# Patient Record
Sex: MALE | Race: WHITE | NOT HISPANIC OR LATINO | URBAN - METROPOLITAN AREA
[De-identification: names, ages, dates, MRNs, and addresses within clinical notes are randomized per-mention and may not be internally consistent; named-entity substitution may affect disease eponyms.]

---

## 2013-02-19 RX ORDER — DONEPEZIL HYDROCHLORIDE 10 MG/1
1 TABLET, FILM COATED ORAL
Qty: 0 | Refills: 0 | COMMUNITY
Start: 2013-02-19 | End: 2014-01-18

## 2017-03-10 ENCOUNTER — INPATIENT (INPATIENT)
Facility: HOSPITAL | Age: 82
LOS: 5 days | Discharge: HOME | DRG: 871 | End: 2017-03-16
Attending: HOSPITALIST | Admitting: HOSPITALIST
Payer: MEDICARE

## 2017-03-10 VITALS — HEART RATE: 91 BPM | SYSTOLIC BLOOD PRESSURE: 149 MMHG | DIASTOLIC BLOOD PRESSURE: 72 MMHG | RESPIRATION RATE: 16 BRPM

## 2017-03-10 DIAGNOSIS — F03.90 UNSPECIFIED DEMENTIA, UNSPECIFIED SEVERITY, WITHOUT BEHAVIORAL DISTURBANCE, PSYCHOTIC DISTURBANCE, MOOD DISTURBANCE, AND ANXIETY: ICD-10-CM

## 2017-03-10 DIAGNOSIS — G91.2 (IDIOPATHIC) NORMAL PRESSURE HYDROCEPHALUS: Chronic | ICD-10-CM

## 2017-03-10 DIAGNOSIS — G91.2 (IDIOPATHIC) NORMAL PRESSURE HYDROCEPHALUS: ICD-10-CM

## 2017-03-10 DIAGNOSIS — A41.9 SEPSIS, UNSPECIFIED ORGANISM: ICD-10-CM

## 2017-03-10 DIAGNOSIS — E11.9 TYPE 2 DIABETES MELLITUS WITHOUT COMPLICATIONS: ICD-10-CM

## 2017-03-10 DIAGNOSIS — J18.9 PNEUMONIA, UNSPECIFIED ORGANISM: ICD-10-CM

## 2017-03-10 DIAGNOSIS — N40.0 BENIGN PROSTATIC HYPERPLASIA WITHOUT LOWER URINARY TRACT SYMPTOMS: ICD-10-CM

## 2017-03-10 DIAGNOSIS — Z29.9 ENCOUNTER FOR PROPHYLACTIC MEASURES, UNSPECIFIED: ICD-10-CM

## 2017-03-10 DIAGNOSIS — E78.5 HYPERLIPIDEMIA, UNSPECIFIED: ICD-10-CM

## 2017-03-10 DIAGNOSIS — R05 COUGH: ICD-10-CM

## 2017-03-10 DIAGNOSIS — M54.5 LOW BACK PAIN: ICD-10-CM

## 2017-03-10 LAB
ALBUMIN SERPL ELPH-MCNC: 3.9 G/DL — SIGNIFICANT CHANGE UP (ref 3.3–5)
ALP SERPL-CCNC: 74 U/L — SIGNIFICANT CHANGE UP (ref 40–120)
ALT FLD-CCNC: 19 U/L RC — SIGNIFICANT CHANGE UP (ref 10–45)
ANION GAP SERPL CALC-SCNC: 15 MMOL/L — SIGNIFICANT CHANGE UP (ref 5–17)
APPEARANCE UR: ABNORMAL
AST SERPL-CCNC: 27 U/L — SIGNIFICANT CHANGE UP (ref 10–40)
BACTERIA # UR AUTO: ABNORMAL /HPF
BASOPHILS # BLD AUTO: 0 K/UL — SIGNIFICANT CHANGE UP (ref 0–0.2)
BILIRUB SERPL-MCNC: 1.2 MG/DL — SIGNIFICANT CHANGE UP (ref 0.2–1.2)
BILIRUB UR-MCNC: NEGATIVE — SIGNIFICANT CHANGE UP
BUN SERPL-MCNC: 20 MG/DL — SIGNIFICANT CHANGE UP (ref 7–23)
CALCIUM SERPL-MCNC: 9.4 MG/DL — SIGNIFICANT CHANGE UP (ref 8.4–10.5)
CHLORIDE SERPL-SCNC: 100 MMOL/L — SIGNIFICANT CHANGE UP (ref 96–108)
CO2 SERPL-SCNC: 22 MMOL/L — SIGNIFICANT CHANGE UP (ref 22–31)
COLOR SPEC: YELLOW — SIGNIFICANT CHANGE UP
COMMENT - URINE: SIGNIFICANT CHANGE UP
CREAT SERPL-MCNC: 1.17 MG/DL — SIGNIFICANT CHANGE UP (ref 0.5–1.3)
DIFF PNL FLD: ABNORMAL
EOSINOPHIL # BLD AUTO: 0 K/UL — SIGNIFICANT CHANGE UP (ref 0–0.5)
GAS PNL BLDV: SIGNIFICANT CHANGE UP
GLUCOSE SERPL-MCNC: 210 MG/DL — HIGH (ref 70–99)
GLUCOSE UR QL: 250
HCT VFR BLD CALC: 48.8 % — SIGNIFICANT CHANGE UP (ref 39–50)
HGB BLD-MCNC: 16.2 G/DL — SIGNIFICANT CHANGE UP (ref 13–17)
KETONES UR-MCNC: NEGATIVE — SIGNIFICANT CHANGE UP
LEUKOCYTE ESTERASE UR-ACNC: ABNORMAL
LYMPHOCYTES # BLD AUTO: 0.7 K/UL — LOW (ref 1–3.3)
LYMPHOCYTES # BLD AUTO: 4 % — LOW (ref 13–44)
MCHC RBC-ENTMCNC: 27.4 PG — SIGNIFICANT CHANGE UP (ref 27–34)
MCHC RBC-ENTMCNC: 33.1 GM/DL — SIGNIFICANT CHANGE UP (ref 32–36)
MCV RBC AUTO: 82.7 FL — SIGNIFICANT CHANGE UP (ref 80–100)
MONOCYTES # BLD AUTO: 2.4 K/UL — HIGH (ref 0–0.9)
MONOCYTES NFR BLD AUTO: 10 % — SIGNIFICANT CHANGE UP (ref 2–14)
NEUTROPHILS # BLD AUTO: 22 K/UL — HIGH (ref 1.8–7.4)
NEUTROPHILS NFR BLD AUTO: 84 % — HIGH (ref 43–77)
NITRITE UR-MCNC: NEGATIVE — SIGNIFICANT CHANGE UP
PH UR: 6 — SIGNIFICANT CHANGE UP (ref 4.8–8)
PLATELET # BLD AUTO: 178 K/UL — SIGNIFICANT CHANGE UP (ref 150–400)
POTASSIUM SERPL-MCNC: 4.8 MMOL/L — SIGNIFICANT CHANGE UP (ref 3.5–5.3)
POTASSIUM SERPL-SCNC: 4.8 MMOL/L — SIGNIFICANT CHANGE UP (ref 3.5–5.3)
PROT SERPL-MCNC: 6.9 G/DL — SIGNIFICANT CHANGE UP (ref 6–8.3)
PROT UR-MCNC: 100 MG/DL
RAPID RVP RESULT: SIGNIFICANT CHANGE UP
RBC # BLD: 5.9 M/UL — HIGH (ref 4.2–5.8)
RBC # FLD: 12.6 % — SIGNIFICANT CHANGE UP (ref 10.3–14.5)
RBC CASTS # UR COMP ASSIST: >50 /HPF (ref 0–2)
SODIUM SERPL-SCNC: 137 MMOL/L — SIGNIFICANT CHANGE UP (ref 135–145)
SP GR SPEC: 1.03 — HIGH (ref 1.01–1.02)
UROBILINOGEN FLD QL: NEGATIVE — SIGNIFICANT CHANGE UP
WBC # BLD: 25.1 K/UL — HIGH (ref 3.8–10.5)
WBC # FLD AUTO: 25.1 K/UL — HIGH (ref 3.8–10.5)
WBC UR QL: >50 /HPF (ref 0–5)

## 2017-03-10 PROCEDURE — 71010: CPT | Mod: 26

## 2017-03-10 PROCEDURE — 99222 1ST HOSP IP/OBS MODERATE 55: CPT | Mod: GC

## 2017-03-10 PROCEDURE — 70450 CT HEAD/BRAIN W/O DYE: CPT | Mod: 26

## 2017-03-10 PROCEDURE — 99285 EMERGENCY DEPT VISIT HI MDM: CPT | Mod: GC

## 2017-03-10 PROCEDURE — 99223 1ST HOSP IP/OBS HIGH 75: CPT | Mod: AI,GC

## 2017-03-10 RX ORDER — ATORVASTATIN CALCIUM 80 MG/1
40 TABLET, FILM COATED ORAL AT BEDTIME
Qty: 0 | Refills: 0 | Status: DISCONTINUED | OUTPATIENT
Start: 2017-03-10 | End: 2017-03-14

## 2017-03-10 RX ORDER — TAMSULOSIN HYDROCHLORIDE 0.4 MG/1
0.4 CAPSULE ORAL AT BEDTIME
Qty: 0 | Refills: 0 | Status: DISCONTINUED | OUTPATIENT
Start: 2017-03-10 | End: 2017-03-16

## 2017-03-10 RX ORDER — AZITHROMYCIN 500 MG/1
500 TABLET, FILM COATED ORAL ONCE
Qty: 0 | Refills: 0 | Status: COMPLETED | OUTPATIENT
Start: 2017-03-10 | End: 2017-03-10

## 2017-03-10 RX ORDER — INSULIN LISPRO 100/ML
VIAL (ML) SUBCUTANEOUS AT BEDTIME
Qty: 0 | Refills: 0 | Status: DISCONTINUED | OUTPATIENT
Start: 2017-03-10 | End: 2017-03-16

## 2017-03-10 RX ORDER — IPRATROPIUM/ALBUTEROL SULFATE 18-103MCG
3 AEROSOL WITH ADAPTER (GRAM) INHALATION ONCE
Qty: 0 | Refills: 0 | Status: COMPLETED | OUTPATIENT
Start: 2017-03-10 | End: 2017-03-10

## 2017-03-10 RX ORDER — INSULIN LISPRO 100/ML
VIAL (ML) SUBCUTANEOUS
Qty: 0 | Refills: 0 | Status: DISCONTINUED | OUTPATIENT
Start: 2017-03-10 | End: 2017-03-16

## 2017-03-10 RX ORDER — SODIUM CHLORIDE 9 MG/ML
1000 INJECTION, SOLUTION INTRAVENOUS
Qty: 0 | Refills: 0 | Status: DISCONTINUED | OUTPATIENT
Start: 2017-03-10 | End: 2017-03-16

## 2017-03-10 RX ORDER — GLUCAGON INJECTION, SOLUTION 0.5 MG/.1ML
1 INJECTION, SOLUTION SUBCUTANEOUS ONCE
Qty: 0 | Refills: 0 | Status: DISCONTINUED | OUTPATIENT
Start: 2017-03-10 | End: 2017-03-16

## 2017-03-10 RX ORDER — SODIUM CHLORIDE 9 MG/ML
500 INJECTION INTRAMUSCULAR; INTRAVENOUS; SUBCUTANEOUS ONCE
Qty: 0 | Refills: 0 | Status: COMPLETED | OUTPATIENT
Start: 2017-03-10 | End: 2017-03-10

## 2017-03-10 RX ORDER — ACETAMINOPHEN 500 MG
650 TABLET ORAL EVERY 6 HOURS
Qty: 0 | Refills: 0 | Status: DISCONTINUED | OUTPATIENT
Start: 2017-03-10 | End: 2017-03-16

## 2017-03-10 RX ORDER — SODIUM CHLORIDE 9 MG/ML
1000 INJECTION INTRAMUSCULAR; INTRAVENOUS; SUBCUTANEOUS ONCE
Qty: 0 | Refills: 0 | Status: COMPLETED | OUTPATIENT
Start: 2017-03-10 | End: 2017-03-10

## 2017-03-10 RX ORDER — PIPERACILLIN AND TAZOBACTAM 4; .5 G/20ML; G/20ML
3.38 INJECTION, POWDER, LYOPHILIZED, FOR SOLUTION INTRAVENOUS EVERY 8 HOURS
Qty: 0 | Refills: 0 | Status: DISCONTINUED | OUTPATIENT
Start: 2017-03-10 | End: 2017-03-15

## 2017-03-10 RX ORDER — DEXTROSE 50 % IN WATER 50 %
25 SYRINGE (ML) INTRAVENOUS ONCE
Qty: 0 | Refills: 0 | Status: DISCONTINUED | OUTPATIENT
Start: 2017-03-10 | End: 2017-03-16

## 2017-03-10 RX ORDER — HEPARIN SODIUM 5000 [USP'U]/ML
5000 INJECTION INTRAVENOUS; SUBCUTANEOUS EVERY 8 HOURS
Qty: 0 | Refills: 0 | Status: DISCONTINUED | OUTPATIENT
Start: 2017-03-10 | End: 2017-03-16

## 2017-03-10 RX ORDER — DONEPEZIL HYDROCHLORIDE 10 MG/1
10 TABLET, FILM COATED ORAL AT BEDTIME
Qty: 0 | Refills: 0 | Status: DISCONTINUED | OUTPATIENT
Start: 2017-03-10 | End: 2017-03-16

## 2017-03-10 RX ORDER — ACETAMINOPHEN 500 MG
1000 TABLET ORAL ONCE
Qty: 0 | Refills: 0 | Status: COMPLETED | OUTPATIENT
Start: 2017-03-10 | End: 2017-03-10

## 2017-03-10 RX ORDER — DEXTROSE 50 % IN WATER 50 %
12.5 SYRINGE (ML) INTRAVENOUS ONCE
Qty: 0 | Refills: 0 | Status: DISCONTINUED | OUTPATIENT
Start: 2017-03-10 | End: 2017-03-16

## 2017-03-10 RX ORDER — CEFTRIAXONE 500 MG/1
1 INJECTION, POWDER, FOR SOLUTION INTRAMUSCULAR; INTRAVENOUS ONCE
Qty: 0 | Refills: 0 | Status: COMPLETED | OUTPATIENT
Start: 2017-03-10 | End: 2017-03-10

## 2017-03-10 RX ORDER — SODIUM CHLORIDE 9 MG/ML
1000 INJECTION INTRAMUSCULAR; INTRAVENOUS; SUBCUTANEOUS
Qty: 0 | Refills: 0 | Status: DISCONTINUED | OUTPATIENT
Start: 2017-03-10 | End: 2017-03-11

## 2017-03-10 RX ORDER — DEXTROSE 50 % IN WATER 50 %
1 SYRINGE (ML) INTRAVENOUS ONCE
Qty: 0 | Refills: 0 | Status: DISCONTINUED | OUTPATIENT
Start: 2017-03-10 | End: 2017-03-16

## 2017-03-10 RX ORDER — PIPERACILLIN AND TAZOBACTAM 4; .5 G/20ML; G/20ML
3.38 INJECTION, POWDER, LYOPHILIZED, FOR SOLUTION INTRAVENOUS ONCE
Qty: 0 | Refills: 0 | Status: COMPLETED | OUTPATIENT
Start: 2017-03-10 | End: 2017-03-10

## 2017-03-10 RX ORDER — GALANTAMINE HYDROBROMIDE 4 MG/1
1 TABLET, FILM COATED ORAL
Qty: 0 | Refills: 0 | COMMUNITY

## 2017-03-10 RX ORDER — IPRATROPIUM/ALBUTEROL SULFATE 18-103MCG
3 AEROSOL WITH ADAPTER (GRAM) INHALATION EVERY 6 HOURS
Qty: 0 | Refills: 0 | Status: DISCONTINUED | OUTPATIENT
Start: 2017-03-10 | End: 2017-03-13

## 2017-03-10 RX ORDER — LIDOCAINE 4 G/100G
1 CREAM TOPICAL DAILY
Qty: 0 | Refills: 0 | Status: DISCONTINUED | OUTPATIENT
Start: 2017-03-10 | End: 2017-03-16

## 2017-03-10 RX ADMIN — CEFTRIAXONE 100 GRAM(S): 500 INJECTION, POWDER, FOR SOLUTION INTRAMUSCULAR; INTRAVENOUS at 11:36

## 2017-03-10 RX ADMIN — Medication 3 MILLILITER(S): at 13:55

## 2017-03-10 RX ADMIN — Medication 1000 MILLIGRAM(S): at 11:11

## 2017-03-10 RX ADMIN — SODIUM CHLORIDE 70 MILLILITER(S): 9 INJECTION INTRAMUSCULAR; INTRAVENOUS; SUBCUTANEOUS at 20:09

## 2017-03-10 RX ADMIN — Medication 3 MILLILITER(S): at 21:04

## 2017-03-10 RX ADMIN — SODIUM CHLORIDE 70 MILLILITER(S): 9 INJECTION INTRAMUSCULAR; INTRAVENOUS; SUBCUTANEOUS at 16:49

## 2017-03-10 RX ADMIN — SODIUM CHLORIDE 500 MILLILITER(S): 9 INJECTION INTRAMUSCULAR; INTRAVENOUS; SUBCUTANEOUS at 10:35

## 2017-03-10 RX ADMIN — DONEPEZIL HYDROCHLORIDE 10 MILLIGRAM(S): 10 TABLET, FILM COATED ORAL at 21:04

## 2017-03-10 RX ADMIN — SODIUM CHLORIDE 1000 MILLILITER(S): 9 INJECTION INTRAMUSCULAR; INTRAVENOUS; SUBCUTANEOUS at 09:28

## 2017-03-10 RX ADMIN — ATORVASTATIN CALCIUM 40 MILLIGRAM(S): 80 TABLET, FILM COATED ORAL at 21:04

## 2017-03-10 RX ADMIN — AZITHROMYCIN 250 MILLIGRAM(S): 500 TABLET, FILM COATED ORAL at 12:38

## 2017-03-10 RX ADMIN — PIPERACILLIN AND TAZOBACTAM 200 GRAM(S): 4; .5 INJECTION, POWDER, LYOPHILIZED, FOR SOLUTION INTRAVENOUS at 16:50

## 2017-03-10 RX ADMIN — HEPARIN SODIUM 5000 UNIT(S): 5000 INJECTION INTRAVENOUS; SUBCUTANEOUS at 17:00

## 2017-03-10 RX ADMIN — Medication 400 MILLIGRAM(S): at 09:41

## 2017-03-10 RX ADMIN — Medication 3 MILLILITER(S): at 11:05

## 2017-03-10 RX ADMIN — PIPERACILLIN AND TAZOBACTAM 25 GRAM(S): 4; .5 INJECTION, POWDER, LYOPHILIZED, FOR SOLUTION INTRAVENOUS at 21:05

## 2017-03-10 RX ADMIN — Medication 3 MILLILITER(S): at 14:31

## 2017-03-10 RX ADMIN — TAMSULOSIN HYDROCHLORIDE 0.4 MILLIGRAM(S): 0.4 CAPSULE ORAL at 21:04

## 2017-03-10 RX ADMIN — SODIUM CHLORIDE 1000 MILLILITER(S): 9 INJECTION INTRAMUSCULAR; INTRAVENOUS; SUBCUTANEOUS at 13:05

## 2017-03-10 RX ADMIN — Medication 1: at 18:36

## 2017-03-10 RX ADMIN — HEPARIN SODIUM 5000 UNIT(S): 5000 INJECTION INTRAVENOUS; SUBCUTANEOUS at 21:04

## 2017-03-10 RX ADMIN — LIDOCAINE 1 PATCH: 4 CREAM TOPICAL at 16:49

## 2017-03-10 NOTE — ED PROVIDER NOTE - CARE PLAN
Principal Discharge DX:	Pneumonia due to infectious organism, unspecified laterality, unspecified part of lung

## 2017-03-10 NOTE — H&P ADULT. - FAMILY HISTORY
Father  Still living? Unknown  Family history of colon cancer, Age at diagnosis: Age Unknown  Family history of stroke, Age at diagnosis: Age Unknown     Mother  Still living? Unknown  Family history of colon cancer, Age at diagnosis: Age Unknown  Family history of stroke, Age at diagnosis: Age Unknown

## 2017-03-10 NOTE — ED PROVIDER NOTE - ATTENDING CONTRIBUTION TO CARE
Dr. Poe (Attending Physician)  Pt. with ho NPH s/p ventriculostomy, utis, pna pw fever, weakness, fatigue, cough hypoxic to low 90s but denies shortness of breath, lungs with diffuse wheezing and rhonchi worst in RUL. Denies urinary symptoms.  Abd. soft/nontender.  Will cxr, labs, RVP, lactate, ecg.  Will cover for CAP.    I performed a history and physical exam of the patient and discussed their management with the resident. I reviewed the resident's note and agree with the documented findings and plan of care. My medical decision making and observations are found above.

## 2017-03-10 NOTE — ED PROVIDER NOTE - MEDICAL DECISION MAKING DETAILS
82M hx multiple medical comorbidities 82M hx multiple medical comorbidities including NPH c/b chronic UTIs presents with AMS and weakness this AM. Chronic cough.  SpO2 91% on room air, VS otherwise WNL.  wheezes noted on exam.  URI resulted in bronchospasm +/- UTI.  Will obtain labs including vbg, rvp, bcx, ucx.  cxr, ekg, CTH.  IVF bolus and duoneb. reassess.

## 2017-03-10 NOTE — H&P ADULT. - PROBLEM SELECTOR PLAN 3
Has history of incontinence and repeat UTIs.   - Continue home vesicare, mirabegron. Hold home hiprex.

## 2017-03-10 NOTE — H&P ADULT. - PROBLEM SELECTOR PLAN 4
Hold home oral medications glipizide, acarbose, and januvia.  - SSI and FSGs  - Consistent carbohydrate diet

## 2017-03-10 NOTE — H&P ADULT. - PROBLEM SELECTOR PLAN 1
2/2 unclear source of infection, likely urinary. Has received 2.5 L NS at the ED  - Continue mIVF   - Blood cultures, urine culture and UA pending  - CXR without obvious source of infection. RVP negative. 2/2 unclear source of infection, likely urinary. Has received 2.5 L NS at the ED  - Continue mIVF   - Blood cultures, urine culture and UA pending  - CXR without obvious source of infection. RVP negative.  - Discuss home carvedilol.

## 2017-03-10 NOTE — ED ADULT NURSE NOTE - OBJECTIVE STATEMENT
82M came to ED via ambulance after wife called 911 because the patient was "unresponsive". Pts wife states she was speaking to him around 0300 this morning, and the patient wouldn't answer her. Pt a&ox2-3 with confusion as to the year. Last night around 2000, pt told his wife he had the chills and he went to bed much earlier than normal. Pt was at baseline mental status when he went to bed. At 0300 this AM pts wife noticed change in mental status. Pt has PMH hydrocephalus, DM and high cholesterol. Pt did not take any medications last night for chills. Pt denies chest pain/SOB/N/V/D/dizziness/abdominal pain/trauma/weakness/pain. Pt had history of "nephrotic syndrome" after taking Aspirin in the past, causing him to gain 50 lbs as per wife. Pts wife states that the lethargy and confusion he is experiencing is similar to when he had a UTI in the past. Pts oxygen saturation 91% on RA, so patient placed on 2L NC upon ED arrival. Pt ambulates with walker at home.

## 2017-03-10 NOTE — H&P ADULT. - ATTENDING COMMENTS
83 yo M pmhx NPH (s/p ventriculostomy in 2012), incontinence, previous UTIs, dementia, HTN, chronic low back pain s/p prior surgeries, HTN, T2DM and nephrotic syndrome who presents with fever, AMS and incontinence admitted for:  1.Sepsis likely in the setting of UTI in a pt with hx of recurrent UTI's on chronic immunosuppresive therapy as outpatient  -Start IV zosyn  -Pt has seen Dr. Santiago in the past, ID eval requested   -Follow up urine and blood cultures  -Trend wbc and temp curve   2.Acute metabolic encephalopthy superimposed on dementia likely multifactorial in the setting of sepsis and polypharmacy  -Would hold merbetriq, vesicare, diluadid for now   -PT eval, oob, fall precautions  -Continue aricept, galantamine for hx of dementia  3.Chronic low back pain 2/2 multiple sx hx   -Start tylenol and lidocaine patch for back pain   -Uptitrate as clinically indicated for optimal pain control   4.DM2  -SS for now, transition to lantus, humalog pending weight  -A1C in am  Rest per note above by Dr. Hamlin

## 2017-03-10 NOTE — H&P ADULT. - ASSESSMENT
81 yo M pmhx NPH (s/p ventriculostomy in 2012), incontinence, previous UTIs, dementia, HTN, chronic low back pain s/p prior surgeries, HTN, T2DM and nephrotic syndrome who presents with fever, AMS and incontinence 2/2 sepsis.

## 2017-03-10 NOTE — H&P ADULT. - PROBLEM SELECTOR PLAN 2
Has history of chronic cough with prior work-up in past per wife. CXR without obvious source of infection and negative RVP. Of note he had mild wheeze and hypoxia to low 90s.   - Continue supplemental O2  - Continue duonebs

## 2017-03-10 NOTE — ED ADULT NURSE NOTE - PMH
BPH (benign prostatic hyperplasia)    Chronic Back Pain  L3-L4, L4-L5 compression  Chronic lower back pain  unclear etiology per wife - no hx trauma, possibly arthritis  H/O recurrent urinary tract infection    Hypertension, Essential    Lumbar spinal stenosis    Mild dementia    Nephrotic syndrome  pt was instructed to avoid taking NSAIDS  NPH (Normal Pressure Hydrocephalus)  s/p ventriculostomy, last one in Jan 2012  OAB (overactive bladder)    Type 2 diabetes mellitus    Vitamin D deficiency

## 2017-03-10 NOTE — ED PROVIDER NOTE - PROGRESS NOTE DETAILS
Case d/w with Dr. Duque's PA Divina  who agrees with plan to admit.  Dr. Duque's group with follow while in ED.      Yisel Cramer MD

## 2017-03-10 NOTE — ED PROVIDER NOTE - OBJECTIVE STATEMENT
82M hx multiple medical comorbidities including dementia, NPH s/p remote ventriculostomy c/b incontinence and multiple UTIs presents with AMS this morning associated with generalized weakness and decreased responsiveness.  Patient ambulates with walker at baseline but could not get up from bed today. Wife reports chills last night but otherwise patient was at baseline.  Denies nausea/vomiting, dysarthria, facial droop or focal deficits, trauma.  No antipyretics on board. Received influenza vaccine. 82M hx multiple medical comorbidities including dementia, NPH s/p remote ventriculostomy c/b incontinence and multiple UTIs presents with AMS this morning associated with generalized weakness and decreased responsiveness.  Patient ambulates with walker at baseline but could not get up from bed today. Wife reports chills last night but otherwise patient was at baseline.  Denies nausea/vomiting, dysarthria, facial droop or focal deficits, trauma.  No antipyretics on board. Received influenza vaccine.    PMD - Eelazar Duque

## 2017-03-10 NOTE — H&P ADULT. - HISTORY OF PRESENT ILLNESS
83 yo   pmhx: NPH (s/p ventriculostomy in 2012)  BPH, dementia, incontinence  Chronic low back pain  HTN  Nephrotic syndrome  T2DM    While at ED VS: 149/72, HR 91, RR 16, 91% RA 81 yo M pmhx NPH (s/p ventriculostomy in 2012), incontinence, previous UTIs, dementia, HTN, chronic low back pain s/p prior surgeries, HTN, T2DM and nephrotic syndrome who presents with fever, AMS and incontinence.     Symptoms began last night around 8 pm. His wife noticed that he was cold and did not appear to warm up with multiple blankets. Around 3 am he was incontinent, which is common for him, however he was not his usual self. He was awake but was not responding appropriately to questions or commands. His wife brought him to the hospital due to concerns of repeat UTI. Patient also had suprapubic pain with questionable burning sensation (which he later denied).     Of note per wife he has had previous admissions for UTI. He has a history of incontinence with his hydrocephalus, but not usually associated with his change in mental status. He typically wakes up multiple times a night to urinate. He also has had a chronic cough for the last few years. It is non-productive.     While at ED VS: 149/72, HR 91, RR 16, 91% RA. He received a dose of ceftriaxone and was given 2.5 L NS and duoneb. RVP was negative. CXR with poor penetration, possible nodular opacities/lymph nodes, and no pleural effusions. CT Head had no acute changes but had unchanged ventriculomegaly. 81 yo M pmhx NPH (s/p ventriculostomy in 2012), incontinence, previous UTIs, dementia, HTN, chronic low back pain s/p prior surgeries, HTN, T2DM and nephrotic syndrome who presents with fever, AMS and incontinence.     Symptoms began last night around 8 pm. His wife noticed that he was cold and did not appear to warm up with multiple blankets. Around 3 am he was incontinent, which is common for him, however he was not his usual self. He was awake but was not responding appropriately to questions or commands. His wife brought him to the hospital due to concerns of repeat UTI. Patient also had suprapubic pain with questionable burning sensation (which he later denied).     Of note per wife he has had previous admissions for UTI. He has a history of incontinence with his hydrocephalus, but not usually associated with his change in mental status. He typically wakes up multiple times a night to urinate. He also has had a chronic cough for the last few years. It is non-productive.     While at ED VS: 149/72, HR 91, RR 16, 91% RA. He was febrile to 101.1. He received a dose of ceftriaxone and was given 2.5 L NS and duoneb. RVP was negative. CXR with poor penetration, possible nodular opacities/lymph nodes, and no pleural effusions. CT Head had no acute changes but had unchanged ventriculomegaly.

## 2017-03-10 NOTE — ED PROVIDER NOTE - RESPIRATORY, MLM
coarse wheezing bilaterally coarse wheezing bilaterally in bases that resolved with deep respirations.

## 2017-03-10 NOTE — H&P ADULT. - LAB RESULTS AND INTERPRETATION
Laboratories notable for elevated WBC ct of 25.1, predominantly neutrophils and monocytes, bands 2.0%. BMP with creatinine of 1.17 (b/l 1.04). LFTs within normal. VBG 7.41/42/48/26. Lactate 2.0.

## 2017-03-10 NOTE — ED ADULT NURSE REASSESSMENT NOTE - NS ED NURSE REASSESS COMMENT FT1
Pt sitting up in bed. Pt received IV tylenol and fluids.  Pt less lethargic and more alert than he was upon ED arrival. Pt to be admitted, pending bed assignment.

## 2017-03-11 LAB
ANION GAP SERPL CALC-SCNC: 13 MMOL/L — SIGNIFICANT CHANGE UP (ref 5–17)
BUN SERPL-MCNC: 19 MG/DL — SIGNIFICANT CHANGE UP (ref 7–23)
CALCIUM SERPL-MCNC: 8.3 MG/DL — LOW (ref 8.4–10.5)
CHLORIDE SERPL-SCNC: 104 MMOL/L — SIGNIFICANT CHANGE UP (ref 96–108)
CHOLEST SERPL-MCNC: 79 MG/DL — SIGNIFICANT CHANGE UP (ref 10–199)
CO2 SERPL-SCNC: 20 MMOL/L — LOW (ref 22–31)
CREAT SERPL-MCNC: 1.09 MG/DL — SIGNIFICANT CHANGE UP (ref 0.5–1.3)
CULTURE RESULTS: NO GROWTH — SIGNIFICANT CHANGE UP
GLUCOSE SERPL-MCNC: 235 MG/DL — HIGH (ref 70–99)
HBA1C BLD-MCNC: 6.2 % — HIGH (ref 4–5.6)
HCT VFR BLD CALC: 44.1 % — SIGNIFICANT CHANGE UP (ref 39–50)
HDLC SERPL-MCNC: 29 MG/DL — LOW (ref 40–125)
HGB BLD-MCNC: 14.6 G/DL — SIGNIFICANT CHANGE UP (ref 13–17)
LIPID PNL WITH DIRECT LDL SERPL: 31 MG/DL — SIGNIFICANT CHANGE UP
MAGNESIUM SERPL-MCNC: 1.8 MG/DL — SIGNIFICANT CHANGE UP (ref 1.6–2.6)
MCHC RBC-ENTMCNC: 27.4 PG — SIGNIFICANT CHANGE UP (ref 27–34)
MCHC RBC-ENTMCNC: 33 GM/DL — SIGNIFICANT CHANGE UP (ref 32–36)
MCV RBC AUTO: 82.9 FL — SIGNIFICANT CHANGE UP (ref 80–100)
PHOSPHATE SERPL-MCNC: 2 MG/DL — LOW (ref 2.5–4.5)
PLATELET # BLD AUTO: 136 K/UL — LOW (ref 150–400)
POTASSIUM SERPL-MCNC: 3.9 MMOL/L — SIGNIFICANT CHANGE UP (ref 3.5–5.3)
POTASSIUM SERPL-SCNC: 3.9 MMOL/L — SIGNIFICANT CHANGE UP (ref 3.5–5.3)
RBC # BLD: 5.33 M/UL — SIGNIFICANT CHANGE UP (ref 4.2–5.8)
RBC # FLD: 12.6 % — SIGNIFICANT CHANGE UP (ref 10.3–14.5)
SODIUM SERPL-SCNC: 137 MMOL/L — SIGNIFICANT CHANGE UP (ref 135–145)
SPECIMEN SOURCE: SIGNIFICANT CHANGE UP
TOTAL CHOLESTEROL/HDL RATIO MEASUREMENT: 2.7 RATIO — LOW (ref 3.4–9.6)
TRIGL SERPL-MCNC: 97 MG/DL — SIGNIFICANT CHANGE UP (ref 10–149)
TSH SERPL-MCNC: 1.27 UIU/ML — SIGNIFICANT CHANGE UP (ref 0.27–4.2)
WBC # BLD: 21.1 K/UL — HIGH (ref 3.8–10.5)
WBC # FLD AUTO: 21.1 K/UL — HIGH (ref 3.8–10.5)

## 2017-03-11 PROCEDURE — 99233 SBSQ HOSP IP/OBS HIGH 50: CPT | Mod: GC

## 2017-03-11 RX ORDER — GALANTAMINE HYDROBROMIDE 4 MG/1
8 TABLET, FILM COATED ORAL DAILY
Qty: 0 | Refills: 0 | Status: DISCONTINUED | OUTPATIENT
Start: 2017-03-11 | End: 2017-03-16

## 2017-03-11 RX ORDER — ACETAMINOPHEN 500 MG
650 TABLET ORAL EVERY 6 HOURS
Qty: 0 | Refills: 0 | Status: DISCONTINUED | OUTPATIENT
Start: 2017-03-11 | End: 2017-03-16

## 2017-03-11 RX ADMIN — ATORVASTATIN CALCIUM 40 MILLIGRAM(S): 80 TABLET, FILM COATED ORAL at 22:48

## 2017-03-11 RX ADMIN — PIPERACILLIN AND TAZOBACTAM 25 GRAM(S): 4; .5 INJECTION, POWDER, LYOPHILIZED, FOR SOLUTION INTRAVENOUS at 13:59

## 2017-03-11 RX ADMIN — LIDOCAINE 1 PATCH: 4 CREAM TOPICAL at 11:38

## 2017-03-11 RX ADMIN — LIDOCAINE 1 PATCH: 4 CREAM TOPICAL at 22:59

## 2017-03-11 RX ADMIN — Medication 650 MILLIGRAM(S): at 17:58

## 2017-03-11 RX ADMIN — Medication 3 MILLILITER(S): at 05:27

## 2017-03-11 RX ADMIN — PIPERACILLIN AND TAZOBACTAM 25 GRAM(S): 4; .5 INJECTION, POWDER, LYOPHILIZED, FOR SOLUTION INTRAVENOUS at 05:27

## 2017-03-11 RX ADMIN — TAMSULOSIN HYDROCHLORIDE 0.4 MILLIGRAM(S): 0.4 CAPSULE ORAL at 22:49

## 2017-03-11 RX ADMIN — Medication 1 TABLET(S): at 11:38

## 2017-03-11 RX ADMIN — LIDOCAINE 1 PATCH: 4 CREAM TOPICAL at 04:59

## 2017-03-11 RX ADMIN — HEPARIN SODIUM 5000 UNIT(S): 5000 INJECTION INTRAVENOUS; SUBCUTANEOUS at 05:27

## 2017-03-11 RX ADMIN — DONEPEZIL HYDROCHLORIDE 10 MILLIGRAM(S): 10 TABLET, FILM COATED ORAL at 22:49

## 2017-03-11 RX ADMIN — Medication 1: at 13:59

## 2017-03-11 RX ADMIN — Medication 2: at 17:22

## 2017-03-11 RX ADMIN — Medication 63.75 MILLIMOLE(S): at 17:22

## 2017-03-11 RX ADMIN — HEPARIN SODIUM 5000 UNIT(S): 5000 INJECTION INTRAVENOUS; SUBCUTANEOUS at 13:58

## 2017-03-11 RX ADMIN — SODIUM CHLORIDE 70 MILLILITER(S): 9 INJECTION INTRAMUSCULAR; INTRAVENOUS; SUBCUTANEOUS at 05:26

## 2017-03-11 RX ADMIN — PIPERACILLIN AND TAZOBACTAM 25 GRAM(S): 4; .5 INJECTION, POWDER, LYOPHILIZED, FOR SOLUTION INTRAVENOUS at 22:49

## 2017-03-11 RX ADMIN — HEPARIN SODIUM 5000 UNIT(S): 5000 INJECTION INTRAVENOUS; SUBCUTANEOUS at 22:49

## 2017-03-11 NOTE — PHYSICAL THERAPY INITIAL EVALUATION ADULT - PERTINENT HX OF CURRENT PROBLEM, REHAB EVAL
82M w/ PMH of NPH (s/p ventriculostomy in 2012), incontinence, previous UTIs on chronic abx suppression, dementia, HTN, chronic low back pain s/p prior surgeries, HTN, T2DM and nephrotic syndrome who presents with fever, AMS and incontinence likely 2/2 sepsis, Results urine shows +blood, +bacteria, CT head: Ventriculomegaly of the lateral and third ventricles is unchanged from 2014 without evidence of transependymal flow.

## 2017-03-11 NOTE — PHYSICAL THERAPY INITIAL EVALUATION ADULT - ADDITIONAL COMMENTS
As per the pt he lives in pvt home with spouse, steps inside don't know how many, +chair lift, +shower chair, and owns RW, used for ambulation, per pt he's independent in ADLs PTA

## 2017-03-12 LAB
ANION GAP SERPL CALC-SCNC: 13 MMOL/L — SIGNIFICANT CHANGE UP (ref 5–17)
BASOPHILS # BLD AUTO: 0 K/UL — SIGNIFICANT CHANGE UP (ref 0–0.2)
BASOPHILS NFR BLD AUTO: 0 % — SIGNIFICANT CHANGE UP (ref 0–2)
BUN SERPL-MCNC: 17 MG/DL — SIGNIFICANT CHANGE UP (ref 7–23)
CALCIUM SERPL-MCNC: 8.6 MG/DL — SIGNIFICANT CHANGE UP (ref 8.4–10.5)
CHLORIDE SERPL-SCNC: 103 MMOL/L — SIGNIFICANT CHANGE UP (ref 96–108)
CO2 SERPL-SCNC: 21 MMOL/L — LOW (ref 22–31)
CREAT SERPL-MCNC: 1.15 MG/DL — SIGNIFICANT CHANGE UP (ref 0.5–1.3)
EOSINOPHIL # BLD AUTO: 0.01 K/UL — SIGNIFICANT CHANGE UP (ref 0–0.5)
EOSINOPHIL NFR BLD AUTO: 0.1 % — SIGNIFICANT CHANGE UP (ref 0–6)
GLUCOSE SERPL-MCNC: 135 MG/DL — HIGH (ref 70–99)
HCT VFR BLD CALC: 42.3 % — SIGNIFICANT CHANGE UP (ref 39–50)
HGB BLD-MCNC: 14.3 G/DL — SIGNIFICANT CHANGE UP (ref 13–17)
IMM GRANULOCYTES NFR BLD AUTO: 0.3 % — SIGNIFICANT CHANGE UP (ref 0–1.5)
LYMPHOCYTES # BLD AUTO: 0.43 K/UL — LOW (ref 1–3.3)
LYMPHOCYTES # BLD AUTO: 3.4 % — LOW (ref 13–44)
MAGNESIUM SERPL-MCNC: 2 MG/DL — SIGNIFICANT CHANGE UP (ref 1.6–2.6)
MCHC RBC-ENTMCNC: 27.4 PG — SIGNIFICANT CHANGE UP (ref 27–34)
MCHC RBC-ENTMCNC: 33.8 GM/DL — SIGNIFICANT CHANGE UP (ref 32–36)
MCV RBC AUTO: 81.2 FL — SIGNIFICANT CHANGE UP (ref 80–100)
MONOCYTES # BLD AUTO: 0.85 K/UL — SIGNIFICANT CHANGE UP (ref 0–0.9)
MONOCYTES NFR BLD AUTO: 6.7 % — SIGNIFICANT CHANGE UP (ref 2–14)
NEUTROPHILS # BLD AUTO: 11.34 K/UL — HIGH (ref 1.8–7.4)
NEUTROPHILS NFR BLD AUTO: 89.5 % — HIGH (ref 43–77)
PHOSPHATE SERPL-MCNC: 1.7 MG/DL — LOW (ref 2.5–4.5)
PLATELET # BLD AUTO: 138 K/UL — LOW (ref 150–400)
POTASSIUM SERPL-MCNC: 3.7 MMOL/L — SIGNIFICANT CHANGE UP (ref 3.5–5.3)
POTASSIUM SERPL-SCNC: 3.7 MMOL/L — SIGNIFICANT CHANGE UP (ref 3.5–5.3)
RBC # BLD: 5.21 M/UL — SIGNIFICANT CHANGE UP (ref 4.2–5.8)
RBC # FLD: 14.2 % — SIGNIFICANT CHANGE UP (ref 10.3–14.5)
SODIUM SERPL-SCNC: 137 MMOL/L — SIGNIFICANT CHANGE UP (ref 135–145)
WBC # BLD: 12.67 K/UL — HIGH (ref 3.8–10.5)
WBC # FLD AUTO: 12.67 K/UL — HIGH (ref 3.8–10.5)

## 2017-03-12 PROCEDURE — 99233 SBSQ HOSP IP/OBS HIGH 50: CPT | Mod: GC

## 2017-03-12 RX ORDER — LACTOBACILLUS ACIDOPHILUS 100MM CELL
1 CAPSULE ORAL DAILY
Qty: 0 | Refills: 0 | Status: DISCONTINUED | OUTPATIENT
Start: 2017-03-12 | End: 2017-03-16

## 2017-03-12 RX ORDER — POTASSIUM PHOSPHATE, MONOBASIC POTASSIUM PHOSPHATE, DIBASIC 236; 224 MG/ML; MG/ML
15 INJECTION, SOLUTION INTRAVENOUS ONCE
Qty: 0 | Refills: 0 | Status: COMPLETED | OUTPATIENT
Start: 2017-03-12 | End: 2017-03-12

## 2017-03-12 RX ADMIN — HEPARIN SODIUM 5000 UNIT(S): 5000 INJECTION INTRAVENOUS; SUBCUTANEOUS at 22:05

## 2017-03-12 RX ADMIN — GALANTAMINE HYDROBROMIDE 8 MILLIGRAM(S): 4 TABLET, FILM COATED ORAL at 13:29

## 2017-03-12 RX ADMIN — DONEPEZIL HYDROCHLORIDE 10 MILLIGRAM(S): 10 TABLET, FILM COATED ORAL at 22:04

## 2017-03-12 RX ADMIN — LIDOCAINE 1 PATCH: 4 CREAM TOPICAL at 13:29

## 2017-03-12 RX ADMIN — PIPERACILLIN AND TAZOBACTAM 25 GRAM(S): 4; .5 INJECTION, POWDER, LYOPHILIZED, FOR SOLUTION INTRAVENOUS at 13:29

## 2017-03-12 RX ADMIN — POTASSIUM PHOSPHATE, MONOBASIC POTASSIUM PHOSPHATE, DIBASIC 62.5 MILLIMOLE(S): 236; 224 INJECTION, SOLUTION INTRAVENOUS at 13:29

## 2017-03-12 RX ADMIN — HEPARIN SODIUM 5000 UNIT(S): 5000 INJECTION INTRAVENOUS; SUBCUTANEOUS at 13:29

## 2017-03-12 RX ADMIN — Medication 1: at 17:33

## 2017-03-12 RX ADMIN — PIPERACILLIN AND TAZOBACTAM 25 GRAM(S): 4; .5 INJECTION, POWDER, LYOPHILIZED, FOR SOLUTION INTRAVENOUS at 22:05

## 2017-03-12 RX ADMIN — HEPARIN SODIUM 5000 UNIT(S): 5000 INJECTION INTRAVENOUS; SUBCUTANEOUS at 06:08

## 2017-03-12 RX ADMIN — Medication 1: at 13:29

## 2017-03-12 RX ADMIN — Medication 650 MILLIGRAM(S): at 22:04

## 2017-03-12 RX ADMIN — Medication 1 TABLET(S): at 17:34

## 2017-03-12 RX ADMIN — PIPERACILLIN AND TAZOBACTAM 25 GRAM(S): 4; .5 INJECTION, POWDER, LYOPHILIZED, FOR SOLUTION INTRAVENOUS at 06:08

## 2017-03-12 RX ADMIN — Medication 1 TABLET(S): at 13:29

## 2017-03-12 RX ADMIN — ATORVASTATIN CALCIUM 40 MILLIGRAM(S): 80 TABLET, FILM COATED ORAL at 22:04

## 2017-03-12 RX ADMIN — TAMSULOSIN HYDROCHLORIDE 0.4 MILLIGRAM(S): 0.4 CAPSULE ORAL at 22:04

## 2017-03-13 LAB
ANION GAP SERPL CALC-SCNC: 15 MMOL/L — SIGNIFICANT CHANGE UP (ref 5–17)
BASOPHILS # BLD AUTO: 0.01 K/UL — SIGNIFICANT CHANGE UP (ref 0–0.2)
BASOPHILS NFR BLD AUTO: 0.1 % — SIGNIFICANT CHANGE UP (ref 0–2)
BUN SERPL-MCNC: 13 MG/DL — SIGNIFICANT CHANGE UP (ref 7–23)
CALCIUM SERPL-MCNC: 8.9 MG/DL — SIGNIFICANT CHANGE UP (ref 8.4–10.5)
CHLORIDE SERPL-SCNC: 106 MMOL/L — SIGNIFICANT CHANGE UP (ref 96–108)
CO2 SERPL-SCNC: 20 MMOL/L — LOW (ref 22–31)
CREAT SERPL-MCNC: 1.11 MG/DL — SIGNIFICANT CHANGE UP (ref 0.5–1.3)
EOSINOPHIL # BLD AUTO: 0.02 K/UL — SIGNIFICANT CHANGE UP (ref 0–0.5)
EOSINOPHIL NFR BLD AUTO: 0.3 % — SIGNIFICANT CHANGE UP (ref 0–6)
GLUCOSE SERPL-MCNC: 209 MG/DL — HIGH (ref 70–99)
HCT VFR BLD CALC: 41.5 % — SIGNIFICANT CHANGE UP (ref 39–50)
HGB BLD-MCNC: 13.8 G/DL — SIGNIFICANT CHANGE UP (ref 13–17)
IMM GRANULOCYTES NFR BLD AUTO: 0.5 % — SIGNIFICANT CHANGE UP (ref 0–1.5)
LYMPHOCYTES # BLD AUTO: 0.57 K/UL — LOW (ref 1–3.3)
LYMPHOCYTES # BLD AUTO: 7.7 % — LOW (ref 13–44)
MAGNESIUM SERPL-MCNC: 2.1 MG/DL — SIGNIFICANT CHANGE UP (ref 1.6–2.6)
MCHC RBC-ENTMCNC: 26.1 PG — LOW (ref 27–34)
MCHC RBC-ENTMCNC: 33.3 GM/DL — SIGNIFICANT CHANGE UP (ref 32–36)
MCV RBC AUTO: 78.6 FL — LOW (ref 80–100)
MONOCYTES # BLD AUTO: 0.77 K/UL — SIGNIFICANT CHANGE UP (ref 0–0.9)
MONOCYTES NFR BLD AUTO: 10.4 % — SIGNIFICANT CHANGE UP (ref 2–14)
NEUTROPHILS # BLD AUTO: 5.96 K/UL — SIGNIFICANT CHANGE UP (ref 1.8–7.4)
NEUTROPHILS NFR BLD AUTO: 81 % — HIGH (ref 43–77)
PHOSPHATE SERPL-MCNC: 1.8 MG/DL — LOW (ref 2.5–4.5)
PLATELET # BLD AUTO: 149 K/UL — LOW (ref 150–400)
POTASSIUM SERPL-MCNC: 3.7 MMOL/L — SIGNIFICANT CHANGE UP (ref 3.5–5.3)
POTASSIUM SERPL-SCNC: 3.7 MMOL/L — SIGNIFICANT CHANGE UP (ref 3.5–5.3)
RAPID RVP RESULT: SIGNIFICANT CHANGE UP
RBC # BLD: 5.28 M/UL — SIGNIFICANT CHANGE UP (ref 4.2–5.8)
RBC # FLD: 14.1 % — SIGNIFICANT CHANGE UP (ref 10.3–14.5)
SODIUM SERPL-SCNC: 141 MMOL/L — SIGNIFICANT CHANGE UP (ref 135–145)
WBC # BLD: 7.37 K/UL — SIGNIFICANT CHANGE UP (ref 3.8–10.5)
WBC # FLD AUTO: 7.37 K/UL — SIGNIFICANT CHANGE UP (ref 3.8–10.5)

## 2017-03-13 PROCEDURE — 71250 CT THORAX DX C-: CPT | Mod: 26

## 2017-03-13 PROCEDURE — 76770 US EXAM ABDO BACK WALL COMP: CPT | Mod: 26

## 2017-03-13 PROCEDURE — 71010: CPT | Mod: 26

## 2017-03-13 PROCEDURE — 74020: CPT | Mod: 26

## 2017-03-13 PROCEDURE — 99233 SBSQ HOSP IP/OBS HIGH 50: CPT | Mod: GC

## 2017-03-13 PROCEDURE — 99232 SBSQ HOSP IP/OBS MODERATE 35: CPT

## 2017-03-13 RX ORDER — IPRATROPIUM/ALBUTEROL SULFATE 18-103MCG
3 AEROSOL WITH ADAPTER (GRAM) INHALATION EVERY 6 HOURS
Qty: 0 | Refills: 0 | Status: DISCONTINUED | OUTPATIENT
Start: 2017-03-13 | End: 2017-03-16

## 2017-03-13 RX ORDER — SODIUM CHLORIDE 9 MG/ML
1000 INJECTION INTRAMUSCULAR; INTRAVENOUS; SUBCUTANEOUS
Qty: 0 | Refills: 0 | Status: DISCONTINUED | OUTPATIENT
Start: 2017-03-13 | End: 2017-03-16

## 2017-03-13 RX ORDER — ONDANSETRON 8 MG/1
4 TABLET, FILM COATED ORAL EVERY 6 HOURS
Qty: 0 | Refills: 0 | Status: DISCONTINUED | OUTPATIENT
Start: 2017-03-13 | End: 2017-03-16

## 2017-03-13 RX ORDER — ONDANSETRON 8 MG/1
4 TABLET, FILM COATED ORAL EVERY 4 HOURS
Qty: 0 | Refills: 0 | Status: DISCONTINUED | OUTPATIENT
Start: 2017-03-13 | End: 2017-03-13

## 2017-03-13 RX ORDER — SODIUM,POTASSIUM PHOSPHATES 278-250MG
1 POWDER IN PACKET (EA) ORAL
Qty: 0 | Refills: 0 | Status: COMPLETED | OUTPATIENT
Start: 2017-03-13 | End: 2017-03-14

## 2017-03-13 RX ADMIN — Medication 2: at 12:00

## 2017-03-13 RX ADMIN — ONDANSETRON 4 MILLIGRAM(S): 8 TABLET, FILM COATED ORAL at 17:36

## 2017-03-13 RX ADMIN — Medication 1 TABLET(S): at 12:01

## 2017-03-13 RX ADMIN — LIDOCAINE 1 PATCH: 4 CREAM TOPICAL at 12:01

## 2017-03-13 RX ADMIN — PIPERACILLIN AND TAZOBACTAM 25 GRAM(S): 4; .5 INJECTION, POWDER, LYOPHILIZED, FOR SOLUTION INTRAVENOUS at 05:29

## 2017-03-13 RX ADMIN — GALANTAMINE HYDROBROMIDE 8 MILLIGRAM(S): 4 TABLET, FILM COATED ORAL at 12:01

## 2017-03-13 RX ADMIN — Medication 3 MILLILITER(S): at 23:13

## 2017-03-13 RX ADMIN — Medication 3 MILLILITER(S): at 05:35

## 2017-03-13 RX ADMIN — SODIUM CHLORIDE 70 MILLILITER(S): 9 INJECTION INTRAMUSCULAR; INTRAVENOUS; SUBCUTANEOUS at 17:30

## 2017-03-13 RX ADMIN — HEPARIN SODIUM 5000 UNIT(S): 5000 INJECTION INTRAVENOUS; SUBCUTANEOUS at 05:29

## 2017-03-13 RX ADMIN — HEPARIN SODIUM 5000 UNIT(S): 5000 INJECTION INTRAVENOUS; SUBCUTANEOUS at 13:54

## 2017-03-13 RX ADMIN — Medication 1: at 09:37

## 2017-03-13 RX ADMIN — HEPARIN SODIUM 5000 UNIT(S): 5000 INJECTION INTRAVENOUS; SUBCUTANEOUS at 23:13

## 2017-03-13 RX ADMIN — LIDOCAINE 1 PATCH: 4 CREAM TOPICAL at 01:58

## 2017-03-13 RX ADMIN — PIPERACILLIN AND TAZOBACTAM 25 GRAM(S): 4; .5 INJECTION, POWDER, LYOPHILIZED, FOR SOLUTION INTRAVENOUS at 13:54

## 2017-03-13 NOTE — PROVIDER CONTACT NOTE (OTHER) - ASSESSMENT
pt alert, pt NPO status
Pt alert and in no acute distress.
Pt A+Ox2-3. VS as documented. Pt febrile -101.2 orally. Pt denies any pain, distress, or discomfort. Last BC, UA, UC done 3/10.

## 2017-03-13 NOTE — PROVIDER CONTACT NOTE (OTHER) - ACTION/TREATMENT ORDERED:
Juan To aware that fingerstick glucose of 239. insulin sliding scale held, will continue to monitor pt for s/s of hyper/hypo glycemia
Team called and made aware.  Tylenol  ordered.  Continue to mnt pt status.
MD aware, BC x2 & UC to be collected. Tylenol to be given. Will continue to monitor pt and maintain pt safety.

## 2017-03-14 LAB
ANION GAP SERPL CALC-SCNC: 12 MMOL/L — SIGNIFICANT CHANGE UP (ref 5–17)
APPEARANCE UR: CLEAR — SIGNIFICANT CHANGE UP
BILIRUB UR-MCNC: NEGATIVE — SIGNIFICANT CHANGE UP
BUN SERPL-MCNC: 18 MG/DL — SIGNIFICANT CHANGE UP (ref 7–23)
CALCIUM SERPL-MCNC: 9 MG/DL — SIGNIFICANT CHANGE UP (ref 8.4–10.5)
CHLORIDE SERPL-SCNC: 106 MMOL/L — SIGNIFICANT CHANGE UP (ref 96–108)
CO2 SERPL-SCNC: 24 MMOL/L — SIGNIFICANT CHANGE UP (ref 22–31)
COLOR SPEC: YELLOW — SIGNIFICANT CHANGE UP
CREAT SERPL-MCNC: 1.21 MG/DL — SIGNIFICANT CHANGE UP (ref 0.5–1.3)
CULTURE RESULTS: NO GROWTH — SIGNIFICANT CHANGE UP
DIFF PNL FLD: ABNORMAL
GLUCOSE SERPL-MCNC: 192 MG/DL — HIGH (ref 70–99)
GLUCOSE UR QL: >1000
HCT VFR BLD CALC: 40.2 % — SIGNIFICANT CHANGE UP (ref 39–50)
HGB BLD-MCNC: 13.6 G/DL — SIGNIFICANT CHANGE UP (ref 13–17)
KETONES UR-MCNC: ABNORMAL
LEUKOCYTE ESTERASE UR-ACNC: ABNORMAL
MAGNESIUM SERPL-MCNC: 2.3 MG/DL — SIGNIFICANT CHANGE UP (ref 1.6–2.6)
MCHC RBC-ENTMCNC: 27.1 PG — SIGNIFICANT CHANGE UP (ref 27–34)
MCHC RBC-ENTMCNC: 33.8 GM/DL — SIGNIFICANT CHANGE UP (ref 32–36)
MCV RBC AUTO: 80.2 FL — SIGNIFICANT CHANGE UP (ref 80–100)
NITRITE UR-MCNC: NEGATIVE — SIGNIFICANT CHANGE UP
PH UR: 6 — SIGNIFICANT CHANGE UP (ref 4.8–8)
PHOSPHATE SERPL-MCNC: 2.4 MG/DL — LOW (ref 2.5–4.5)
PLATELET # BLD AUTO: 168 K/UL — SIGNIFICANT CHANGE UP (ref 150–400)
POTASSIUM SERPL-MCNC: 3.5 MMOL/L — SIGNIFICANT CHANGE UP (ref 3.5–5.3)
POTASSIUM SERPL-SCNC: 3.5 MMOL/L — SIGNIFICANT CHANGE UP (ref 3.5–5.3)
PROT UR-MCNC: 100 MG/DL
RBC # BLD: 5.01 M/UL — SIGNIFICANT CHANGE UP (ref 4.2–5.8)
RBC # FLD: 14.1 % — SIGNIFICANT CHANGE UP (ref 10.3–14.5)
SODIUM SERPL-SCNC: 142 MMOL/L — SIGNIFICANT CHANGE UP (ref 135–145)
SP GR SPEC: 1.03 — HIGH (ref 1.01–1.02)
SPECIMEN SOURCE: SIGNIFICANT CHANGE UP
UROBILINOGEN FLD QL: NEGATIVE — SIGNIFICANT CHANGE UP
WBC # BLD: 9.61 K/UL — SIGNIFICANT CHANGE UP (ref 3.8–10.5)
WBC # FLD AUTO: 9.61 K/UL — SIGNIFICANT CHANGE UP (ref 3.8–10.5)

## 2017-03-14 PROCEDURE — 99232 SBSQ HOSP IP/OBS MODERATE 35: CPT

## 2017-03-14 PROCEDURE — 99233 SBSQ HOSP IP/OBS HIGH 50: CPT | Mod: GC

## 2017-03-14 PROCEDURE — 70450 CT HEAD/BRAIN W/O DYE: CPT | Mod: 26

## 2017-03-14 PROCEDURE — 74177 CT ABD & PELVIS W/CONTRAST: CPT | Mod: 26

## 2017-03-14 PROCEDURE — 99222 1ST HOSP IP/OBS MODERATE 55: CPT | Mod: GC

## 2017-03-14 RX ORDER — SODIUM,POTASSIUM PHOSPHATES 278-250MG
1 POWDER IN PACKET (EA) ORAL
Qty: 0 | Refills: 0 | Status: COMPLETED | OUTPATIENT
Start: 2017-03-14 | End: 2017-03-14

## 2017-03-14 RX ORDER — INSULIN LISPRO 100/ML
3 VIAL (ML) SUBCUTANEOUS
Qty: 0 | Refills: 0 | Status: DISCONTINUED | OUTPATIENT
Start: 2017-03-14 | End: 2017-03-16

## 2017-03-14 RX ORDER — INSULIN GLARGINE 100 [IU]/ML
10 INJECTION, SOLUTION SUBCUTANEOUS AT BEDTIME
Qty: 0 | Refills: 0 | Status: DISCONTINUED | OUTPATIENT
Start: 2017-03-14 | End: 2017-03-16

## 2017-03-14 RX ORDER — ATORVASTATIN CALCIUM 80 MG/1
20 TABLET, FILM COATED ORAL AT BEDTIME
Qty: 0 | Refills: 0 | Status: DISCONTINUED | OUTPATIENT
Start: 2017-03-14 | End: 2017-03-16

## 2017-03-14 RX ORDER — PANTOPRAZOLE SODIUM 20 MG/1
40 TABLET, DELAYED RELEASE ORAL DAILY
Qty: 0 | Refills: 0 | Status: DISCONTINUED | OUTPATIENT
Start: 2017-03-14 | End: 2017-03-16

## 2017-03-14 RX ADMIN — GALANTAMINE HYDROBROMIDE 8 MILLIGRAM(S): 4 TABLET, FILM COATED ORAL at 14:00

## 2017-03-14 RX ADMIN — ATORVASTATIN CALCIUM 20 MILLIGRAM(S): 80 TABLET, FILM COATED ORAL at 21:59

## 2017-03-14 RX ADMIN — LIDOCAINE 1 PATCH: 4 CREAM TOPICAL at 00:10

## 2017-03-14 RX ADMIN — Medication 1 TABLET(S): at 14:11

## 2017-03-14 RX ADMIN — Medication 1: at 18:58

## 2017-03-14 RX ADMIN — HEPARIN SODIUM 5000 UNIT(S): 5000 INJECTION INTRAVENOUS; SUBCUTANEOUS at 06:22

## 2017-03-14 RX ADMIN — Medication 1 TABLET(S): at 14:01

## 2017-03-14 RX ADMIN — Medication 1 TABLET(S): at 18:52

## 2017-03-14 RX ADMIN — PIPERACILLIN AND TAZOBACTAM 25 GRAM(S): 4; .5 INJECTION, POWDER, LYOPHILIZED, FOR SOLUTION INTRAVENOUS at 00:00

## 2017-03-14 RX ADMIN — Medication 3 MILLILITER(S): at 22:00

## 2017-03-14 RX ADMIN — PIPERACILLIN AND TAZOBACTAM 25 GRAM(S): 4; .5 INJECTION, POWDER, LYOPHILIZED, FOR SOLUTION INTRAVENOUS at 21:59

## 2017-03-14 RX ADMIN — HEPARIN SODIUM 5000 UNIT(S): 5000 INJECTION INTRAVENOUS; SUBCUTANEOUS at 21:59

## 2017-03-14 RX ADMIN — TAMSULOSIN HYDROCHLORIDE 0.4 MILLIGRAM(S): 0.4 CAPSULE ORAL at 21:59

## 2017-03-14 RX ADMIN — Medication 3 UNIT(S): at 18:59

## 2017-03-14 RX ADMIN — HEPARIN SODIUM 5000 UNIT(S): 5000 INJECTION INTRAVENOUS; SUBCUTANEOUS at 14:02

## 2017-03-14 RX ADMIN — INSULIN GLARGINE 10 UNIT(S): 100 INJECTION, SOLUTION SUBCUTANEOUS at 21:58

## 2017-03-14 RX ADMIN — Medication 3 MILLILITER(S): at 06:21

## 2017-03-14 RX ADMIN — Medication 3 MILLILITER(S): at 13:59

## 2017-03-14 RX ADMIN — Medication 1 TABLET(S): at 14:02

## 2017-03-14 RX ADMIN — PANTOPRAZOLE SODIUM 40 MILLIGRAM(S): 20 TABLET, DELAYED RELEASE ORAL at 14:10

## 2017-03-14 RX ADMIN — LIDOCAINE 1 PATCH: 4 CREAM TOPICAL at 14:01

## 2017-03-14 RX ADMIN — PIPERACILLIN AND TAZOBACTAM 25 GRAM(S): 4; .5 INJECTION, POWDER, LYOPHILIZED, FOR SOLUTION INTRAVENOUS at 13:59

## 2017-03-14 RX ADMIN — Medication 1 TABLET(S): at 21:59

## 2017-03-14 RX ADMIN — DONEPEZIL HYDROCHLORIDE 10 MILLIGRAM(S): 10 TABLET, FILM COATED ORAL at 21:59

## 2017-03-15 LAB
AMYLASE P1 CFR SERPL: 50 U/L — SIGNIFICANT CHANGE UP (ref 25–125)
ANION GAP SERPL CALC-SCNC: 19 MMOL/L — HIGH (ref 5–17)
BUN SERPL-MCNC: 14 MG/DL — SIGNIFICANT CHANGE UP (ref 7–23)
CALCIUM SERPL-MCNC: 8.9 MG/DL — SIGNIFICANT CHANGE UP (ref 8.4–10.5)
CHLORIDE SERPL-SCNC: 100 MMOL/L — SIGNIFICANT CHANGE UP (ref 96–108)
CO2 SERPL-SCNC: 15 MMOL/L — LOW (ref 22–31)
CREAT SERPL-MCNC: 1.08 MG/DL — SIGNIFICANT CHANGE UP (ref 0.5–1.3)
CULTURE RESULTS: SIGNIFICANT CHANGE UP
CULTURE RESULTS: SIGNIFICANT CHANGE UP
GLUCOSE SERPL-MCNC: 131 MG/DL — HIGH (ref 70–99)
HCT VFR BLD CALC: 38.8 % — LOW (ref 39–50)
HGB BLD-MCNC: 13.1 G/DL — SIGNIFICANT CHANGE UP (ref 13–17)
LIDOCAIN IGE QN: 78 U/L — HIGH (ref 7–60)
MAGNESIUM SERPL-MCNC: 2.1 MG/DL — SIGNIFICANT CHANGE UP (ref 1.6–2.6)
MCHC RBC-ENTMCNC: 26.6 PG — LOW (ref 27–34)
MCHC RBC-ENTMCNC: 33.8 GM/DL — SIGNIFICANT CHANGE UP (ref 32–36)
MCV RBC AUTO: 78.9 FL — LOW (ref 80–100)
PHOSPHATE SERPL-MCNC: 2.8 MG/DL — SIGNIFICANT CHANGE UP (ref 2.5–4.5)
PLATELET # BLD AUTO: 175 K/UL — SIGNIFICANT CHANGE UP (ref 150–400)
POTASSIUM SERPL-MCNC: 3.7 MMOL/L — SIGNIFICANT CHANGE UP (ref 3.5–5.3)
POTASSIUM SERPL-SCNC: 3.7 MMOL/L — SIGNIFICANT CHANGE UP (ref 3.5–5.3)
RBC # BLD: 4.92 M/UL — SIGNIFICANT CHANGE UP (ref 4.2–5.8)
RBC # FLD: 14.1 % — SIGNIFICANT CHANGE UP (ref 10.3–14.5)
SODIUM SERPL-SCNC: 134 MMOL/L — LOW (ref 135–145)
SPECIMEN SOURCE: SIGNIFICANT CHANGE UP
SPECIMEN SOURCE: SIGNIFICANT CHANGE UP
WBC # BLD: 9.04 K/UL — SIGNIFICANT CHANGE UP (ref 3.8–10.5)
WBC # FLD AUTO: 9.04 K/UL — SIGNIFICANT CHANGE UP (ref 3.8–10.5)

## 2017-03-15 PROCEDURE — 99233 SBSQ HOSP IP/OBS HIGH 50: CPT | Mod: GC

## 2017-03-15 PROCEDURE — 99232 SBSQ HOSP IP/OBS MODERATE 35: CPT

## 2017-03-15 PROCEDURE — 99232 SBSQ HOSP IP/OBS MODERATE 35: CPT | Mod: GC

## 2017-03-15 RX ADMIN — Medication 650 MILLIGRAM(S): at 01:42

## 2017-03-15 RX ADMIN — Medication 3 MILLILITER(S): at 05:00

## 2017-03-15 RX ADMIN — Medication 650 MILLIGRAM(S): at 00:42

## 2017-03-15 RX ADMIN — Medication 1: at 14:11

## 2017-03-15 RX ADMIN — Medication 3 MILLILITER(S): at 14:12

## 2017-03-15 RX ADMIN — INSULIN GLARGINE 10 UNIT(S): 100 INJECTION, SOLUTION SUBCUTANEOUS at 22:19

## 2017-03-15 RX ADMIN — DONEPEZIL HYDROCHLORIDE 10 MILLIGRAM(S): 10 TABLET, FILM COATED ORAL at 21:47

## 2017-03-15 RX ADMIN — Medication 3 UNIT(S): at 14:12

## 2017-03-15 RX ADMIN — HEPARIN SODIUM 5000 UNIT(S): 5000 INJECTION INTRAVENOUS; SUBCUTANEOUS at 21:47

## 2017-03-15 RX ADMIN — Medication 1: at 18:05

## 2017-03-15 RX ADMIN — SODIUM CHLORIDE 70 MILLILITER(S): 9 INJECTION INTRAMUSCULAR; INTRAVENOUS; SUBCUTANEOUS at 21:48

## 2017-03-15 RX ADMIN — Medication 3 UNIT(S): at 08:40

## 2017-03-15 RX ADMIN — PIPERACILLIN AND TAZOBACTAM 25 GRAM(S): 4; .5 INJECTION, POWDER, LYOPHILIZED, FOR SOLUTION INTRAVENOUS at 05:52

## 2017-03-15 RX ADMIN — Medication 650 MILLIGRAM(S): at 06:58

## 2017-03-15 RX ADMIN — HEPARIN SODIUM 5000 UNIT(S): 5000 INJECTION INTRAVENOUS; SUBCUTANEOUS at 14:12

## 2017-03-15 RX ADMIN — LIDOCAINE 1 PATCH: 4 CREAM TOPICAL at 23:56

## 2017-03-15 RX ADMIN — LIDOCAINE 1 PATCH: 4 CREAM TOPICAL at 02:31

## 2017-03-15 RX ADMIN — ATORVASTATIN CALCIUM 20 MILLIGRAM(S): 80 TABLET, FILM COATED ORAL at 21:47

## 2017-03-15 RX ADMIN — Medication 3 UNIT(S): at 18:06

## 2017-03-15 RX ADMIN — PIPERACILLIN AND TAZOBACTAM 25 GRAM(S): 4; .5 INJECTION, POWDER, LYOPHILIZED, FOR SOLUTION INTRAVENOUS at 14:12

## 2017-03-15 RX ADMIN — PANTOPRAZOLE SODIUM 40 MILLIGRAM(S): 20 TABLET, DELAYED RELEASE ORAL at 11:10

## 2017-03-15 RX ADMIN — Medication 650 MILLIGRAM(S): at 07:30

## 2017-03-15 RX ADMIN — HEPARIN SODIUM 5000 UNIT(S): 5000 INJECTION INTRAVENOUS; SUBCUTANEOUS at 05:52

## 2017-03-15 RX ADMIN — GALANTAMINE HYDROBROMIDE 8 MILLIGRAM(S): 4 TABLET, FILM COATED ORAL at 11:05

## 2017-03-15 RX ADMIN — Medication 3 MILLILITER(S): at 08:41

## 2017-03-15 RX ADMIN — TAMSULOSIN HYDROCHLORIDE 0.4 MILLIGRAM(S): 0.4 CAPSULE ORAL at 21:47

## 2017-03-15 RX ADMIN — LIDOCAINE 1 PATCH: 4 CREAM TOPICAL at 11:05

## 2017-03-15 RX ADMIN — Medication 1 TABLET(S): at 11:05

## 2017-03-15 RX ADMIN — SODIUM CHLORIDE 70 MILLILITER(S): 9 INJECTION INTRAMUSCULAR; INTRAVENOUS; SUBCUTANEOUS at 05:52

## 2017-03-15 RX ADMIN — Medication 3 MILLILITER(S): at 21:47

## 2017-03-16 ENCOUNTER — TRANSCRIPTION ENCOUNTER (OUTPATIENT)
Age: 82
End: 2017-03-16

## 2017-03-16 VITALS
HEART RATE: 70 BPM | OXYGEN SATURATION: 94 % | RESPIRATION RATE: 18 BRPM | SYSTOLIC BLOOD PRESSURE: 156 MMHG | DIASTOLIC BLOOD PRESSURE: 92 MMHG | TEMPERATURE: 97 F

## 2017-03-16 LAB
ANION GAP SERPL CALC-SCNC: 13 MMOL/L — SIGNIFICANT CHANGE UP (ref 5–17)
BUN SERPL-MCNC: 8 MG/DL — SIGNIFICANT CHANGE UP (ref 7–23)
CALCIUM SERPL-MCNC: 8.8 MG/DL — SIGNIFICANT CHANGE UP (ref 8.4–10.5)
CHLORIDE SERPL-SCNC: 107 MMOL/L — SIGNIFICANT CHANGE UP (ref 96–108)
CO2 SERPL-SCNC: 24 MMOL/L — SIGNIFICANT CHANGE UP (ref 22–31)
CREAT SERPL-MCNC: 1.02 MG/DL — SIGNIFICANT CHANGE UP (ref 0.5–1.3)
GLUCOSE SERPL-MCNC: 156 MG/DL — HIGH (ref 70–99)
HCT VFR BLD CALC: 41.3 % — SIGNIFICANT CHANGE UP (ref 39–50)
HGB BLD-MCNC: 14.1 G/DL — SIGNIFICANT CHANGE UP (ref 13–17)
MAGNESIUM SERPL-MCNC: 2.2 MG/DL — SIGNIFICANT CHANGE UP (ref 1.6–2.6)
MCHC RBC-ENTMCNC: 27.1 PG — SIGNIFICANT CHANGE UP (ref 27–34)
MCHC RBC-ENTMCNC: 34.1 GM/DL — SIGNIFICANT CHANGE UP (ref 32–36)
MCV RBC AUTO: 79.3 FL — LOW (ref 80–100)
PHOSPHATE SERPL-MCNC: 2.8 MG/DL — SIGNIFICANT CHANGE UP (ref 2.5–4.5)
PLATELET # BLD AUTO: 206 K/UL — SIGNIFICANT CHANGE UP (ref 150–400)
POTASSIUM SERPL-MCNC: 3.6 MMOL/L — SIGNIFICANT CHANGE UP (ref 3.5–5.3)
POTASSIUM SERPL-SCNC: 3.6 MMOL/L — SIGNIFICANT CHANGE UP (ref 3.5–5.3)
RBC # BLD: 5.21 M/UL — SIGNIFICANT CHANGE UP (ref 4.2–5.8)
RBC # FLD: 14.1 % — SIGNIFICANT CHANGE UP (ref 10.3–14.5)
SODIUM SERPL-SCNC: 144 MMOL/L — SIGNIFICANT CHANGE UP (ref 135–145)
WBC # BLD: 10.47 K/UL — SIGNIFICANT CHANGE UP (ref 3.8–10.5)
WBC # FLD AUTO: 10.47 K/UL — SIGNIFICANT CHANGE UP (ref 3.8–10.5)

## 2017-03-16 PROCEDURE — 82803 BLOOD GASES ANY COMBINATION: CPT

## 2017-03-16 PROCEDURE — 99285 EMERGENCY DEPT VISIT HI MDM: CPT | Mod: 25

## 2017-03-16 PROCEDURE — 83036 HEMOGLOBIN GLYCOSYLATED A1C: CPT

## 2017-03-16 PROCEDURE — 87798 DETECT AGENT NOS DNA AMP: CPT

## 2017-03-16 PROCEDURE — 87633 RESP VIRUS 12-25 TARGETS: CPT

## 2017-03-16 PROCEDURE — 74020: CPT

## 2017-03-16 PROCEDURE — 97162 PT EVAL MOD COMPLEX 30 MIN: CPT

## 2017-03-16 PROCEDURE — 80048 BASIC METABOLIC PNL TOTAL CA: CPT

## 2017-03-16 PROCEDURE — 97112 NEUROMUSCULAR REEDUCATION: CPT

## 2017-03-16 PROCEDURE — 80061 LIPID PANEL: CPT

## 2017-03-16 PROCEDURE — 71250 CT THORAX DX C-: CPT

## 2017-03-16 PROCEDURE — 85014 HEMATOCRIT: CPT

## 2017-03-16 PROCEDURE — 94640 AIRWAY INHALATION TREATMENT: CPT

## 2017-03-16 PROCEDURE — 81001 URINALYSIS AUTO W/SCOPE: CPT

## 2017-03-16 PROCEDURE — 80053 COMPREHEN METABOLIC PANEL: CPT

## 2017-03-16 PROCEDURE — 82435 ASSAY OF BLOOD CHLORIDE: CPT

## 2017-03-16 PROCEDURE — 85027 COMPLETE CBC AUTOMATED: CPT

## 2017-03-16 PROCEDURE — 82150 ASSAY OF AMYLASE: CPT

## 2017-03-16 PROCEDURE — 87040 BLOOD CULTURE FOR BACTERIA: CPT

## 2017-03-16 PROCEDURE — 87086 URINE CULTURE/COLONY COUNT: CPT

## 2017-03-16 PROCEDURE — 87486 CHLMYD PNEUM DNA AMP PROBE: CPT

## 2017-03-16 PROCEDURE — 84100 ASSAY OF PHOSPHORUS: CPT

## 2017-03-16 PROCEDURE — 96374 THER/PROPH/DIAG INJ IV PUSH: CPT

## 2017-03-16 PROCEDURE — 76770 US EXAM ABDO BACK WALL COMP: CPT

## 2017-03-16 PROCEDURE — 82330 ASSAY OF CALCIUM: CPT

## 2017-03-16 PROCEDURE — 74177 CT ABD & PELVIS W/CONTRAST: CPT

## 2017-03-16 PROCEDURE — 71045 X-RAY EXAM CHEST 1 VIEW: CPT

## 2017-03-16 PROCEDURE — 99232 SBSQ HOSP IP/OBS MODERATE 35: CPT

## 2017-03-16 PROCEDURE — 97116 GAIT TRAINING THERAPY: CPT

## 2017-03-16 PROCEDURE — 84295 ASSAY OF SERUM SODIUM: CPT

## 2017-03-16 PROCEDURE — 82947 ASSAY GLUCOSE BLOOD QUANT: CPT

## 2017-03-16 PROCEDURE — 84443 ASSAY THYROID STIM HORMONE: CPT

## 2017-03-16 PROCEDURE — 83690 ASSAY OF LIPASE: CPT

## 2017-03-16 PROCEDURE — 70450 CT HEAD/BRAIN W/O DYE: CPT

## 2017-03-16 PROCEDURE — 84132 ASSAY OF SERUM POTASSIUM: CPT

## 2017-03-16 PROCEDURE — 96375 TX/PRO/DX INJ NEW DRUG ADDON: CPT

## 2017-03-16 PROCEDURE — 83605 ASSAY OF LACTIC ACID: CPT

## 2017-03-16 PROCEDURE — 87581 M.PNEUMON DNA AMP PROBE: CPT

## 2017-03-16 PROCEDURE — 83735 ASSAY OF MAGNESIUM: CPT

## 2017-03-16 PROCEDURE — 99239 HOSP IP/OBS DSCHRG MGMT >30: CPT

## 2017-03-16 RX ORDER — ACETAMINOPHEN 500 MG
2 TABLET ORAL
Qty: 0 | Refills: 0 | COMMUNITY
Start: 2017-03-16

## 2017-03-16 RX ORDER — LACTOBACILLUS ACIDOPHILUS 100MM CELL
1 CAPSULE ORAL
Qty: 0 | Refills: 0 | COMMUNITY
Start: 2017-03-16

## 2017-03-16 RX ORDER — IPRATROPIUM/ALBUTEROL SULFATE 18-103MCG
3 AEROSOL WITH ADAPTER (GRAM) INHALATION
Qty: 0 | Refills: 0 | COMMUNITY
Start: 2017-03-16

## 2017-03-16 RX ORDER — PANTOPRAZOLE SODIUM 20 MG/1
1 TABLET, DELAYED RELEASE ORAL
Qty: 0 | Refills: 0 | COMMUNITY
Start: 2017-03-16

## 2017-03-16 RX ORDER — METHENAMINE MANDELATE 1 G
1 TABLET ORAL
Qty: 0 | Refills: 0 | COMMUNITY

## 2017-03-16 RX ORDER — CARVEDILOL PHOSPHATE 80 MG/1
6.25 CAPSULE, EXTENDED RELEASE ORAL EVERY 12 HOURS
Qty: 0 | Refills: 0 | Status: DISCONTINUED | OUTPATIENT
Start: 2017-03-16 | End: 2017-03-16

## 2017-03-16 RX ORDER — LIDOCAINE 4 G/100G
1 CREAM TOPICAL
Qty: 0 | Refills: 0 | COMMUNITY
Start: 2017-03-16

## 2017-03-16 RX ORDER — ACARBOSE 25 MG/1
1 TABLET ORAL
Qty: 0 | Refills: 0 | COMMUNITY

## 2017-03-16 RX ORDER — HYDROMORPHONE HYDROCHLORIDE 2 MG/ML
1 INJECTION INTRAMUSCULAR; INTRAVENOUS; SUBCUTANEOUS
Qty: 0 | Refills: 0 | COMMUNITY

## 2017-03-16 RX ADMIN — LIDOCAINE 1 PATCH: 4 CREAM TOPICAL at 12:43

## 2017-03-16 RX ADMIN — GALANTAMINE HYDROBROMIDE 8 MILLIGRAM(S): 4 TABLET, FILM COATED ORAL at 12:46

## 2017-03-16 RX ADMIN — Medication 1: at 09:29

## 2017-03-16 RX ADMIN — PANTOPRAZOLE SODIUM 40 MILLIGRAM(S): 20 TABLET, DELAYED RELEASE ORAL at 12:37

## 2017-03-16 RX ADMIN — Medication 3 MILLILITER(S): at 09:28

## 2017-03-16 RX ADMIN — Medication 3 UNIT(S): at 12:34

## 2017-03-16 RX ADMIN — CARVEDILOL PHOSPHATE 6.25 MILLIGRAM(S): 80 CAPSULE, EXTENDED RELEASE ORAL at 12:35

## 2017-03-16 RX ADMIN — Medication 3 UNIT(S): at 09:29

## 2017-03-16 RX ADMIN — Medication 1 TABLET(S): at 12:36

## 2017-03-16 RX ADMIN — HEPARIN SODIUM 5000 UNIT(S): 5000 INJECTION INTRAVENOUS; SUBCUTANEOUS at 06:29

## 2017-03-16 RX ADMIN — Medication 1 TABLET(S): at 12:41

## 2017-03-16 RX ADMIN — Medication 1: at 12:33

## 2017-03-16 NOTE — DISCHARGE NOTE ADULT - CARE PLAN
Principal Discharge DX:	Sepsis, due to unspecified organism  Goal:	Resolution Principal Discharge DX:	Sepsis, due to unspecified organism  Goal:	Resolution  Instructions for follow-up, activity and diet:	You were diagnosed with an urinary tract infection.  Secondary Diagnosis:	Vomiting  Secondary Diagnosis:	Altered mental status Principal Discharge DX:	Sepsis, due to unspecified organism  Goal:	Resolution  Instructions for follow-up, activity and diet:	You were diagnosed with an urinary tract infection. You completed your course of antibiotics while at the hospital. Please follow-up with Dr. Alejandra after your stay at rehab. Please stop taking your vesicare and mirabegron. Please make an appointment to see your urologist as an outpatient after rehab. If you have any fevers, chills, or confusion, please seek medical attention.  Secondary Diagnosis:	Vomiting  Goal:	Resolved  Instructions for follow-up, activity and diet:	You had an episode of vomiting while at the hospital. Repeat head scan was negative. Abdominal films and CT abdomen did not show an obstruction to your bowels causing your symptoms. You did have a dilated esophagus on your CT chest. Please follow-up with your gastroenterologist after your discharge.  Secondary Diagnosis:	Altered mental status  Goal:	Resolved/Improved  Instructions for follow-up, activity and diet:	You came in with confusion which had improved during your hospitalization. Please stop taking dilaudid for your pain control, vesicare and mirabegron as these can worsen your confusion. Please continue to take lidocaine patches and Tylenol for your pain. If you become confused in the future please seek medical attention. Please continue your memantine and donepezil.

## 2017-03-16 NOTE — DISCHARGE NOTE ADULT - ADDITIONAL INSTRUCTIONS
Please call and make an appointment with Dr. Duque within the next two weeks. Please make an appointment with your urologist and infectious disease doctor on discharge from the hospital or after rehab.

## 2017-03-16 NOTE — DISCHARGE NOTE ADULT - HOSPITAL COURSE
History of Present Illness		  83 yo M pmhx NPH (s/p ventriculostomy in 2012), incontinence, previous UTIs, dementia, HTN, chronic low back pain s/p prior surgeries, HTN, T2DM and nephrotic syndrome who presents with fever, AMS and incontinence.     Symptoms began last night around 8 pm. His wife noticed that he was cold and did not appear to warm up with multiple blankets. Around 3 am he was incontinent, which is common for him, however he was not his usual self. He was awake but was not responding appropriately to questions or commands. His wife brought him to the hospital due to concerns of repeat UTI. Patient also had suprapubic pain with questionable burning sensation (which he later denied).     Of note per wife he has had previous admissions for UTI. He has a history of incontinence with his hydrocephalus, but not usually associated with his change in mental status. He typically wakes up multiple times a night to urinate. He also has had a chronic cough for the last few years. It is non-productive.     While at ED VS: 149/72, HR 91, RR 16, 91% RA. He was febrile to 101.1. He received a dose of ceftriaxone and was given 2.5 L NS and duoneb. RVP was negative. CXR with poor penetration, possible nodular opacities/lymph nodes, and no pleural effusions. CT Head had no acute changes but had unchanged ventriculomegaly.      Hospital Course:  Patient was admitted for urosepsis c/b chronic antibiotic suppressive therapy and prior UTIs. He was initially given ceftriaxone for concerns of community acquired pneumonia, however his CXR did not show any focal consolidations. His urinalysis was grossly positive. He was subsequently changed to zosyn. Infectious diseases was consulted given his prior history of UTIs. He was continued on zosyn throughout the course of his hospitalization. Urine cultures were negative, however due to difficulty collecting urine, this was collected post start of abx.     His hospital course was complicated by one afternoon with an episode of projectile vomiting and increased distension on exam. His outpatient gastroenterologist and house gastroenterology were consulted. Abdominal x-rays did not show an obstructive gas pattern and CT abdomen did not show small bowel obstruction and showed resolution of gastric distension. He will follow-up with his outpatient gastroenterologist after discharge. Of note he had fever the evening prior to this episode. He had a repeat CT head performed, as he had prior history of ventriculostomy. Repeat CT head had stable changes since repeat exam.     Prior to discharge patient remained afebrile and hemodynamically stable. He was restarted on his home coreg. History of Present Illness		  81 yo M pmhx NPH (s/p ventriculostomy in 2012), incontinence, previous UTIs, dementia, HTN, chronic low back pain s/p prior surgeries, HTN, T2DM and nephrotic syndrome who presents with fever, AMS and incontinence.     Symptoms began last night around 8 pm. His wife noticed that he was cold and did not appear to warm up with multiple blankets. Around 3 am he was incontinent, which is common for him, however he was not his usual self. He was awake but was not responding appropriately to questions or commands. His wife brought him to the hospital due to concerns of repeat UTI. Patient also had suprapubic pain with questionable burning sensation (which he later denied).     Of note per wife he has had previous admissions for UTI. He has a history of incontinence with his hydrocephalus, but not usually associated with his change in mental status. He typically wakes up multiple times a night to urinate. He also has had a chronic cough for the last few years. It is non-productive.     While at ED VS: 149/72, HR 91, RR 16, 91% RA. He was febrile to 101.1. He received a dose of ceftriaxone and was given 2.5 L NS and duoneb. RVP was negative. CXR with poor penetration, possible nodular opacities/lymph nodes, and no pleural effusions. CT Head had no acute changes but had unchanged ventriculomegaly.      Hospital Course:  Patient was admitted for urosepsis c/b chronic antibiotic suppressive therapy and prior UTIs. He was initially given ceftriaxone for concerns of community acquired pneumonia, however his CXR did not show any focal consolidations. His urinalysis was grossly positive. He was subsequently changed to zosyn. Infectious diseases was consulted given his prior history of UTIs. He was continued on zosyn throughout the course of his hospitalization. Urine cultures were negative, however due to difficulty collecting urine, this was collected post start of abx.     His hospital course was complicated by one afternoon with an episode of projectile vomiting and increased distension on exam. His outpatient gastroenterologist and house gastroenterology were consulted. Abdominal x-rays did not show an obstructive gas pattern and CT abdomen did not show small bowel obstruction and showed resolution of gastric distension. He will follow-up with his outpatient gastroenterologist after discharge. Of note he had fever the evening prior to this episode. He had a repeat CT head performed, as he had prior history of ventriculostomy. Repeat CT head had stable changes since repeat exam.     Prior to discharge patient remained afebrile and hemodynamically stable. He was restarted on his home coreg. His vesicare, dilaudid and mirabegron were stopped.

## 2017-03-16 NOTE — DISCHARGE NOTE ADULT - MEDICATION SUMMARY - MEDICATIONS TO TAKE
I will START or STAY ON the medications listed below when I get home from the hospital:    Tylenol Regular Strength 325 mg oral tablet  -- 2 tab(s) by mouth every 6 hours, As needed, mild and moderate pain  -- Indication: For Pain control    tamsulosin 0.4 mg oral capsule  -- 1 cap(s) by mouth once a day (at bedtime)  -- verified with duane reade  -- Indication: For BPH (benign prostatic hyperplasia)    glipiZIDE 10 mg oral tablet  -- 1 tab(s) by mouth 2 times a day  -- verified with duane reade  -- Indication: For Diabetes    Januvia 50 mg oral tablet  -- 1 tab(s) by mouth once a day  -- Indication: For Diabetes    Crestor 10 mg oral tablet  -- 1 tab(s) by mouth once a day (at bedtime)  -- verified with duane reade  -- Indication: For Hyperlipidemia    carvedilol 6.25 mg oral tablet  -- 1 tab(s) by mouth 2 times a day  -- verified with duane reade  -- Indication: For Hypertension    albuterol-ipratropium 2.5 mg-0.5 mg/3 mL inhalation solution  -- 3 milliliter(s) inhaled every 6 hours  -- Indication: For Wheezing    galantamine 8 mg oral capsule, extended release  -- 1 cap(s) by mouth once a day (in the morning)  -- verified with duane reade  -- Indication: For Dementia    donepezil 10 mg oral tablet  -- 1 tab(s) by mouth once a day  -- verified with duane reade  -- Indication: For Dementia    lidocaine 5% topical film  -- Apply on skin to affected area every 12 hours, As Needed  -- Please keep on for 12 hours and remove for 12 hours  -- Indication: For Pain control    lactobacillus acidophilus oral capsule  -- 1 tab(s) by mouth 3 times a day  -- Indication: For Digestion    pantoprazole 40 mg oral granule, enteric coated  -- 1 tab(s) by mouth once a day in the morning 30 minutes before eating  -- Indication: For GERD    methenamine hippurate 1 g oral tablet  -- 1 tab(s) by mouth once a day  -- Indication: For UTIs

## 2017-03-16 NOTE — DISCHARGE NOTE ADULT - OTHER SIGNIFICANT FINDINGS
3/14/17: Ct ab/pelvis IMPRESSION: No small bowel obstruction.  Resolution of previously noted gastric distension. Wall thickening versus underdistention of the gastric   antrum. Enlarged heterogeneous prostate gland. 3/13:12: CT chest: 1.  Dilated esophagus and gastric distention. 2.  No pneumonia.  3/14/17: Ct ab/pelvis IMPRESSION: No small bowel obstruction.  Resolution of previously noted gastric distension. Wall thickening versus underdistention of the gastric   antrum. Enlarged heterogeneous prostate gland.

## 2017-03-16 NOTE — DISCHARGE NOTE ADULT - MEDICATION SUMMARY - MEDICATIONS TO STOP TAKING
I will STOP taking the medications listed below when I get home from the hospital:    VESIcare 10 mg oral tablet  -- 1 tab(s) by mouth once a day  -- verified with duane reade    mirabegron 25 mg oral tablet, extended release  -- 1 tab(s) by mouth once a day  -- verified with duane reade    acarbose 100 mg oral tablet  -- 1 tab(s) by mouth 2 times a day  -- verified with duane reade    Dilaudid 2 mg oral tablet  -- 1 tab(s) by mouth every 8 hours, As Needed for chronic back pain

## 2017-03-16 NOTE — DISCHARGE NOTE ADULT - CARE PROVIDER_API CALL
Eleazar Duque), Gastroenterology; Internal Medicine  [address] [facility] [address]  [place] [address]  Phone: [phone]  Fax: [phone]    Nohelia Alejandra), Infectious Disease; Internal Medicine  [address] [place] [id number]  Phone: [phone]  Fax: [phone]

## 2017-03-16 NOTE — DISCHARGE NOTE ADULT - PATIENT PORTAL LINK FT
“You can access the [facility] Patient Portal, offered by [facility], by registering with the following website: [email or web address]”

## 2017-03-16 NOTE — DISCHARGE NOTE ADULT - PLAN OF CARE
Resolution You were diagnosed with an urinary tract infection. Resolved You had an episode of vomiting while at the hospital. Repeat head scan was negative. Abdominal films and CT abdomen did not show an obstruction to your bowels causing your symptoms. You did have a dilated esophagus on your CT chest. Please follow-up with your gastroenterologist after your discharge. Resolved/Improved You came in with confusion which had improved during your hospitalization. Please stop taking dilaudid for your pain control, vesicare and mirabegron as these can worsen your confusion. Please continue to take lidocaine patches and Tylenol for your pain. If you become confused in the future please seek medical attention. Please continue your memantine and donepezil. You were diagnosed with an urinary tract infection. You completed your course of antibiotics while at the hospital. Please follow-up with Dr. Alejandra after your stay at rehab. Please stop taking your vesicare and mirabegron. Please make an appointment to see your urologist as an outpatient after rehab. If you have any fevers, chills, or confusion, please seek medical attention.

## 2017-03-18 LAB
CULTURE RESULTS: SIGNIFICANT CHANGE UP
CULTURE RESULTS: SIGNIFICANT CHANGE UP
SPECIMEN SOURCE: SIGNIFICANT CHANGE UP
SPECIMEN SOURCE: SIGNIFICANT CHANGE UP

## 2017-05-23 NOTE — ED PROVIDER NOTE - NEUROLOGICAL, MLM
HOME INSTRUCTION SHEET  Procedure/Condition: Eye Lid - right upper lid skin graft harvesting, left eyelid sharing flap-reconstruction of Mohs defect  Discharge Medications:   1. Apply the Erythromycin ointment two times a day to surgical area, (morning & bedtime), by placing a thin strip over the right upper lid sutures and between the edges of the left eyelids.  If your eye(s) feel dry or scratchy, you may apply erythromycin ointment or a lubricant (Optive or Genteal Gel) in the eye as needed during the day for comfort - may do more often if eyes feel scratchy. The ointment will blur your vision but is safe to use in the eye.  2. Use Tylenol (reg. Or extra strength) for discomfort as directed on the bottle.  3. Vicodin for more severe pain as directed.  4. DO NOT take any Aspirin or Arthritic products until 3rd day. Saturday May 5/27  5. Cephalexin antibiotic as directed.  6. May restart anticoagulants as directed by your primary physician.  If your physician has not instructed you, restart Coumadin the day after surgery, Xarelto 48 hours after surgery and aspirin 3 days after surgery.  Activity:   1.     Rest today.   2.     No bending, lifting or straining for 72 hours.  Increase activity slowly over the next 6 days.   3.     Do not work or be in a bobby or dirty place for ten days.   4.     No swimming for three weeks.    5.     Do not operate machinery or appliances, make any important decisions, or           drink alcohol for the next 24 hours.     6.     Do not drive a motor vehicle for 7 days, or longer if you don’t feel safe or comfortable.    7.     If there are any activities that you are unsure about, check with your doctor.   8.     A responsible adult needs to oversee your care today.  Diet:  You may eat your usual diet.   Dressing/Incision:  1. Keep the incision clean and dry.  Occasionally, there may be some minor bleeding.  If this happens, apply direct pressure on the involved area for 5-10  minutes.     2.   Clean the area with a warm moist cloth --- DO NOT RUB THE AREA, only gently blot.  3. Eyelid swelling and bruising is normal.  Notify Dr. Sanders IMMEDIATELY if the swelling is severe and feels hard like a baseball.  It may take one month for the eyelids to regain their strength and completely close after surgery.   4.   GOOD HANDWASHING PRIOR TO USING EYE OINTMENT.  Special Instructions:  1. Ice packs are very important for 72 hours after surgery on the right upper lid.  Apply the ice one time per hour for 15 minutes at a time.   Use the ice only while you are awake. Do not put cool compresses or ice on the left eyelids  2. Today and tomorrow keep your head elevated about 30 degrees by using a recliner chair and/or  2 - 3 pillows under your head.  Wear eye shield over the left eye at bedtime/sleeping/snapping   3.  Shower neck down, wash hair backwards, and keep water out of eyes for the first week.   4.  Please do not wear contacts or eye make-up for 14 days after surgery.   Call Your Doctor if You Have Any of The Followin.     Excessive pain not controlled by pain medication.   2.     Increased bleeding, heat or drainage, significant swelling or continued dry eyes.   3.     Temperature above 101 degrees.      You will next see your physician at their office.  Call for an appointment if needed at the number listed below.  The [facility] staff will call you tomorrow to see how you are doing.  If you need to speak to your Doctor, call him/her at his/her office number as listed below.  You can also feel free to contact the surgery center between the hours of 6:00 AM and 4:30 PM Monday - Thursday at [phone] with your questions or concerns regarding your instructions.      Physician:  Caleb Sanders M.D.  Office Address:  [address] [place] [address]    Office Phone: [phone]    I have read and understand these instructions and received a copy.  I acknowledge that I have received all of my  belongings brought into the [facility] from home.        Spouse_____________________________________________    Signature of R.N.____________________________________________________________________  [email or web address]         Alert and oriented, no focal deficits, no motor or sensory deficits.

## 2018-02-06 ENCOUNTER — INPATIENT (INPATIENT)
Facility: HOSPITAL | Age: 83
LOS: 5 days | Discharge: NURSING, REHAB OR LONG-TERM CARE | DRG: 871 | End: 2018-02-12
Attending: INTERNAL MEDICINE | Admitting: INTERNAL MEDICINE
Payer: MEDICARE

## 2018-02-06 VITALS
DIASTOLIC BLOOD PRESSURE: 87 MMHG | HEART RATE: 102 BPM | SYSTOLIC BLOOD PRESSURE: 136 MMHG | OXYGEN SATURATION: 100 % | RESPIRATION RATE: 16 BRPM | TEMPERATURE: 101 F

## 2018-02-06 DIAGNOSIS — R50.9 FEVER, UNSPECIFIED: ICD-10-CM

## 2018-02-06 DIAGNOSIS — G91.2 (IDIOPATHIC) NORMAL PRESSURE HYDROCEPHALUS: Chronic | ICD-10-CM

## 2018-02-06 LAB
ALBUMIN SERPL ELPH-MCNC: 4.2 G/DL — SIGNIFICANT CHANGE UP (ref 3.3–5)
ALP SERPL-CCNC: 87 U/L — SIGNIFICANT CHANGE UP (ref 40–120)
ALT FLD-CCNC: 15 U/L RC — SIGNIFICANT CHANGE UP (ref 10–45)
ANION GAP SERPL CALC-SCNC: 12 MMOL/L — SIGNIFICANT CHANGE UP (ref 5–17)
APPEARANCE UR: CLEAR — SIGNIFICANT CHANGE UP
AST SERPL-CCNC: 11 U/L — SIGNIFICANT CHANGE UP (ref 10–40)
BASOPHILS # BLD AUTO: 0 K/UL — SIGNIFICANT CHANGE UP (ref 0–0.2)
BASOPHILS NFR BLD AUTO: 0.2 % — SIGNIFICANT CHANGE UP (ref 0–2)
BILIRUB SERPL-MCNC: 1.3 MG/DL — HIGH (ref 0.2–1.2)
BILIRUB UR-MCNC: NEGATIVE — SIGNIFICANT CHANGE UP
BUN SERPL-MCNC: 13 MG/DL — SIGNIFICANT CHANGE UP (ref 7–23)
CALCIUM SERPL-MCNC: 9.7 MG/DL — SIGNIFICANT CHANGE UP (ref 8.4–10.5)
CHLORIDE SERPL-SCNC: 99 MMOL/L — SIGNIFICANT CHANGE UP (ref 96–108)
CK MB CFR SERPL CALC: 2 NG/ML — SIGNIFICANT CHANGE UP (ref 0–6.7)
CO2 SERPL-SCNC: 27 MMOL/L — SIGNIFICANT CHANGE UP (ref 22–31)
COLOR SPEC: YELLOW — SIGNIFICANT CHANGE UP
CREAT SERPL-MCNC: 1.14 MG/DL — SIGNIFICANT CHANGE UP (ref 0.5–1.3)
DIFF PNL FLD: ABNORMAL
EOSINOPHIL # BLD AUTO: 0 K/UL — SIGNIFICANT CHANGE UP (ref 0–0.5)
EOSINOPHIL NFR BLD AUTO: 0.2 % — SIGNIFICANT CHANGE UP (ref 0–6)
EPI CELLS # UR: SIGNIFICANT CHANGE UP /HPF
GAS PNL BLDV: SIGNIFICANT CHANGE UP
GLUCOSE SERPL-MCNC: 195 MG/DL — HIGH (ref 70–99)
GLUCOSE UR QL: 250 MG/DL
HCT VFR BLD CALC: 51 % — HIGH (ref 39–50)
HGB BLD-MCNC: 17.4 G/DL — HIGH (ref 13–17)
KETONES UR-MCNC: ABNORMAL
LEUKOCYTE ESTERASE UR-ACNC: NEGATIVE — SIGNIFICANT CHANGE UP
LYMPHOCYTES # BLD AUTO: 0.6 K/UL — LOW (ref 1–3.3)
LYMPHOCYTES # BLD AUTO: 3.7 % — LOW (ref 13–44)
MCHC RBC-ENTMCNC: 28.5 PG — SIGNIFICANT CHANGE UP (ref 27–34)
MCHC RBC-ENTMCNC: 34 GM/DL — SIGNIFICANT CHANGE UP (ref 32–36)
MCV RBC AUTO: 83.7 FL — SIGNIFICANT CHANGE UP (ref 80–100)
MONOCYTES # BLD AUTO: 1.8 K/UL — HIGH (ref 0–0.9)
MONOCYTES NFR BLD AUTO: 11.6 % — SIGNIFICANT CHANGE UP (ref 2–14)
NEUTROPHILS # BLD AUTO: 13.4 K/UL — HIGH (ref 1.8–7.4)
NEUTROPHILS NFR BLD AUTO: 84.4 % — HIGH (ref 43–77)
NITRITE UR-MCNC: NEGATIVE — SIGNIFICANT CHANGE UP
NT-PROBNP SERPL-SCNC: 910 PG/ML — HIGH (ref 0–300)
PH UR: 6 — SIGNIFICANT CHANGE UP (ref 5–8)
PLATELET # BLD AUTO: 190 K/UL — SIGNIFICANT CHANGE UP (ref 150–400)
POTASSIUM SERPL-MCNC: 4.3 MMOL/L — SIGNIFICANT CHANGE UP (ref 3.5–5.3)
POTASSIUM SERPL-SCNC: 4.3 MMOL/L — SIGNIFICANT CHANGE UP (ref 3.5–5.3)
PROT SERPL-MCNC: 7.7 G/DL — SIGNIFICANT CHANGE UP (ref 6–8.3)
PROT UR-MCNC: 100 MG/DL
RAPID RVP RESULT: SIGNIFICANT CHANGE UP
RBC # BLD: 6.09 M/UL — HIGH (ref 4.2–5.8)
RBC # FLD: 12.3 % — SIGNIFICANT CHANGE UP (ref 10.3–14.5)
RBC CASTS # UR COMP ASSIST: SIGNIFICANT CHANGE UP /HPF (ref 0–2)
SODIUM SERPL-SCNC: 138 MMOL/L — SIGNIFICANT CHANGE UP (ref 135–145)
SP GR SPEC: 1.02 — SIGNIFICANT CHANGE UP (ref 1.01–1.02)
TROPONIN T SERPL-MCNC: <0.01 NG/ML — SIGNIFICANT CHANGE UP (ref 0–0.06)
UROBILINOGEN FLD QL: NEGATIVE — SIGNIFICANT CHANGE UP
WBC # BLD: 15.9 K/UL — HIGH (ref 3.8–10.5)
WBC # FLD AUTO: 15.9 K/UL — HIGH (ref 3.8–10.5)
WBC UR QL: SIGNIFICANT CHANGE UP /HPF (ref 0–5)

## 2018-02-06 PROCEDURE — 71045 X-RAY EXAM CHEST 1 VIEW: CPT | Mod: 26

## 2018-02-06 PROCEDURE — 99223 1ST HOSP IP/OBS HIGH 75: CPT | Mod: AI

## 2018-02-06 PROCEDURE — 99284 EMERGENCY DEPT VISIT MOD MDM: CPT | Mod: GC

## 2018-02-06 RX ORDER — IPRATROPIUM/ALBUTEROL SULFATE 18-103MCG
3 AEROSOL WITH ADAPTER (GRAM) INHALATION ONCE
Qty: 0 | Refills: 0 | Status: COMPLETED | OUTPATIENT
Start: 2018-02-06 | End: 2018-02-06

## 2018-02-06 RX ORDER — AZITHROMYCIN 500 MG/1
500 TABLET, FILM COATED ORAL ONCE
Qty: 0 | Refills: 0 | Status: COMPLETED | OUTPATIENT
Start: 2018-02-06 | End: 2018-02-06

## 2018-02-06 RX ORDER — SODIUM CHLORIDE 9 MG/ML
1000 INJECTION, SOLUTION INTRAVENOUS ONCE
Qty: 0 | Refills: 0 | Status: COMPLETED | OUTPATIENT
Start: 2018-02-06 | End: 2018-02-06

## 2018-02-06 RX ORDER — ACETAMINOPHEN 500 MG
1000 TABLET ORAL ONCE
Qty: 0 | Refills: 0 | Status: COMPLETED | OUTPATIENT
Start: 2018-02-06 | End: 2018-02-06

## 2018-02-06 RX ORDER — CEFTRIAXONE 500 MG/1
1 INJECTION, POWDER, FOR SOLUTION INTRAMUSCULAR; INTRAVENOUS ONCE
Qty: 0 | Refills: 0 | Status: COMPLETED | OUTPATIENT
Start: 2018-02-06 | End: 2018-02-06

## 2018-02-06 RX ADMIN — CEFTRIAXONE 100 GRAM(S): 500 INJECTION, POWDER, FOR SOLUTION INTRAMUSCULAR; INTRAVENOUS at 21:22

## 2018-02-06 RX ADMIN — Medication 3 MILLILITER(S): at 19:45

## 2018-02-06 RX ADMIN — Medication 400 MILLIGRAM(S): at 22:23

## 2018-02-06 RX ADMIN — Medication 3 MILLILITER(S): at 20:06

## 2018-02-06 RX ADMIN — SODIUM CHLORIDE 1000 MILLILITER(S): 9 INJECTION, SOLUTION INTRAVENOUS at 19:21

## 2018-02-06 RX ADMIN — AZITHROMYCIN 250 MILLIGRAM(S): 500 TABLET, FILM COATED ORAL at 19:21

## 2018-02-06 NOTE — ED PROVIDER NOTE - OBJECTIVE STATEMENT
82 yo male with PMH of chronic back pain, BPH, DM, recurrent UTI's, HTN, dementia, nephrotic syndrome, NPO s/p ventriculostomy, spinal stenosis presents to the ED brought in by his wife and his brother-in-law who is a physician, for AMS and fever x2 days. Family states this is how his typical UTI's present. Patient appeared confused yesterday and told his wife he was going to "a shiva call" when in fact nobody had . Patient A&O x3 in the ED but hesitates when answering questions. Has cough, states it is c/w his chronic cough of unknown etiology, states he has back pain c/w his chronic back pain. Endorsed chest pain earlier today to wife, denying now. Seen at urgent care yesterday, diagnosed with flu clinically w/o RVP, given tamiflu. Took one dose yesterday and two today.

## 2018-02-06 NOTE — ED PROVIDER NOTE - MEDICAL DECISION MAKING DETAILS
AMS in setting of fever. Will perform sepsis w/u including EKG, CXR blood and urine cultures, UA, labs including VBG with lactate, cardiac enzymes, pro-bnp; initiating IV abx in ED, IV fluid, RVP

## 2018-02-06 NOTE — ED ADULT NURSE REASSESSMENT NOTE - NS ED NURSE REASSESS COMMENT FT1
As per conversation with ED MD Attending Sofie, MD has tried to get in touch with surgery, and is waiting to get a name to admit patient to.  Safety and comfort maintaned.

## 2018-02-06 NOTE — ED PROVIDER NOTE - ATTENDING CONTRIBUTION TO CARE
Private Physician Dwaine Duque PCP, [facility], [facility], [facility], Nadeem neruo  83y male PMH DMT2, Hydrocephalus sp ventriculostomy, HTN,HLD, UTI, nph, bph, PT comes to ed complains of 1d hx of anorexia. "he was talking like he as a uti, Seen at urgent care and dx as flu clinically treated with tamiflu. PE WDWN Male awake alert looking stated age normocephalic atraumatic chest clear anterior & posterior abd soft +bs neuro moves all extr  Timoteo Braden MD, Facep

## 2018-02-06 NOTE — ED ADULT NURSE REASSESSMENT NOTE - NS ED NURSE REASSESS COMMENT FT1
Report received from Shimon PARHAM.  Pt is resting comfortably.  Pt family members asking about catheterization of the patient.  MD made aware.   Medications given, VSS, will continue to monitor.

## 2018-02-06 NOTE — ED ADULT NURSE NOTE - OBJECTIVE STATEMENT
84 y/o M, A&Ox3, hard of hearing, enters ED by EMS w/ c/o fever and urinary symptoms. As per wife, pt. was slouched over and c/o chest pain when she arrived at home. Upon arrival, pt. has no c/o chest pain. Pt. presents febrile. tachy to 102 and hypoxic to 91-92% on RA. Pt. placed on 3L NC and O2 improved to 96%. Wheezes heard bilaterally in the upper fields and diminished in the lower fields. Hx. of UTIs. As per wife, pt. has been acting confused which is the typical presentation of how pt. behaves when he has UTIs. Pt. poor historian and unable to determine if he has dysuria/hematuria bc as per pt., "I don't remember if I went." No abdominal pain. Abdomen soft, distended, round, nontender. Pt placed on CM - sinus tach. Pt. straight cathed as per MD's orders using sterile technique - 2 RNs present. Successful after 1 attempt. Pt. tolerated procedure well. No skin breakdown. Pt.'s lower extremities cool to touch. Skin otherwise warm, dry and intact. As per family, pt. also has chronic back pain. Family at bedside. Safety and comfort provided.

## 2018-02-07 DIAGNOSIS — Z75.8 OTHER PROBLEMS RELATED TO MEDICAL FACILITIES AND OTHER HEALTH CARE: ICD-10-CM

## 2018-02-07 DIAGNOSIS — M54.5 LOW BACK PAIN: ICD-10-CM

## 2018-02-07 DIAGNOSIS — F03.90 UNSPECIFIED DEMENTIA WITHOUT BEHAVIORAL DISTURBANCE: ICD-10-CM

## 2018-02-07 DIAGNOSIS — G91.2 (IDIOPATHIC) NORMAL PRESSURE HYDROCEPHALUS: ICD-10-CM

## 2018-02-07 DIAGNOSIS — R50.9 FEVER, UNSPECIFIED: ICD-10-CM

## 2018-02-07 DIAGNOSIS — I10 ESSENTIAL (PRIMARY) HYPERTENSION: ICD-10-CM

## 2018-02-07 DIAGNOSIS — E11.9 TYPE 2 DIABETES MELLITUS WITHOUT COMPLICATIONS: ICD-10-CM

## 2018-02-07 DIAGNOSIS — Z79.899 OTHER LONG TERM (CURRENT) DRUG THERAPY: ICD-10-CM

## 2018-02-07 LAB
CULTURE RESULTS: NO GROWTH — SIGNIFICANT CHANGE UP
GAS PNL BLDV: SIGNIFICANT CHANGE UP
GLUCOSE BLDC GLUCOMTR-MCNC: 165 MG/DL — HIGH (ref 70–99)
GLUCOSE BLDC GLUCOMTR-MCNC: 188 MG/DL — HIGH (ref 70–99)
GLUCOSE BLDC GLUCOMTR-MCNC: 198 MG/DL — HIGH (ref 70–99)
GLUCOSE BLDC GLUCOMTR-MCNC: 251 MG/DL — HIGH (ref 70–99)
LACTATE BLDV-MCNC: 1.5 MMOL/L — SIGNIFICANT CHANGE UP (ref 0.7–2)
LEGIONELLA AG UR QL: NEGATIVE — SIGNIFICANT CHANGE UP
PROCALCITONIN SERPL-MCNC: 0.14 NG/ML — HIGH (ref 0–0.04)
SPECIMEN SOURCE: SIGNIFICANT CHANGE UP

## 2018-02-07 PROCEDURE — 71250 CT THORAX DX C-: CPT | Mod: 26

## 2018-02-07 RX ORDER — DEXTROSE 50 % IN WATER 50 %
25 SYRINGE (ML) INTRAVENOUS ONCE
Qty: 0 | Refills: 0 | Status: DISCONTINUED | OUTPATIENT
Start: 2018-02-07 | End: 2018-02-12

## 2018-02-07 RX ORDER — DEXTROSE 50 % IN WATER 50 %
1 SYRINGE (ML) INTRAVENOUS ONCE
Qty: 0 | Refills: 0 | Status: DISCONTINUED | OUTPATIENT
Start: 2018-02-07 | End: 2018-02-12

## 2018-02-07 RX ORDER — TAMSULOSIN HYDROCHLORIDE 0.4 MG/1
0.4 CAPSULE ORAL AT BEDTIME
Qty: 0 | Refills: 0 | Status: DISCONTINUED | OUTPATIENT
Start: 2018-02-07 | End: 2018-02-12

## 2018-02-07 RX ORDER — LIDOCAINE 4 G/100G
1 CREAM TOPICAL EVERY 24 HOURS
Qty: 0 | Refills: 0 | Status: DISCONTINUED | OUTPATIENT
Start: 2018-02-07 | End: 2018-02-12

## 2018-02-07 RX ORDER — CEFTRIAXONE 500 MG/1
1 INJECTION, POWDER, FOR SOLUTION INTRAMUSCULAR; INTRAVENOUS EVERY 24 HOURS
Qty: 0 | Refills: 0 | Status: DISCONTINUED | OUTPATIENT
Start: 2018-02-07 | End: 2018-02-09

## 2018-02-07 RX ORDER — SODIUM CHLORIDE 9 MG/ML
1000 INJECTION, SOLUTION INTRAVENOUS
Qty: 0 | Refills: 0 | Status: DISCONTINUED | OUTPATIENT
Start: 2018-02-07 | End: 2018-02-12

## 2018-02-07 RX ORDER — INSULIN LISPRO 100/ML
VIAL (ML) SUBCUTANEOUS
Qty: 0 | Refills: 0 | Status: DISCONTINUED | OUTPATIENT
Start: 2018-02-07 | End: 2018-02-12

## 2018-02-07 RX ORDER — ATORVASTATIN CALCIUM 80 MG/1
40 TABLET, FILM COATED ORAL AT BEDTIME
Qty: 0 | Refills: 0 | Status: DISCONTINUED | OUTPATIENT
Start: 2018-02-07 | End: 2018-02-08

## 2018-02-07 RX ORDER — FUROSEMIDE 40 MG
20 TABLET ORAL DAILY
Qty: 0 | Refills: 0 | Status: DISCONTINUED | OUTPATIENT
Start: 2018-02-07 | End: 2018-02-08

## 2018-02-07 RX ORDER — AZITHROMYCIN 500 MG/1
250 TABLET, FILM COATED ORAL DAILY
Qty: 0 | Refills: 0 | Status: DISCONTINUED | OUTPATIENT
Start: 2018-02-07 | End: 2018-02-12

## 2018-02-07 RX ORDER — DEXTROSE 50 % IN WATER 50 %
12.5 SYRINGE (ML) INTRAVENOUS ONCE
Qty: 0 | Refills: 0 | Status: DISCONTINUED | OUTPATIENT
Start: 2018-02-07 | End: 2018-02-12

## 2018-02-07 RX ORDER — PANTOPRAZOLE SODIUM 20 MG/1
40 TABLET, DELAYED RELEASE ORAL
Qty: 0 | Refills: 0 | Status: DISCONTINUED | OUTPATIENT
Start: 2018-02-07 | End: 2018-02-12

## 2018-02-07 RX ORDER — INSULIN LISPRO 100/ML
VIAL (ML) SUBCUTANEOUS AT BEDTIME
Qty: 0 | Refills: 0 | Status: DISCONTINUED | OUTPATIENT
Start: 2018-02-07 | End: 2018-02-12

## 2018-02-07 RX ORDER — GLUCAGON INJECTION, SOLUTION 0.5 MG/.1ML
1 INJECTION, SOLUTION SUBCUTANEOUS ONCE
Qty: 0 | Refills: 0 | Status: DISCONTINUED | OUTPATIENT
Start: 2018-02-07 | End: 2018-02-12

## 2018-02-07 RX ORDER — ACETAMINOPHEN 500 MG
650 TABLET ORAL EVERY 6 HOURS
Qty: 0 | Refills: 0 | Status: DISCONTINUED | OUTPATIENT
Start: 2018-02-07 | End: 2018-02-12

## 2018-02-07 RX ORDER — CARVEDILOL PHOSPHATE 80 MG/1
6.25 CAPSULE, EXTENDED RELEASE ORAL EVERY 12 HOURS
Qty: 0 | Refills: 0 | Status: DISCONTINUED | OUTPATIENT
Start: 2018-02-07 | End: 2018-02-12

## 2018-02-07 RX ORDER — HEPARIN SODIUM 5000 [USP'U]/ML
5000 INJECTION INTRAVENOUS; SUBCUTANEOUS EVERY 8 HOURS
Qty: 0 | Refills: 0 | Status: DISCONTINUED | OUTPATIENT
Start: 2018-02-07 | End: 2018-02-12

## 2018-02-07 RX ORDER — DONEPEZIL HYDROCHLORIDE 10 MG/1
10 TABLET, FILM COATED ORAL AT BEDTIME
Qty: 0 | Refills: 0 | Status: DISCONTINUED | OUTPATIENT
Start: 2018-02-07 | End: 2018-02-12

## 2018-02-07 RX ORDER — SODIUM CHLORIDE 9 MG/ML
1000 INJECTION INTRAMUSCULAR; INTRAVENOUS; SUBCUTANEOUS
Qty: 0 | Refills: 0 | Status: DISCONTINUED | OUTPATIENT
Start: 2018-02-07 | End: 2018-02-08

## 2018-02-07 RX ADMIN — Medication 1: at 17:40

## 2018-02-07 RX ADMIN — Medication 3: at 13:35

## 2018-02-07 RX ADMIN — TAMSULOSIN HYDROCHLORIDE 0.4 MILLIGRAM(S): 0.4 CAPSULE ORAL at 22:55

## 2018-02-07 RX ADMIN — ATORVASTATIN CALCIUM 40 MILLIGRAM(S): 80 TABLET, FILM COATED ORAL at 22:55

## 2018-02-07 RX ADMIN — SODIUM CHLORIDE 75 MILLILITER(S): 9 INJECTION INTRAMUSCULAR; INTRAVENOUS; SUBCUTANEOUS at 11:09

## 2018-02-07 RX ADMIN — CARVEDILOL PHOSPHATE 6.25 MILLIGRAM(S): 80 CAPSULE, EXTENDED RELEASE ORAL at 06:35

## 2018-02-07 RX ADMIN — PANTOPRAZOLE SODIUM 40 MILLIGRAM(S): 20 TABLET, DELAYED RELEASE ORAL at 06:35

## 2018-02-07 RX ADMIN — Medication 650 MILLIGRAM(S): at 17:40

## 2018-02-07 RX ADMIN — CEFTRIAXONE 100 GRAM(S): 500 INJECTION, POWDER, FOR SOLUTION INTRAMUSCULAR; INTRAVENOUS at 22:56

## 2018-02-07 RX ADMIN — CARVEDILOL PHOSPHATE 6.25 MILLIGRAM(S): 80 CAPSULE, EXTENDED RELEASE ORAL at 17:40

## 2018-02-07 RX ADMIN — AZITHROMYCIN 250 MILLIGRAM(S): 500 TABLET, FILM COATED ORAL at 11:22

## 2018-02-07 RX ADMIN — HEPARIN SODIUM 5000 UNIT(S): 5000 INJECTION INTRAVENOUS; SUBCUTANEOUS at 06:34

## 2018-02-07 RX ADMIN — HEPARIN SODIUM 5000 UNIT(S): 5000 INJECTION INTRAVENOUS; SUBCUTANEOUS at 22:57

## 2018-02-07 RX ADMIN — HEPARIN SODIUM 5000 UNIT(S): 5000 INJECTION INTRAVENOUS; SUBCUTANEOUS at 13:35

## 2018-02-07 RX ADMIN — SODIUM CHLORIDE 150 MILLILITER(S): 9 INJECTION INTRAMUSCULAR; INTRAVENOUS; SUBCUTANEOUS at 06:35

## 2018-02-07 NOTE — H&P ADULT - HISTORY OF PRESENT ILLNESS
Farooq Parmar is an 83 year old male with a history of chronic back pain, BPH, DM, recurrent UTI's, HTN, dementia, nephrotic syndrome, NPH s/p ventriculostomy, spinal stenosis presents for evaluation of fever and altered mental status.    Patient is unable to provide an accurate history secondary to dementia. As per discussion with family and chart review, patient    In the ED, Tmax 102.1, HR 90-100s, /96, O2 92% on RA. Labs showed leukocytosis to 15.9, hemoconcentration with Hgb 17.4, and platelets 190. Metabolic panel unremarkable with Cr 1.14. LFTs normal. Troponin negative, . VBG with mildly elevated lactate of 3.4. UA non-infectious. RVP negative. CXR with possible RLL PNA.    Patient was given 1L LR and Ceftriaxone/Azithromycin for possible PNA and admitted. Farooq Parmar is an 83 year old male with a history of chronic back pain, BPH, DM, recurrent UTI's, HTN, dementia, nephrotic syndrome, NPH s/p ventriculostomy, spinal stenosis presents for evaluation of fever and altered mental status.    Patient is unable to provide an accurate history secondary to dementia. As per discussion with patient and chart review, patient was noted to have fever and altered mental status similar to prior UTIs. Patient was also noted to be complaining of some chest discomfort and have a cough. Went to  where RVP was negative, but clinically diagnosed with influenza and sent home. Symptoms persisted prompting visit to the ED. On my exam with the patient, he reports feeling well at this time without any pain or shortness of breath. Requesting to be discharged home this AM. Does have a cough that is productive of a small amount of sputum.     In the ED, Tmax 102.1, HR 90-100s, /96, O2 92% on RA. Labs showed leukocytosis to 15.9, hemoconcentration with Hgb 17.4, and platelets 190. Metabolic panel unremarkable with Cr 1.14. LFTs normal. Troponin negative, . VBG with mildly elevated lactate of 3.4. UA non-infectious. RVP negative. CXR with possible RLL PNA.    Patient was given 1L LR and Ceftriaxone/Azithromycin for possible PNA and admitted.

## 2018-02-07 NOTE — H&P ADULT - NSHPPHYSICALEXAM_GEN_ALL_CORE
PHYSICAL EXAM:  GENERAL: NAD, well-groomed, well-developed  HEAD:  Atraumatic, Normocephalic  EYES: EOMI, PERRLA, conjunctiva and sclera clear  ENMT: No tonsillar erythema, exudates, or enlargement; Moist mucous membranes,   NECK: Supple, No JVD, Normal thyroid  HEART: Regular rate and rhythm; No murmurs, rubs, or gallops  RESPIRATORY: CTA B/L, No W/R/R  ABDOMEN: Soft, Nontender, Nondistended; Bowel sounds present  NEUROLOGY: A&Ox3, nonfocal, CN II-XII grossly intact, sensation intact, no gait abnormalities bilaterally;  EXTREMITIES:  2+ Peripheral Pulses, No clubbing, cyanosis, or edema  SKIN: warm, dry, normal color, no rash or abnormal lesions  MSK: No joint effusion  PSYCH: Flat affect PHYSICAL EXAM:  GENERAL: NAD, well-groomed, well-developed  HEAD:  Atraumatic, Normocephalic  EYES: EOMI, PERRLA, conjunctiva and sclera clear  ENMT: No tonsillar erythema, exudates, or enlargement; dry mucous membranes,   NECK: Supple, No JVD, Normal thyroid  HEART: Regular rate and rhythm; No murmurs, rubs, or gallops  RESPIRATORY: rales at the right lower base, no wheezes or rhonchi   ABDOMEN: Soft, Nontender, Nondistended; Bowel sounds present  NEUROLOGY: A&Ox3, nonfocal, CN II-XII grossly intact, sensation intact  EXTREMITIES:  2+ Peripheral Pulses, No clubbing, cyanosis, or edema  SKIN: warm, dry, normal color, no rash or abnormal lesions  MSK: No joint effusion  PSYCH: Flat affect

## 2018-02-07 NOTE — H&P ADULT - PROBLEM SELECTOR PLAN 4
Patient is s/p  shunt for NPH. Consider possible source of infection though without any headache or evidence of meningismus on exam.

## 2018-02-07 NOTE — H&P ADULT - ASSESSMENT
Farooq Parmar is an 83 year old male with a history of chronic back pain, BPH, DM, recurrent UTI's, HTN, dementia, nephrotic syndrome, NPH s/p ventriculostomy, spinal stenosis presents for evaluation of fever and altered mental status found to have likely pneumonia.

## 2018-02-07 NOTE — H&P ADULT - PROBLEM SELECTOR PLAN 7
- Patient is unsure about medications and family no longer at bedside. Please confirm medication with PMD in AM

## 2018-02-07 NOTE — CHART NOTE - NSCHARTNOTEFT_GEN_A_CORE
VSS. Seen in ER. CXR clear. CT chest ordered to r/o pneumonia. Continue IVF and present medical regimen.

## 2018-02-07 NOTE — H&P ADULT - NSHPLABSRESULTS_GEN_ALL_CORE
17.4   15.9  )-----------( 190      ( 2018 19:21 )             51.0       02-    138  |  99  |  13  ----------------------------<  195<H>  4.3   |  27  |  1.14    Ca    9.7      2018 19:21    TPro  7.7  /  Alb  4.2  /  TBili  1.3<H>  /  DBili  x   /  AST  11  /  ALT  15  /  AlkPhos  87  02-        Urinalysis Basic - ( 2018 19:21 )    Color: Yellow / Appearance: Clear / S.018 / pH: x  Gluc: x / Ketone: Trace  / Bili: Negative / Urobili: Negative   Blood: x / Protein: 100 mg/dL / Nitrite: Negative   Leuk Esterase: Negative / RBC: 3-5 /HPF / WBC 0-2 /HPF   Sq Epi: x / Non Sq Epi: OCC /HPF / Bacteria: x    CARDIAC MARKERS ( 2018 19:21 )  x     / <0.01 ng/mL / x     / x     / 2.0 ng/mL    I personally interpreted this patient's labs. They were notable for leukocytosis to 15.9, hemoconcentration with Hgb 17.4, and platelets 190. Metabolic panel unremarkable with Cr 1.14. LFTs normal. Troponin negative, . VBG with mildly elevated lactate of 3.4. UA non-infectious. RVP negative.  I personally interpreted this patient's imaging. CXR was notable for possible RLL infiltrate though poor inspiration.  I personally interpreted this patient's ECG. It showed 17.4   15.9  )-----------( 190      ( 2018 19:21 )             51.0       02-    138  |  99  |  13  ----------------------------<  195<H>  4.3   |  27  |  1.14    Ca    9.7      2018 19:21    TPro  7.7  /  Alb  4.2  /  TBili  1.3<H>  /  DBili  x   /  AST  11  /  ALT  15  /  AlkPhos  87  02-        Urinalysis Basic - ( 2018 19:21 )    Color: Yellow / Appearance: Clear / S.018 / pH: x  Gluc: x / Ketone: Trace  / Bili: Negative / Urobili: Negative   Blood: x / Protein: 100 mg/dL / Nitrite: Negative   Leuk Esterase: Negative / RBC: 3-5 /HPF / WBC 0-2 /HPF   Sq Epi: x / Non Sq Epi: OCC /HPF / Bacteria: x    CARDIAC MARKERS ( 2018 19:21 )  x     / <0.01 ng/mL / x     / x     / 2.0 ng/mL    I personally interpreted this patient's labs. They were notable for leukocytosis to 15.9, hemoconcentration with Hgb 17.4, and platelets 190. Metabolic panel unremarkable with Cr 1.14. LFTs normal. Troponin negative, . VBG with mildly elevated lactate of 3.4. UA non-infectious. RVP negative.  I personally interpreted this patient's imaging. CXR was notable for possible RLL infiltrate though poor inspiration.  I personally interpreted this patient's ECG. It showed sinus rhythm with frequent PVCs, no acute ischemic changes

## 2018-02-07 NOTE — H&P ADULT - PROBLEM SELECTOR PLAN 1
Patient presents for evaluation of fever and found to have cough and significant wheezing on arrival. Labs with leukocytosis though hemoconcentrated. UA and RVP negative. CXR overall poor quality with shallow inspiration, though evidence of possible RLL opacity consistent with pneumonia. Given fever and symptoms, likely source of infection.    - Follow up final read of CXR  - Continue Ceftriaxone, Azithromycin for CAP  - Follow up BCx  - Urine legionella, procalctonin  - Consider CT chest for further evaluation if CXR indeterminate Patient presents for evaluation of fever and found to have cough and significant wheezing on arrival. Labs with leukocytosis though hemoconcentrated. UA and RVP negative. CXR overall poor quality with shallow inspiration, though evidence of possible RLL opacity consistent with pneumonia. Given fever and symptoms, likely source of infection.    - Follow up final read of CXR  - Continue Ceftriaxone, Azithromycin for CAP  - Follow up BCx  - Urine legionella, procalctonin  - Consider CT chest for further evaluation if CXR indeterminate  - Follow up repeat lactate

## 2018-02-07 NOTE — CONSULT NOTE ADULT - ASSESSMENT
pt with sig risk factor for cad with sob and possible pneumoniae/pleural effusion  possible chf/diastolic vs systolic  echo  add lasix  asa daily  continue coreg  will add ace if renal function remain normal  lipid  tsh

## 2018-02-08 LAB
CHOLEST SERPL-MCNC: 102 MG/DL — SIGNIFICANT CHANGE UP (ref 10–199)
GLUCOSE BLDC GLUCOMTR-MCNC: 205 MG/DL — HIGH (ref 70–99)
GLUCOSE BLDC GLUCOMTR-MCNC: 228 MG/DL — HIGH (ref 70–99)
GLUCOSE BLDC GLUCOMTR-MCNC: 324 MG/DL — HIGH (ref 70–99)
HBA1C BLD-MCNC: 7.6 % — HIGH (ref 4–5.6)
HDLC SERPL-MCNC: 39 MG/DL — LOW (ref 40–125)
LIPID PNL WITH DIRECT LDL SERPL: 41 MG/DL — SIGNIFICANT CHANGE UP
PROCALCITONIN SERPL-MCNC: 0.16 NG/ML — HIGH (ref 0–0.04)
TOTAL CHOLESTEROL/HDL RATIO MEASUREMENT: 2.6 RATIO — LOW (ref 3.4–9.6)
TRIGL SERPL-MCNC: 110 MG/DL — SIGNIFICANT CHANGE UP (ref 10–149)
TSH SERPL-MCNC: 1.16 UIU/ML — SIGNIFICANT CHANGE UP (ref 0.27–4.2)

## 2018-02-08 PROCEDURE — 93306 TTE W/DOPPLER COMPLETE: CPT | Mod: 26

## 2018-02-08 RX ORDER — ATORVASTATIN CALCIUM 80 MG/1
10 TABLET, FILM COATED ORAL AT BEDTIME
Qty: 0 | Refills: 0 | Status: DISCONTINUED | OUTPATIENT
Start: 2018-02-08 | End: 2018-02-12

## 2018-02-08 RX ADMIN — CEFTRIAXONE 100 GRAM(S): 500 INJECTION, POWDER, FOR SOLUTION INTRAMUSCULAR; INTRAVENOUS at 22:13

## 2018-02-08 RX ADMIN — PANTOPRAZOLE SODIUM 40 MILLIGRAM(S): 20 TABLET, DELAYED RELEASE ORAL at 05:41

## 2018-02-08 RX ADMIN — TAMSULOSIN HYDROCHLORIDE 0.4 MILLIGRAM(S): 0.4 CAPSULE ORAL at 22:14

## 2018-02-08 RX ADMIN — Medication 2: at 08:41

## 2018-02-08 RX ADMIN — CARVEDILOL PHOSPHATE 6.25 MILLIGRAM(S): 80 CAPSULE, EXTENDED RELEASE ORAL at 18:04

## 2018-02-08 RX ADMIN — CARVEDILOL PHOSPHATE 6.25 MILLIGRAM(S): 80 CAPSULE, EXTENDED RELEASE ORAL at 05:42

## 2018-02-08 RX ADMIN — DONEPEZIL HYDROCHLORIDE 10 MILLIGRAM(S): 10 TABLET, FILM COATED ORAL at 01:00

## 2018-02-08 RX ADMIN — DONEPEZIL HYDROCHLORIDE 10 MILLIGRAM(S): 10 TABLET, FILM COATED ORAL at 22:14

## 2018-02-08 RX ADMIN — ATORVASTATIN CALCIUM 10 MILLIGRAM(S): 80 TABLET, FILM COATED ORAL at 22:13

## 2018-02-08 RX ADMIN — Medication 2: at 18:03

## 2018-02-08 RX ADMIN — AZITHROMYCIN 250 MILLIGRAM(S): 500 TABLET, FILM COATED ORAL at 14:27

## 2018-02-08 RX ADMIN — Medication 4: at 12:27

## 2018-02-08 RX ADMIN — HEPARIN SODIUM 5000 UNIT(S): 5000 INJECTION INTRAVENOUS; SUBCUTANEOUS at 14:28

## 2018-02-08 RX ADMIN — Medication 20 MILLIGRAM(S): at 05:41

## 2018-02-08 NOTE — CONSULT NOTE ADULT - ASSESSMENT
83y male with hx DM, spinal stenosis, NPH, ventriculostomy, BPH who presented with fever, leukocytosis, episode of confusion.   He was seen 2 days ago at urgent care center and empiric tamiflu was started for possible influenza, but RVP was negative and now d/cd.   fever to 102, started on CTX and Zithromax for possible pneumonia.   Pt now afebrile, he denies any ha, neck pain, +dry cough, no dysuria.   CT chest with atelectasis, pleural effusion, possible superimposed infiltrate.    PLAN:  cont CTX and Zithromax for CAP  f/u final cultures  monitor WBC trend and check procalcitonin in am  if cont to improve may switch to oral abx regimen soon: PO ceftin and zithromax 5 days total  D/w Dr Beckwith and medicine NP  reviewed with pt's spouse at bedside 83y male with hx DM, spinal stenosis, NPH, ventriculostomy, BPH who presented with fever, leukocytosis, episode of confusion.   He was seen 2 days ago at urgent care center and empiric tamiflu was started for possible influenza, but RVP was negative and now d/cd.   fever to 102, started on CTX and Zithromax for possible pneumonia.   Pt now afebrile, he denies any ha, neck pain, +dry cough, no dysuria.   CT chest with atelectasis, pleural effusion, possible superimposed infiltrate.    PLAN:  cont CTX and Zithromax for CAP  f/u final cultures  monitor WBC trend and check procalcitonin in am  if cont to improve may switch to oral abx regimen soon: PO ceftin and zithromax 5 days total  D/w Dr Beckwith and medicine NP  reviewed with pt's spouse at bedside   etiology of pericardial effusion w/u as per cardiology, echo  No HA or confusion at present, if symptoms develop would consider CT head, neuro eval

## 2018-02-08 NOTE — PROGRESS NOTE ADULT - SUBJECTIVE AND OBJECTIVE BOX
HPI:  84 yo male HTN, DM2, NPH s/p ventriculostomy, Nephrotic syndrome, admitted with fever.  CT chest bilateral effusions, can't R/O pna and interval increase in circumferential small pericardial effusion from 3/17.  PAST MEDICAL & SURGICAL HISTORY:  Lumbar spinal stenosis  Vitamin D deficiency  Mild dementia  OAB (overactive bladder)  Type 2 diabetes mellitus  BPH (benign prostatic hyperplasia)  Nephrotic syndrome: pt was instructed to avoid taking NSAIDS  Chronic lower back pain: unclear etiology per wife - no hx trauma, possibly arthritis  H/O recurrent urinary tract infection  Chronic Back Pain: L3-L4, L4-L5 compression  NPH (Normal Pressure Hydrocephalus): s/p ventriculostomy, last one in 2012  Hypertension, Essential  Normal pressure hydrocephalus: s/p ventriculostomy of the third ventricle       Allergies    No Known Allergies    Intolerances          MEDICATIONS  (STANDING):  atorvastatin 40 milliGRAM(s) Oral at bedtime  azithromycin   Tablet 250 milliGRAM(s) Oral daily  carvedilol 6.25 milliGRAM(s) Oral every 12 hours  cefTRIAXone   IVPB 1 Gram(s) IV Intermittent every 24 hours  dextrose 5%. 1000 milliLiter(s) (50 mL/Hr) IV Continuous <Continuous>  dextrose 50% Injectable 12.5 Gram(s) IV Push once  dextrose 50% Injectable 25 Gram(s) IV Push once  dextrose 50% Injectable 25 Gram(s) IV Push once  donepezil 10 milliGRAM(s) Oral at bedtime  furosemide   Injectable 20 milliGRAM(s) IV Push daily  heparin  Injectable 5000 Unit(s) SubCutaneous every 8 hours  insulin lispro (HumaLOG) corrective regimen sliding scale   SubCutaneous three times a day before meals  insulin lispro (HumaLOG) corrective regimen sliding scale   SubCutaneous at bedtime  lidocaine   Patch 1 Patch Transdermal every 24 hours  pantoprazole    Tablet 40 milliGRAM(s) Oral before breakfast  sodium chloride 0.9%. 1000 milliLiter(s) (75 mL/Hr) IV Continuous <Continuous>  tamsulosin 0.4 milliGRAM(s) Oral at bedtime    MEDICATIONS  (PRN):  acetaminophen   Tablet 650 milliGRAM(s) Oral every 6 hours PRN For Temp greater than 38 C (100.4 F)  dextrose Gel 1 Dose(s) Oral once PRN Blood Glucose LESS THAN 70 milliGRAM(s)/deciliter  glucagon  Injectable 1 milliGRAM(s) IntraMuscular once PRN Glucose LESS THAN 70 milligrams/deciliter            Vital Signs Last 24 Hrs  T(C): 37.5 (2018 07:56), Max: 37.8 (2018 17:35)  T(F): 99.5 (2018 07:56), Max: 100.1 (2018 17:35)  HR: 94 (2018 07:56) (74 - 94)  BP: 146/84 (2018 07:56) (123/82 - 168/69)  BP(mean): --  RR: 18 (2018 07:56) (18 - 18)  SpO2: 94% (2018 07:56) (93% - 97%)  Daily Height in cm: 175.26 (2018 21:51)    Daily   I&O's Detail      Physical Exam  Neck without JVD  Lungs dec BS bases  cor s1s2 2/6 kamar rusb  Abd soft  Ext without edema          LABS    Urinalysis Basic - ( 2018 19:21 )    Color: Yellow / Appearance: Clear / S.018 / pH: x  Gluc: x / Ketone: Trace  / Bili: Negative / Urobili: Negative   Blood: x / Protein: 100 mg/dL / Nitrite: Negative   Leuk Esterase: Negative / RBC: 3-5 /HPF / WBC 0-2 /HPF   Sq Epi: x / Non Sq Epi: OCC /HPF / Bacteria: x      CARDIAC MARKERS ( 2018 19:21 )  x     / <0.01 ng/mL / x     / x     / 2.0 ng/mL      CBC Full  -  ( 2018 19:21 )  WBC Count : 15.9 K/uL  Hemoglobin : 17.4 g/dL  Hematocrit : 51.0 %  Platelet Count - Automated : 190 K/uL  Mean Cell Volume : 83.7 fl  Mean Cell Hemoglobin : 28.5 pg  Mean Cell Hemoglobin Concentration : 34.0 gm/dL  Auto Neutrophil # : 13.4 K/uL  Auto Lymphocyte # : 0.6 K/uL  Auto Monocyte # : 1.8 K/uL  Auto Eosinophil # : 0.0 K/uL  Auto Basophil # : 0.0 K/uL  Auto Neutrophil % : 84.4 %  Auto Lymphocyte % : 3.7 %  Auto Monocyte % : 11.6 %  Auto Eosinophil % : 0.2 %  Auto Basophil % : 0.2 %    -06    138  |  99  |  13  ----------------------------<  195<H>  4.3   |  27  |  1.14    Ca    9.7      2018 19:21    TPro  7.7  /  Alb  4.2  /  TBili  1.3<H>  /  DBili  x   /  AST  11  /  ALT  15  /  AlkPhos  87  -        EKG:  < from: 12 Lead ECG (18 @ 18:14) >  Ventricular Rate 98 BPM    Atrial Rate 98 BPM    P-R Interval 264 ms    QRS Duration 94 ms     ms    QTc 423 ms    P Axis 13 degrees    R Axis -34 degrees    T Axis 70 degrees    Diagnosis Line SINUS RHYTHM WITH 1ST DEGREE A-V BLOCK WITH FREQUENT PREMATURE VENTRICULAR COMPLEXES  POSSIBLE LEFT ATRIAL ENLARGEMENT  LEFT AXIS DEVIATION  LEFT VENTRICULAR HYPERTROPHY WITH REPOLARIZATION ABNORMALITY  CANNOT RULE OUT SEPTAL INFARCT , AGE UNDETERMINED  ABNORMAL ECG    CT chest  < from: CT Chest No Cont (18 @ 11:52) >  EXAM:  CT CHEST                            PROCEDURE DATE:  2018            INTERPRETATION:  CLINICAL INFORMATION: Fever. Evaluate for pneumonia.    COMPARISON: CT chest dated 3/13/2017.    PROCEDURE:   CT of the Chest was performed without intravenous contrast.  Sagittal and coronal reformats were performed.      FINDINGS:    CHEST:     LUNGS AND LARGE AIRWAYS: Secretions within the lower trachea. Bibasilar   lower lobe dependent or subsegmental atelectasis. Scattered 2 mm   granulomas. Tracheal bronchus is incidentally noted.  PLEURA: Trace bilateral pleural effusions with associated atelectasis.  VESSELS: Atherosclerosis of the aorta and coronary arteries.  HEART: Cardiomegaly. Small circumferential and likely loculated   pericardial effusion with mild interval increase in size.  MEDIASTINUM AND PETER: No lymphadenopathy.  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  VISUALIZED UPPER ABDOMEN: Tiny hiatal hernia.Right upper pole renal   cysts.   BONES: Mild degenerative changes.    IMPRESSION:     Small bilateral pleural effusions with bibasilar atelectasis.   Superimposed pneumonia cannot be excluded.    Small circumferential and likely loculated pericardial effusion with   interval increase in size since 2017. Correlation with   echocardiography is recommended.              Assessment and Plan:  84 yo male HTN, DM2, NPH s/p ventriculostomy, Nephrotic syndrome, admitted with fever.  CT chest bilateral effusions, can't R/O pna and interval increase in circumferential small pericardial effusion from 3/17.  Pt receiving IV NS and IV lasix  Recommend: DC lasix  Start lisinopril 10mg daily  TTE to evaluate pericardial effusion  ID consult etiology of fever unclear in this patient with shunt

## 2018-02-08 NOTE — PROGRESS NOTE ADULT - ASSESSMENT
Farooq Parmar is an 83 year old male with a history of chronic back pain, BPH, DM, recurrent UTI's, HTN, dementia, nephrotic syndrome, NPH s/p ventriculostomy, spinal stenosis presents for evaluation of fever and altered mental status.    Patient is unable to provide an accurate history secondary to dementia. As per discussion with patient and chart review, patient was noted to have fever and altered mental status similar to prior UTIs. Patient was also noted to be complaining of some chest discomfort and have a cough. Went to  where RVP was negative, but clinically diagnosed with influenza and sent home. Symptoms persisted prompting visit to the ED. On my exam with the patient, he reports feeling well at this time without any pain or shortness of breath. Requesting to be discharged home this AM. Does have a cough that is productive of a small amount of sputum.     In the ED, Tmax 102.1, HR 90-100s, /96, O2 92% on RA. Labs showed leukocytosis to 15.9, hemoconcentration with Hgb 17.4, and platelets 190. Metabolic panel unremarkable with Cr 1.14. LFTs normal. Troponin negative, . VBG with mildly elevated lactate of 3.4. UA non-infectious. RVP negative. CXR with possible RLL PNA. Patient was given 1L LR and Ceftriaxone/Azithromycin for possible PNA and admitted.     ID: Continue Rocephin and Zithromas. CT chest notes mild pericardial effusion and lower lobe infiltrates. ID consult called. TTE ordered.     CV: Continue Lipitor 20 mg/day and Coreg 6.25 mg bid, Vasotec 10 mg/day.  Stop IVF. Likely resume Lasix in AM if BP allows. Discussed case with cardiology.      Neuro: Continue Aricept 10 mg/day.

## 2018-02-08 NOTE — PROGRESS NOTE ADULT - SUBJECTIVE AND OBJECTIVE BOX
INTERVAL HPI/OVERNIGHT EVENTS:  Pt seen and examined at bedside.     Allergies/Intolerance: No Known Allergies      MEDICATIONS  (STANDING):  atorvastatin 10 milliGRAM(s) Oral at bedtime  azithromycin   Tablet 250 milliGRAM(s) Oral daily  carvedilol 6.25 milliGRAM(s) Oral every 12 hours  cefTRIAXone   IVPB 1 Gram(s) IV Intermittent every 24 hours  dextrose 5%. 1000 milliLiter(s) (50 mL/Hr) IV Continuous <Continuous>  dextrose 50% Injectable 12.5 Gram(s) IV Push once  dextrose 50% Injectable 25 Gram(s) IV Push once  dextrose 50% Injectable 25 Gram(s) IV Push once  donepezil 10 milliGRAM(s) Oral at bedtime  enalapril 10 milliGRAM(s) Oral daily  heparin  Injectable 5000 Unit(s) SubCutaneous every 8 hours  insulin lispro (HumaLOG) corrective regimen sliding scale   SubCutaneous three times a day before meals  insulin lispro (HumaLOG) corrective regimen sliding scale   SubCutaneous at bedtime  lidocaine   Patch 1 Patch Transdermal every 24 hours  pantoprazole    Tablet 40 milliGRAM(s) Oral before breakfast  tamsulosin 0.4 milliGRAM(s) Oral at bedtime    MEDICATIONS  (PRN):  acetaminophen   Tablet 650 milliGRAM(s) Oral every 6 hours PRN For Temp greater than 38 C (100.4 F)  dextrose Gel 1 Dose(s) Oral once PRN Blood Glucose LESS THAN 70 milliGRAM(s)/deciliter  glucagon  Injectable 1 milliGRAM(s) IntraMuscular once PRN Glucose LESS THAN 70 milligrams/deciliter        ROS: all systems reviewed and wnl      PHYSICAL EXAMINATION:  Vital Signs Last 24 Hrs  T(C): 37.5 (2018 07:56), Max: 37.8 (2018 17:35)  T(F): 99.5 (2018 07:56), Max: 100.1 (2018 17:35)  HR: 94 (2018 07:56) (74 - 94)  BP: 146/84 (2018 07:56) (123/82 - 168/69)  BP(mean): --  RR: 18 (2018 07:56) (18 - 18)  SpO2: 94% (2018 07:56) (93% - 97%)  CAPILLARY BLOOD GLUCOSE      POCT Blood Glucose.: 205 mg/dL (2018 08:34)  POCT Blood Glucose.: 198 mg/dL (2018 22:29)  POCT Blood Glucose.: 188 mg/dL (2018 17:28)  POCT Blood Glucose.: 251 mg/dL (2018 13:20)        GENERAL:   NECK: supple, No JVD  CHEST/LUNG: clear to auscultation bilaterally; no rales, rhonchi, or wheezing b/l  HEART: normal S1, S2  ABDOMEN: BS+, soft, ND, NT   EXTREMITIES:  pulses palpable; no clubbing, cyanosis, or edema b/l LEs  SKIN: no rashes or lesions      LABS:                        17.4   15.9  )-----------( 190      ( 2018 19:21 )             51.0     02-06    138  |  99  |  13  ----------------------------<  195<H>  4.3   |  27  |  1.14    Ca    9.7      2018 19:21    TPro  7.7  /  Alb  4.2  /  TBili  1.3<H>  /  DBili  x   /  AST  11  /  ALT  15  /  AlkPhos  87  02-06      Urinalysis Basic - ( 2018 19:21 )    Color: Yellow / Appearance: Clear / S.018 / pH: x  Gluc: x / Ketone: Trace  / Bili: Negative / Urobili: Negative   Blood: x / Protein: 100 mg/dL / Nitrite: Negative   Leuk Esterase: Negative / RBC: 3-5 /HPF / WBC 0-2 /HPF   Sq Epi: x / Non Sq Epi: OCC /HPF / Bacteria: x INTERVAL HPI/OVERNIGHT EVENTS:  Pt seen and examined at bedside.     Allergies/Intolerance: No Known Allergies      MEDICATIONS  (STANDING):  atorvastatin 10 milliGRAM(s) Oral at bedtime  azithromycin   Tablet 250 milliGRAM(s) Oral daily  carvedilol 6.25 milliGRAM(s) Oral every 12 hours  cefTRIAXone   IVPB 1 Gram(s) IV Intermittent every 24 hours  dextrose 5%. 1000 milliLiter(s) (50 mL/Hr) IV Continuous <Continuous>  dextrose 50% Injectable 12.5 Gram(s) IV Push once  dextrose 50% Injectable 25 Gram(s) IV Push once  dextrose 50% Injectable 25 Gram(s) IV Push once  donepezil 10 milliGRAM(s) Oral at bedtime  enalapril 10 milliGRAM(s) Oral daily  heparin  Injectable 5000 Unit(s) SubCutaneous every 8 hours  insulin lispro (HumaLOG) corrective regimen sliding scale   SubCutaneous three times a day before meals  insulin lispro (HumaLOG) corrective regimen sliding scale   SubCutaneous at bedtime  lidocaine   Patch 1 Patch Transdermal every 24 hours  pantoprazole    Tablet 40 milliGRAM(s) Oral before breakfast  tamsulosin 0.4 milliGRAM(s) Oral at bedtime    MEDICATIONS  (PRN):  acetaminophen   Tablet 650 milliGRAM(s) Oral every 6 hours PRN For Temp greater than 38 C (100.4 F)  dextrose Gel 1 Dose(s) Oral once PRN Blood Glucose LESS THAN 70 milliGRAM(s)/deciliter  glucagon  Injectable 1 milliGRAM(s) IntraMuscular once PRN Glucose LESS THAN 70 milligrams/deciliter        ROS: all systems reviewed and wnl      PHYSICAL EXAMINATION:  Vital Signs Last 24 Hrs  T(C): 37.5 (2018 07:56), Max: 37.8 (2018 17:35)  T(F): 99.5 (2018 07:56), Max: 100.1 (2018 17:35)  HR: 94 (2018 07:56) (74 - 94)  BP: 146/84 (2018 07:56) (123/82 - 168/69)  BP(mean): --  RR: 18 (2018 07:56) (18 - 18)  SpO2: 94% (2018 07:56) (93% - 97%)  CAPILLARY BLOOD GLUCOSE      POCT Blood Glucose.: 205 mg/dL (2018 08:34)  POCT Blood Glucose.: 198 mg/dL (2018 22:29)  POCT Blood Glucose.: 188 mg/dL (2018 17:28)  POCT Blood Glucose.: 251 mg/dL (2018 13:20)        GENERAL: stable, NAD, no sob, fevers or CP  NECK: supple, No JVD  CHEST/LUNG: decreased at bases.  HEART: normal S1, S2  ABDOMEN: BS+, soft, ND, NT   EXTREMITIES:  pulses palpable; no clubbing, cyanosis, or edema b/l LEs  SKIN: no rashes or lesions      LABS:                        17.4   15.9  )-----------( 190      ( 2018 19:21 )             51.0     02-06    138  |  99  |  13  ----------------------------<  195<H>  4.3   |  27  |  1.14    Ca    9.7      2018 19:21    TPro  7.7  /  Alb  4.2  /  TBili  1.3<H>  /  DBili  x   /  AST  11  /  ALT  15  /  AlkPhos  87  02-06      Urinalysis Basic - ( 2018 19:21 )    Color: Yellow / Appearance: Clear / S.018 / pH: x  Gluc: x / Ketone: Trace  / Bili: Negative / Urobili: Negative   Blood: x / Protein: 100 mg/dL / Nitrite: Negative   Leuk Esterase: Negative / RBC: 3-5 /HPF / WBC 0-2 /HPF   Sq Epi: x / Non Sq Epi: OCC /HPF / Bacteria: x

## 2018-02-09 ENCOUNTER — TRANSCRIPTION ENCOUNTER (OUTPATIENT)
Age: 83
End: 2018-02-09

## 2018-02-09 LAB
ANION GAP SERPL CALC-SCNC: 13 MMOL/L — SIGNIFICANT CHANGE UP (ref 5–17)
BUN SERPL-MCNC: 19 MG/DL — SIGNIFICANT CHANGE UP (ref 7–23)
CALCIUM SERPL-MCNC: 10.1 MG/DL — SIGNIFICANT CHANGE UP (ref 8.4–10.5)
CHLORIDE SERPL-SCNC: 102 MMOL/L — SIGNIFICANT CHANGE UP (ref 96–108)
CO2 SERPL-SCNC: 23 MMOL/L — SIGNIFICANT CHANGE UP (ref 22–31)
CREAT SERPL-MCNC: 1.11 MG/DL — SIGNIFICANT CHANGE UP (ref 0.5–1.3)
GLUCOSE BLDC GLUCOMTR-MCNC: 225 MG/DL — HIGH (ref 70–99)
GLUCOSE BLDC GLUCOMTR-MCNC: 235 MG/DL — HIGH (ref 70–99)
GLUCOSE BLDC GLUCOMTR-MCNC: 241 MG/DL — HIGH (ref 70–99)
GLUCOSE BLDC GLUCOMTR-MCNC: 258 MG/DL — HIGH (ref 70–99)
GLUCOSE BLDC GLUCOMTR-MCNC: 263 MG/DL — HIGH (ref 70–99)
GLUCOSE SERPL-MCNC: 238 MG/DL — HIGH (ref 70–99)
HCT VFR BLD CALC: 43.9 % — SIGNIFICANT CHANGE UP (ref 39–50)
HGB BLD-MCNC: 14.6 G/DL — SIGNIFICANT CHANGE UP (ref 13–17)
MCHC RBC-ENTMCNC: 27 PG — SIGNIFICANT CHANGE UP (ref 27–34)
MCHC RBC-ENTMCNC: 33.3 GM/DL — SIGNIFICANT CHANGE UP (ref 32–36)
MCV RBC AUTO: 81.3 FL — SIGNIFICANT CHANGE UP (ref 80–100)
PLATELET # BLD AUTO: 232 K/UL — SIGNIFICANT CHANGE UP (ref 150–400)
POTASSIUM SERPL-MCNC: 4 MMOL/L — SIGNIFICANT CHANGE UP (ref 3.5–5.3)
POTASSIUM SERPL-SCNC: 4 MMOL/L — SIGNIFICANT CHANGE UP (ref 3.5–5.3)
RBC # BLD: 5.4 M/UL — SIGNIFICANT CHANGE UP (ref 4.2–5.8)
RBC # FLD: 13.8 % — SIGNIFICANT CHANGE UP (ref 10.3–14.5)
SODIUM SERPL-SCNC: 138 MMOL/L — SIGNIFICANT CHANGE UP (ref 135–145)
WBC # BLD: 11.87 K/UL — HIGH (ref 3.8–10.5)
WBC # FLD AUTO: 11.87 K/UL — HIGH (ref 3.8–10.5)

## 2018-02-09 PROCEDURE — 70450 CT HEAD/BRAIN W/O DYE: CPT | Mod: 26

## 2018-02-09 RX ORDER — CEFUROXIME AXETIL 250 MG
500 TABLET ORAL EVERY 12 HOURS
Qty: 0 | Refills: 0 | Status: DISCONTINUED | OUTPATIENT
Start: 2018-02-09 | End: 2018-02-12

## 2018-02-09 RX ORDER — ASPIRIN/CALCIUM CARB/MAGNESIUM 324 MG
81 TABLET ORAL DAILY
Qty: 0 | Refills: 0 | Status: DISCONTINUED | OUTPATIENT
Start: 2018-02-09 | End: 2018-02-12

## 2018-02-09 RX ORDER — INSULIN GLARGINE 100 [IU]/ML
10 INJECTION, SOLUTION SUBCUTANEOUS AT BEDTIME
Qty: 0 | Refills: 0 | Status: DISCONTINUED | OUTPATIENT
Start: 2018-02-09 | End: 2018-02-10

## 2018-02-09 RX ADMIN — Medication 3: at 13:03

## 2018-02-09 RX ADMIN — PANTOPRAZOLE SODIUM 40 MILLIGRAM(S): 20 TABLET, DELAYED RELEASE ORAL at 05:58

## 2018-02-09 RX ADMIN — TAMSULOSIN HYDROCHLORIDE 0.4 MILLIGRAM(S): 0.4 CAPSULE ORAL at 21:35

## 2018-02-09 RX ADMIN — LIDOCAINE 1 PATCH: 4 CREAM TOPICAL at 13:04

## 2018-02-09 RX ADMIN — CARVEDILOL PHOSPHATE 6.25 MILLIGRAM(S): 80 CAPSULE, EXTENDED RELEASE ORAL at 05:59

## 2018-02-09 RX ADMIN — Medication 81 MILLIGRAM(S): at 13:07

## 2018-02-09 RX ADMIN — Medication 2: at 08:55

## 2018-02-09 RX ADMIN — CARVEDILOL PHOSPHATE 6.25 MILLIGRAM(S): 80 CAPSULE, EXTENDED RELEASE ORAL at 18:04

## 2018-02-09 RX ADMIN — ATORVASTATIN CALCIUM 10 MILLIGRAM(S): 80 TABLET, FILM COATED ORAL at 21:35

## 2018-02-09 RX ADMIN — HEPARIN SODIUM 5000 UNIT(S): 5000 INJECTION INTRAVENOUS; SUBCUTANEOUS at 21:35

## 2018-02-09 RX ADMIN — Medication 2: at 18:04

## 2018-02-09 RX ADMIN — INSULIN GLARGINE 10 UNIT(S): 100 INJECTION, SOLUTION SUBCUTANEOUS at 21:53

## 2018-02-09 RX ADMIN — AZITHROMYCIN 250 MILLIGRAM(S): 500 TABLET, FILM COATED ORAL at 13:04

## 2018-02-09 RX ADMIN — Medication 10 MILLIGRAM(S): at 05:58

## 2018-02-09 RX ADMIN — Medication 500 MILLIGRAM(S): at 18:04

## 2018-02-09 RX ADMIN — HEPARIN SODIUM 5000 UNIT(S): 5000 INJECTION INTRAVENOUS; SUBCUTANEOUS at 13:07

## 2018-02-09 RX ADMIN — DONEPEZIL HYDROCHLORIDE 10 MILLIGRAM(S): 10 TABLET, FILM COATED ORAL at 21:35

## 2018-02-09 NOTE — PROGRESS NOTE ADULT - SUBJECTIVE AND OBJECTIVE BOX
CC: f/u for pneumonia, fever, leukocytosis    Patient reports no new c/o     REVIEW OF SYSTEMS: no fevers, no chills, no nausea, no vomiting, no abd pain, no resp symptoms  All other review of systems negative (Comprehensive ROS)    Antimicrobials Day #    azithromycin   Tablet 250 milliGRAM(s) Oral daily  cefTRIAXone   IVPB 1 Gram(s) IV Intermittent every 24 hours    Other Medications Reviewed    T(F): 98.2 (02-09-18 @ 08:05), Max: 98.8 (02-08-18 @ 22:04)  HR: 80 (02-09-18 @ 08:05)  BP: 149/81 (02-09-18 @ 08:05)  RR: 18 (02-09-18 @ 08:05)  SpO2: 97% (02-09-18 @ 08:05)    PHYSICAL EXAM:  General: alert, no acute distress  Eyes:  anicteric, no conjunctival injection, no discharge  Oropharynx: no lesions or injection 	  Neck: supple, without adenopathy  Lungs: clear to auscultation  Heart: regular rate and rhythm; no murmur, rubs or gallops  Abdomen: soft, nondistended, nontender, without mass or organomegaly  Skin: no lesions  Extremities: no clubbing, cyanosis, or edema  Neurologic: alert, oriented, moves all extremities    LAB RESULTS:                MICROBIOLOGY REVIEWED:    RADIOLOGY REVIEWED: CC: f/u for pneumonia, fever, leukocytosis    Patient reports no new c/o     REVIEW OF SYSTEMS: no fevers, no chills, no nausea, no vomiting, no abd pain, no resp symptoms  All other review of systems negative (Comprehensive ROS)    Antimicrobials Day #    azithromycin   Tablet 250 milliGRAM(s) Oral daily  cefuroxime   Tablet 500 milliGRAM(s) Oral every 12 hours      Other Medications Reviewed        T(F): 98.2 (02-09-18 @ 08:05), Max: 98.8 (02-08-18 @ 22:04)  HR: 80 (02-09-18 @ 08:05)  BP: 149/81 (02-09-18 @ 08:05)  RR: 18 (02-09-18 @ 08:05)  SpO2: 97% (02-09-18 @ 08:05)    PHYSICAL EXAM:  General: alert, no acute distress  Eyes:  anicteric, no conjunctival injection, no discharge  Oropharynx: no lesions or injection 	  Neck: supple, without adenopathy  Lungs: clear to auscultation  Heart: regular rate and rhythm; no murmur, rubs or gallops  Abdomen: soft, nondistended, nontender, without mass or organomegaly  Skin: no lesions  Extremities: no clubbing, cyanosis, or edema  Neurologic: alert, oriented, moves all extremities    LAB RESULTS:                            14.6   11.87 )-----------( 232      ( 09 Feb 2018 09:07 )             43.9         Procalcitonin, Serum: 0.16  PCT < 0.5: Systemic infection (sepsis) is not likely and risk for  progression to severe systemic infection is low.     MICROBIOLOGY REVIEWED:    RADIOLOGY REVIEWED:

## 2018-02-09 NOTE — PROGRESS NOTE ADULT - SUBJECTIVE AND OBJECTIVE BOX
HPI:  84 yo male HTN, DM2, NPH s/p ventriculostomy, Nephrotic syndrome, admitted with fever.  CT chest bilateral effusions, can't R/O pna and interval increase in circumferential small pericardial effusion from 3/17.  PAST MEDICAL & SURGICAL HISTORY:  Lumbar spinal stenosis  Vitamin D deficiency  Mild dementia  OAB (overactive bladder)  Type 2 diabetes mellitus  BPH (benign prostatic hyperplasia)  Nephrotic syndrome: pt was instructed to avoid taking NSAIDS  Chronic lower back pain: unclear etiology per wife - no hx trauma, possibly arthritis  H/O recurrent urinary tract infection  Chronic Back Pain: L3-L4, L4-L5 compression  NPH (Normal Pressure Hydrocephalus): s/p ventriculostomy, last one in Jan 2012  Hypertension, Essential  Normal pressure hydrocephalus: s/p ventriculostomy of the third ventricle 2012      Allergies    No Known Allergies    Intolerances          MEDICATIONS  (STANDING):  atorvastatin 10 milliGRAM(s) Oral at bedtime  azithromycin   Tablet 250 milliGRAM(s) Oral daily  carvedilol 6.25 milliGRAM(s) Oral every 12 hours  cefTRIAXone   IVPB 1 Gram(s) IV Intermittent every 24 hours  dextrose 5%. 1000 milliLiter(s) (50 mL/Hr) IV Continuous <Continuous>  dextrose 50% Injectable 12.5 Gram(s) IV Push once  dextrose 50% Injectable 25 Gram(s) IV Push once  dextrose 50% Injectable 25 Gram(s) IV Push once  donepezil 10 milliGRAM(s) Oral at bedtime  enalapril 10 milliGRAM(s) Oral daily  heparin  Injectable 5000 Unit(s) SubCutaneous every 8 hours  insulin lispro (HumaLOG) corrective regimen sliding scale   SubCutaneous three times a day before meals  insulin lispro (HumaLOG) corrective regimen sliding scale   SubCutaneous at bedtime  lidocaine   Patch 1 Patch Transdermal every 24 hours  pantoprazole    Tablet 40 milliGRAM(s) Oral before breakfast  tamsulosin 0.4 milliGRAM(s) Oral at bedtime    MEDICATIONS  (PRN):  acetaminophen   Tablet 650 milliGRAM(s) Oral every 6 hours PRN For Temp greater than 38 C (100.4 F)  dextrose Gel 1 Dose(s) Oral once PRN Blood Glucose LESS THAN 70 milliGRAM(s)/deciliter  glucagon  Injectable 1 milliGRAM(s) IntraMuscular once PRN Glucose LESS THAN 70 milligrams/deciliter            Vital Signs Last 24 Hrs  T(C): 36.8 (09 Feb 2018 08:05), Max: 37.1 (08 Feb 2018 16:13)  T(F): 98.2 (09 Feb 2018 08:05), Max: 98.8 (08 Feb 2018 22:04)  HR: 80 (09 Feb 2018 08:05) (75 - 86)  BP: 149/81 (09 Feb 2018 08:05) (121/59 - 149/81)  BP(mean): --  RR: 18 (09 Feb 2018 08:05) (18 - 18)  SpO2: 97% (09 Feb 2018 08:05) (93% - 97%)  Daily     Daily   I&O's Detail    08 Feb 2018 07:01  -  09 Feb 2018 07:00  --------------------------------------------------------  IN:    Oral Fluid: 600 mL  Total IN: 600 mL    OUT:    Voided: 400 mL  Total OUT: 400 mL    Total NET: 200 mL      09 Feb 2018 07:01  -  09 Feb 2018 08:15  --------------------------------------------------------  IN:  Total IN: 0 mL    OUT:    Voided: 500 mL  Total OUT: 500 mL    Total NET: -500 mL      Physical Exam  Neck without JVD  Lungs dec BS bases  cor s1s2 2/6 kamar rusb  Abd soft  Ext without edema          LABS      Lipid [id number] @ 09:21  102  41  39  --  110    TSH:Thyroid Stimulating Hormone, Serum: 1.16 uIU/mL (02-08 @ 09:22)    Echo  < from: Transthoracic Echocardiogram (02.08.18 @ 12:24) >  PROCEDURE: Transthoracic echocardiogram with 2-D, M-Mode  and complete spectral and color flow Doppler.  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 Minimal mitral  regurgitation.  Aortic Valve/Aorta: Calcified trileaflet aortic valve with  normal opening. No aortic valve regurgitation seen.  Aortic root not well visualized.  Left Atrium: Left atrium not well visualized.  Left Ventricle: Endocardium not well visualized; grossly  normal left ventricular systolic function. Concentric left  ventricular hypertrophy.  Right Heart: Right atrium not well visualized. The right  ventricle is not well visualized; grossly normal right  ventricular systolic function. TAPSE > 2.0 cm (normal).  Tricuspid valve not well visualized. Pulmonic valve not  well visualized.  Pericardium/Pleura: Small to moderate pericardial effusion  (up to 1.4 cm) seen postero-lateral to the left ventricle.  Hemodynamic: Estimated right atrial pressure is 8 mm Hg.  ------------------------------------------------------------------------  Conclusions:  1. Endocardium not well visualized; grossly normal left  ventricular systolic function.  2. The right ventricle is not well visualized; grossly  normal right ventricular systolic function. TAPSE > 2.0 cm  (normal).  3. Small to moderate pericardial effusion (up to 1.4 cm)  seen postero-lateral tothe left ventricle.  *** Compared with echocardiogram of 8/29/2016, small  pericardial effusion is new, otherwise findings are  similar.  --          Assessment and Plan:  84 yo male HTN, DM2, NPH s/p ventriculostomy, Nephrotic syndrome, admitted with fever.  CT chest bilateral effusions, can't R/O pna and interval increase in circumferential small pericardial effusion from 3/17.  Agree with current rx  Repeat echocardiogram as outpatient

## 2018-02-09 NOTE — PROGRESS NOTE ADULT - ASSESSMENT
Farooq Parmar is an 83 year old male with a history of chronic back pain, BPH, DM, recurrent UTI's, HTN, dementia, nephrotic syndrome, NPH s/p ventriculostomy, spinal stenosis presents for evaluation of fever and altered mental status.    Patient is unable to provide an accurate history secondary to dementia. As per discussion with patient and chart review, patient was noted to have fever and altered mental status similar to prior UTIs. Patient was also noted to be complaining of some chest discomfort and have a cough. Went to  where RVP was negative, but clinically diagnosed with influenza and sent home. Symptoms persisted prompting visit to the ED. On my exam with the patient, he reports feeling well at this time without any pain or shortness of breath. Requesting to be discharged home this AM. Does have a cough that is productive of a small amount of sputum.     In the ED, Tmax 102.1, HR 90-100s, /96, O2 92% on RA. Labs showed leukocytosis to 15.9, hemoconcentration with Hgb 17.4, and platelets 190. Metabolic panel unremarkable with Cr 1.14. LFTs normal. Troponin negative, . VBG with mildly elevated lactate of 3.4. UA non-infectious. RVP negative. CXR with possible RLL PNA. Patient was given 1L LR and Ceftriaxone/Azithromycin for possible PNA and admitted.     ID: Continue Rocephin and Zithromas. CT chest notes mild pericardial effusion and lower lobe infiltrates. ID consult reviewed. Likely CAP. TTE   shows normal LV, RV function with EF 65 %, small pericardial effusion.       CV: Continue Lipitor 20 mg/day and Coreg 6.25 mg bid, Vasotec 10 mg/day.  Stop IVF.       Neuro: Continue Aricept 10 mg/day. PT ordered. Discharge to rehab Monday.

## 2018-02-09 NOTE — PHYSICAL THERAPY INITIAL EVALUATION ADULT - PRECAUTIONS/LIMITATIONS, REHAB EVAL
fall precautions/CT Chest: Small B/L pleural effusions with bibasilar atelectasis. +loculated pericardial effusion with small increase from March 2017. In ED pt found with fever 102, given 1L LR and Ceftriaxone/Azithromycin for PNA

## 2018-02-09 NOTE — DISCHARGE NOTE ADULT - SECONDARY DIAGNOSIS.
Pneumonia due to infectious organism, unspecified laterality, unspecified part of lung Type 2 diabetes mellitus without complication, without long-term current use of insulin Hypertension, Essential NPH (Normal Pressure Hydrocephalus)

## 2018-02-09 NOTE — CONSULT NOTE ADULT - ASSESSMENT
82yo M with ams in setting of fever, pneumonia, likley reflective of a toxic metabolic encephalopathy, suspect this is superimposed on a baseline dementia. Other considerations for ams include a space occupying lesion,small evolving SDH and seizure  1. CT head  2. may consider EEG if mental status does not continue to improve  3. ID on case, will defer infectious work up to them.

## 2018-02-09 NOTE — PROGRESS NOTE ADULT - ASSESSMENT
83y m hx DM, spinal stenosis, NPH, ventriculostomy, BPH who presented with fever, leukocytosis, episode of confusion.   He was seen 2 days ago at urgent care center and empiric tamiflu was started for possible influenza, but RVP was negative and now d/cd.   fever to 102, started on CTX and Zithromax for possible pneumonia.   Pt now afebrile, he denies any ha, neck pain, +dry cough, no dysuria.   CT chest with atelectasis, pleural effusion, possible superimposed infiltrate, also pericardial effusion  PLAN:  cont CTX and Zithromax for CAP  f/u final cultures  monitor WBC trend and check procalcitonin in am  if cont to improve may switch to oral abx regimen soon: PO ceftin and zithromax 5 days total  reviewed with pt's spouse at bedside   etiology of pericardial effusion w/u as per cardiology, echo  No HA or confusion at present, if symptoms develop would consider CT head, neuro eval 83y m hx DM, spinal stenosis, NPH, ventriculostomy, BPH who presented with fever, leukocytosis, episode of confusion.   He was seen 2 days ago at urgent care center and empiric tamiflu was started for possible influenza, but RVP was negative and now d/cd.   fever to 102, started on CTX and Zithromax for possible pneumonia.   Pt now afebrile, he denies any ha, neck pain, +dry cough, no dysuria.   CT chest with atelectasis, pleural effusion, possible superimposed infiltrate, also pericardial effusion  procalcitonin is low    PLAN:    f/u final cultures  monitor WBC trend and check procalcitonin in am  switch to oral abx regimen soon: PO ceftin and zithromax 5 days total  reviewed with pt's spouse at bedside   etiology of pericardial effusion w/u as per cardiology, echo to be followed as outpatient as per cardiology  d/c planning

## 2018-02-09 NOTE — DISCHARGE NOTE ADULT - MEDICATION SUMMARY - MEDICATIONS TO STOP TAKING
I will STOP taking the medications listed below when I get home from the hospital:    lactobacillus acidophilus oral capsule  -- 1 tab(s) by mouth 3 times a day    Dilaudid 2 mg oral tablet  -- 1 tab(s) by mouth 3 times a day, As Needed    heparin

## 2018-02-09 NOTE — DISCHARGE NOTE ADULT - CARE PROVIDER_API CALL
Cb Ellis (MD), Cardiovascular Disease; Internal Medicine  [address] [facility]  [address]  [place] [address]  Phone: [phone]  Fax: [phone]    Vasu Macario (DO), Neurology  [address] [place] [address]  Phone: [phone]  Fax: [phone]

## 2018-02-09 NOTE — PROGRESS NOTE ADULT - SUBJECTIVE AND OBJECTIVE BOX
INTERVAL HPI/OVERNIGHT EVENTS:  Pt seen and examined at bedside.     Allergies/Intolerance: No Known Allergies      MEDICATIONS  (STANDING):  aspirin enteric coated 81 milliGRAM(s) Oral daily  atorvastatin 10 milliGRAM(s) Oral at bedtime  azithromycin   Tablet 250 milliGRAM(s) Oral daily  carvedilol 6.25 milliGRAM(s) Oral every 12 hours  cefuroxime   Tablet 500 milliGRAM(s) Oral every 12 hours  dextrose 5%. 1000 milliLiter(s) (50 mL/Hr) IV Continuous <Continuous>  dextrose 50% Injectable 12.5 Gram(s) IV Push once  dextrose 50% Injectable 25 Gram(s) IV Push once  dextrose 50% Injectable 25 Gram(s) IV Push once  donepezil 10 milliGRAM(s) Oral at bedtime  enalapril 10 milliGRAM(s) Oral daily  heparin  Injectable 5000 Unit(s) SubCutaneous every 8 hours  insulin lispro (HumaLOG) corrective regimen sliding scale   SubCutaneous three times a day before meals  insulin lispro (HumaLOG) corrective regimen sliding scale   SubCutaneous at bedtime  lidocaine   Patch 1 Patch Transdermal every 24 hours  pantoprazole    Tablet 40 milliGRAM(s) Oral before breakfast  tamsulosin 0.4 milliGRAM(s) Oral at bedtime    MEDICATIONS  (PRN):  acetaminophen   Tablet 650 milliGRAM(s) Oral every 6 hours PRN For Temp greater than 38 C (100.4 F)  dextrose Gel 1 Dose(s) Oral once PRN Blood Glucose LESS THAN 70 milliGRAM(s)/deciliter  glucagon  Injectable 1 milliGRAM(s) IntraMuscular once PRN Glucose LESS THAN 70 milligrams/deciliter        ROS: all systems reviewed and wnl      PHYSICAL EXAMINATION:  Vital Signs Last 24 Hrs  T(C): 36.8 (09 Feb 2018 08:05), Max: 37.1 (08 Feb 2018 16:13)  T(F): 98.2 (09 Feb 2018 08:05), Max: 98.8 (08 Feb 2018 22:04)  HR: 80 (09 Feb 2018 08:05) (75 - 86)  BP: 149/81 (09 Feb 2018 08:05) (121/59 - 149/81)  BP(mean): --  RR: 18 (09 Feb 2018 08:05) (18 - 18)  SpO2: 97% (09 Feb 2018 08:05) (93% - 97%)  CAPILLARY BLOOD GLUCOSE      POCT Blood Glucose.: 225 mg/dL (09 Feb 2018 08:28)  POCT Blood Glucose.: 228 mg/dL (08 Feb 2018 17:11)  POCT Blood Glucose.: 324 mg/dL (08 Feb 2018 12:09)      02-08 @ 07:01  -  02-09 @ 07:00  --------------------------------------------------------  IN: 600 mL / OUT: 400 mL / NET: 200 mL    02-09 @ 07:01  -  02-09 @ 09:42  --------------------------------------------------------  IN: 0 mL / OUT: 500 mL / NET: -500 mL        GENERAL:   NECK: supple, No JVD  CHEST/LUNG: clear to auscultation bilaterally; no rales, rhonchi, or wheezing b/l  HEART: normal S1, S2  ABDOMEN: BS+, soft, ND, NT   EXTREMITIES:  pulses palpable; no clubbing, cyanosis, or edema b/l LEs  SKIN: no rashes or lesions      LABS: INTERVAL HPI/OVERNIGHT EVENTS:  Pt seen and examined at bedside.     Allergies/Intolerance: No Known Allergies      MEDICATIONS  (STANDING):  aspirin enteric coated 81 milliGRAM(s) Oral daily  atorvastatin 10 milliGRAM(s) Oral at bedtime  azithromycin   Tablet 250 milliGRAM(s) Oral daily  carvedilol 6.25 milliGRAM(s) Oral every 12 hours  cefuroxime   Tablet 500 milliGRAM(s) Oral every 12 hours  dextrose 5%. 1000 milliLiter(s) (50 mL/Hr) IV Continuous <Continuous>  dextrose 50% Injectable 12.5 Gram(s) IV Push once  dextrose 50% Injectable 25 Gram(s) IV Push once  dextrose 50% Injectable 25 Gram(s) IV Push once  donepezil 10 milliGRAM(s) Oral at bedtime  enalapril 10 milliGRAM(s) Oral daily  heparin  Injectable 5000 Unit(s) SubCutaneous every 8 hours  insulin lispro (HumaLOG) corrective regimen sliding scale   SubCutaneous three times a day before meals  insulin lispro (HumaLOG) corrective regimen sliding scale   SubCutaneous at bedtime  lidocaine   Patch 1 Patch Transdermal every 24 hours  pantoprazole    Tablet 40 milliGRAM(s) Oral before breakfast  tamsulosin 0.4 milliGRAM(s) Oral at bedtime    MEDICATIONS  (PRN):  acetaminophen   Tablet 650 milliGRAM(s) Oral every 6 hours PRN For Temp greater than 38 C (100.4 F)  dextrose Gel 1 Dose(s) Oral once PRN Blood Glucose LESS THAN 70 milliGRAM(s)/deciliter  glucagon  Injectable 1 milliGRAM(s) IntraMuscular once PRN Glucose LESS THAN 70 milligrams/deciliter        ROS: all systems reviewed and wnl      PHYSICAL EXAMINATION:  Vital Signs Last 24 Hrs  T(C): 36.8 (09 Feb 2018 08:05), Max: 37.1 (08 Feb 2018 16:13)  T(F): 98.2 (09 Feb 2018 08:05), Max: 98.8 (08 Feb 2018 22:04)  HR: 80 (09 Feb 2018 08:05) (75 - 86)  BP: 149/81 (09 Feb 2018 08:05) (121/59 - 149/81)  BP(mean): --  RR: 18 (09 Feb 2018 08:05) (18 - 18)  SpO2: 97% (09 Feb 2018 08:05) (93% - 97%)  CAPILLARY BLOOD GLUCOSE      POCT Blood Glucose.: 225 mg/dL (09 Feb 2018 08:28)  POCT Blood Glucose.: 228 mg/dL (08 Feb 2018 17:11)  POCT Blood Glucose.: 324 mg/dL (08 Feb 2018 12:09)      02-08 @ 07:01  -  02-09 @ 07:00  --------------------------------------------------------  IN: 600 mL / OUT: 400 mL / NET: 200 mL    02-09 @ 07:01  -  02-09 @ 09:42  --------------------------------------------------------  IN: 0 mL / OUT: 500 mL / NET: -500 mL        GENERAL: stable, mild cough, no fevers or CP  NECK: supple, No JVD  CHEST/LUNG: clear to auscultation bilaterally; no rales, rhonchi, or wheezing b/l  HEART: normal S1, S2  ABDOMEN: BS+, soft, ND, NT   EXTREMITIES:  pulses palpable; no clubbing, cyanosis, or edema b/l LEs  SKIN: no rashes or lesions      LABS:

## 2018-02-09 NOTE — DISCHARGE NOTE ADULT - CARE PLAN
Principal Discharge DX:	Fever, unspecified fever cause  Secondary Diagnosis:	Pneumonia due to infectious organism, unspecified laterality, unspecified part of lung  Secondary Diagnosis:	Type 2 diabetes mellitus without complication, without long-term current use of insulin  Secondary Diagnosis:	Hypertension, Essential  Secondary Diagnosis:	NPH (Normal Pressure Hydrocephalus) Principal Discharge DX:	Fever, unspecified fever cause  Goal:	stable  Assessment and plan of treatment:	now resolved, likely related  to possible pneumonia.   follow up with your PMD  Secondary Diagnosis:	Pneumonia due to infectious organism, unspecified laterality, unspecified part of lung  Assessment and plan of treatment:	Pneumonia is a lung infection that can cause a fever, cough, and trouble breathing.  Continue all antibiotics as ordered until complete.  Nutrition is important, eat small frequent meals.  Get lots of rest and drink fluids.  Call your health care provider upon arrival home from hospital and make a follow up appointment for one week.  If your cough worsens, you develop fever greater than 101', you have shaking chills, a fast heartbeat, trouble breathing and/or feel your are breathing much faster than usual, call your healthcare provider.  Make sure you wash your hands frequently.  Secondary Diagnosis:	Type 2 diabetes mellitus without complication, without long-term current use of insulin  Assessment and plan of treatment:	HgA1C this admission.  Make sure you get your HgA1c checked every three months.  If you take oral diabetes medications, check your blood glucose two times a day.  If you take insulin, check your blood glucose before meals and at bedtime.  It's important not to skip any meals.  Keep a log of your blood glucose results and always take it with you to your doctor appointments.  Keep a list of your current medications including injectables and over the counter medications and bring this medication list with you to all your doctor appointments.  If you have not seen your ophthalmologist this year call for appointment.  Check your feet daily for redness, sores, or openings. Do not self treat. If no improvement in two days call your primary care physician for an appointment.  Low blood sugar (hypoglycemia) is a blood sugar below 70mg/dl. Check your blood sugar if you feel signs/symptoms of hypoglycemia. If your blood sugar is below 70 take 15 grams of carbohydrates (ex 4 oz of apple juice, 3-4 glucose tablets, or 4-6 oz of regular soda) wait 15 minutes and repeat blood sugar to make sure it comes up above 70.  If your blood sugar is above 70 and you are due for a meal, have a meal.  If you are not due for a meal have a snack.  This snack helps keeps your blood sugar at a safe range.  Secondary Diagnosis:	Hypertension, Essential  Assessment and plan of treatment:	Low salt diet  Activity as tolerated.  Take all medication as prescribed.  Follow up with your medical doctor for routine blood pressure monitoring at your next visit.  Notify your doctor if you have any of the following symptoms:   Dizziness, Lightheadedness, Blurry vision, Headache, Chest pain, Shortness of breath  Secondary Diagnosis:	NPH (Normal Pressure Hydrocephalus)  Assessment and plan of treatment:	follow up with your Neurologist

## 2018-02-09 NOTE — DISCHARGE NOTE ADULT - PLAN OF CARE
stable now resolved, likely related  to possible pneumonia.   follow up with your PMD Pneumonia is a lung infection that can cause a fever, cough, and trouble breathing.  Continue all antibiotics as ordered until complete.  Nutrition is important, eat small frequent meals.  Get lots of rest and drink fluids.  Call your health care provider upon arrival home from hospital and make a follow up appointment for one week.  If your cough worsens, you develop fever greater than 101', you have shaking chills, a fast heartbeat, trouble breathing and/or feel your are breathing much faster than usual, call your healthcare provider.  Make sure you wash your hands frequently. HgA1C this admission.  Make sure you get your HgA1c checked every three months.  If you take oral diabetes medications, check your blood glucose two times a day.  If you take insulin, check your blood glucose before meals and at bedtime.  It's important not to skip any meals.  Keep a log of your blood glucose results and always take it with you to your doctor appointments.  Keep a list of your current medications including injectables and over the counter medications and bring this medication list with you to all your doctor appointments.  If you have not seen your ophthalmologist this year call for appointment.  Check your feet daily for redness, sores, or openings. Do not self treat. If no improvement in two days call your primary care physician for an appointment.  Low blood sugar (hypoglycemia) is a blood sugar below 70mg/dl. Check your blood sugar if you feel signs/symptoms of hypoglycemia. If your blood sugar is below 70 take 15 grams of carbohydrates (ex 4 oz of apple juice, 3-4 glucose tablets, or 4-6 oz of regular soda) wait 15 minutes and repeat blood sugar to make sure it comes up above 70.  If your blood sugar is above 70 and you are due for a meal, have a meal.  If you are not due for a meal have a snack.  This snack helps keeps your blood sugar at a safe range. Low salt diet  Activity as tolerated.  Take all medication as prescribed.  Follow up with your medical doctor for routine blood pressure monitoring at your next visit.  Notify your doctor if you have any of the following symptoms:   Dizziness, Lightheadedness, Blurry vision, Headache, Chest pain, Shortness of breath follow up with your Neurologist

## 2018-02-09 NOTE — DISCHARGE NOTE ADULT - PATIENT PORTAL LINK FT
You can access the [facility] Patient Portal, offered by [facility], by registering with the following website: [email or web address]

## 2018-02-09 NOTE — DISCHARGE NOTE ADULT - HOSPITAL COURSE
83 year old male with a history of chronic back pain, BPH, DM, recurrent UTI's, HTN, dementia, nephrotic syndrome, NPH s/p ventriculostomy, spinal stenosis presents for evaluation of fever and altered mental status.    Patient is unable to provide an accurate history secondary to dementia. As per discussion with patient and chart review, patient was noted to have fever and altered mental status similar to prior UTIs. Patient was also noted to be complaining of some chest discomfort and have a cough. Went to  where RVP was negative, but clinically diagnosed with influenza and sent home. Symptoms persisted prompting visit to the ED. On my exam with the patient, he reports feeling well at this time without any pain or shortness of breath. Requesting to be discharged home this AM. Does have a cough that is productive of a small amount of sputum.     In the ED, Tmax 102.1, HR 90-100s, /96, O2 92% on RA. Labs showed leukocytosis to 15.9, hemoconcentration with Hgb 17.4, and platelets 190. Metabolic panel unremarkable with Cr 1.14. LFTs normal. Troponin negative, . VBG with mildly elevated lactate of 3.4. UA non-infectious. RVP negative. CXR with possible RLL PNA. Patient was given 1L LR and Ceftriaxone/Azithromycin for possible PNA and admitted.     ID: Rocephin and Zithromax changed to oral Ceftin and Zithromax, to finish on Monday. Likely gram negative pneumonia. CT chest notes mild pericardial effusion and lower lobe infiltrates. ID consult reviewed. TTE shows normal LV, RV function with EF 65 %, small pericardial effusion.       CV: Continue Lipitor 20 mg/day and Coreg 6.25 mg bid, Vasotec increased to 20 mg/day.  Stop IVF.       Neuro: Continue Aricept 10 mg/day. PT ordered. Discharge to rehab Monday. 83 year old male with a history of chronic back pain, BPH, DM, recurrent UTI's, HTN, dementia, nephrotic syndrome, NPH s/p ventriculostomy, spinal stenosis presents for evaluation of fever and altered mental status.    Patient is unable to provide an accurate history secondary to dementia. As per discussion with patient and chart review, patient was noted to have fever and altered mental status similar to prior UTIs. Patient was also noted to be complaining of some chest discomfort and have a cough. Went to  where RVP was negative, but clinically diagnosed with influenza and sent home. Symptoms persisted prompting visit to the ED. On my exam with the patient, he reports feeling well at this time without any pain or shortness of breath. Requesting to be discharged home this AM. Does have a cough that is productive of a small amount of sputum.     In the ED, Tmax 102.1, HR 90-100s, /96, O2 92% on RA. Labs showed leukocytosis to 15.9, hemoconcentration with Hgb 17.4, and platelets 190. Metabolic panel unremarkable with Cr 1.14. LFTs normal. Troponin negative, . VBG with mildly elevated lactate of 3.4. UA non-infectious. RVP negative. CXR with possible RLL PNA. Patient was given 1L LR and Ceftriaxone/Azithromycin for possible PNA and admitted.     ID: Rocephin and Zithromax changed to oral Ceftin and Zithromax, to finish on Monday. Likely aspiration given dementia. CT chest notes mild pericardial effusion and lower lobe infiltrates. ID consult reviewed. TTE shows normal LV, RV function with EF 65 %, small pericardial effusion.       CV: Continue Lipitor 20 mg/day and Coreg 6.25 mg bid, Vasotec increased to 20 mg/day.  Stop IVF.       Neuro: Continue Aricept 10 mg/day. PT ordered. Discharge to rehab Monday. 83 year old male with a history of chronic back pain, BPH, DM, recurrent UTI's, HTN, dementia, nephrotic syndrome, NPH s/p ventriculostomy, spinal stenosis presents for evaluation of fever and altered mental status.    Patient is unable to provide an accurate history secondary to dementia. As per discussion with patient and chart review, patient was noted to have fever and altered mental status similar to prior UTIs. Patient was also noted to be complaining of some chest discomfort and have a cough. Went to  where RVP was negative, but clinically diagnosed with influenza and sent home. Symptoms persisted prompting visit to the ED. On my exam with the patient, he reports feeling well at this time without any pain or shortness of breath. Requesting to be discharged home this AM. Does have a cough that is productive of a small amount of sputum.     In the ED, Tmax 102.1, HR 90-100s, /96, O2 92% on RA. Labs showed leukocytosis to 15.9, hemoconcentration with Hgb 17.4, and platelets 190. Metabolic panel unremarkable with Cr 1.14. LFTs normal. Troponin negative, . VBG with mildly elevated lactate of 3.4. UA non-infectious. RVP negative. CXR with possible RLL PNA. Patient was given 1L LR and Ceftriaxone/Azithromycin for possible PNA and admitted.     ID: Rocephin and Zithromax changed to oral Ceftin and Zithromax, to finish on Monday. Likely aspiration given dementia. CT chest notes mild pericardial effusion and lower lobe infiltrates. ID consult reviewed. TTE shows normal LV, RV function with EF 65 %, small pericardial effusion.       CV: Continue Lipitor 20 mg/day and Coreg 6.25 mg bid, Vasotec increased to 20 mg/day.  Stop IVF.       Neuro: Continue Aricept 10 mg/day. PT ordered. Discharge to rehab Monday.     Addednum: Patient had altered mental status upon arrival from sepsis and aspiration pneumonia.

## 2018-02-09 NOTE — DISCHARGE NOTE ADULT - MEDICATION SUMMARY - MEDICATIONS TO TAKE
I will START or STAY ON the medications listed below when I get home from the hospital:    Aspirin Enteric Coated 81 mg oral delayed release tablet  -- 1 tab(s) by mouth once a day  -- Indication: For Ppx    acetaminophen 325 mg oral tablet  -- 2 tab(s) by mouth every 6 hours, As needed, For Temp greater than 38 C (100.4 F)  -- Indication: For Fever    enalapril 20 mg oral tablet  -- 1 tab(s) by mouth once a day  -- Indication: For Hypertension, Essential    tamsulosin 0.4 mg oral capsule  -- 1 cap(s) by mouth once a day (at bedtime)  -- verified with duane reade  -- Indication: For bph    Lyrica 50 mg oral capsule  -- 1 cap(s) by mouth 2 times a day  -- Indication: For Chronic low back pain, unspecified back pain laterality, with sciatica presence unspecified    acarbose 50 mg oral tablet  -- 1 tab(s) by mouth 2 times a day  -- Indication: For Type 2 diabetes mellitus without complication, without long-term current use of insulin    glipiZIDE 10 mg oral tablet  -- 1 tab(s) by mouth 2 times a day  -- verified with duane reade  -- Indication: For Type 2 diabetes mellitus without complication, without long-term current use of insulin    Januvia 50 mg oral tablet  -- 1 tab(s) by mouth once a day  -- Indication: For Type 2 diabetes mellitus without complication, without long-term current use of insulin    Crestor 10 mg oral tablet  -- 1 tab(s) by mouth once a day (at bedtime)  -- verified with duane reade  -- Indication: For Hld    carvedilol 6.25 mg oral tablet  -- 1 tab(s) by mouth 2 times a day  -- verified with duane reade  -- Indication: For Hypertension, Essential    galantamine 8 mg oral capsule, extended release  -- 1 cap(s) by mouth once a day (in the morning)  -- verified with duane reade  -- Indication: For NPH (Normal Pressure Hydrocephalus)    donepezil 10 mg oral tablet  -- 1 tab(s) by mouth once a day  -- verified with duane reade  -- Indication: For Cns    lidocaine 5% topical film  -- Apply on skin to affected area once a day for back pain   -- Indication: For back pain     omeprazole 20 mg oral delayed release capsule  -- 1 cap(s) by mouth once a day  -- Indication: For reflux    methenamine hippurate 1 g oral tablet  -- 1 tab(s) by mouth once a day  -- Indication: For Ppx-uti    Myrbetriq 25 mg oral tablet, extended release  -- 1 tab(s) by mouth once a day  -- Indication: For     VESIcare 10 mg oral tablet  -- 1 tab(s) by mouth once a day  -- Indication: For

## 2018-02-09 NOTE — PHYSICAL THERAPY INITIAL EVALUATION ADULT - ADDITIONAL COMMENTS
Pt lives in a  with his spouse +2 steps to enter with HR, 1 flight of stairs to bedroom/bathroom with chair lift. Pt reports he was ambulating independently with use of R/W and was independent with ADL's prior

## 2018-02-09 NOTE — PHYSICAL THERAPY INITIAL EVALUATION ADULT - PERTINENT HX OF CURRENT PROBLEM, REHAB EVAL
Pt is an 84 yo M PMH of chronic back pain, recurrent UTI's, HTN, dementia pw fever and AMS. Pt unable to provide accurate hx 2/2 dementia. Pt also with c/o chest discomfort and cough. Went to  where RVP was negative, but clinically diagnosed with influenza given tamiflu and sent home. Symptoms persisted prompting visit to the ED.

## 2018-02-09 NOTE — PHYSICAL THERAPY INITIAL EVALUATION ADULT - GENERAL OBSERVATIONS, REHAB EVAL
Pt rec'd sitting in bedside chair in NAD, VSS +O2 via NC, agreeable to PT Pt rec'd sitting in bedside chair in NAD, VSS +O2 via NC, +A&Ox3, agreeable to PT

## 2018-02-10 LAB
GLUCOSE BLDC GLUCOMTR-MCNC: 229 MG/DL — HIGH (ref 70–99)
GLUCOSE BLDC GLUCOMTR-MCNC: 270 MG/DL — HIGH (ref 70–99)
GLUCOSE BLDC GLUCOMTR-MCNC: 282 MG/DL — HIGH (ref 70–99)
GLUCOSE BLDC GLUCOMTR-MCNC: 322 MG/DL — HIGH (ref 70–99)

## 2018-02-10 RX ORDER — INSULIN GLARGINE 100 [IU]/ML
16 INJECTION, SOLUTION SUBCUTANEOUS AT BEDTIME
Qty: 0 | Refills: 0 | Status: DISCONTINUED | OUTPATIENT
Start: 2018-02-10 | End: 2018-02-11

## 2018-02-10 RX ADMIN — Medication 2: at 21:59

## 2018-02-10 RX ADMIN — TAMSULOSIN HYDROCHLORIDE 0.4 MILLIGRAM(S): 0.4 CAPSULE ORAL at 21:52

## 2018-02-10 RX ADMIN — LIDOCAINE 1 PATCH: 4 CREAM TOPICAL at 12:57

## 2018-02-10 RX ADMIN — DONEPEZIL HYDROCHLORIDE 10 MILLIGRAM(S): 10 TABLET, FILM COATED ORAL at 21:52

## 2018-02-10 RX ADMIN — Medication 500 MILLIGRAM(S): at 05:41

## 2018-02-10 RX ADMIN — CARVEDILOL PHOSPHATE 6.25 MILLIGRAM(S): 80 CAPSULE, EXTENDED RELEASE ORAL at 05:40

## 2018-02-10 RX ADMIN — Medication 3: at 12:55

## 2018-02-10 RX ADMIN — Medication 81 MILLIGRAM(S): at 12:56

## 2018-02-10 RX ADMIN — CARVEDILOL PHOSPHATE 6.25 MILLIGRAM(S): 80 CAPSULE, EXTENDED RELEASE ORAL at 17:40

## 2018-02-10 RX ADMIN — ATORVASTATIN CALCIUM 10 MILLIGRAM(S): 80 TABLET, FILM COATED ORAL at 21:52

## 2018-02-10 RX ADMIN — Medication 10 MILLIGRAM(S): at 05:41

## 2018-02-10 RX ADMIN — HEPARIN SODIUM 5000 UNIT(S): 5000 INJECTION INTRAVENOUS; SUBCUTANEOUS at 21:52

## 2018-02-10 RX ADMIN — LIDOCAINE 1 PATCH: 4 CREAM TOPICAL at 01:30

## 2018-02-10 RX ADMIN — Medication 2: at 09:03

## 2018-02-10 RX ADMIN — PANTOPRAZOLE SODIUM 40 MILLIGRAM(S): 20 TABLET, DELAYED RELEASE ORAL at 05:41

## 2018-02-10 RX ADMIN — Medication 500 MILLIGRAM(S): at 17:41

## 2018-02-10 RX ADMIN — INSULIN GLARGINE 16 UNIT(S): 100 INJECTION, SOLUTION SUBCUTANEOUS at 21:53

## 2018-02-10 RX ADMIN — HEPARIN SODIUM 5000 UNIT(S): 5000 INJECTION INTRAVENOUS; SUBCUTANEOUS at 13:00

## 2018-02-10 RX ADMIN — AZITHROMYCIN 250 MILLIGRAM(S): 500 TABLET, FILM COATED ORAL at 12:57

## 2018-02-10 RX ADMIN — HEPARIN SODIUM 5000 UNIT(S): 5000 INJECTION INTRAVENOUS; SUBCUTANEOUS at 05:42

## 2018-02-10 RX ADMIN — Medication 4: at 17:39

## 2018-02-10 NOTE — PROGRESS NOTE ADULT - ASSESSMENT
Farooq Parmar is an 83 year old male with a history of chronic back pain, BPH, DM, recurrent UTI's, HTN, dementia, nephrotic syndrome, NPH s/p ventriculostomy, spinal stenosis presents for evaluation of fever and altered mental status.    Patient is unable to provide an accurate history secondary to dementia. As per discussion with patient and chart review, patient was noted to have fever and altered mental status similar to prior UTIs. Patient was also noted to be complaining of some chest discomfort and have a cough. Went to  where RVP was negative, but clinically diagnosed with influenza and sent home. Symptoms persisted prompting visit to the ED. On my exam with the patient, he reports feeling well at this time without any pain or shortness of breath. Requesting to be discharged home this AM. Does have a cough that is productive of a small amount of sputum.     In the ED, Tmax 102.1, HR 90-100s, /96, O2 92% on RA. Labs showed leukocytosis to 15.9, hemoconcentration with Hgb 17.4, and platelets 190. Metabolic panel unremarkable with Cr 1.14. LFTs normal. Troponin negative, . VBG with mildly elevated lactate of 3.4. UA non-infectious. RVP negative. CXR with possible RLL PNA. Patient was given 1L LR and Ceftriaxone/Azithromycin for possible PNA and admitted.     ID: Rocephin and Zithromax changed to oral Ceftin and Zithromax, to finish on Monday. Likely gram negative pneumonia. CT chest notes mild pericardial effusion and lower lobe infiltrates. ID consult reviewed. TTE shows normal LV, RV function with EF 65 %, small pericardial effusion.       CV: Continue Lipitor 20 mg/day and Coreg 6.25 mg bid, Vasotec increased to 20 mg/day.  Stop IVF.       Neuro: Continue Aricept 10 mg/day. PT ordered. Discharge to rehab Monday.     DM: Increase Lantus to 16 units at night.

## 2018-02-10 NOTE — PROGRESS NOTE ADULT - SUBJECTIVE AND OBJECTIVE BOX
Patient is a 83y old  Male who presents with a chief complaint of altered mental status, fevers (09 Feb 2018 15:15)    HPI:  pt seen, no events noted overnight, no ha, no weakness, no agitation.    PAST MEDICAL & SURGICAL HISTORY:  Lumbar spinal stenosis  Vitamin D deficiency  Mild dementia  OAB (overactive bladder)  Type 2 diabetes mellitus  BPH (benign prostatic hyperplasia)  Nephrotic syndrome: pt was instructed to avoid taking NSAIDS  Chronic lower back pain: unclear etiology per wife - no hx trauma, possibly arthritis  H/O recurrent urinary tract infection  Chronic Back Pain: L3-L4, L4-L5 compression  NPH (Normal Pressure Hydrocephalus): s/p ventriculostomy, last one in Jan 2012  Hypertension, Essential  Normal pressure hydrocephalus: s/p ventriculostomy of the third ventricle 2012    FAMILY HISTORY:  No pertinent family history in first degree relatives    Social Hx:  Nonsmoker, no drug or alcohol use  MEDICATIONS  (STANDING):  aspirin enteric coated 81 milliGRAM(s) Oral daily  atorvastatin 10 milliGRAM(s) Oral at bedtime  azithromycin   Tablet 250 milliGRAM(s) Oral daily  carvedilol 6.25 milliGRAM(s) Oral every 12 hours  cefuroxime   Tablet 500 milliGRAM(s) Oral every 12 hours  dextrose 5%. 1000 milliLiter(s) (50 mL/Hr) IV Continuous <Continuous>  dextrose 50% Injectable 12.5 Gram(s) IV Push once  dextrose 50% Injectable 25 Gram(s) IV Push once  dextrose 50% Injectable 25 Gram(s) IV Push once  donepezil 10 milliGRAM(s) Oral at bedtime  enalapril 10 milliGRAM(s) Oral daily  heparin  Injectable 5000 Unit(s) SubCutaneous every 8 hours  insulin glargine Injectable (LANTUS) 10 Unit(s) SubCutaneous at bedtime  insulin lispro (HumaLOG) corrective regimen sliding scale   SubCutaneous three times a day before meals  insulin lispro (HumaLOG) corrective regimen sliding scale   SubCutaneous at bedtime  lidocaine   Patch 1 Patch Transdermal every 24 hours  pantoprazole    Tablet 40 milliGRAM(s) Oral before breakfast  tamsulosin 0.4 milliGRAM(s) Oral at bedtime    MEDICATIONS  (PRN):  acetaminophen   Tablet 650 milliGRAM(s) Oral every 6 hours PRN For Temp greater than 38 C (100.4 F)  dextrose Gel 1 Dose(s) Oral once PRN Blood Glucose LESS THAN 70 milliGRAM(s)/deciliter  glucagon  Injectable 1 milliGRAM(s) IntraMuscular once PRN Glucose LESS THAN 70 milligrams/deciliter    Allergies    No Known Allergies    Intolerances      ROS: Pertinent positives in HPI, all other ROS were reviewed and are negative.    Vital Signs Last 24 Hrs  T(C): 36.6 (10 Feb 2018 08:41), Max: 37.2 (09 Feb 2018 21:53)  T(F): 97.9 (10 Feb 2018 08:41), Max: 99 (09 Feb 2018 21:53)  HR: 78 (10 Feb 2018 08:41) (76 - 80)  BP: 156/44 (10 Feb 2018 08:41) (127/74 - 163/80)  BP(mean): --  RR: 18 (10 Feb 2018 08:41) (18 - 18)  SpO2: 92% (10 Feb 2018 08:41) (92% - 95%)  GENERAL EXAM:  Constitutional: awake and alert. NAD  HEENT: PERRLA, EOMI  Neck: Supple  Respiratory: Breath sounds are clear bilaterally  Cardiovascular: S1 and S2, regular / irregular rhythm  Gastrointestinal: soft, nontender  Extremities: no edema, no cyanosis  Vascular: no carotid bruits  Musculoskeletal: no joint swelling/tenderness, no abnormal movements  Skin: no rashes  NEUROLOGICAL EXAM:  MS: AAOX2, speech fluent, intact concentration, memory  CN: VFF, EOMI, PERRL, no MARIPOSA, no APD,  V1-3 intact, no facial asymmetry, t/p midline, SCM/trap intact.  Motor: Strength: 5/5 4x. Tone: normal. Bulk: normal. DTR 1+ symm.  Plantar flex b/l. Sensation: intact to LT/PP/  Coordination: intact 4x.       Labs:                         14.6   11.87 )-----------( 232      ( 09 Feb 2018 09:07 )             43.9     02-09    138  |  102  |  19  ----------------------------<  238<H>  4.0   |  23  |  1.11    Ca    10.1      09 Feb 2018 09:00        02-08 Chol 102 LDL 41 HDL 39<L> Trig 110  02-08 [id number] 7.6        CAPILLARY BLOOD GLUCOSE      POCT Blood Glucose.: 229 mg/dL (10 Feb 2018 08:35)        Radiology:  -CT Head: I personally reviewed films, no change from prior study 3/17  -MRI brain  -MRA brain/Carotids  -EEG

## 2018-02-10 NOTE — PROGRESS NOTE ADULT - ASSESSMENT
83y m hx DM, spinal stenosis, NPH, ventriculostomy, BPH who presented with fever, leukocytosis, episode of confusion.   He was seen 2 days ago at urgent care center and empiric tamiflu was started for possible influenza, but RVP was negative and now d/cd.   fever to 102, started on CTX and Zithromax for possible pneumonia.   Pt now afebrile, he denies any ha, neck pain, +dry cough, no dysuria.   CT chest with atelectasis, pleural effusion, possible superimposed infiltrate, also pericardial effusion  procalcitonin is low    PLAN:    f/u final cultures  monitor WBC trend and check procalcitonin in am  cont PO ceftin and zithromax 5 days total  repeat echo to be followed as outpatient as per cardiology  d/c planning

## 2018-02-10 NOTE — PROGRESS NOTE ADULT - SUBJECTIVE AND OBJECTIVE BOX
INTERVAL HPI/OVERNIGHT EVENTS:  Pt seen and examined at bedside.     Allergies/Intolerance: No Known Allergies      MEDICATIONS  (STANDING):  aspirin enteric coated 81 milliGRAM(s) Oral daily  atorvastatin 10 milliGRAM(s) Oral at bedtime  azithromycin   Tablet 250 milliGRAM(s) Oral daily  carvedilol 6.25 milliGRAM(s) Oral every 12 hours  cefuroxime   Tablet 500 milliGRAM(s) Oral every 12 hours  dextrose 5%. 1000 milliLiter(s) (50 mL/Hr) IV Continuous <Continuous>  dextrose 50% Injectable 12.5 Gram(s) IV Push once  dextrose 50% Injectable 25 Gram(s) IV Push once  dextrose 50% Injectable 25 Gram(s) IV Push once  donepezil 10 milliGRAM(s) Oral at bedtime  enalapril 20 milliGRAM(s) Oral daily  heparin  Injectable 5000 Unit(s) SubCutaneous every 8 hours  insulin glargine Injectable (LANTUS) 16 Unit(s) SubCutaneous at bedtime  insulin lispro (HumaLOG) corrective regimen sliding scale   SubCutaneous three times a day before meals  insulin lispro (HumaLOG) corrective regimen sliding scale   SubCutaneous at bedtime  lidocaine   Patch 1 Patch Transdermal every 24 hours  pantoprazole    Tablet 40 milliGRAM(s) Oral before breakfast  tamsulosin 0.4 milliGRAM(s) Oral at bedtime    MEDICATIONS  (PRN):  acetaminophen   Tablet 650 milliGRAM(s) Oral every 6 hours PRN For Temp greater than 38 C (100.4 F)  dextrose Gel 1 Dose(s) Oral once PRN Blood Glucose LESS THAN 70 milliGRAM(s)/deciliter  glucagon  Injectable 1 milliGRAM(s) IntraMuscular once PRN Glucose LESS THAN 70 milligrams/deciliter        ROS: all systems reviewed and wnl      PHYSICAL EXAMINATION:  Vital Signs Last 24 Hrs  T(C): 36.6 (10 Feb 2018 08:41), Max: 37.2 (09 Feb 2018 21:53)  T(F): 97.9 (10 Feb 2018 08:41), Max: 99 (09 Feb 2018 21:53)  HR: 78 (10 Feb 2018 08:41) (76 - 80)  BP: 156/44 (10 Feb 2018 08:41) (127/74 - 163/80)  BP(mean): --  RR: 18 (10 Feb 2018 08:41) (18 - 18)  SpO2: 92% (10 Feb 2018 08:41) (92% - 95%)  CAPILLARY BLOOD GLUCOSE      POCT Blood Glucose.: 229 mg/dL (10 Feb 2018 08:35)  POCT Blood Glucose.: 235 mg/dL (09 Feb 2018 21:50)  POCT Blood Glucose.: 241 mg/dL (09 Feb 2018 17:39)  POCT Blood Glucose.: 258 mg/dL (09 Feb 2018 17:22)  POCT Blood Glucose.: 263 mg/dL (09 Feb 2018 12:41)      02-09 @ 07:01  -  02-10 @ 07:00  --------------------------------------------------------  IN: 940 mL / OUT: 900 mL / NET: 40 mL        GENERAL:   NECK: supple, No JVD  CHEST/LUNG: clear to auscultation bilaterally; no rales, rhonchi, or wheezing b/l  HEART: normal S1, S2  ABDOMEN: BS+, soft, ND, NT   EXTREMITIES:  pulses palpable; no clubbing, cyanosis, or edema b/l LEs  SKIN: no rashes or lesions      LABS:                        14.6   11.87 )-----------( 232      ( 09 Feb 2018 09:07 )             43.9     02-09    138  |  102  |  19  ----------------------------<  238<H>  4.0   |  23  |  1.11    Ca    10.1      09 Feb 2018 09:00 INTERVAL HPI/OVERNIGHT EVENTS:  Pt seen and examined at bedside.     Allergies/Intolerance: No Known Allergies      MEDICATIONS  (STANDING):  aspirin enteric coated 81 milliGRAM(s) Oral daily  atorvastatin 10 milliGRAM(s) Oral at bedtime  azithromycin   Tablet 250 milliGRAM(s) Oral daily  carvedilol 6.25 milliGRAM(s) Oral every 12 hours  cefuroxime   Tablet 500 milliGRAM(s) Oral every 12 hours  dextrose 5%. 1000 milliLiter(s) (50 mL/Hr) IV Continuous <Continuous>  dextrose 50% Injectable 12.5 Gram(s) IV Push once  dextrose 50% Injectable 25 Gram(s) IV Push once  dextrose 50% Injectable 25 Gram(s) IV Push once  donepezil 10 milliGRAM(s) Oral at bedtime  enalapril 20 milliGRAM(s) Oral daily  heparin  Injectable 5000 Unit(s) SubCutaneous every 8 hours  insulin glargine Injectable (LANTUS) 16 Unit(s) SubCutaneous at bedtime  insulin lispro (HumaLOG) corrective regimen sliding scale   SubCutaneous three times a day before meals  insulin lispro (HumaLOG) corrective regimen sliding scale   SubCutaneous at bedtime  lidocaine   Patch 1 Patch Transdermal every 24 hours  pantoprazole    Tablet 40 milliGRAM(s) Oral before breakfast  tamsulosin 0.4 milliGRAM(s) Oral at bedtime    MEDICATIONS  (PRN):  acetaminophen   Tablet 650 milliGRAM(s) Oral every 6 hours PRN For Temp greater than 38 C (100.4 F)  dextrose Gel 1 Dose(s) Oral once PRN Blood Glucose LESS THAN 70 milliGRAM(s)/deciliter  glucagon  Injectable 1 milliGRAM(s) IntraMuscular once PRN Glucose LESS THAN 70 milligrams/deciliter        ROS: all systems reviewed and wnl      PHYSICAL EXAMINATION:  Vital Signs Last 24 Hrs  T(C): 36.6 (10 Feb 2018 08:41), Max: 37.2 (09 Feb 2018 21:53)  T(F): 97.9 (10 Feb 2018 08:41), Max: 99 (09 Feb 2018 21:53)  HR: 78 (10 Feb 2018 08:41) (76 - 80)  BP: 156/44 (10 Feb 2018 08:41) (127/74 - 163/80)  BP(mean): --  RR: 18 (10 Feb 2018 08:41) (18 - 18)  SpO2: 92% (10 Feb 2018 08:41) (92% - 95%)  CAPILLARY BLOOD GLUCOSE      POCT Blood Glucose.: 229 mg/dL (10 Feb 2018 08:35)  POCT Blood Glucose.: 235 mg/dL (09 Feb 2018 21:50)  POCT Blood Glucose.: 241 mg/dL (09 Feb 2018 17:39)  POCT Blood Glucose.: 258 mg/dL (09 Feb 2018 17:22)  POCT Blood Glucose.: 263 mg/dL (09 Feb 2018 12:41)      02-09 @ 07:01  -  02-10 @ 07:00  --------------------------------------------------------  IN: 940 mL / OUT: 900 mL / NET: 40 mL        GENERAL: stable, alert, in kourteny, no fevers, CP or SOB   NECK: supple, No JVD  CHEST/LUNG: decreased BS both bases bilaterally, no wheezing b/l  HEART: normal S1, S2  ABDOMEN: BS+, soft, ND, NT   EXTREMITIES:  pulses palpable; no clubbing, cyanosis, or edema b/l LEs  SKIN: no rashes or lesions      LABS:                        14.6   11.87 )-----------( 232      ( 09 Feb 2018 09:07 )             43.9     02-09    138  |  102  |  19  ----------------------------<  238<H>  4.0   |  23  |  1.11    Ca    10.1      09 Feb 2018 09:00

## 2018-02-10 NOTE — PROGRESS NOTE ADULT - ASSESSMENT
84yo M with ams, likely toxic/metabolic encephalopathy with improved symptoms  1. No further inpt neuro work up planned. If change in condition please recall.

## 2018-02-10 NOTE — PROGRESS NOTE ADULT - SUBJECTIVE AND OBJECTIVE BOX
CC: f/u for pneumonia, fever, leukocytosis    Patient reports no new c/o     REVIEW OF SYSTEMS: no fevers, no chills, no nausea, no vomiting, no abd pain, no resp symptoms  All other review of systems negative (Comprehensive ROS)    Antimicrobials Day #    azithromycin   Tablet 250 milliGRAM(s) Oral daily  cefuroxime   Tablet 500 milliGRAM(s) Oral every 12 hours        Other Medications Reviewed        Vital Signs Last 24 Hrs  T(C): 37.2 (09 Feb 2018 21:53), Max: 37.2 (09 Feb 2018 21:53)  T(F): 99 (09 Feb 2018 21:53), Max: 99 (09 Feb 2018 21:53)  HR: 80 (10 Feb 2018 05:43) (76 - 80)  BP: 163/80 (10 Feb 2018 05:43) (127/74 - 163/80)  BP(mean): --  RR: 18 (09 Feb 2018 21:53) (18 - 18)  SpO2: 93% (09 Feb 2018 21:53) (93% - 95%)    PHYSICAL EXAM:  General: alert, no acute distress  Eyes:  anicteric, no conjunctival injection, no discharge  Oropharynx: no lesions or injection 	  Neck: supple, without adenopathy  Lungs: clear to auscultation  Heart: regular rate and rhythm; no murmur, rubs or gallops  Abdomen: soft, nondistended, nontender, without mass or organomegaly  Skin: no lesions  Extremities: no clubbing, cyanosis, or edema  Neurologic: alert, oriented, moves all extremities    LAB RESULTS:                          14.6   11.87 )-----------( 232      ( 09 Feb 2018 09:07 )             43.9   02-09    138  |  102  |  19  ----------------------------<  238<H>  4.0   |  23  |  1.11    Ca    10.1      09 Feb 2018 09:00        Procalcitonin, Serum: 0.16  PCT < 0.5: Systemic infection (sepsis) is not likely and risk for  progression to severe systemic infection is low.     MICROBIOLOGY REVIEWED:    RADIOLOGY REVIEWED:

## 2018-02-10 NOTE — CONSULT NOTE ADULT - ASSESSMENT
suggest stool check for c. diff  serial cbc and cmp  abx as per med/id  consider trial of probiotics

## 2018-02-10 NOTE — CONSULT NOTE ADULT - GASTROINTESTINAL DETAILS
no guarding/soft/no masses palpable/bowel sounds normal/prominent/nontender/no rigidity/no rebound tenderness/no organomegaly

## 2018-02-10 NOTE — CONSULT NOTE ADULT - SUBJECTIVE AND OBJECTIVE BOX
CHIEF COMPLAINT:Patient is a 83y old  Male who presents with a chief complaint of altered mental status, fevers (07 Feb 2018 03:05)      HPI:  Faoroq Parmar is an 83 year old male with a history of chronic back pain, BPH, DM, recurrent UTI's, HTN, dementia, nephrotic syndrome, NPH s/p ventriculostomy, spinal stenosis presents for evaluation of fever and altered mental status.    Patient is unable to provide an accurate history secondary to dementia. As per discussion with patient and chart review, patient was noted to have fever and altered mental status similar to prior UTIs. Patient was also noted to be complaining of some chest discomfort and have a cough. Went to  where RVP was negative, but clinically diagnosed with influenza and sent home. Symptoms persisted prompting visit to the ED. On my exam with the patient, he reports feeling well at this time without any pain or shortness of breath. Requesting to be discharged home this AM. Does have a cough that is productive of a small amount of sputum.     In the ED, Tmax 102.1, HR 90-100s, /96, O2 92% on RA. Labs showed leukocytosis to 15.9, hemoconcentration with Hgb 17.4, and platelets 190. Metabolic panel unremarkable with Cr 1.14. LFTs normal. Troponin negative, . VBG with mildly elevated lactate of 3.4. UA non-infectious. RVP negative. CXR with possible RLL PNA.    Patient was given 1L LR and Ceftriaxone/Azithromycin for possible PNA and admitted. (07 Feb 2018 03:05)  pt alsoo c/o sob and eli ?cardiac hx.      PAST MEDICAL & SURGICAL HISTORY:  Lumbar spinal stenosis  Vitamin D deficiency  Mild dementia  OAB (overactive bladder)  Type 2 diabetes mellitus  BPH (benign prostatic hyperplasia)  Nephrotic syndrome: pt was instructed to avoid taking NSAIDS  Chronic lower back pain: unclear etiology per wife - no hx trauma, possibly arthritis  H/O recurrent urinary tract infection  Chronic Back Pain: L3-L4, L4-L5 compression  NPH (Normal Pressure Hydrocephalus): s/p ventriculostomy, last one in Jan 2012  Hypertension, Essential  Normal pressure hydrocephalus: s/p ventriculostomy of the third ventricle 2012      MEDICATIONS  (STANDING):  atorvastatin 40 milliGRAM(s) Oral at bedtime  azithromycin   Tablet 250 milliGRAM(s) Oral daily  carvedilol 6.25 milliGRAM(s) Oral every 12 hours  cefTRIAXone   IVPB 1 Gram(s) IV Intermittent every 24 hours  dextrose 5%. 1000 milliLiter(s) (50 mL/Hr) IV Continuous <Continuous>  dextrose 50% Injectable 12.5 Gram(s) IV Push once  dextrose 50% Injectable 25 Gram(s) IV Push once  dextrose 50% Injectable 25 Gram(s) IV Push once  donepezil 10 milliGRAM(s) Oral at bedtime  heparin  Injectable 5000 Unit(s) SubCutaneous every 8 hours  insulin lispro (HumaLOG) corrective regimen sliding scale   SubCutaneous three times a day before meals  insulin lispro (HumaLOG) corrective regimen sliding scale   SubCutaneous at bedtime  lidocaine   Patch 1 Patch Transdermal every 24 hours  pantoprazole    Tablet 40 milliGRAM(s) Oral before breakfast  sodium chloride 0.9%. 1000 milliLiter(s) (75 mL/Hr) IV Continuous <Continuous>  tamsulosin 0.4 milliGRAM(s) Oral at bedtime    MEDICATIONS  (PRN):  acetaminophen   Tablet 650 milliGRAM(s) Oral every 6 hours PRN For Temp greater than 38 C (100.4 F)  dextrose Gel 1 Dose(s) Oral once PRN Blood Glucose LESS THAN 70 milliGRAM(s)/deciliter  glucagon  Injectable 1 milliGRAM(s) IntraMuscular once PRN Glucose LESS THAN 70 milligrams/deciliter      FAMILY HISTORY:  No pertinent family history in first degree relatives      SOCIAL HISTORY:    [ ] Non-smoker  [ ] Smoker  [ ] Alcohol    Allergies    No Known Allergies    Intolerances    	    REVIEW OF SYSTEMS:  CONSTITUTIONAL: + fever, weight loss, or fatigue  EYES: No eye pain, visual disturbances, or discharge  ENT:  No difficulty hearing, tinnitus, vertigo; No sinus or throat pain  NECK: No pain or stiffness  RESPIRATORY: No cough, wheezing, chills or hemoptysis; + Shortness of Breath  CARDIOVASCULAR: No chest pain, palpitations, passing out, dizziness, or leg swelling  GASTROINTESTINAL: No abdominal or epigastric pain. No nausea, vomiting, or hematemesis; No diarrhea or constipation. No melena or hematochezia.  GENITOURINARY: No dysuria, frequency, hematuria, or incontinence  NEUROLOGICAL: No headaches, memory loss, loss of strength, numbness, or tremors  SKIN: No itching, burning, rashes, or lesions   LYMPH Nodes: No enlarged glands  ENDOCRINE: No heat or cold intolerance; No hair loss  MUSCULOSKELETAL: No joint pain or swelling; No muscle, back, or extremity pain  PSYCHIATRIC: No depression, anxiety, mood swings, or difficulty sleeping  HEME/LYMPH: No easy bruising, or bleeding gums  ALLERGY AND IMMUNOLOGIC: No hives or eczema	    [ ] All others negative	  [ ] Unable to obtain    PHYSICAL EXAM:  T(C): 37.8 (02-07-18 @ 19:07), Max: 37.8 (02-07-18 @ 17:35)  HR: 86 (02-07-18 @ 19:07) (67 - 92)  BP: 152/63 (02-07-18 @ 19:07) (132/71 - 168/69)  RR: 18 (02-07-18 @ 19:07) (18 - 23)  SpO2: 97% (02-07-18 @ 19:07) (93% - 97%)  Wt(kg): --  I&O's Summary      Appearance: Normal	  HEENT:   Normal oral mucosa, PERRL, EOMI	  Lymphatic: No lymphadenopathy  Cardiovascular: Normal S1 S2, No JVD, + murmurs, No edema  Respiratory: decrease bs	  Psychiatry: A & O x 3, Mood & affect appropriate  Gastrointestinal:  Soft, Non-tender, + BS	  Skin: No rashes, No ecchymoses, No cyanosis	  Neurologic: Non-focal  Extremities: Normal range of motion, No clubbing, cyanosis or edema  Vascular: Peripheral pulses palpable 2+ bilaterally    TELEMETRY: 	    ECG:  	  RADIOLOGY:  OTHER: 	  	  LABS:	 	    CARDIAC MARKERS:  CARDIAC MARKERS ( 06 Feb 2018 19:21 )  x     / <0.01 ng/mL / x     / x     / 2.0 ng/mL                              17.4   15.9  )-----------( 190      ( 06 Feb 2018 19:21 )             51.0     02-06    138  |  99  |  13  ----------------------------<  195<H>  4.3   |  27  |  1.14    Ca    9.7      06 Feb 2018 19:21    TPro  7.7  /  Alb  4.2  /  TBili  1.3<H>  /  DBili  x   /  AST  11  /  ALT  15  /  AlkPhos  87  02-06    proBNP: Serum Pro-Brain Natriuretic Peptide: 910 pg/mL (02-06 @ 19:21)    Lipid Profile:   HgA1c:   TSH:       PREVIOUS DIAGNOSTIC TESTING:    < from: CT Chest No Cont (02.07.18 @ 11:52) >  Small bilateral pleural effusions with bibasilar atelectasis.   Superimposed pneumonia cannot be excluded.    Small circumferential and likely loculated pericardial effusion with   interval increase in size since March 13, 2017. Correlation with   echocardiography is recommended.    < from: 12 Lead ECG (02.06.18 @ 18:14) >  Line SINUS RHYTHM WITH 1ST DEGREE A-V BLOCK WITH FREQUENT PREMATURE VENTRICULAR COMPLEXES  POSSIBLE LEFT ATRIAL ENLARGEMENT  LEFT AXIS DEVIATION  LEFT VENTRICULAR HYPERTROPHY WITH REPOLARIZATION ABNORMALITY  CANNOT RULE OUT SEPTAL INFARCT , AGE UNDETERMINED  ABNORMAL ECG  < from: Transthoracic Echocardiogram (08.29.16 @ 14:13) >  1. Mitral annular calcification, otherwise normal mitral  valve. Mild mitral regurgitation.  2. Normal left ventricular internal dimensions and wall  thicknesses.  3. Normal left ventricular systolic function. No segmental  wall motion abnormalities.    < end of copied text >
HPI:   Patient is a 83y male with hx DM, spinal stenosis, NPH, ventriculostomy, BPH who presented with fever, leukocytosis, episode of confusion. He was seen 2 days ago at urgent care center and empiric tamiflu was started for possible influenza, but was negative and now d/cd. Pt came to ED, fever to 102, started on CTX and Zithromax for possible pneumonia. ID eval requested for further abx management. Pt now afebrile, he denies any ha, neck pain, +dry cough, no dysuria. CT chest with atelectasis, pleural effusion, possible superimposed infiltrate.      REVIEW OF SYSTEMS:  All other review of systems negative (Comprehensive ROS)    PAST MEDICAL & SURGICAL HISTORY:  Lumbar spinal stenosis  Vitamin D deficiency  Mild dementia  OAB (overactive bladder)  Type 2 diabetes mellitus  BPH (benign prostatic hyperplasia)  Nephrotic syndrome: pt was instructed to avoid taking NSAIDS  Chronic lower back pain: unclear etiology per wife - no hx trauma, possibly arthritis  H/O recurrent urinary tract infection  Chronic Back Pain: L3-L4, L4-L5 compression  NPH (Normal Pressure Hydrocephalus): s/p ventriculostomy, last one in 2012  Hypertension, Essential  Normal pressure hydrocephalus: s/p ventriculostomy of the third ventricle       Allergies    No Known Allergies    Intolerances        Antimicrobials Day #    azithromycin   Tablet 250 milliGRAM(s) Oral daily  cefTRIAXone   IVPB 1 Gram(s) IV Intermittent every 24 hours    Other Medications:  acetaminophen   Tablet 650 milliGRAM(s) Oral every 6 hours PRN  atorvastatin 10 milliGRAM(s) Oral at bedtime  carvedilol 6.25 milliGRAM(s) Oral every 12 hours  dextrose 5%. 1000 milliLiter(s) IV Continuous <Continuous>  dextrose 50% Injectable 12.5 Gram(s) IV Push once  dextrose 50% Injectable 25 Gram(s) IV Push once  dextrose 50% Injectable 25 Gram(s) IV Push once  dextrose Gel 1 Dose(s) Oral once PRN  donepezil 10 milliGRAM(s) Oral at bedtime  enalapril 10 milliGRAM(s) Oral daily  glucagon  Injectable 1 milliGRAM(s) IntraMuscular once PRN  heparin  Injectable 5000 Unit(s) SubCutaneous every 8 hours  insulin lispro (HumaLOG) corrective regimen sliding scale   SubCutaneous three times a day before meals  insulin lispro (HumaLOG) corrective regimen sliding scale   SubCutaneous at bedtime  lidocaine   Patch 1 Patch Transdermal every 24 hours  pantoprazole    Tablet 40 milliGRAM(s) Oral before breakfast  tamsulosin 0.4 milliGRAM(s) Oral at bedtime      FAMILY HISTORY:  No pertinent family history in first degree relatives      SOCIAL HISTORY:  Smoking:     ETOH:     Drug Use:     Single     T(F): 99.5 (18 @ 07:56), Max: 100.1 (18 @ 17:35)  HR: 94 (18 @ 07:56)  BP: 146/84 (18 @ 07:56)  RR: 18 (18 @ 07:56)  SpO2: 94% (18 @ 07:56)  Wt(kg): --    PHYSICAL EXAM:  General: alert, no acute distress  Eyes:  anicteric, no conjunctival injection, no discharge  Oropharynx: no lesions or injection 	  Neck: supple, without adenopathy  Lungs: clear to auscultation  Heart: regular rate and rhythm; no murmur, rubs or gallops  Abdomen: soft, nondistended, nontender, without mass or organomegaly  Skin: no lesions  Extremities: no clubbing, cyanosis, or edema  Neurologic: alert, oriented, moves all extremities    LAB RESULTS:                        17.4   15.9  )-----------( 190      ( 2018 19:21 )             51.0     02-    138  |  99  |  13  ----------------------------<  195<H>  4.3   |  27  |  1.14    Ca    9.7      2018 19:21    TPro  7.7  /  Alb  4.2  /  TBili  1.3<H>  /  DBili  x   /  AST  11  /  ALT  15  /  AlkPhos  87  02-06    LIVER FUNCTIONS - ( 2018 19:21 )  Alb: 4.2 g/dL / Pro: 7.7 g/dL / ALK PHOS: 87 U/L / ALT: 15 U/L RC / AST: 11 U/L / GGT: x           Urinalysis Basic - ( 2018 19:21 )    Color: Yellow / Appearance: Clear / S.018 / pH: x  Gluc: x / Ketone: Trace  / Bili: Negative / Urobili: Negative   Blood: x / Protein: 100 mg/dL / Nitrite: Negative   Leuk Esterase: Negative / RBC: 3-5 /HPF / WBC 0-2 /HPF   Sq Epi: x / Non Sq Epi: OCC /HPF / Bacteria: x        MICROBIOLOGY REVIEWED:    RADIOLOGY REVIEWED:
Patient is a 83y old  Male who presents with a chief complaint of altered mental status, fevers (07 Feb 2018 03:05)    HPI:  82 yo male admitted with fever and ams, he recalls being 'off' for the past several days, though this has improved. Patient is a difficult historian. No hallucination nor agitation, no documented convulsion, tongue biting nor urinary incontinence. He initialy had been treated for flu, now currenlty treated for pneumonia. He denies any ha, neck pain.      PAST MEDICAL & SURGICAL HISTORY:  Lumbar spinal stenosis  Vitamin D deficiency  Mild dementia  OAB (overactive bladder)  Type 2 diabetes mellitus  BPH (benign prostatic hyperplasia)  Nephrotic syndrome: pt was instructed to avoid taking NSAIDS  Chronic lower back pain: unclear etiology per wife - no hx trauma, possibly arthritis  H/O recurrent urinary tract infection  Chronic Back Pain: L3-L4, L4-L5 compression  NPH (Normal Pressure Hydrocephalus): s/p ventriculostomy, last one in Jan 2012  Hypertension, Essential  Normal pressure hydrocephalus: s/p ventriculostomy of the third ventricle 2012    FAMILY HISTORY:  No pertinent family history in first degree relatives    Social Hx:  Nonsmoker, no drug or alcohol use  MEDICATIONS  (STANDING):  aspirin enteric coated 81 milliGRAM(s) Oral daily  atorvastatin 10 milliGRAM(s) Oral at bedtime  azithromycin   Tablet 250 milliGRAM(s) Oral daily  carvedilol 6.25 milliGRAM(s) Oral every 12 hours  cefuroxime   Tablet 500 milliGRAM(s) Oral every 12 hours  dextrose 5%. 1000 milliLiter(s) (50 mL/Hr) IV Continuous <Continuous>  dextrose 50% Injectable 12.5 Gram(s) IV Push once  dextrose 50% Injectable 25 Gram(s) IV Push once  dextrose 50% Injectable 25 Gram(s) IV Push once  donepezil 10 milliGRAM(s) Oral at bedtime  enalapril 10 milliGRAM(s) Oral daily  heparin  Injectable 5000 Unit(s) SubCutaneous every 8 hours  insulin glargine Injectable (LANTUS) 10 Unit(s) SubCutaneous at bedtime  insulin lispro (HumaLOG) corrective regimen sliding scale   SubCutaneous three times a day before meals  insulin lispro (HumaLOG) corrective regimen sliding scale   SubCutaneous at bedtime  lidocaine   Patch 1 Patch Transdermal every 24 hours  pantoprazole    Tablet 40 milliGRAM(s) Oral before breakfast  tamsulosin 0.4 milliGRAM(s) Oral at bedtime    MEDICATIONS  (PRN):  acetaminophen   Tablet 650 milliGRAM(s) Oral every 6 hours PRN For Temp greater than 38 C (100.4 F)  dextrose Gel 1 Dose(s) Oral once PRN Blood Glucose LESS THAN 70 milliGRAM(s)/deciliter  glucagon  Injectable 1 milliGRAM(s) IntraMuscular once PRN Glucose LESS THAN 70 milligrams/deciliter    Allergies    No Known Allergies    Intolerances      ROS: Positive fever, chills, all other ROS were reviewed and are negative.    Vital Signs Last 24 Hrs  T(C): 36.8 (09 Feb 2018 08:05), Max: 37.1 (08 Feb 2018 16:13)  T(F): 98.2 (09 Feb 2018 08:05), Max: 98.8 (08 Feb 2018 22:04)  HR: 80 (09 Feb 2018 08:05) (75 - 86)  BP: 149/81 (09 Feb 2018 08:05) (121/59 - 149/81)  BP(mean): --  RR: 18 (09 Feb 2018 08:05) (18 - 18)  SpO2: 97% (09 Feb 2018 08:05) (93% - 97%)  GENERAL EXAM:  Constitutional: awake and alert. NAD  HEENT: NCAT  Neck: Supple  Respiratory: Breath sounds are clear bilaterally  Cardiovascular: S1 and S2, regular  rhythm  Gastrointestinal: soft, nontender  Extremities: no edema, no cyanosis  Vascular: no carotid bruits  Musculoskeletal: no joint swelling/tenderness, no abnormal movements  Skin: no rashes  NEUROLOGICAL EXAM:  MS: AAOX2, not date, fluent, attends b/l; impaired recent remote memory; slightly impaired attention, language and fund of knowledge.   CN: VFF, EOMI, PERRL, no MARIPOSA, no APD,  V1-3 intact, no facial asymmetry, t/p midline, SCM/trap intact.  Fundi: no papilledema.  Motor: Strength: 5/5 4x. Tone: normal. Bulk: normal. DTR 1+ symm.  Plantar flex b/l. Sensation: intact to LT/PP/Vibration/Position/Temperature 4x.   Coordination: intact 4x.   Gait:  able to stand briefly unassisted.    Labs:                         14.6   11.87 )-----------( 232      ( 09 Feb 2018 09:07 )             43.9             02-08 Chol 102 LDL 41 HDL 39<L> Trig 110  02-08 [id number] 7.6        CAPILLARY BLOOD GLUCOSE      POCT Blood Glucose.: 225 mg/dL (09 Feb 2018 08:28)        Radiology:  -CT Head:  -MRI brain  -MRA brain/Carotids  -EEG
Patient is a 83y old  Male who presents with a chief complaint of altered mental status, fevers (2018 03:05)      HPI:  Farooq Marshall is an 83 year old male with a history of chronic back pain, BPH, DM, recurrent UTI's, HTN, dementia, nephrotic syndrome, NPH s/p ventriculostomy, spinal stenosis presents for evaluation of fever and altered mental status.    Patient is unable to provide an accurate history secondary to dementia. As per discussion with patient and chart review, patient was noted to have fever and altered mental status similar to prior UTIs. Patient was also noted to be complaining of some chest discomfort and have a cough. Went to  where RVP was negative, but clinically diagnosed with influenza and sent home. Symptoms persisted prompting visit to the ED. On my exam with the patient, he reports feeling well at this time without any pain or shortness of breath. Requesting to be discharged home this AM. Does have a cough that is productive of a small amount of sputum.     In the ED, Tmax 102.1, HR 90-100s, /96, O2 92% on RA. Labs showed leukocytosis to 15.9, hemoconcentration with Hgb 17.4, and platelets 190. Metabolic panel unremarkable with Cr 1.14. LFTs normal. Troponin negative, . VBG with mildly elevated lactate of 3.4. UA non-infectious. RVP negative. CXR with possible RLL PNA.    Patient was given 1L LR and Ceftriaxone/Azithromycin for possible PNA and admitted. (2018 03:05)    Pt offers no new complaints to me today.  He is eager to go home  Complaining of his chronic low back pain      PAST MEDICAL & SURGICAL HISTORY:  Lumbar spinal stenosis  Vitamin D deficiency  Mild dementia  OAB (overactive bladder)  Type 2 diabetes mellitus  BPH (benign prostatic hyperplasia)  Nephrotic syndrome: pt was instructed to avoid taking NSAIDS  Chronic lower back pain: unclear etiology per wife - no hx trauma, possibly arthritis  H/O recurrent urinary tract infection  Chronic Back Pain: L3-L4, L4-L5 compression  NPH (Normal Pressure Hydrocephalus): s/p ventriculostomy, last one in 2012  Hypertension, Essential  Normal pressure hydrocephalus: s/p ventriculostomy of the third ventricle       ALLERGIES:    No Known Allergies       MEDICATIONS  (STANDING):  atorvastatin 40 milliGRAM(s) Oral at bedtime  azithromycin   Tablet 250 milliGRAM(s) Oral daily  carvedilol 6.25 milliGRAM(s) Oral every 12 hours  cefTRIAXone   IVPB 1 Gram(s) IV Intermittent every 24 hours  dextrose 5%. 1000 milliLiter(s) (50 mL/Hr) IV Continuous <Continuous>  dextrose 50% Injectable 12.5 Gram(s) IV Push once  dextrose 50% Injectable 25 Gram(s) IV Push once  dextrose 50% Injectable 25 Gram(s) IV Push once  donepezil 10 milliGRAM(s) Oral at bedtime  heparin  Injectable 5000 Unit(s) SubCutaneous every 8 hours  insulin lispro (HumaLOG) corrective regimen sliding scale   SubCutaneous three times a day before meals  insulin lispro (HumaLOG) corrective regimen sliding scale   SubCutaneous at bedtime  lidocaine   Patch 1 Patch Transdermal every 24 hours  pantoprazole    Tablet 40 milliGRAM(s) Oral before breakfast  sodium chloride 0.9%. 1000 milliLiter(s) (75 mL/Hr) IV Continuous <Continuous>  tamsulosin 0.4 milliGRAM(s) Oral at bedtime    MEDICATIONS  (PRN):  acetaminophen   Tablet 650 milliGRAM(s) Oral every 6 hours PRN For Temp greater than 38 C (100.4 F)  dextrose Gel 1 Dose(s) Oral once PRN Blood Glucose LESS THAN 70 milliGRAM(s)/deciliter  glucagon  Injectable 1 milliGRAM(s) IntraMuscular once PRN Glucose LESS THAN 70 milligrams/deciliter      FAMILY HISTORY:  No pertinent family history in first degree relatives      SOCIAL HISTORY:      ROS:     A comprehensive review of systems was performed and pertinent items are noted in the history above. A detailed ROS is as follows:    non contributory    Vital Signs Last 24 Hrs  T(C): 37.8 (2018 17:35), Max: 38.9 (2018 18:39)  T(F): 100.1 (2018 17:35), Max: 102.1 (2018 18:39)  HR: 83 (2018 17:35) (67 - 102)  BP: 156/72 (2018 17:35) (132/71 - 168/69)  BP(mean): --  RR: 18 (2018 17:35) (16 - 23)  SpO2: 93% (2018 17:35) (92% - 100%)    I&O's Summary      PHYSICAL EXAM:  Daily     Daily   Drug Dosing Weight  Height (cm): 177.8 (25 Aug 2016 21:42)  Weight (kg): 105.1 (10 Mar 2017 20:49)  BMI (kg/m2): 33.2 (10 Mar 2017 20:49)  BSA (m2): 2.22 (10 Mar 2017 20:49)  General Appearance:    Comfortable, AAO X 2, in no distress.   HEENT:                       Atraumatic, PERRLA, EOMI, conjunctiva clear.   Neck:                          Supple, no adenopathy, no thyromegaly, no JVD, no carotid bruit  Back:                          Symmetric, no CV angle fullness or tenderness, no soft tissue tenderness.  Chest:                         Clear to auscultation, no wheezes, rales or rhonchi, symmetric air entry, no tachypnea, retractions or cyanosis  Heart:                          S1, S2 normal, no gallop, no murmur.  Abdomen:                    Soft, non-tender, bowel sounds active. No palpable masses.   Extremities:                 no cyanosis, no edema, no joint swelling. Peripheral pulses normal  Skin:                           Skin color, texture normal. No rashes   Neurologic:                  Alert and oriented X2, cranial nerves intact, sensory and motor normal.      TELEMETRY:    ECG:  Ventricular Rate 98 BPM    Atrial Rate 98 BPM    P-R Interval 264 ms    QRS Duration 94 ms     ms    QTc 423 ms    P Axis 13 degrees    R Axis -34 degrees    T Axis 70 degrees    Diagnosis Line SINUS RHYTHM WITH 1ST DEGREE A-V BLOCK WITH FREQUENT PREMATURE VENTRICULAR COMPLEXES  POSSIBLE LEFT ATRIAL ENLARGEMENT  LEFT AXIS DEVIATION  LEFT VENTRICULAR HYPERTROPHY WITH REPOLARIZATION ABNORMALITY  CANNOT RULE OUT SEPTAL INFARCT , AGE UNDETERMINED  ABNORMAL ECG      Patient name: FAROOQ MARSHALL  YOB: 1935   Age: 81 (M)   MR#: [id number]  Study Date: 2016  Location: [address] [place], [place] [id number]: [facility]  Study quality: Technically good  Referring Physician: Ismael Garduno MD  Blood Pressure: 155/82 mmHg  Height: 175 cm  Weight: 95 kg  BSA: 2.1 m2  ------------------------------------------------------------------------  PROCEDURE: Transthoracic echocardiogram with 2-D, M-Mode  and complete spectral and color flow Doppler.  INDICATION:Endocarditis, valve unspecified (I38)  ------------------------------------------------------------------------  Dimensions:    Normal Values:  LA:     5.0    2.0 - 4.0 cm  Ao:     3.3    2.0 - 3.8 cm  SEPTUM: 1.1    0.6 - 1.2 cm  PWT:    1.2    0.6 - 1.1 cm  LVIDd:  4.0    3.0 - 5.6 cm  LVIDs:  2.6    1.8 - 4.0 cm  Derived variables:  LVMI: 74 g/m2  RWT: 0.60  Fractional short: 35 %  EF (Pachecoicholtz): 65 %  ------------------------------------------------------------------------  Observations:  Mitral Valve: Mitral annular calcification, otherwise  normal mitral valve. Mild mitral regurgitation.  Aortic Valve/Aorta: Calcified trileaflet aortic valve with  normal opening.  Aortic Root: 3.3 cm.  Left Atrium: Normal left atrium.  LA volume index = 26  cc/m2.  Left Ventricle: Normal left ventricular systolic function.  No segmental wall motion abnormalities. Normal left  ventricular internal dimensions and wall thicknesses.  Right Heart: Normal right atrium. Normal right ventricular  size and function. Normal tricuspid valve. Normal pulmonic  valve.  Pericardium/Pleura: Normal pericardium with no pericardial  effusion.  Hemodynamic: Estimated right atrial pressure is 8 mm Hg.  ------------------------------------------------------------------------  Conclusions:  1. Mitral annular calcification, otherwise normal mitral  valve. Mild mitral regurgitation.  2. Normal left ventricular internal dimensions and wall  thicknesses.  3. Normal left ventricular systolic function. No segmental  wall motion abnormalities.  ------------------------------------------------------------------------  Confirmed on  2016 - 17:53:17 by Polina Mitchell M.D.  ------------------------------------------------------------------------      LABS:  CARDIAC MARKERS ( 2018 19:21 )  x     / <0.01 ng/mL / x     / x     / 2.0 ng/mL                            17.4   15.9  )-----------( 190      ( 2018 19:21 )             51.0     02-06    138  |  99  |  13  ----------------------------<  195<H>  4.3   |  27  |  1.14    Ca    9.7      2018 19:21    TPro  7.7  /  Alb  4.2  /  TBili  1.3<H>  /  DBili  x   /  AST  11  /  ALT  15  /  AlkPhos  87  -        Pro BNP  910  @ 19:21  D Dimer  --  @ 19:21      Urinalysis Basic - ( 2018 19:21 )    Color: Yellow / Appearance: Clear / S.018 / pH: x  Gluc: x / Ketone: Trace  / Bili: Negative / Urobili: Negative   Blood: x / Protein: 100 mg/dL / Nitrite: Negative   Leuk Esterase: Negative / RBC: 3-5 /HPF / WBC 0-2 /HPF   Sq Epi: x / Non Sq Epi: OCC /HPF / Bacteria: x            RADIOLOGY & ADDITIONAL STUDIES:    HEALTH ISSUES - PROBLEM Dx:  Medication management: Medication management  Other problems related to medical facilities and other health care: Other problems related to medical facilities and other health care  Chronic low back pain, unspecified back pain laterality, with sciatica presence unspecified: Chronic low back pain, unspecified back pain laterality, with sciatica presence unspecified  Mild dementia: Mild dementia  NPH (Normal Pressure Hydrocephalus): NPH (Normal Pressure Hydrocephalus)  Hypertension, Essential: Hypertension, Essential  Type 2 diabetes mellitus without complication, without long-term current use of insulin: Type 2 diabetes mellitus without complication, without long-term current use of insulin  Fever, unspecified fever cause: Fever, unspecified fever cause  Possible PNA  Mild MR
Patient is a 83y old  Male who presents to [facility] with a chief complaint of altered mental status, fevers (09 Feb 2018 15:15)      HPI:  Farooq Parmar is an 83 year old male with a history of chronic back pain, BPH, DM, recurrent UTI's, HTN, dementia, nephrotic syndrome, NPH s/p ventriculostomy, spinal stenosis presents for evaluation of fever and altered mental status.    Patient is a poor historian. patient was noted to have fever and altered mental status similar to prior UTIs. Patient was also noted to be complaining of some chest discomfort and have a cough. Went to  where RVP was negative, but clinically diagnosed with influenza and sent home. Symptoms persisted prompting visit to the ED. .     In the ED, Tmax 102.1, HR 90-100s, /96, O2 92% on RA. Labs showed leukocytosis to 15.9, hemoconcentration with Hgb 17.4, and platelets 190. Metabolic panel unremarkable with Cr 1.14. LFTs normal. Troponin negative, . VBG with mildly elevated lactate of 3.4. UA non-infectious. RVP negative. CXR with possible RLL PNA.    Patient was given 1L LR and Ceftriaxone/Azithromycin for possible PNA and admitted.  hospital course was placed on iv rocephin and zithromax and ct chest obtained with small bilateral pleural effusions and  bibasilar atelectasis. pneumonia could not be excluded. abx changed to po cefuroxime and azithromycin.  now with about two day hx of nonbloody diarrhea without nausea, vomiting or abdominal pain.      PAST MEDICAL  HISTORY:  Lumbar spinal stenosis  ashd  Mild dementia  OAB (overactive bladder)  Type 2 diabetes mellitus  BPH (benign prostatic hyperplasia)  Nephrotic syndrome: pt was instructed to avoid taking NSAIDS  floyd  H/O recurrent urinary tract infection  htn    PAST SURGICAL HISTORY:  NPH (Normal Pressure Hydrocephalus): s/p ventriculostomy, last one in Jan 2012  s/p brain bx  s/p spine repair x 2      Allergies  No Known Allergies      MEDICATIONS  (STANDING):  aspirin enteric coated 81 milliGRAM(s) Oral daily  atorvastatin 10 milliGRAM(s) Oral at bedtime  azithromycin   Tablet 250 milliGRAM(s) Oral daily  carvedilol 6.25 milliGRAM(s) Oral every 12 hours  cefuroxime   Tablet 500 milliGRAM(s) Oral every 12 hours  dextrose 5%. 1000 milliLiter(s) (50 mL/Hr) IV Continuous <Continuous>  dextrose 50% Injectable 12.5 Gram(s) IV Push once  dextrose 50% Injectable 25 Gram(s) IV Push once  dextrose 50% Injectable 25 Gram(s) IV Push once  donepezil 10 milliGRAM(s) Oral at bedtime  enalapril 10 milliGRAM(s) Oral daily  heparin  Injectable 5000 Unit(s) SubCutaneous every 8 hours  insulin glargine Injectable (LANTUS) 10 Unit(s) SubCutaneous at bedtime  insulin lispro (HumaLOG) corrective regimen sliding scale   SubCutaneous three times a day before meals  insulin lispro (HumaLOG) corrective regimen sliding scale   SubCutaneous at bedtime  lidocaine   Patch 1 Patch Transdermal every 24 hours  pantoprazole    Tablet 40 milliGRAM(s) Oral before breakfast  tamsulosin 0.4 milliGRAM(s) Oral at bedtime    MEDICATIONS  (PRN):  acetaminophen   Tablet 650 milliGRAM(s) Oral every 6 hours PRN For Temp greater than 38 C (100.4 F)  dextrose Gel 1 Dose(s) Oral once PRN Blood Glucose LESS THAN 70 milliGRAM(s)/deciliter  glucagon  Injectable 1 milliGRAM(s) IntraMuscular once PRN Glucose LESS THAN 70 milligrams/deciliter      social history  distant smoking hx  alcohol without    FAMILY HISTORY:  No pertinent family history in first degree relatives              Vital Signs Last 24 Hrs  T(C): 36.6 (10 Feb 2018 08:41), Max: 37.2 (09 Feb 2018 21:53)  T(F): 97.9 (10 Feb 2018 08:41), Max: 99 (09 Feb 2018 21:53)  HR: 78 (10 Feb 2018 08:41) (76 - 80)  BP: 156/44 (10 Feb 2018 08:41) (127/74 - 163/80)  BP(mean): --  RR: 18 (10 Feb 2018 08:41) (18 - 18)  SpO2: 92% (10 Feb 2018 08:41) (92% - 95%)        LABS:                        14.6   11.87 )-----------( 232      ( 09 Feb 2018 09:07 )             43.9     02-09    138  |  102  |  19  ----------------------------<  238<H>  4.0   |  23  |  1.11    Ca    10.1      09 Feb 2018 09:00          I&O's Summary    09 Feb 2018 07:01  -  10 Feb 2018 07:00  --------------------------------------------------------  IN: 940 mL / OUT: 900 mL / NET: 40 mL      RADIOLOGY & ADDITIONAL STUDIES:        marshall

## 2018-02-11 LAB
CULTURE RESULTS: SIGNIFICANT CHANGE UP
CULTURE RESULTS: SIGNIFICANT CHANGE UP
GLUCOSE BLDC GLUCOMTR-MCNC: 222 MG/DL — HIGH (ref 70–99)
GLUCOSE BLDC GLUCOMTR-MCNC: 284 MG/DL — HIGH (ref 70–99)
GLUCOSE BLDC GLUCOMTR-MCNC: 317 MG/DL — HIGH (ref 70–99)
GLUCOSE BLDC GLUCOMTR-MCNC: 402 MG/DL — HIGH (ref 70–99)
GLUCOSE BLDC GLUCOMTR-MCNC: 429 MG/DL — HIGH (ref 70–99)
SPECIMEN SOURCE: SIGNIFICANT CHANGE UP
SPECIMEN SOURCE: SIGNIFICANT CHANGE UP

## 2018-02-11 RX ORDER — INSULIN GLARGINE 100 [IU]/ML
18 INJECTION, SOLUTION SUBCUTANEOUS AT BEDTIME
Qty: 0 | Refills: 0 | Status: DISCONTINUED | OUTPATIENT
Start: 2018-02-11 | End: 2018-02-12

## 2018-02-11 RX ADMIN — HEPARIN SODIUM 5000 UNIT(S): 5000 INJECTION INTRAVENOUS; SUBCUTANEOUS at 05:53

## 2018-02-11 RX ADMIN — HEPARIN SODIUM 5000 UNIT(S): 5000 INJECTION INTRAVENOUS; SUBCUTANEOUS at 12:56

## 2018-02-11 RX ADMIN — Medication 500 MILLIGRAM(S): at 05:53

## 2018-02-11 RX ADMIN — LIDOCAINE 1 PATCH: 4 CREAM TOPICAL at 00:05

## 2018-02-11 RX ADMIN — INSULIN GLARGINE 18 UNIT(S): 100 INJECTION, SOLUTION SUBCUTANEOUS at 21:57

## 2018-02-11 RX ADMIN — Medication 81 MILLIGRAM(S): at 12:57

## 2018-02-11 RX ADMIN — TAMSULOSIN HYDROCHLORIDE 0.4 MILLIGRAM(S): 0.4 CAPSULE ORAL at 21:57

## 2018-02-11 RX ADMIN — CARVEDILOL PHOSPHATE 6.25 MILLIGRAM(S): 80 CAPSULE, EXTENDED RELEASE ORAL at 05:53

## 2018-02-11 RX ADMIN — Medication 20 MILLIGRAM(S): at 05:59

## 2018-02-11 RX ADMIN — Medication 2: at 21:58

## 2018-02-11 RX ADMIN — PANTOPRAZOLE SODIUM 40 MILLIGRAM(S): 20 TABLET, DELAYED RELEASE ORAL at 05:54

## 2018-02-11 RX ADMIN — Medication 6: at 17:45

## 2018-02-11 RX ADMIN — ATORVASTATIN CALCIUM 10 MILLIGRAM(S): 80 TABLET, FILM COATED ORAL at 21:57

## 2018-02-11 RX ADMIN — Medication 2: at 08:46

## 2018-02-11 RX ADMIN — LIDOCAINE 1 PATCH: 4 CREAM TOPICAL at 12:58

## 2018-02-11 RX ADMIN — HEPARIN SODIUM 5000 UNIT(S): 5000 INJECTION INTRAVENOUS; SUBCUTANEOUS at 21:57

## 2018-02-11 RX ADMIN — Medication 500 MILLIGRAM(S): at 17:46

## 2018-02-11 RX ADMIN — CARVEDILOL PHOSPHATE 6.25 MILLIGRAM(S): 80 CAPSULE, EXTENDED RELEASE ORAL at 17:46

## 2018-02-11 RX ADMIN — AZITHROMYCIN 250 MILLIGRAM(S): 500 TABLET, FILM COATED ORAL at 12:57

## 2018-02-11 RX ADMIN — Medication 4: at 12:57

## 2018-02-11 NOTE — PROGRESS NOTE ADULT - ASSESSMENT
monitor bowel pattern for any recurrence of diarrhea   abx as per med/id  gi prophylaxis  for subacute rehab

## 2018-02-11 NOTE — PROGRESS NOTE ADULT - GASTROINTESTINAL DETAILS
no rigidity/no guarding/bowel sounds normal/no masses palpable/nontender/no rebound tenderness/prominent but soft/no organomegaly

## 2018-02-11 NOTE — PROGRESS NOTE ADULT - SUBJECTIVE AND OBJECTIVE BOX
resting, no complaints    MEDICATIONS  (STANDING):  aspirin enteric coated 81 milliGRAM(s) Oral daily  atorvastatin 10 milliGRAM(s) Oral at bedtime  azithromycin   Tablet 250 milliGRAM(s) Oral daily  carvedilol 6.25 milliGRAM(s) Oral every 12 hours  cefuroxime   Tablet 500 milliGRAM(s) Oral every 12 hours  dextrose 5%. 1000 milliLiter(s) (50 mL/Hr) IV Continuous <Continuous>  dextrose 50% Injectable 12.5 Gram(s) IV Push once  dextrose 50% Injectable 25 Gram(s) IV Push once  dextrose 50% Injectable 25 Gram(s) IV Push once  donepezil 10 milliGRAM(s) Oral at bedtime  enalapril 20 milliGRAM(s) Oral daily  heparin  Injectable 5000 Unit(s) SubCutaneous every 8 hours  insulin glargine Injectable (LANTUS) 16 Unit(s) SubCutaneous at bedtime  insulin lispro (HumaLOG) corrective regimen sliding scale   SubCutaneous three times a day before meals  insulin lispro (HumaLOG) corrective regimen sliding scale   SubCutaneous at bedtime  lidocaine   Patch 1 Patch Transdermal every 24 hours  pantoprazole    Tablet 40 milliGRAM(s) Oral before breakfast  tamsulosin 0.4 milliGRAM(s) Oral at bedtime    MEDICATIONS  (PRN):  acetaminophen   Tablet 650 milliGRAM(s) Oral every 6 hours PRN For Temp greater than 38 C (100.4 F)  dextrose Gel 1 Dose(s) Oral once PRN Blood Glucose LESS THAN 70 milliGRAM(s)/deciliter  glucagon  Injectable 1 milliGRAM(s) IntraMuscular once PRN Glucose LESS THAN 70 milligrams/deciliter      Vital Signs Last 24 Hrs  T(C): 36.7 (11 Feb 2018 08:30), Max: 36.8 (10 Feb 2018 22:00)  T(F): 98 (11 Feb 2018 08:30), Max: 98.3 (10 Feb 2018 22:00)  HR: 69 (11 Feb 2018 08:30) (69 - 78)  BP: 124/69 (11 Feb 2018 08:30) (124/69 - 150/74)  BP(mean): --  RR: 18 (11 Feb 2018 08:30) (18 - 18)  SpO2: 93% (11 Feb 2018 08:30) (93% - 95%)  CAPILLARY BLOOD GLUCOSE      POCT Blood Glucose.: 222 mg/dL (11 Feb 2018 08:29)  POCT Blood Glucose.: 282 mg/dL (10 Feb 2018 21:58)  POCT Blood Glucose.: 322 mg/dL (10 Feb 2018 17:05)  POCT Blood Glucose.: 270 mg/dL (10 Feb 2018 12:34)    I&O's Summary    10 Feb 2018 07:01  -  11 Feb 2018 07:00  --------------------------------------------------------  IN: 1140 mL / OUT: 300 mL / NET: 840 mL        PHYSICAL EXAM:  HEAD:  Atraumatic, Normocephalic  NECK: Supple, No JVD  CHEST/LUNG:   no     rales,     no,    rhonchi  HEART: Regular rate and rhythm;         murmur  ABDOMEN: Soft, Nontender, ;   EXTREMITIES:       no      edema  NEUROLOGY:  alert    LABS:                        14.6   11.87 )-----------( 232      ( 09 Feb 2018 09:07 )             43.9     02-09    138  |  102  |  19  ----------------------------<  238<H>  4.0   |  23  |  1.11    Ca    10.1      09 Feb 2018 09:00                    Hemoglobin A1C, Whole Blood: 7.6 % (02-08 @ 07:34)    Thyroid Stimulating Hormone, Serum: 1.16 uIU/mL (02-08 @ 09:22)          Consultant(s) Notes Reviewed:      Care Discussed with Consultants/Other Providers:

## 2018-02-11 NOTE — PROGRESS NOTE ADULT - SUBJECTIVE AND OBJECTIVE BOX
INTERVAL HPI/OVERNIGHT EVENTS:  one formed bowel movement noted  c.diff testing not accomplished secondary to above  no nausea, vomiting or abdominal pain      Vital Signs Last 24 Hrs  T(C): 36.8 (10 Feb 2018 22:00), Max: 36.8 (10 Feb 2018 22:00)  T(F): 98.3 (10 Feb 2018 22:00), Max: 98.3 (10 Feb 2018 22:00)  HR: 78 (10 Feb 2018 22:00) (74 - 78)  BP: 150/74 (10 Feb 2018 22:00) (140/75 - 156/44)  BP(mean): --  RR: 18 (10 Feb 2018 22:00) (18 - 18)  SpO2: 95% (10 Feb 2018 22:00) (92% - 95%)        LABS:                        14.6   11.87 )-----------( 232      ( 09 Feb 2018 09:07 )             43.9     02-09    138  |  102  |  19  ----------------------------<  238<H>  4.0   |  23  |  1.11    Ca    10.1      09 Feb 2018 09:00          I&O's Summary    10 Feb 2018 07:01  -  11 Feb 2018 07:00  --------------------------------------------------------  IN: 1140 mL / OUT: 300 mL / NET: 840 mL        MEDICATIONS  (STANDING):  aspirin enteric coated 81 milliGRAM(s) Oral daily  atorvastatin 10 milliGRAM(s) Oral at bedtime  azithromycin   Tablet 250 milliGRAM(s) Oral daily  carvedilol 6.25 milliGRAM(s) Oral every 12 hours  cefuroxime   Tablet 500 milliGRAM(s) Oral every 12 hours  dextrose 5%. 1000 milliLiter(s) (50 mL/Hr) IV Continuous <Continuous>  dextrose 50% Injectable 12.5 Gram(s) IV Push once  dextrose 50% Injectable 25 Gram(s) IV Push once  dextrose 50% Injectable 25 Gram(s) IV Push once  donepezil 10 milliGRAM(s) Oral at bedtime  enalapril 20 milliGRAM(s) Oral daily  heparin  Injectable 5000 Unit(s) SubCutaneous every 8 hours  insulin glargine Injectable (LANTUS) 16 Unit(s) SubCutaneous at bedtime  insulin lispro (HumaLOG) corrective regimen sliding scale   SubCutaneous three times a day before meals  insulin lispro (HumaLOG) corrective regimen sliding scale   SubCutaneous at bedtime  lidocaine   Patch 1 Patch Transdermal every 24 hours  pantoprazole    Tablet 40 milliGRAM(s) Oral before breakfast  tamsulosin 0.4 milliGRAM(s) Oral at bedtime    MEDICATIONS  (PRN):  acetaminophen   Tablet 650 milliGRAM(s) Oral every 6 hours PRN For Temp greater than 38 C (100.4 F)  dextrose Gel 1 Dose(s) Oral once PRN Blood Glucose LESS THAN 70 milliGRAM(s)/deciliter  glucagon  Injectable 1 milliGRAM(s) IntraMuscular once PRN Glucose LESS THAN 70 milligrams/deciliter        RADIOLOGY & ADDITIONAL TESTS:                marshall

## 2018-02-12 VITALS
DIASTOLIC BLOOD PRESSURE: 78 MMHG | HEART RATE: 76 BPM | RESPIRATION RATE: 18 BRPM | OXYGEN SATURATION: 94 % | SYSTOLIC BLOOD PRESSURE: 133 MMHG | TEMPERATURE: 98 F

## 2018-02-12 LAB
GLUCOSE BLDC GLUCOMTR-MCNC: 233 MG/DL — HIGH (ref 70–99)
GLUCOSE BLDC GLUCOMTR-MCNC: 301 MG/DL — HIGH (ref 70–99)

## 2018-02-12 PROCEDURE — 71045 X-RAY EXAM CHEST 1 VIEW: CPT

## 2018-02-12 PROCEDURE — 85014 HEMATOCRIT: CPT

## 2018-02-12 PROCEDURE — 84443 ASSAY THYROID STIM HORMONE: CPT

## 2018-02-12 PROCEDURE — 93306 TTE W/DOPPLER COMPLETE: CPT

## 2018-02-12 PROCEDURE — 85027 COMPLETE CBC AUTOMATED: CPT

## 2018-02-12 PROCEDURE — 82962 GLUCOSE BLOOD TEST: CPT

## 2018-02-12 PROCEDURE — 70450 CT HEAD/BRAIN W/O DYE: CPT

## 2018-02-12 PROCEDURE — 71250 CT THORAX DX C-: CPT

## 2018-02-12 PROCEDURE — 82803 BLOOD GASES ANY COMBINATION: CPT

## 2018-02-12 PROCEDURE — 84132 ASSAY OF SERUM POTASSIUM: CPT

## 2018-02-12 PROCEDURE — 82553 CREATINE MB FRACTION: CPT

## 2018-02-12 PROCEDURE — 80053 COMPREHEN METABOLIC PANEL: CPT

## 2018-02-12 PROCEDURE — 87798 DETECT AGENT NOS DNA AMP: CPT

## 2018-02-12 PROCEDURE — 81001 URINALYSIS AUTO W/SCOPE: CPT

## 2018-02-12 PROCEDURE — 82947 ASSAY GLUCOSE BLOOD QUANT: CPT

## 2018-02-12 PROCEDURE — 82435 ASSAY OF BLOOD CHLORIDE: CPT

## 2018-02-12 PROCEDURE — 84295 ASSAY OF SERUM SODIUM: CPT

## 2018-02-12 PROCEDURE — 97116 GAIT TRAINING THERAPY: CPT

## 2018-02-12 PROCEDURE — 87633 RESP VIRUS 12-25 TARGETS: CPT

## 2018-02-12 PROCEDURE — 87040 BLOOD CULTURE FOR BACTERIA: CPT

## 2018-02-12 PROCEDURE — 96374 THER/PROPH/DIAG INJ IV PUSH: CPT

## 2018-02-12 PROCEDURE — 80048 BASIC METABOLIC PNL TOTAL CA: CPT

## 2018-02-12 PROCEDURE — 96375 TX/PRO/DX INJ NEW DRUG ADDON: CPT

## 2018-02-12 PROCEDURE — 80061 LIPID PANEL: CPT

## 2018-02-12 PROCEDURE — 84484 ASSAY OF TROPONIN QUANT: CPT

## 2018-02-12 PROCEDURE — 97162 PT EVAL MOD COMPLEX 30 MIN: CPT

## 2018-02-12 PROCEDURE — 82330 ASSAY OF CALCIUM: CPT

## 2018-02-12 PROCEDURE — 83880 ASSAY OF NATRIURETIC PEPTIDE: CPT

## 2018-02-12 PROCEDURE — 94640 AIRWAY INHALATION TREATMENT: CPT

## 2018-02-12 PROCEDURE — 97110 THERAPEUTIC EXERCISES: CPT

## 2018-02-12 PROCEDURE — 87581 M.PNEUMON DNA AMP PROBE: CPT

## 2018-02-12 PROCEDURE — 87486 CHLMYD PNEUM DNA AMP PROBE: CPT

## 2018-02-12 PROCEDURE — 99285 EMERGENCY DEPT VISIT HI MDM: CPT | Mod: 25

## 2018-02-12 PROCEDURE — 84145 PROCALCITONIN (PCT): CPT

## 2018-02-12 PROCEDURE — 83036 HEMOGLOBIN GLYCOSYLATED A1C: CPT

## 2018-02-12 PROCEDURE — 87449 NOS EACH ORGANISM AG IA: CPT

## 2018-02-12 PROCEDURE — 87086 URINE CULTURE/COLONY COUNT: CPT

## 2018-02-12 PROCEDURE — 83605 ASSAY OF LACTIC ACID: CPT

## 2018-02-12 RX ORDER — LIDOCAINE 4 G/100G
0 CREAM TOPICAL
Qty: 0 | Refills: 0 | COMMUNITY
Start: 2018-02-12

## 2018-02-12 RX ORDER — ACETAMINOPHEN 500 MG
2 TABLET ORAL
Qty: 0 | Refills: 0 | COMMUNITY
Start: 2018-02-12

## 2018-02-12 RX ORDER — HYDROMORPHONE HYDROCHLORIDE 2 MG/ML
1 INJECTION INTRAMUSCULAR; INTRAVENOUS; SUBCUTANEOUS
Qty: 0 | Refills: 0 | COMMUNITY

## 2018-02-12 RX ORDER — INSULIN LISPRO 100/ML
4 VIAL (ML) SUBCUTANEOUS
Qty: 0 | Refills: 0 | Status: DISCONTINUED | OUTPATIENT
Start: 2018-02-12 | End: 2018-02-12

## 2018-02-12 RX ORDER — INSULIN GLARGINE 100 [IU]/ML
25 INJECTION, SOLUTION SUBCUTANEOUS AT BEDTIME
Qty: 0 | Refills: 0 | Status: DISCONTINUED | OUTPATIENT
Start: 2018-02-12 | End: 2018-02-12

## 2018-02-12 RX ORDER — HEPARIN SODIUM 5000 [USP'U]/ML
0 INJECTION INTRAVENOUS; SUBCUTANEOUS
Qty: 0 | Refills: 0 | COMMUNITY
Start: 2018-02-12

## 2018-02-12 RX ADMIN — PANTOPRAZOLE SODIUM 40 MILLIGRAM(S): 20 TABLET, DELAYED RELEASE ORAL at 05:00

## 2018-02-12 RX ADMIN — Medication 500 MILLIGRAM(S): at 05:00

## 2018-02-12 RX ADMIN — CARVEDILOL PHOSPHATE 6.25 MILLIGRAM(S): 80 CAPSULE, EXTENDED RELEASE ORAL at 05:00

## 2018-02-12 RX ADMIN — Medication 4: at 13:30

## 2018-02-12 RX ADMIN — HEPARIN SODIUM 5000 UNIT(S): 5000 INJECTION INTRAVENOUS; SUBCUTANEOUS at 05:00

## 2018-02-12 RX ADMIN — Medication 81 MILLIGRAM(S): at 12:48

## 2018-02-12 RX ADMIN — LIDOCAINE 1 PATCH: 4 CREAM TOPICAL at 12:48

## 2018-02-12 RX ADMIN — Medication 4 UNIT(S): at 13:29

## 2018-02-12 RX ADMIN — LIDOCAINE 1 PATCH: 4 CREAM TOPICAL at 00:05

## 2018-02-12 RX ADMIN — HEPARIN SODIUM 5000 UNIT(S): 5000 INJECTION INTRAVENOUS; SUBCUTANEOUS at 13:31

## 2018-02-12 RX ADMIN — Medication 2: at 09:40

## 2018-02-12 RX ADMIN — Medication 20 MILLIGRAM(S): at 05:00

## 2018-02-12 NOTE — PROGRESS NOTE ADULT - SUBJECTIVE AND OBJECTIVE BOX
CC: f/u for fever and respiratory illness    Patient reports: weakness, no acute complaints, no chills, dry cough.    REVIEW OF SYSTEMS:  All other review of systems negative (Comprehensive ROS)    Antimicrobials Day # s/p 5 days of ceftin and azithromycin    Other Medications Reviewed    T(F): 98.7 (02-12-18 @ 08:50), Max: 98.8 (02-11-18 @ 21:45)  HR: 73 (02-12-18 @ 08:50)  BP: 149/80 (02-12-18 @ 08:50)  RR: 18 (02-12-18 @ 08:50)  SpO2: 95% (02-12-18 @ 08:50)  Wt(kg): --    PHYSICAL EXAM:  General: alert, no acute distress  Eyes:  anicteric, no conjunctival injection, no discharge  Oropharynx: no lesions or injection 	  Neck: supple, without adenopathy  Lungs: crackles at bases  Heart: regular rate and rhythm; no murmur, rubs or gallops  Abdomen: soft, nondistended, nontender, without mass or organomegaly  Skin: no lesions  Extremities: no clubbing, cyanosis, or edema  Neurologic: alert, oriented, moves all extremities    LAB RESULTS:              MICROBIOLOGY:  RECENT CULTURES:  2/6 blood,urine,cultures negative  RVP negative    RADIOLOGY REVIEWED:  < from: CT Chest No Cont (02.07.18 @ 11:52) >  IMPRESSION:     Small bilateral pleural effusions with bibasilar atelectasis.   Superimposed pneumonia cannot be excluded.    Small circumferential and likely loculated pericardial effusion with   interval increase in size since March 13, 2017. Correlation with   echocardiography is recommended.    < end of copied text >

## 2018-02-12 NOTE — PROGRESS NOTE ADULT - ASSESSMENT
83y m hx DM, spinal stenosis, NPH, ventriculostomy, BPH who presented with fever, leukocytosis, episode of confusion.   He was seen 2 days ago at urgent care center and empiric tamiflu was started for possible influenza, but RVP was negative and now d/cd.   fever to 102, started on CTX and Zithromax for possible pneumonia.   Pt now afebrile, he denies any ha, neck pain, +dry cough, no dysuria.   CT chest with atelectasis, pleural effusion, possible superimposed infiltrate, also pericardial effusion  procalcitonin is low  No signs of ongoing infection,his antibiotic course has been completed    PLAN:  1. Monitor off antibiotics  2.No additional ID w/u planned at this time  3.No ID objection to d/c, we mar stop actively following,please call if ID issues arise.

## 2018-03-24 ENCOUNTER — INPATIENT (INPATIENT)
Facility: HOSPITAL | Age: 83
LOS: 5 days | Discharge: HOME | DRG: 274 | End: 2018-03-30
Attending: INTERNAL MEDICINE | Admitting: INTERNAL MEDICINE
Payer: MEDICARE

## 2018-03-24 VITALS
HEART RATE: 112 BPM | RESPIRATION RATE: 20 BRPM | DIASTOLIC BLOOD PRESSURE: 96 MMHG | SYSTOLIC BLOOD PRESSURE: 154 MMHG | OXYGEN SATURATION: 99 %

## 2018-03-24 DIAGNOSIS — G91.2 (IDIOPATHIC) NORMAL PRESSURE HYDROCEPHALUS: Chronic | ICD-10-CM

## 2018-03-24 LAB
ALBUMIN SERPL ELPH-MCNC: 3 G/DL — LOW (ref 3.3–5)
ALP SERPL-CCNC: 76 U/L — SIGNIFICANT CHANGE UP (ref 40–120)
ALT FLD-CCNC: 16 U/L RC — SIGNIFICANT CHANGE UP (ref 10–45)
ANION GAP SERPL CALC-SCNC: 15 MMOL/L — SIGNIFICANT CHANGE UP (ref 5–17)
APTT BLD: 40.7 SEC — HIGH (ref 27.5–37.4)
AST SERPL-CCNC: 48 U/L — HIGH (ref 10–40)
BASE EXCESS BLDV CALC-SCNC: 3.7 MMOL/L — HIGH (ref -2–2)
BASOPHILS # BLD AUTO: 0 K/UL — SIGNIFICANT CHANGE UP (ref 0–0.2)
BASOPHILS NFR BLD AUTO: 0.3 % — SIGNIFICANT CHANGE UP (ref 0–2)
BILIRUB SERPL-MCNC: 0.7 MG/DL — SIGNIFICANT CHANGE UP (ref 0.2–1.2)
BUN SERPL-MCNC: 20 MG/DL — SIGNIFICANT CHANGE UP (ref 7–23)
CA-I SERPL-SCNC: 1.12 MMOL/L — SIGNIFICANT CHANGE UP (ref 1.12–1.3)
CALCIUM SERPL-MCNC: 9.1 MG/DL — SIGNIFICANT CHANGE UP (ref 8.4–10.5)
CHLORIDE BLDV-SCNC: 99 MMOL/L — SIGNIFICANT CHANGE UP (ref 96–108)
CHLORIDE SERPL-SCNC: 95 MMOL/L — LOW (ref 96–108)
CK SERPL-CCNC: 85 U/L — SIGNIFICANT CHANGE UP (ref 30–200)
CO2 BLDV-SCNC: 30 MMOL/L — SIGNIFICANT CHANGE UP (ref 22–30)
CO2 SERPL-SCNC: 22 MMOL/L — SIGNIFICANT CHANGE UP (ref 22–31)
CREAT SERPL-MCNC: 1.21 MG/DL — SIGNIFICANT CHANGE UP (ref 0.5–1.3)
EOSINOPHIL # BLD AUTO: 0.1 K/UL — SIGNIFICANT CHANGE UP (ref 0–0.5)
EOSINOPHIL NFR BLD AUTO: 0.4 % — SIGNIFICANT CHANGE UP (ref 0–6)
GAS PNL BLDV: 132 MMOL/L — LOW (ref 136–145)
GAS PNL BLDV: SIGNIFICANT CHANGE UP
GAS PNL BLDV: SIGNIFICANT CHANGE UP
GLUCOSE BLDV-MCNC: 293 MG/DL — HIGH (ref 70–99)
GLUCOSE SERPL-MCNC: 297 MG/DL — HIGH (ref 70–99)
HCO3 BLDV-SCNC: 29 MMOL/L — SIGNIFICANT CHANGE UP (ref 21–29)
HCT VFR BLD CALC: 40.4 % — SIGNIFICANT CHANGE UP (ref 39–50)
HCT VFR BLDA CALC: 42 % — SIGNIFICANT CHANGE UP (ref 39–50)
HGB BLD CALC-MCNC: 13.5 G/DL — SIGNIFICANT CHANGE UP (ref 13–17)
HGB BLD-MCNC: 14.4 G/DL — SIGNIFICANT CHANGE UP (ref 13–17)
INR BLD: 1.63 RATIO — HIGH (ref 0.88–1.16)
LACTATE BLDV-MCNC: 2.5 MMOL/L — HIGH (ref 0.7–2)
LYMPHOCYTES # BLD AUTO: 0.8 K/UL — LOW (ref 1–3.3)
LYMPHOCYTES # BLD AUTO: 4.1 % — LOW (ref 13–44)
MCHC RBC-ENTMCNC: 28.9 PG — SIGNIFICANT CHANGE UP (ref 27–34)
MCHC RBC-ENTMCNC: 35.5 GM/DL — SIGNIFICANT CHANGE UP (ref 32–36)
MCV RBC AUTO: 81.4 FL — SIGNIFICANT CHANGE UP (ref 80–100)
MONOCYTES # BLD AUTO: 1.5 K/UL — HIGH (ref 0–0.9)
MONOCYTES NFR BLD AUTO: 7.9 % — SIGNIFICANT CHANGE UP (ref 2–14)
NEUTROPHILS # BLD AUTO: 16.1 K/UL — HIGH (ref 1.8–7.4)
NEUTROPHILS NFR BLD AUTO: 87.3 % — HIGH (ref 43–77)
NT-PROBNP SERPL-SCNC: 2268 PG/ML — HIGH (ref 0–300)
PCO2 BLDV: 48 MMHG — SIGNIFICANT CHANGE UP (ref 35–50)
PH BLDV: 7.4 — SIGNIFICANT CHANGE UP (ref 7.35–7.45)
PLATELET # BLD AUTO: 276 K/UL — SIGNIFICANT CHANGE UP (ref 150–400)
PO2 BLDV: 49 MMHG — HIGH (ref 25–45)
POTASSIUM BLDV-SCNC: 4.5 MMOL/L — SIGNIFICANT CHANGE UP (ref 3.5–5)
POTASSIUM SERPL-MCNC: 5.1 MMOL/L — SIGNIFICANT CHANGE UP (ref 3.5–5.3)
POTASSIUM SERPL-SCNC: 5.1 MMOL/L — SIGNIFICANT CHANGE UP (ref 3.5–5.3)
PROT SERPL-MCNC: 7.4 G/DL — SIGNIFICANT CHANGE UP (ref 6–8.3)
PROTHROM AB SERPL-ACNC: 17.9 SEC — HIGH (ref 9.8–12.7)
RBC # BLD: 4.97 M/UL — SIGNIFICANT CHANGE UP (ref 4.2–5.8)
RBC # FLD: 13.2 % — SIGNIFICANT CHANGE UP (ref 10.3–14.5)
SAO2 % BLDV: 83 % — SIGNIFICANT CHANGE UP (ref 67–88)
SODIUM SERPL-SCNC: 132 MMOL/L — LOW (ref 135–145)
TROPONIN T SERPL-MCNC: 0.04 NG/ML — SIGNIFICANT CHANGE UP (ref 0–0.06)
WBC # BLD: 18.5 K/UL — HIGH (ref 3.8–10.5)
WBC # FLD AUTO: 18.5 K/UL — HIGH (ref 3.8–10.5)

## 2018-03-24 PROCEDURE — 99285 EMERGENCY DEPT VISIT HI MDM: CPT | Mod: GC

## 2018-03-24 PROCEDURE — 71045 X-RAY EXAM CHEST 1 VIEW: CPT | Mod: 26

## 2018-03-24 RX ORDER — IPRATROPIUM/ALBUTEROL SULFATE 18-103MCG
3 AEROSOL WITH ADAPTER (GRAM) INHALATION ONCE
Qty: 0 | Refills: 0 | Status: COMPLETED | OUTPATIENT
Start: 2018-03-24 | End: 2018-03-24

## 2018-03-24 RX ORDER — FUROSEMIDE 40 MG
20 TABLET ORAL ONCE
Qty: 0 | Refills: 0 | Status: COMPLETED | OUTPATIENT
Start: 2018-03-24 | End: 2018-03-24

## 2018-03-24 RX ORDER — SODIUM CHLORIDE 9 MG/ML
3 INJECTION INTRAMUSCULAR; INTRAVENOUS; SUBCUTANEOUS ONCE
Qty: 0 | Refills: 0 | Status: COMPLETED | OUTPATIENT
Start: 2018-03-24 | End: 2018-03-24

## 2018-03-24 RX ADMIN — Medication 3 MILLILITER(S): at 22:46

## 2018-03-24 RX ADMIN — SODIUM CHLORIDE 3 MILLILITER(S): 9 INJECTION INTRAMUSCULAR; INTRAVENOUS; SUBCUTANEOUS at 22:07

## 2018-03-24 RX ADMIN — Medication 125 MILLIGRAM(S): at 22:46

## 2018-03-24 NOTE — ED PROVIDER NOTE - PHYSICAL EXAMINATION
Braxton: A & O x 3, NAD, HEENT WNL and no facial asymmetry; lungs with wheezing and shallow inspiration, heart with irreg rhythm; abdomen soft NTND; extremities with bilateral edema; skin with no rashes, neuro exam non focal with no motor or sensory deficits

## 2018-03-24 NOTE — ED PROCEDURE NOTE - PROCEDURE ADDITIONAL DETAILS
Emergency Department Focused Ultrasound performed at patient's bedside for educational purposes. The study will have a follow up study performed or was performed in the direct supervision of an ultrasound trained attending.     Lungs: no B lines, left sided pleural effusion

## 2018-03-24 NOTE — ED PROVIDER NOTE - OBJECTIVE STATEMENT
83 year old, history of ventriulostomy, presenting after EMS took patient from restaurant after patient fell to ground and was confused. Found to be hypoxic in the 88. patient in ER was hypoxic to 84 on RA. Placed on nasal cannula. History of CHF and is on a "water pill" that is not lasix as this didn't work for him in the past. No worsening of chronic leg swelling over the past few days. No syncope, he remember the fall.   denies fevers for the past few days. no changes in medications.     on pradaxa  PMD: eboni webster  Cards: sandy campbell

## 2018-03-24 NOTE — ED PROVIDER NOTE - PROGRESS NOTE DETAILS
Patient evaluation and care begun prior to documentation. Patient was evaluated by me, found in no acute distress, calm, speaking in complete sentences, but with low O2 sat on room air requiring O2 supplementation with nasal cannula. Patient with O2 sat at 94% with nasal cannula at this time. Physical exam revealed mild expiratory wheezing, with bedside lung ultrasound revealing no B lines, but was remarkable for left sided pleural effusion. Pleural effusion also appreciated on X-ray. Family describe pleural effusion was diagnosed on previous admission, but are unsure of the side that was affected. Will start treatment with antibiotics and lasix at this time. Will likely admit to hospital. Will f/u on results and patient's clinical progression to determine final disposition. I agree with resident assessment and plan. -Dr. Darrel Green

## 2018-03-24 NOTE — ED PROVIDER NOTE - MEDICAL DECISION MAKING DETAILS
Dr. Green: Male patient with past hx of HTN, DM, NPH, BPH, brought to ED after sustaining episode of general weakness while at restaurant earlier today, causing patient to require assistance to avoid falling to floor. Patient found in no acute distress, calm, speaking in complete sentences, but with low O2 sat on room air requiring O2 supplementation with nasal cannula. Patient with O2 sat at 94% with nasal cannula at this time. Physical exam revealed mild expiratory wheezing, with bedside lung ultrasound revealing no B lines, but was remarkable for left sided pleural effusion. Pleural effusion also appreciated on X-ray. Family describe pleural effusion was diagnosed on previous admission, but are unsure of the side that was affected. Will start treatment with antibiotics and lasix at this time. Will likely admit to hospital. Will f/u on results and patient's clinical progression to determine final disposition.

## 2018-03-25 DIAGNOSIS — R09.02 HYPOXEMIA: ICD-10-CM

## 2018-03-25 LAB
ANION GAP SERPL CALC-SCNC: 16 MMOL/L — SIGNIFICANT CHANGE UP (ref 5–17)
APPEARANCE UR: CLEAR — SIGNIFICANT CHANGE UP
BILIRUB UR-MCNC: NEGATIVE — SIGNIFICANT CHANGE UP
BUN SERPL-MCNC: 21 MG/DL — SIGNIFICANT CHANGE UP (ref 7–23)
CALCIUM SERPL-MCNC: 9.3 MG/DL — SIGNIFICANT CHANGE UP (ref 8.4–10.5)
CHLORIDE SERPL-SCNC: 96 MMOL/L — SIGNIFICANT CHANGE UP (ref 96–108)
CK MB CFR SERPL CALC: 2.2 NG/ML — SIGNIFICANT CHANGE UP (ref 0–6.7)
CK SERPL-CCNC: 58 U/L — SIGNIFICANT CHANGE UP (ref 30–200)
CO2 SERPL-SCNC: 23 MMOL/L — SIGNIFICANT CHANGE UP (ref 22–31)
COLOR SPEC: YELLOW — SIGNIFICANT CHANGE UP
CREAT SERPL-MCNC: 1.25 MG/DL — SIGNIFICANT CHANGE UP (ref 0.5–1.3)
DIFF PNL FLD: NEGATIVE — SIGNIFICANT CHANGE UP
EPI CELLS # UR: SIGNIFICANT CHANGE UP /HPF
GLUCOSE BLDC GLUCOMTR-MCNC: 311 MG/DL — HIGH (ref 70–99)
GLUCOSE BLDC GLUCOMTR-MCNC: 318 MG/DL — HIGH (ref 70–99)
GLUCOSE BLDC GLUCOMTR-MCNC: 328 MG/DL — HIGH (ref 70–99)
GLUCOSE BLDC GLUCOMTR-MCNC: 372 MG/DL — HIGH (ref 70–99)
GLUCOSE BLDC GLUCOMTR-MCNC: 379 MG/DL — HIGH (ref 70–99)
GLUCOSE SERPL-MCNC: 371 MG/DL — HIGH (ref 70–99)
GLUCOSE UR QL: >1000 MG/DL
HCT VFR BLD CALC: 42.7 % — SIGNIFICANT CHANGE UP (ref 39–50)
HGB BLD-MCNC: 14.3 G/DL — SIGNIFICANT CHANGE UP (ref 13–17)
HYALINE CASTS # UR AUTO: ABNORMAL
KETONES UR-MCNC: ABNORMAL
LEUKOCYTE ESTERASE UR-ACNC: NEGATIVE — SIGNIFICANT CHANGE UP
MCHC RBC-ENTMCNC: 26.1 PG — LOW (ref 27–34)
MCHC RBC-ENTMCNC: 33.5 GM/DL — SIGNIFICANT CHANGE UP (ref 32–36)
MCV RBC AUTO: 77.9 FL — LOW (ref 80–100)
NITRITE UR-MCNC: NEGATIVE — SIGNIFICANT CHANGE UP
NRBC # BLD: 0 /100 WBCS — SIGNIFICANT CHANGE UP (ref 0–0)
PH UR: 6 — SIGNIFICANT CHANGE UP (ref 5–8)
PLATELET # BLD AUTO: 316 K/UL — SIGNIFICANT CHANGE UP (ref 150–400)
POTASSIUM SERPL-MCNC: 4.4 MMOL/L — SIGNIFICANT CHANGE UP (ref 3.5–5.3)
POTASSIUM SERPL-SCNC: 4.4 MMOL/L — SIGNIFICANT CHANGE UP (ref 3.5–5.3)
PROT UR-MCNC: SIGNIFICANT CHANGE UP
RAPID RVP RESULT: SIGNIFICANT CHANGE UP
RBC # BLD: 5.48 M/UL — SIGNIFICANT CHANGE UP (ref 4.2–5.8)
RBC # FLD: 14.4 % — SIGNIFICANT CHANGE UP (ref 10.3–14.5)
SODIUM SERPL-SCNC: 135 MMOL/L — SIGNIFICANT CHANGE UP (ref 135–145)
SP GR SPEC: 1.02 — SIGNIFICANT CHANGE UP (ref 1.01–1.02)
TROPONIN T SERPL-MCNC: 0.03 NG/ML — SIGNIFICANT CHANGE UP (ref 0–0.06)
UROBILINOGEN FLD QL: NEGATIVE — SIGNIFICANT CHANGE UP
WBC # BLD: 12.99 K/UL — HIGH (ref 3.8–10.5)
WBC # FLD AUTO: 12.99 K/UL — HIGH (ref 3.8–10.5)
WBC UR QL: SIGNIFICANT CHANGE UP /HPF (ref 0–5)

## 2018-03-25 PROCEDURE — 71250 CT THORAX DX C-: CPT | Mod: 26

## 2018-03-25 PROCEDURE — 70450 CT HEAD/BRAIN W/O DYE: CPT | Mod: 26

## 2018-03-25 RX ORDER — ATORVASTATIN CALCIUM 80 MG/1
40 TABLET, FILM COATED ORAL AT BEDTIME
Qty: 0 | Refills: 0 | Status: DISCONTINUED | OUTPATIENT
Start: 2018-03-25 | End: 2018-03-30

## 2018-03-25 RX ORDER — GLUCAGON INJECTION, SOLUTION 0.5 MG/.1ML
1 INJECTION, SOLUTION SUBCUTANEOUS ONCE
Qty: 0 | Refills: 0 | Status: DISCONTINUED | OUTPATIENT
Start: 2018-03-25 | End: 2018-03-30

## 2018-03-25 RX ORDER — DEXTROSE 50 % IN WATER 50 %
1 SYRINGE (ML) INTRAVENOUS ONCE
Qty: 0 | Refills: 0 | Status: DISCONTINUED | OUTPATIENT
Start: 2018-03-25 | End: 2018-03-30

## 2018-03-25 RX ORDER — KETOTIFEN FUMARATE 0.34 MG/ML
1 SOLUTION OPHTHALMIC
Qty: 0 | Refills: 0 | Status: DISCONTINUED | OUTPATIENT
Start: 2018-03-25 | End: 2018-03-30

## 2018-03-25 RX ORDER — SODIUM CHLORIDE 9 MG/ML
1000 INJECTION, SOLUTION INTRAVENOUS
Qty: 0 | Refills: 0 | Status: DISCONTINUED | OUTPATIENT
Start: 2018-03-25 | End: 2018-03-30

## 2018-03-25 RX ORDER — ASCORBIC ACID 60 MG
500 TABLET,CHEWABLE ORAL DAILY
Qty: 0 | Refills: 0 | Status: DISCONTINUED | OUTPATIENT
Start: 2018-03-25 | End: 2018-03-30

## 2018-03-25 RX ORDER — CARVEDILOL PHOSPHATE 80 MG/1
3.12 CAPSULE, EXTENDED RELEASE ORAL EVERY 12 HOURS
Qty: 0 | Refills: 0 | Status: DISCONTINUED | OUTPATIENT
Start: 2018-03-25 | End: 2018-03-25

## 2018-03-25 RX ORDER — TAMSULOSIN HYDROCHLORIDE 0.4 MG/1
0.4 CAPSULE ORAL AT BEDTIME
Qty: 0 | Refills: 0 | Status: DISCONTINUED | OUTPATIENT
Start: 2018-03-25 | End: 2018-03-30

## 2018-03-25 RX ORDER — ACETAMINOPHEN 500 MG
650 TABLET ORAL EVERY 6 HOURS
Qty: 0 | Refills: 0 | Status: DISCONTINUED | OUTPATIENT
Start: 2018-03-25 | End: 2018-03-30

## 2018-03-25 RX ORDER — INSULIN LISPRO 100/ML
VIAL (ML) SUBCUTANEOUS AT BEDTIME
Qty: 0 | Refills: 0 | Status: DISCONTINUED | OUTPATIENT
Start: 2018-03-25 | End: 2018-03-30

## 2018-03-25 RX ORDER — INSULIN GLARGINE 100 [IU]/ML
20 INJECTION, SOLUTION SUBCUTANEOUS AT BEDTIME
Qty: 0 | Refills: 0 | Status: DISCONTINUED | OUTPATIENT
Start: 2018-03-25 | End: 2018-03-28

## 2018-03-25 RX ORDER — DONEPEZIL HYDROCHLORIDE 10 MG/1
10 TABLET, FILM COATED ORAL AT BEDTIME
Qty: 0 | Refills: 0 | Status: DISCONTINUED | OUTPATIENT
Start: 2018-03-25 | End: 2018-03-30

## 2018-03-25 RX ORDER — DEXTROSE 50 % IN WATER 50 %
25 SYRINGE (ML) INTRAVENOUS ONCE
Qty: 0 | Refills: 0 | Status: DISCONTINUED | OUTPATIENT
Start: 2018-03-25 | End: 2018-03-30

## 2018-03-25 RX ORDER — ASPIRIN/CALCIUM CARB/MAGNESIUM 324 MG
81 TABLET ORAL DAILY
Qty: 0 | Refills: 0 | Status: DISCONTINUED | OUTPATIENT
Start: 2018-03-25 | End: 2018-03-27

## 2018-03-25 RX ORDER — LIDOCAINE 4 G/100G
1 CREAM TOPICAL DAILY
Qty: 0 | Refills: 0 | Status: DISCONTINUED | OUTPATIENT
Start: 2018-03-25 | End: 2018-03-30

## 2018-03-25 RX ORDER — INSULIN LISPRO 100/ML
VIAL (ML) SUBCUTANEOUS
Qty: 0 | Refills: 0 | Status: DISCONTINUED | OUTPATIENT
Start: 2018-03-25 | End: 2018-03-30

## 2018-03-25 RX ORDER — DEXTROSE 50 % IN WATER 50 %
12.5 SYRINGE (ML) INTRAVENOUS ONCE
Qty: 0 | Refills: 0 | Status: DISCONTINUED | OUTPATIENT
Start: 2018-03-25 | End: 2018-03-30

## 2018-03-25 RX ORDER — PANTOPRAZOLE SODIUM 20 MG/1
40 TABLET, DELAYED RELEASE ORAL
Qty: 0 | Refills: 0 | Status: DISCONTINUED | OUTPATIENT
Start: 2018-03-25 | End: 2018-03-30

## 2018-03-25 RX ORDER — FLUTICASONE PROPIONATE 50 MCG
1 SPRAY, SUSPENSION NASAL
Qty: 0 | Refills: 0 | Status: DISCONTINUED | OUTPATIENT
Start: 2018-03-25 | End: 2018-03-30

## 2018-03-25 RX ORDER — LACTOBACILLUS ACIDOPHILUS 100MM CELL
1 CAPSULE ORAL
Qty: 0 | Refills: 0 | Status: DISCONTINUED | OUTPATIENT
Start: 2018-03-25 | End: 2018-03-30

## 2018-03-25 RX ORDER — IPRATROPIUM/ALBUTEROL SULFATE 18-103MCG
3 AEROSOL WITH ADAPTER (GRAM) INHALATION EVERY 6 HOURS
Qty: 0 | Refills: 0 | Status: DISCONTINUED | OUTPATIENT
Start: 2018-03-25 | End: 2018-03-26

## 2018-03-25 RX ORDER — ASCORBIC ACID 60 MG
1000 TABLET,CHEWABLE ORAL DAILY
Qty: 0 | Refills: 0 | Status: DISCONTINUED | OUTPATIENT
Start: 2018-03-25 | End: 2018-03-25

## 2018-03-25 RX ORDER — CARVEDILOL PHOSPHATE 80 MG/1
6.25 CAPSULE, EXTENDED RELEASE ORAL EVERY 12 HOURS
Qty: 0 | Refills: 0 | Status: DISCONTINUED | OUTPATIENT
Start: 2018-03-25 | End: 2018-03-29

## 2018-03-25 RX ADMIN — DONEPEZIL HYDROCHLORIDE 10 MILLIGRAM(S): 10 TABLET, FILM COATED ORAL at 21:47

## 2018-03-25 RX ADMIN — Medication 1 TABLET(S): at 17:25

## 2018-03-25 RX ADMIN — ATORVASTATIN CALCIUM 40 MILLIGRAM(S): 80 TABLET, FILM COATED ORAL at 21:46

## 2018-03-25 RX ADMIN — KETOTIFEN FUMARATE 1 DROP(S): 0.34 SOLUTION OPHTHALMIC at 17:26

## 2018-03-25 RX ADMIN — Medication 81 MILLIGRAM(S): at 11:31

## 2018-03-25 RX ADMIN — Medication 20 MILLIGRAM(S): at 11:33

## 2018-03-25 RX ADMIN — CARVEDILOL PHOSPHATE 6.25 MILLIGRAM(S): 80 CAPSULE, EXTENDED RELEASE ORAL at 17:30

## 2018-03-25 RX ADMIN — Medication 500 MILLIGRAM(S): at 21:47

## 2018-03-25 RX ADMIN — Medication 50 MILLIGRAM(S): at 17:29

## 2018-03-25 RX ADMIN — Medication 20 MILLIGRAM(S): at 00:20

## 2018-03-25 RX ADMIN — Medication 5: at 11:30

## 2018-03-25 RX ADMIN — INSULIN GLARGINE 20 UNIT(S): 100 INJECTION, SOLUTION SUBCUTANEOUS at 21:47

## 2018-03-25 RX ADMIN — TAMSULOSIN HYDROCHLORIDE 0.4 MILLIGRAM(S): 0.4 CAPSULE ORAL at 21:46

## 2018-03-25 RX ADMIN — Medication 2: at 21:47

## 2018-03-25 RX ADMIN — Medication 4: at 07:56

## 2018-03-25 RX ADMIN — Medication 1 SPRAY(S): at 17:26

## 2018-03-25 RX ADMIN — PANTOPRAZOLE SODIUM 40 MILLIGRAM(S): 20 TABLET, DELAYED RELEASE ORAL at 11:31

## 2018-03-25 RX ADMIN — Medication 5: at 17:24

## 2018-03-25 NOTE — ED ADULT NURSE NOTE - OBJECTIVE STATEMENT
82 yo M pmh of BPH, HTN, diabetes type 2 came to ED via ambulance c/o hypoxia, weakness, pt had fallen in restaurant and was confused. Pt was hypoxic at 88 on RA.  Pt had difficulty recollecting the events leading up to his arrival to ED but had some memory of the fall.  Denies chest pain, back pain, SOB, fevers/chills, n/v/d, lightheadedness, dizziness, changes in urinary or bowel habits.  A&Ox4, pt appears slightly confused and sleepy, able to answer questions after being prompted several times, placed on O2, neurologically intact, with slight weakness noted on left leg.  Pt has visual disturbances in left peripherals.  PERRLA.  Lungs wheezes bilaterally, symmetrical lung movement.  Skin w/d/i, pt skin pink upon first assessment after receiving 4L nasal cannula.  Family present at bedside.  Safety and comfort maintained.  Will continue to monitor.

## 2018-03-25 NOTE — H&P ADULT - ASSESSMENT
83 year old  male PMH of BPH, HTN, lumbar spinal stenosis, dementia and NPH, history of ventriulostomy, presenting after EMS took patient from restaurant after patient fell to ground and was confused. Found to be hypoxic in the 88. Patient in ER was hypoxic to 84 on RA. Placed on nasal cannula. History of CHF and is on a "water pill" that is not lasix as this didn't work for him in the past. No worsening of chronic leg swelling over the past few days. No syncope, he remember the fall. Denies fevers for the past few days. No new meds.    CV: 83 year old  male PMH of BPH, HTN, lumbar spinal stenosis, dementia and NPH, history of ventriulostomy, presenting after EMS took patient from restaurant after patient fell to ground and was confused. Found to be hypoxic in the 88. Patient in ER was hypoxic to 84 on RA. Placed on nasal cannula. History of CHF and is on a "water pill" that is not lasix as this didn't work for him in the past. No worsening of chronic leg swelling over the past few days. No syncope, he remember the fall. Denies fevers for the past few days. No new meds.    CV: Had TTE 2/08/18. EF normal 65 % with normal LV, RV function.     Pulmonary: CXR notes small left effusion, cannot r/o infection. CT chest non-contrast ordered. 83 year old  male PMH of BPH, HTN, lumbar spinal stenosis, dementia and NPH, history of ventriulostomy, presenting after EMS took patient from restaurant after patient fell to ground and was confused. Found to be hypoxic in the 88. Patient in ER was hypoxic to 84 on RA. Placed on nasal cannula. History of CHF and is on a "water pill" that is not lasix as this didn't work for him in the past. No worsening of chronic leg swelling over the past few days. No syncope, he remember the fall. Denies fevers for the past few days. No new meds.    CV: Had TTE 2/08/18. EF normal 65 % with normal LV, RV function. BP stable 131/66. Continue Coreg 6.25 mg bid and Vasotec 20 mg/day, ASA 81 mg/day and Crestor 10 mg/day. Continue telemetry.     Pulmonary: CXR notes small left effusion, cannot r/o infection. CT chest non-contrast ordered to r/o possible LLL pneumonia as cause of   hypoxemia.     Urology: Continue Flomax .4 mg/day for BPH.     Neuro: Continue Aricept 10 mg/day. PT eval requested.  CT head ordered by NP.     Endo: hold all oral meds. Add Lantus 20 units at night and SSC.

## 2018-03-25 NOTE — CONSULT NOTE ADULT - ASSESSMENT
83y male with DM, spinal stenosis, NPH, ventriculostomy, BPH, here last month for fever and possible pneumonia, who returns as of late last night after severe weakness, fall.  Difficult to conclude if new process such as infection, is present.  Afebrile, WBC minimally elevated, no other sepsis criteria.  No pyuria.  No infiltrate on CT.  Pleural and pericardial effusion, along with leg edema noted, but this less likely to be linked to infection.  Pericardial effusion noted on TTE from last month as well.      Plan:  With no support for infection, no indication for empiric abx  ?progression of NPH- ?neuro f/u  Afib, pericardial effusion as per Cardiology- input appreciated  D/w wife at bedside- lack of support for infex reviewed

## 2018-03-25 NOTE — H&P ADULT - NSHPPHYSICALEXAM_GEN_ALL_CORE
PHYSICAL EXAMINATION:  Vital Signs Last 24 Hrs  T(C): 36.7 (25 Mar 2018 05:08), Max: 36.7 (25 Mar 2018 02:43)  T(F): 98 (25 Mar 2018 05:08), Max: 98 (25 Mar 2018 02:43)  HR: 63 (25 Mar 2018 05:08) (63 - 112)  BP: 131/66 (25 Mar 2018 05:08) (120/57 - 154/96)  BP(mean): --  RR: 20 (25 Mar 2018 05:08) (20 - 20)  SpO2: 96% (25 Mar 2018 05:08) (95% - 99%)  CAPILLARY BLOOD GLUCOSE      POCT Blood Glucose.: 328 mg/dL (25 Mar 2018 07:40)  POCT Blood Glucose.: 318 mg/dL (25 Mar 2018 02:50)      GENERAL: NAD, well-groomed, well-developed  HEAD:  atraumatic, normocephalic  EYES: sclera anicteric  ENMT: mucous membranes moist  NECK: supple, No JVD  CHEST/LUNG: clear to auscultation bilaterally; no rales, rhonchi, or wheezing b/l  HEART: normal S1, S2  ABDOMEN: BS+, soft, ND, NT   EXTREMITIES:  pulses palpable; no clubbing, cyanosis, or edema b/l LEs  NEURO: awake, alert, interactive; moves all extremities  SKIN: no rashes or lesions

## 2018-03-25 NOTE — CONSULT NOTE ADULT - ASSESSMENT
pty with hx of chf now with fall ?syncope in a.fib and hx of pericardial effusion  awaiting chest ct result  repeat echo  hold on ac due to pericardial effusion and risk of fall  tele  tsh  will adjust meds

## 2018-03-25 NOTE — H&P ADULT - HISTORY OF PRESENT ILLNESS
83 year old  male PMH of BPH, HTN, lumbar spinal stenosis, dementia and NPH, history of ventriulostomy, presenting after EMS took patient from restaurant after patient fell to ground and was confused. Found to be hypoxic in the 88. Patient in ER was hypoxic to 84 on RA. Placed on nasal cannula. History of CHF and is on a "water pill" that is not lasix as this didn't work for him in the past. No worsening of chronic leg swelling over the past few days. No syncope, he remember the fall. Denies fevers for the past few days. No new meds.

## 2018-03-25 NOTE — CONSULT NOTE ADULT - SUBJECTIVE AND OBJECTIVE BOX
CHIEF COMPLAINT:Patient is a 83y old  Male who presents with a chief complaint of syncope    HPI:  83 year old  male PMH of BPH, HTN, lumbar spinal stenosis, dementia and NPH, history of ventriulostomy, presenting after EMS took patient from restaurant after patient fell to ground and was confused. Found to be hypoxic in the 88. Patient in ER was hypoxic to 84 on RA. Placed on nasal cannula. History of CHF and is on a "water pill" that is not lasix as this didn't work for him in the past. No worsening of chronic leg swelling over the past few days. No syncope, he remember the fall. Denies fevers for the past few days. No new meds. (25 Mar 2018 07:46)  pt in a.fib in er.    PAST MEDICAL & SURGICAL HISTORY:  Lumbar spinal stenosis  Vitamin D deficiency  Mild dementia  OAB (overactive bladder)  Type 2 diabetes mellitus  BPH (benign prostatic hyperplasia)  Nephrotic syndrome: pt was instructed to avoid taking NSAIDS  Chronic lower back pain: unclear etiology per wife - no hx trauma, possibly arthritis  H/O recurrent urinary tract infection  Chronic Back Pain: L3-L4, L4-L5 compression  NPH (Normal Pressure Hydrocephalus): s/p ventriculostomy, last one in Jan 2012  Hypertension, Essential  Normal pressure hydrocephalus: s/p ventriculostomy of the third ventricle 2012      MEDICATIONS  (STANDING):  aspirin enteric coated 81 milliGRAM(s) Oral daily  atorvastatin 40 milliGRAM(s) Oral at bedtime  carvedilol 6.25 milliGRAM(s) Oral every 12 hours  dextrose 5%. 1000 milliLiter(s) (50 mL/Hr) IV Continuous <Continuous>  dextrose 50% Injectable 12.5 Gram(s) IV Push once  dextrose 50% Injectable 25 Gram(s) IV Push once  dextrose 50% Injectable 25 Gram(s) IV Push once  donepezil 10 milliGRAM(s) Oral at bedtime  enalapril 20 milliGRAM(s) Oral daily  insulin glargine Injectable (LANTUS) 20 Unit(s) SubCutaneous at bedtime  insulin lispro (HumaLOG) corrective regimen sliding scale   SubCutaneous three times a day before meals  insulin lispro (HumaLOG) corrective regimen sliding scale   SubCutaneous at bedtime  lactobacillus acidophilus 1 Tablet(s) Oral two times a day with meals  lidocaine   Patch 1 Patch Transdermal daily  pantoprazole    Tablet 40 milliGRAM(s) Oral before breakfast  pregabalin 50 milliGRAM(s) Oral two times a day  tamsulosin 0.4 milliGRAM(s) Oral at bedtime    MEDICATIONS  (PRN):  ALBUTerol/ipratropium for Nebulization 3 milliLiter(s) Nebulizer every 6 hours PRN Shortness of Breath and/or Wheezing  dextrose Gel 1 Dose(s) Oral once PRN Blood Glucose LESS THAN 70 milliGRAM(s)/deciliter  glucagon  Injectable 1 milliGRAM(s) IntraMuscular once PRN Glucose LESS THAN 70 milligrams/deciliter      FAMILY HISTORY:  No pertinent family history in first degree relatives      SOCIAL HISTORY:    [ ] Non-smoker  [ ] Smoker  [ ] Alcohol    Allergies    No Known Allergies    Intolerances    	    REVIEW OF SYSTEMS:can not get a good hx from the pt.  CONSTITUTIONAL: No fever, weight loss, or fatigue  EYES: No eye pain, visual disturbances, or discharge  ENT:  No difficulty hearing, tinnitus, vertigo; No sinus or throat pain  NECK: No pain or stiffness  RESPIRATORY: No cough, wheezing, chills or hemoptysis; + Shortness of Breath  CARDIOVASCULAR: No chest pain, palpitations, passing out, dizziness, or leg swelling  GASTROINTESTINAL: No abdominal or epigastric pain. No nausea, vomiting, or hematemesis; No diarrhea or constipation. No melena or hematochezia.  GENITOURINARY: No dysuria, frequency, hematuria, or incontinence  NEUROLOGICAL: No headaches, memory loss, loss of strength, numbness, or tremors  SKIN: No itching, burning, rashes, or lesions   LYMPH Nodes: No enlarged glands  ENDOCRINE: No heat or cold intolerance; No hair loss  MUSCULOSKELETAL: No joint pain or swelling; No muscle, back, or extremity pain  PSYCHIATRIC: No depression, anxiety, mood swings, or difficulty sleeping  HEME/LYMPH: No easy bruising, or bleeding gums  ALLERGY AND IMMUNOLOGIC: No hives or eczema	    [ ] All others negative	  [ ] Unable to obtain    PHYSICAL EXAM:  T(C): 36.7 (03-25-18 @ 05:08), Max: 36.7 (03-25-18 @ 02:43)  HR: 63 (03-25-18 @ 05:08) (63 - 112)  BP: 131/66 (03-25-18 @ 05:08) (120/57 - 154/96)  RR: 20 (03-25-18 @ 05:08) (20 - 20)  SpO2: 96% (03-25-18 @ 05:08) (95% - 99%)  Wt(kg): --  I&O's Summary      Appearance: Normal	  HEENT:   Normal oral mucosa, PERRL, EOMI	  Lymphatic: No lymphadenopathy  Cardiovascular: Normal S1 S2, + JVD, + murmurs, No edema  Respiratory: Lungs clear to auscultation	  Psychiatry: A & O x 3, Mood & affect appropriate  Gastrointestinal:  Soft, Non-tender, + BS	  Skin: No rashes, No ecchymoses, No cyanosis	  Neurologic: Non-focal  Extremities: Normal range of motion, No clubbing, cyanosis or edema  Vascular: Peripheral pulses palpable 2+ bilaterally    TELEMETRY: 	    ECG:  	  RADIOLOGY:  OTHER: 	  	  LABS:	 	    CARDIAC MARKERS:  CARDIAC MARKERS ( 25 Mar 2018 03:41 )  x     / 0.03 ng/mL / 58 U/L / x     / 2.2 ng/mL  CARDIAC MARKERS ( 24 Mar 2018 22:29 )  x     / 0.04 ng/mL / 85 U/L / x     / x                                  14.3   12.99 )-----------( 316      ( 25 Mar 2018 07:15 )             42.7     03-25    135  |  96  |  21  ----------------------------<  371<H>  4.4   |  23  |  1.25    Ca    9.3      25 Mar 2018 06:12    TPro  7.4  /  Alb  3.0<L>  /  TBili  0.7  /  DBili  x   /  AST  48<H>  /  ALT  16  /  AlkPhos  76  03-24    proBNP: Serum Pro-Brain Natriuretic Peptide: 2268 pg/mL (03-24 @ 22:29)    Lipid Profile:   HgA1c:   TSH:   PT/INR - ( 24 Mar 2018 22:29 )   PT: 17.9 sec;   INR: 1.63 ratio         PTT - ( 24 Mar 2018 22:29 )  PTT:40.7 sec    PREVIOUS DIAGNOSTIC TESTING:      < from: Transthoracic Echocardiogram (02.08.18 @ 12:24) >  1. Endocardium not well visualized; grossly normal left  ventricular systolic function.  2. The right ventricle is not well visualized; grossly  normal right ventricular systolic function. TAPSE > 2.0 cm  (normal).  3. Small to moderate pericardial effusion (up to 1.4 cm)  seen postero-lateral tothe left ventricle.    < from: 12 Lead ECG (02.06.18 @ 18:14) >  INUS RHYTHM WITH 1ST DEGREE A-V BLOCK WITH FREQUENT PREMATURE VENTRICULAR COMPLEXES  POSSIBLE LEFT ATRIAL ENLARGEMENT  LEFT AXIS DEVIATION  LEFT VENTRICULAR HYPERTROPHY WITH REPOLARIZATION ABNORMALITY  CANNOT RULE OUT SEPTAL INFARCT , AGE UNDETERMINED  ABNORMAL ECG    < from: CT Head No Cont (02.09.18 @ 14:09) >   Large ventricles out of proportion to the degree of sulcal   prominence without change from 3/14/2017.    < from: Xray Chest 1 View AP/PA (03.24.18 @ 22:27) >  Small left pleural effusion with adjacent atelectasis and/or infection.    < end of copied text >

## 2018-03-25 NOTE — H&P ADULT - NSHPLABSRESULTS_GEN_ALL_CORE
LABS:                        14.3   12.99 )-----------( 316      ( 25 Mar 2018 07:15 )             42.7     03-25    135  |  96  |  21  ----------------------------<  371<H>  4.4   |  23  |  1.25    Ca    9.3      25 Mar 2018 06:12    TPro  7.4  /  Alb  3.0<L>  /  TBili  0.7  /  DBili  x   /  AST  48<H>  /  ALT  16  /  AlkPhos  76  03-24    PT/INR - ( 24 Mar 2018 22:29 )   PT: 17.9 sec;   INR: 1.63 ratio         PTT - ( 24 Mar 2018 22:29 )  PTT:40.7 sec        RADIOLOGY & ADDITIONAL TESTS:

## 2018-03-25 NOTE — CONSULT NOTE ADULT - SUBJECTIVE AND OBJECTIVE BOX
HPI:   Patient is a 83y male with DM, spinal stenosis, NPH, ventriculostomy, BPH, here last month for fever and possible pneumonia, who returns as of late last night after severe weakness, fall at restaurant.  Wife describes pt's legs weak, could not support himself, and fell to ground.  EMS brought pt to ED and hypoxia noted.  No recent fever or chills.  Wife reports cough x at least past yr, no change of late.  Pt is incontinent at baseline, requires assistant with most activities, and noted to be more confused of late.  Gait disturbance, unsteady on feet., "not the same since he returned from hosp last month."    REVIEW OF SYSTEMS:  All other review of systems negative (Comprehensive ROS)    PAST MEDICAL & SURGICAL HISTORY:  Lumbar spinal stenosis  Vitamin D deficiency  Mild dementia  OAB (overactive bladder)  Type 2 diabetes mellitus  BPH (benign prostatic hyperplasia)  Nephrotic syndrome: pt was instructed to avoid taking NSAIDS  Chronic lower back pain: unclear etiology per wife - no hx trauma, possibly arthritis  H/O recurrent urinary tract infection  Chronic Back Pain: L3-L4, L4-L5 compression  NPH (Normal Pressure Hydrocephalus): s/p ventriculostomy, last one in 2012  Hypertension, Essential  Normal pressure hydrocephalus: s/p ventriculostomy of the third ventricle       Allergies  No Known Allergies    Antimicrobials: none    Other Medications:  acetaminophen   Tablet. 650 milliGRAM(s) Oral every 6 hours PRN  ALBUTerol/ipratropium for Nebulization 3 milliLiter(s) Nebulizer every 6 hours PRN  ascorbic acid 500 milliGRAM(s) Oral daily  aspirin enteric coated 81 milliGRAM(s) Oral daily  atorvastatin 40 milliGRAM(s) Oral at bedtime  carvedilol 6.25 milliGRAM(s) Oral every 12 hours  dextrose 5%. 1000 milliLiter(s) IV Continuous <Continuous>  dextrose 50% Injectable 12.5 Gram(s) IV Push once  dextrose 50% Injectable 25 Gram(s) IV Push once  dextrose 50% Injectable 25 Gram(s) IV Push once  dextrose Gel 1 Dose(s) Oral once PRN  donepezil 10 milliGRAM(s) Oral at bedtime  enalapril 20 milliGRAM(s) Oral daily  fluticasone propionate 50 MICROgram(s)/spray Nasal Spray 1 Spray(s) Both Nostrils two times a day  glucagon  Injectable 1 milliGRAM(s) IntraMuscular once PRN  insulin glargine Injectable (LANTUS) 20 Unit(s) SubCutaneous at bedtime  insulin lispro (HumaLOG) corrective regimen sliding scale   SubCutaneous three times a day before meals  insulin lispro (HumaLOG) corrective regimen sliding scale   SubCutaneous at bedtime  ketotifen 0.025% Ophthalmic Solution 1 Drop(s) Both EYES two times a day  lactobacillus acidophilus 1 Tablet(s) Oral two times a day with meals  lidocaine   Patch 1 Patch Transdermal daily  pantoprazole    Tablet 40 milliGRAM(s) Oral before breakfast  pregabalin 50 milliGRAM(s) Oral two times a day  tamsulosin 0.4 milliGRAM(s) Oral at bedtime      FAMILY HISTORY:  No pertinent family history in first degree relatives      SOCIAL HISTORY:  Smoking: no  ETOH: no           T(F): 98 (18 @ 05:08), Max: 98 (18 @ 02:43)  HR: 61 (18 @ 11:47)  BP: 144/72 (18 @ 11:47)  RR: 20 (18 @ 05:08)  SpO2: 96% (18 @ 05:08)  Wt(kg): --    PHYSICAL EXAM:  General: alert, no acute distress  Eyes:  anicteric, no conjunctival injection, no discharge  Oropharynx: no lesions or injection 	  Neck: supple, without adenopathy  Lungs: clear to auscultation  Heart: irregular rhythm; no murmur, rubs or gallops  Abdomen: soft, nondistended, nontender, without mass or organomegaly  Skin: no lesions  Extremities: symmetrical pitting leg edema  Neurologic: alert, moves all extremities    LAB RESULTS:                        14.3   12.99 )-----------( 316      ( 25 Mar 2018 07:15 )             42.7         135  |  96  |  21  ----------------------------<  371<H>  4.4   |  23  |  1.25    Ca    9.3      25 Mar 2018 06:12    TPro  7.4  /  Alb  3.0<L>  /  TBili  0.7  /  DBili  x   /  AST  48<H>  /  ALT  16  /  AlkPhos  76      LIVER FUNCTIONS - ( 24 Mar 2018 22:29 )  Alb: 3.0 g/dL / Pro: 7.4 g/dL / ALK PHOS: 76 U/L / ALT: 16 U/L RC / AST: 48 U/L / GGT: x           Urinalysis Basic - ( 25 Mar 2018 12:37 )    Color: Yellow / Appearance: Clear / S.021 / pH: x  Gluc: x / Ketone: Small  / Bili: Negative / Urobili: Negative   Blood: x / Protein: Trace / Nitrite: Negative   Leuk Esterase: Negative / RBC: x / WBC 0-2 /HPF   Sq Epi: x / Non Sq Epi: OCC /HPF / Bacteria: x    MICROBIOLOGY:  RECENT CULTURES:  Rapid Respiratory Viral Panel (18 @ 22:44)    Rapid RVP Result: [facility]    RADIOLOGY REVIEWED:  CT Chest No Cont (18 @ 09:37) >  1.  Small left pleural effusion, increased since 2018, and partial   left lower lobe compressive atelectasis.  2.  Pericardial effusion of greater than fluid density with pericardial   thickening can occur in the clinical setting of constrictive   pericarditis. This could either be evaluated by echocardiography.    CT Head No Cont (18 @ 09:28) >  No CT evidence for acute infarct or acute hemorrhage.    Unchanged enlarged lateral ventricles out of proportion to sulcal   prominence, raising possibility of normal pressure hydrocephalus versus   central greater than cortical atrophy.

## 2018-03-26 LAB
ANION GAP SERPL CALC-SCNC: 12 MMOL/L — SIGNIFICANT CHANGE UP (ref 5–17)
BUN SERPL-MCNC: 27 MG/DL — HIGH (ref 7–23)
CALCIUM SERPL-MCNC: 9.2 MG/DL — SIGNIFICANT CHANGE UP (ref 8.4–10.5)
CHLORIDE SERPL-SCNC: 97 MMOL/L — SIGNIFICANT CHANGE UP (ref 96–108)
CK MB CFR SERPL CALC: 2.8 NG/ML — SIGNIFICANT CHANGE UP (ref 0–6.7)
CK SERPL-CCNC: 52 U/L — SIGNIFICANT CHANGE UP (ref 30–200)
CO2 SERPL-SCNC: 27 MMOL/L — SIGNIFICANT CHANGE UP (ref 22–31)
CREAT SERPL-MCNC: 1.22 MG/DL — SIGNIFICANT CHANGE UP (ref 0.5–1.3)
GLUCOSE BLDC GLUCOMTR-MCNC: 155 MG/DL — HIGH (ref 70–99)
GLUCOSE BLDC GLUCOMTR-MCNC: 171 MG/DL — HIGH (ref 70–99)
GLUCOSE BLDC GLUCOMTR-MCNC: 256 MG/DL — HIGH (ref 70–99)
GLUCOSE BLDC GLUCOMTR-MCNC: 278 MG/DL — HIGH (ref 70–99)
GLUCOSE SERPL-MCNC: 175 MG/DL — HIGH (ref 70–99)
HCT VFR BLD CALC: 39 % — SIGNIFICANT CHANGE UP (ref 39–50)
HGB BLD-MCNC: 12.8 G/DL — LOW (ref 13–17)
MCHC RBC-ENTMCNC: 25.4 PG — LOW (ref 27–34)
MCHC RBC-ENTMCNC: 32.8 GM/DL — SIGNIFICANT CHANGE UP (ref 32–36)
MCV RBC AUTO: 77.5 FL — LOW (ref 80–100)
NRBC # BLD: 0 /100 WBCS — SIGNIFICANT CHANGE UP (ref 0–0)
PLATELET # BLD AUTO: 295 K/UL — SIGNIFICANT CHANGE UP (ref 150–400)
POTASSIUM SERPL-MCNC: 4.1 MMOL/L — SIGNIFICANT CHANGE UP (ref 3.5–5.3)
POTASSIUM SERPL-SCNC: 4.1 MMOL/L — SIGNIFICANT CHANGE UP (ref 3.5–5.3)
RBC # BLD: 5.03 M/UL — SIGNIFICANT CHANGE UP (ref 4.2–5.8)
RBC # FLD: 14.2 % — SIGNIFICANT CHANGE UP (ref 10.3–14.5)
SODIUM SERPL-SCNC: 136 MMOL/L — SIGNIFICANT CHANGE UP (ref 135–145)
TROPONIN T SERPL-MCNC: 0.02 NG/ML — SIGNIFICANT CHANGE UP (ref 0–0.06)
WBC # BLD: 15.6 K/UL — HIGH (ref 3.8–10.5)
WBC # FLD AUTO: 15.6 K/UL — HIGH (ref 3.8–10.5)

## 2018-03-26 PROCEDURE — 73560 X-RAY EXAM OF KNEE 1 OR 2: CPT | Mod: 26,LT

## 2018-03-26 PROCEDURE — 73590 X-RAY EXAM OF LOWER LEG: CPT | Mod: 26,RT

## 2018-03-26 PROCEDURE — 93010 ELECTROCARDIOGRAM REPORT: CPT

## 2018-03-26 RX ORDER — CARVEDILOL PHOSPHATE 80 MG/1
1 CAPSULE, EXTENDED RELEASE ORAL
Qty: 0 | Refills: 0 | COMMUNITY

## 2018-03-26 RX ORDER — METHENAMINE MANDELATE 1 G
1 TABLET ORAL
Qty: 0 | Refills: 0 | COMMUNITY

## 2018-03-26 RX ORDER — ACARBOSE 25 MG/1
1 TABLET ORAL
Qty: 0 | Refills: 0 | COMMUNITY

## 2018-03-26 RX ORDER — GALANTAMINE HYDROBROMIDE 4 MG/1
8 TABLET, FILM COATED ORAL
Qty: 0 | Refills: 0 | Status: DISCONTINUED | OUTPATIENT
Start: 2018-03-26 | End: 2018-03-30

## 2018-03-26 RX ADMIN — Medication 20 MILLIGRAM(S): at 05:49

## 2018-03-26 RX ADMIN — Medication 50 MILLIGRAM(S): at 05:49

## 2018-03-26 RX ADMIN — INSULIN GLARGINE 20 UNIT(S): 100 INJECTION, SOLUTION SUBCUTANEOUS at 22:01

## 2018-03-26 RX ADMIN — PANTOPRAZOLE SODIUM 40 MILLIGRAM(S): 20 TABLET, DELAYED RELEASE ORAL at 05:49

## 2018-03-26 RX ADMIN — Medication 1: at 07:33

## 2018-03-26 RX ADMIN — Medication 3: at 17:33

## 2018-03-26 RX ADMIN — CARVEDILOL PHOSPHATE 6.25 MILLIGRAM(S): 80 CAPSULE, EXTENDED RELEASE ORAL at 17:24

## 2018-03-26 RX ADMIN — Medication 20 MILLIGRAM(S): at 12:00

## 2018-03-26 RX ADMIN — GALANTAMINE HYDROBROMIDE 8 MILLIGRAM(S): 4 TABLET, FILM COATED ORAL at 12:00

## 2018-03-26 RX ADMIN — Medication 1 SPRAY(S): at 17:24

## 2018-03-26 RX ADMIN — KETOTIFEN FUMARATE 1 DROP(S): 0.34 SOLUTION OPHTHALMIC at 17:24

## 2018-03-26 RX ADMIN — TAMSULOSIN HYDROCHLORIDE 0.4 MILLIGRAM(S): 0.4 CAPSULE ORAL at 22:01

## 2018-03-26 RX ADMIN — Medication 81 MILLIGRAM(S): at 12:00

## 2018-03-26 RX ADMIN — Medication 1 TABLET(S): at 17:24

## 2018-03-26 RX ADMIN — Medication 650 MILLIGRAM(S): at 06:26

## 2018-03-26 RX ADMIN — ATORVASTATIN CALCIUM 40 MILLIGRAM(S): 80 TABLET, FILM COATED ORAL at 22:01

## 2018-03-26 RX ADMIN — Medication 650 MILLIGRAM(S): at 05:49

## 2018-03-26 RX ADMIN — Medication 1 TABLET(S): at 07:33

## 2018-03-26 RX ADMIN — Medication 50 MILLIGRAM(S): at 17:24

## 2018-03-26 RX ADMIN — DONEPEZIL HYDROCHLORIDE 10 MILLIGRAM(S): 10 TABLET, FILM COATED ORAL at 22:01

## 2018-03-26 RX ADMIN — Medication 500 MILLIGRAM(S): at 12:00

## 2018-03-26 RX ADMIN — Medication 1 SPRAY(S): at 05:50

## 2018-03-26 RX ADMIN — Medication 1: at 22:02

## 2018-03-26 RX ADMIN — CARVEDILOL PHOSPHATE 6.25 MILLIGRAM(S): 80 CAPSULE, EXTENDED RELEASE ORAL at 05:49

## 2018-03-26 RX ADMIN — Medication 1: at 11:59

## 2018-03-26 NOTE — PHYSICAL THERAPY INITIAL EVALUATION ADULT - ADDITIONAL COMMENTS
Pt lives in a house with wife, 2 JORDAN with left sided hand rail and 12 steps inside with left sided hand rail and +chair lift. PTA Pt reports being independent and occasionally used the walker or cane. HHA to start 6 hours, 5 days a week. Spouse assists on off time. Pt owns rolling walker, cane, shower chair, and grab bars.

## 2018-03-26 NOTE — PHYSICAL THERAPY INITIAL EVALUATION ADULT - ACTIVE RANGE OF MOTION EXAMINATION, REHAB EVAL
bilateral lower extremity Active ROM was WNL (within normal limits)/guerda. upper extremity Active ROM was WNL (within normal limits)

## 2018-03-26 NOTE — PROGRESS NOTE ADULT - ASSESSMENT
83y male with DM, spinal stenosis, NPH, ventriculostomy, BPH, here last month for fever and possible pneumonia, who returns after severe weakness, fall.    Afebrile, WBC minimally elevated, no other sepsis criteria.   No pyuria.  No infiltrate on CT.  Bld Cxs negative  Pleural and pericardial effusion subacute  Alert, cooperative  No support for infection at present      Plan:  No indication for empiric abx  Afib, pericardial effusion as per Cardiology  Follow WBC trend

## 2018-03-26 NOTE — CONSULT NOTE ADULT - SUBJECTIVE AND OBJECTIVE BOX
HPI:  83 year old  male PMH of BPH, HTN, lumbar spinal stenosis, dementia and NPH, history of ventriulostomy, presenting after EMS took patient from restaurant after patient fell to ground and was confused. Found to be hypoxic in the 88. Patient in ER was hypoxic to 84 on RA. Placed on nasal cannula. History of CHF and is on a "water pill" that is not lasix as this didn't work for him in the past. No worsening of chronic leg swelling over the past few days. No syncope, he remember the fall. Denies fevers for the past few days. No new meds. (25 Mar 2018 07:46)    Seen at bedside with wife.  Patient felt his legs going weak and family was able to support him.  Pt did not fall.  No LOC.      Review of Systems:  Denies SOB, CP, or LOC 	    PAST MEDICAL & SURGICAL HISTORY:  Lumbar spinal stenosis  Vitamin D deficiency  Mild dementia  OAB (overactive bladder)  Type 2 diabetes mellitus  BPH (benign prostatic hyperplasia)  Nephrotic syndrome: pt was instructed to avoid taking NSAIDS  Chronic lower back pain: unclear etiology per wife - no hx trauma, possibly arthritis  H/O recurrent urinary tract infection  Chronic Back Pain: L3-L4, L4-L5 compression  NPH (Normal Pressure Hydrocephalus): s/p ventriculostomy, last one in 2012  Hypertension, Essential  Normal pressure hydrocephalus: s/p ventriculostomy of the third ventricle     MEDICATIONS  (STANDING):  ascorbic acid 500 milliGRAM(s) Oral daily  aspirin enteric coated 81 milliGRAM(s) Oral daily  atorvastatin 40 milliGRAM(s) Oral at bedtime  carvedilol 6.25 milliGRAM(s) Oral every 12 hours  dextrose 5%. 1000 milliLiter(s) (50 mL/Hr) IV Continuous <Continuous>  dextrose 50% Injectable 12.5 Gram(s) IV Push once  dextrose 50% Injectable 25 Gram(s) IV Push once  dextrose 50% Injectable 25 Gram(s) IV Push once  donepezil 10 milliGRAM(s) Oral at bedtime  enalapril 20 milliGRAM(s) Oral daily  fluticasone propionate 50 MICROgram(s)/spray Nasal Spray 1 Spray(s) Both Nostrils two times a day  galantamine ER 8 milliGRAM(s) Oral with breakfast  insulin glargine Injectable (LANTUS) 20 Unit(s) SubCutaneous at bedtime  insulin lispro (HumaLOG) corrective regimen sliding scale   SubCutaneous three times a day before meals  insulin lispro (HumaLOG) corrective regimen sliding scale   SubCutaneous at bedtime  ketotifen 0.025% Ophthalmic Solution 1 Drop(s) Both EYES two times a day  lactobacillus acidophilus 1 Tablet(s) Oral two times a day with meals  lidocaine   Patch 1 Patch Transdermal daily  pantoprazole    Tablet 40 milliGRAM(s) Oral before breakfast  pregabalin 50 milliGRAM(s) Oral two times a day  tamsulosin 0.4 milliGRAM(s) Oral at bedtime  torsemide 20 milliGRAM(s) Oral daily    MEDICATIONS  (PRN):  acetaminophen   Tablet. 650 milliGRAM(s) Oral every 6 hours PRN Mild Pain (1 - 3)  dextrose Gel 1 Dose(s) Oral once PRN Blood Glucose LESS THAN 70 milliGRAM(s)/deciliter  glucagon  Injectable 1 milliGRAM(s) IntraMuscular once PRN Glucose LESS THAN 70 milligrams/deciliter    Allergies    No Known Allergies    Intolerances          FAMILY HISTORY:  No pertinent neurological family history in first degree relatives      Social History  Lives at home with wife     Vital Signs Last 24 Hrs  T(C): 36.8 (26 Mar 2018 12:20), Max: 37 (25 Mar 2018 20:08)  T(F): 98.2 (26 Mar 2018 12:20), Max: 98.6 (25 Mar 2018 20:08)  HR: 67 (26 Mar 2018 17:16) (53 - 72)  BP: 113/55 (26 Mar 2018 17:16) (113/55 - 139/68)  BP(mean): 79 (26 Mar 2018 15:30) (79 - 79)  RR: 16 (26 Mar 2018 17:16) (16 - 20)  SpO2: 92% (26 Mar 2018 17:16) (92% - 97%)  Daily     Daily Weight in k.6 (26 Mar 2018 04:31)      GENERAL PHYSICAL EXAM  All physical exam findings normal, except for those marked:  General:	well nourished, not acutely or chronically ill-appearing  HEENT:	normocephalic, atraumatic, clear conjunctiva   Neck:          supple, full range of motion, no nuchal rigidity  Cardiovascular:	regular rate and variability   Abdominal	:                    soft, ND, NT   Extremities:	no edema b/l LE    NEUROLOGIC EXAM  Mental Status:     Oriented to time/place/person; Good eye contact ; follow simple commands ;  Age appropriate language  and fund of  knowledge.  Cranial Nerves:   PERRL, EOMI, no facial asymmetry , V1-V3 intact , symmetric palate, tongue midline.   Eyes:			Normal: optic discs   Visual Fields:		Full visual field  Muscle Strength:	 Full strength 5/5, proximal and distal,  upper and lower extremities  Muscle Tone:	Normal tone  Deep Tendon Reflexes:         1+/4  : Biceps, Brachioradialis, Triceps Bilateral;  1+/4 : Pattelar, Ankle bilateral. No clonus.  Plantar Response:	Plantar reflexes flexion bilaterally  Sensation:		Intact to pain, light touch, temperature    Coordination/	No dysmetria in finger to nose test bilaterally  Cerebellum	  Tandem Gait/Romberg	Ambulates with a rolling walker                             12.8   15.60 )-----------( 295      ( 26 Mar 2018 07:28 )             39.0         136  |  97  |  27<H>  ----------------------------<  175<H>  4.1   |  27  |  1.22    Ca    9.2      26 Mar 2018 07:02    TPro  7.4  /  Alb  3.0<L>  /  TBili  0.7  /  DBili  x   /  AST  48<H>  /  ALT  16  /  AlkPhos  76  03-24    LIVER FUNCTIONS - ( 24 Mar 2018 22:29 )  Alb: 3.0 g/dL / Pro: 7.4 g/dL / ALK PHOS: 76 U/L / ALT: 16 U/L RC / AST: 48 U/L / GGT: x           PT/INR - ( 24 Mar 2018 22:29 )   PT: 17.9 sec;   INR: 1.63 ratio         PTT - ( 24 Mar 2018 22:29 )  PTT:40.7 sec    Imaging Studies:  < from: CT Head No Cont (18 @ 09:28) >  IMPRESSION:    No CT evidence for acute infarct or acute hemorrhage.    Unchanged enlarged lateral ventricles out of proportion to sulcal   prominence, raising possibility of normal pressure hydrocephalus versus   central greater than cortical atrophy.    < end of copied text >

## 2018-03-26 NOTE — PHYSICAL THERAPY INITIAL EVALUATION ADULT - DISCHARGE DISPOSITION, PT EVAL
rehabilitation facility [facility], spoke to wife Maria Eugenia on phone and she's in agreement with DC plan/rehabilitation facility

## 2018-03-26 NOTE — CHART NOTE - NSCHARTNOTEFT_GEN_A_CORE
Called by RN for complaint of headache and chest pain. Patient seen and evaluated in bedside, in NAD, mildly anxious. Patient states he woke up with frontal headache and substernal chest pain, both 6/10, pt unable to describe pain, stating "you're the doctor". Patient also c/o pain to left knee and right shin since fall. +palpitations, + dizziness. Denies N/V, dyspnea, SOB.   - VSS. Continue to monitor.  - Stat EKG shows AFlutter, no acute changes from previous EKG.  - CE ordered  - XR L knee and R tibia  - Tylenol prn for pain  Will endorse to AM team, attending to follow.    Sherly Loyd PA-C  #[id number]

## 2018-03-26 NOTE — CONSULT NOTE ADULT - SUBJECTIVE AND OBJECTIVE BOX
Patient is a 83y old  Male who presents with a chief complaint of weakness and ? syncope    HPI:  83 year old  male PMH of BPH, HTN, lumbar spinal stenosis, dementia and NPH, history of ventriculostomy, presenting after [place] patient from restaurant after patient fell to ground and was confused. Found to be hypoxic in the ER to an O2 satn of 88%. Patient in ER was hypoxic to 84% on RA. Placed on nasal cannula. History of CHF and is on Torsemide since Lasix didn't work for him in the past. No worsening of chronic leg swelling over the past few days. No syncope, he remembers the fall. Denies fevers for the past few days. No new meds. denies CP or SOB.      PAST MEDICAL & SURGICAL HISTORY:  Lumbar spinal stenosis  Vitamin D deficiency  Mild dementia  OAB (overactive bladder)  Type 2 diabetes mellitus  BPH (benign prostatic hyperplasia)  Nephrotic syndrome: pt was instructed to avoid taking NSAIDS  Chronic lower back pain: unclear etiology per wife - no hx trauma, possibly arthritis  H/O recurrent urinary tract infection  Chronic Back Pain: L3-L4, L4-L5 compression  NPH (Normal Pressure Hydrocephalus): s/p ventriculostomy, last one in 2012  Hypertension, Essential  Normal pressure hydrocephalus: s/p ventriculostomy of the third ventricle   HFpEF    MEDICATIONS  (STANDING):  ascorbic acid 500 milliGRAM(s) Oral daily  aspirin enteric coated 81 milliGRAM(s) Oral daily  atorvastatin 40 milliGRAM(s) Oral at bedtime  carvedilol 6.25 milliGRAM(s) Oral every 12 hours  dextrose 5%. 1000 milliLiter(s) (50 mL/Hr) IV Continuous <Continuous>  dextrose 50% Injectable 12.5 Gram(s) IV Push once  dextrose 50% Injectable 25 Gram(s) IV Push once  dextrose 50% Injectable 25 Gram(s) IV Push once  donepezil 10 milliGRAM(s) Oral at bedtime  enalapril 20 milliGRAM(s) Oral daily  fluticasone propionate 50 MICROgram(s)/spray Nasal Spray 1 Spray(s) Both Nostrils two times a day  insulin glargine Injectable (LANTUS) 20 Unit(s) SubCutaneous at bedtime  insulin lispro (HumaLOG) corrective regimen sliding scale   SubCutaneous three times a day before meals  insulin lispro (HumaLOG) corrective regimen sliding scale   SubCutaneous at bedtime  ketotifen 0.025% Ophthalmic Solution 1 Drop(s) Both EYES two times a day  lactobacillus acidophilus 1 Tablet(s) Oral two times a day with meals  lidocaine   Patch 1 Patch Transdermal daily  pantoprazole    Tablet 40 milliGRAM(s) Oral before breakfast  pregabalin 50 milliGRAM(s) Oral two times a day  tamsulosin 0.4 milliGRAM(s) Oral at bedtime    MEDICATIONS  (PRN):  acetaminophen   Tablet. 650 milliGRAM(s) Oral every 6 hours PRN Mild Pain (1 - 3)  ALBUTerol/ipratropium for Nebulization 3 milliLiter(s) Nebulizer every 6 hours PRN Shortness of Breath and/or Wheezing  dextrose Gel 1 Dose(s) Oral once PRN Blood Glucose LESS THAN 70 milliGRAM(s)/deciliter  glucagon  Injectable 1 milliGRAM(s) IntraMuscular once PRN Glucose LESS THAN 70 milligrams/deciliter      FAMILY HISTORY:  No pertinent family history in first degree relatives      SOCIAL HISTORY: ,retired    CIGARETTES: former smoker    REVIEW OF SYSTEMS  General: Weakness  Respiratory and Thorax: See HPI	  Cardiovascular:	See HPI  Gastrointestinal:	neg  Neurological: see HPI  	  Vital Signs Last 24 Hrs  T(C): 36.7 (26 Mar 2018 04:31), Max: 37 (25 Mar 2018 20:08)  T(F): 98 (26 Mar 2018 04:31), Max: 98.6 (25 Mar 2018 20:08)  HR: 60 (26 Mar 2018 04:31) (60 - 80)  BP: 115/63 (26 Mar 2018 04:31) (115/63 - 165/81)  BP(mean): --  RR: 20 (26 Mar 2018 04:31) (18 - 20)  SpO2: 94% (26 Mar 2018 04:31) (94% - 96%)    PHYSICAL EXAM:    Constitutional: WD, WN  Neck: no JVD  Respiratory: decreased BS at L base  Cardiovascular: IRR, 1/6 JONY  Gastrointestinal: BS present, no masses. NT  Extremities: guerda 1+ edema of LE  Neurological: grossly nonfocal    TELEMETRY: atrial flutter with rates down to 40 BPM    ECG:< from: 12 Lead ECG (18 @ 21:32) >  Ventricular Rate 84 BPM    Atrial Rate 252 BPM    QRS Duration 108 ms     ms    QTc 472 ms    P Axis 49 degrees    R Axis -19 degrees    T Axis 47 degrees    Diagnosis Line ATRIAL FLUTTER WITH VARIABLE A-V BLOCK  INCOMPLETE LEFT BUNDLE BRANCHBLOCK  MODERATE VOLTAGE CRITERIA FOR LVH, MAY BE NORMAL VARIANT  NONSPECIFIC ST AND T WAVE ABNORMALITY  PROLONGED QT  ABNORMAL ECG    < end of copied text >        LABS:                        12.8   15.60 )-----------( 295      ( 26 Mar 2018 07:28 )             39.0         136  |  97  |  27<H>  ----------------------------<  175<H>  4.1   |  27  |  1.22    Ca    9.2      26 Mar 2018 07:02    TPro  7.4  /  Alb  3.0<L>  /  TBili  0.7  /  DBili  x   /  AST  48<H>  /  ALT  16  /  AlkPhos  76  03-24    CARDIAC MARKERS ( 26 Mar 2018 07:02 )  x     / 0.02 ng/mL / 52 U/L / x     / 2.8 ng/mL  CARDIAC MARKERS ( 25 Mar 2018 03:41 )  x     / 0.03 ng/mL / 58 U/L / x     / 2.2 ng/mL  CARDIAC MARKERS ( 24 Mar 2018 22:29 )  x     / 0.04 ng/mL / 85 U/L / x     / x          PT/INR - ( 24 Mar 2018 22:29 )   PT: 17.9 sec;   INR: 1.63 ratio         PTT - ( 24 Mar 2018 22:29 )  PTT:40.7 sec  Urinalysis Basic - ( 25 Mar 2018 12:37 )    Color: Yellow / Appearance: Clear / S.021 / pH: x  Gluc: x / Ketone: Small  / Bili: Negative / Urobili: Negative   Blood: x / Protein: Trace / Nitrite: Negative   Leuk Esterase: Negative / RBC: x / WBC 0-2 /HPF   Sq Epi: x / Non Sq Epi: OCC /HPF / Bacteria: x      I&O's Summary    25 Mar 2018 07:01  -  26 Mar 2018 07:00  --------------------------------------------------------  IN: 1600 mL / OUT: 1000 mL / NET: 600 mL      BNP  RADIOLOGY & ADDITIONAL STUDIES:  < from: CT Chest No Cont (18 @ 09:37) >  EXAM:  CT CHEST                            PROCEDURE DATE:  2018            INTERPRETATION:  CLINICAL INFORMATION: Hypoxia. Follow up admission chest   radiograph. Evaluate left lower lobe effusion or infection.    COMPARISON: CT chest 2018.    PROCEDURE:   CT of the Chest was performed without intravenous contrast.  Sagittal and coronal reformats were performed.    FINDINGS:    LUNGS, PLEURA AND LARGE AIRWAYS: Patent central airways. The left pleural   effusion occupies [id number]% of the left hemithorax has increased   in size since 2018, and subsegmental left lower lobe compressive   atelectasis. Few mucoid impacted bronchi in the right lower lobe and   associated subsegmental atelectasis.     VESSELS: The main pulmonary artery measures 3.2 cm. Normal caliber aorta.    HEART: Heart size is mildly enlarged. The pericardial effusion (small) is   unchanged in size, however, the effusion is of greater than simple fluid   density. It addition, the pericardium is thickened measuring greater than   4 mm anterior to the free wall of the right ventricle. In addition, there   are subtle striation of the pericardial fat adjacent to the lateral wall   of the left ventricle. Coronary artery calcifications involving the left   main, left anterior descending and left circumflex arteries.    MEDIASTINUM AND PETER: No lymphadenopathy.    CHEST WALL AND LOWER NECK: The thyroid gland is heterogeneous.    VISUALIZED UPPER ABDOMEN: Partially imaged right upper pole hypodense   lesions, likely cysts.    BONES: Within normal limits.    IMPRESSION:     1.  Small left pleural effusion, increased since 2018, and partial   left lower lobe compressive atelectasis.  2.  Pericardial effusion of greater than fluid density with pericardial   thickening can occur in the clinical setting of constrictive   pericarditis. This could either be evaluated by echocardiography.                    RADHA SANCHEZ M.D., RADIOLOGY RESIDENT  This document has been electronically signed.  JONNY KIM M.D., ATTENDING RADIOLOGIST  This document has been electronically signed. Mar 25 2018 12:51PM              < end of copied text >  < from: Xray Chest 1 View AP/PA (18 @ 22:27) >  EXAM:  XR CHEST AP OR PA 1V                            PROCEDURE DATE:  2018            INTERPRETATION:  EXAMINATION: XR CHEST AP OR PA 1V    CLINICAL INDICATION:  Chest Pain.     TECHNIQUE: Frontal radiograph of the chest was obtained on 2018     COMPARISON: Chest radiograph 2018.    FINDINGS:     The cardiomediastinal silhouette is enlarged in size. Small left pleural   effusion with adjacent atelectasis and/or infection. There is no   pneumothorax. No focal consolidation identified. Unremarkable osseous   structures.    IMPRESSION:   Small left pleural effusion with adjacent atelectasis and/or infection.                ANIA FLOWER M.D., RADIOLOGY RESIDENT  This document has been electronically signed.  TRUE MABRY M.D., ATTENDING RADIOLOGIST  This document has been electronically signed. Mar 25 2018  7:58AM        < end of copied text >    IMPRESSION:  Weakness with ? syncope or fall from weakness  Atrial flutter- persistent  HFpEF  DM  HTN  Possible LLL PNA/effusion    RECOMMENDATIONS:  Continue current meds  Pt. off NOAC due to fall risk- ? candidate for Watchman- would elect for MARYJANE with DCCV first  f/u CBC and BMP  Repeat CXR  Restart Torsemide at 20mg daily  Telemetry to assess for bradyarrhythmias

## 2018-03-26 NOTE — CONSULT NOTE ADULT - ASSESSMENT
suggest trial of miralax prn for constipation  sono abdomen  gi prophylaxis  consider neuro f/up for gait impairment

## 2018-03-26 NOTE — PROGRESS NOTE ADULT - SUBJECTIVE AND OBJECTIVE BOX
- Patient seen and examined.  - In summary, patient is a 83 year old man who presented with syncope.  - Today, patient is without complaints.         *****MEDICATIONS:    MEDICATIONS  (STANDING):  ascorbic acid 500 milliGRAM(s) Oral daily  aspirin enteric coated 81 milliGRAM(s) Oral daily  atorvastatin 40 milliGRAM(s) Oral at bedtime  carvedilol 6.25 milliGRAM(s) Oral every 12 hours  dextrose 5%. 1000 milliLiter(s) (50 mL/Hr) IV Continuous <Continuous>  dextrose 50% Injectable 12.5 Gram(s) IV Push once  dextrose 50% Injectable 25 Gram(s) IV Push once  dextrose 50% Injectable 25 Gram(s) IV Push once  donepezil 10 milliGRAM(s) Oral at bedtime  enalapril 20 milliGRAM(s) Oral daily  fluticasone propionate 50 MICROgram(s)/spray Nasal Spray 1 Spray(s) Both Nostrils two times a day  insulin glargine Injectable (LANTUS) 20 Unit(s) SubCutaneous at bedtime  insulin lispro (HumaLOG) corrective regimen sliding scale   SubCutaneous three times a day before meals  insulin lispro (HumaLOG) corrective regimen sliding scale   SubCutaneous at bedtime  ketotifen 0.025% Ophthalmic Solution 1 Drop(s) Both EYES two times a day  lactobacillus acidophilus 1 Tablet(s) Oral two times a day with meals  lidocaine   Patch 1 Patch Transdermal daily  pantoprazole    Tablet 40 milliGRAM(s) Oral before breakfast  pregabalin 50 milliGRAM(s) Oral two times a day  tamsulosin 0.4 milliGRAM(s) Oral at bedtime    MEDICATIONS  (PRN):  acetaminophen   Tablet. 650 milliGRAM(s) Oral every 6 hours PRN Mild Pain (1 - 3)  ALBUTerol/ipratropium for Nebulization 3 milliLiter(s) Nebulizer every 6 hours PRN Shortness of Breath and/or Wheezing  dextrose Gel 1 Dose(s) Oral once PRN Blood Glucose LESS THAN 70 milliGRAM(s)/deciliter  glucagon  Injectable 1 milliGRAM(s) IntraMuscular once PRN Glucose LESS THAN 70 milligrams/deciliter           ***** REVIEW OF SYSTEM:  GEN: no fever, no chills, no pain  RESP: no SOB, no cough, no sputum  CVS: no chest pain, no palpitations, no edema  GI: no abdominal pain, no nausea, no vomiting, no constipation, no diarrhea  : no dysuria, no frequency  NEURO: no headache, no dizziness  PSYCH: no depression, not anxious  Derm : no itching, no rash         ***** VITAL SIGNS:  T(F): 98 (18 @ 04:31), Max: 98.6 (18 @ 20:08)  HR: 60 (18 @ 04:31) (60 - 80)  BP: 115/63 (18 @ 04:31) (115/63 - 165/81)  RR: 20 (18 @ 04:31) (18 - 20)  SpO2: 94% (18 @ 04:31) (94% - 96%)  Wt(kg): --  ,   I&O's Summary    25 Mar 2018 07:01  -  26 Mar 2018 07:00  --------------------------------------------------------  IN: 1600 mL / OUT: 1000 mL / NET: 600 mL             *****PHYSICAL EXAM:  GEN: A&O X 3 , NAD , comfortable  HEENT: NCAT, EOMI, MMM, no icterus  NECK: Supple, No JVD  CVS: S1S2 , regular , No M/R/G appreciated  PULM: CTA B/L,  no W/R/R appreciated  ABD.: soft. non tender, non distended,  bowel sounds present  Extrem: intact pulses , no edema noted  Derm: No rash or ecchymosis noted  PSYCH: normal mood, no depression, not anxious         *****LAB AND IMAGIN.8   15.60 )-----------( 295      ( 26 Mar 2018 07:28 )             39.0                   136  |  97  |  27<H>  ----------------------------<  175<H>  4.1   |  27  |  1.22    Ca    9.2      26 Mar 2018 07:02    TPro  7.4  /  Alb  3.0<L>  /  TBili  0.7  /  DBili  x   /  AST  48<H>  /  ALT  16  /  AlkPhos  76  03-24    PT/INR - ( 24 Mar 2018 22:29 )   PT: 17.9 sec;   INR: 1.63 ratio         PTT - ( 24 Mar 2018 22:29 )  PTT:40.7 sec       CARDIAC MARKERS ( 26 Mar 2018 07:02 )  x     / 0.02 ng/mL / 52 U/L / x     / 2.8 ng/mL  CARDIAC MARKERS ( 25 Mar 2018 03:41 )  x     / 0.03 ng/mL / 58 U/L / x     / 2.2 ng/mL  CARDIAC MARKERS ( 24 Mar 2018 22:29 )  x     / 0.04 ng/mL / 85 U/L / x     / x                  Urinalysis Basic - ( 25 Mar 2018 12:37 )    Color: Yellow / Appearance: Clear / S.021 / pH: x  Gluc: x / Ketone: Small  / Bili: Negative / Urobili: Negative   Blood: x / Protein: Trace / Nitrite: Negative   Leuk Esterase: Negative / RBC: x / WBC 0-2 /HPF   Sq Epi: x / Non Sq Epi: OCC /HPF / Bacteria: x      [All pertinent recent Imaging/Reports reviewed]         *****A S S E S S M E N T   A N D   P L A N :  83M with chf adm s/p fall, ?syncope, a.fib and hx of pericardial effusion  chest ct result noted  await echo  hold ac due to pericardial effusion and risk of fall  cont tele  will adjust meds as needed  ?neuro    __________________________  RONNIE Quiroz D.O.

## 2018-03-26 NOTE — CONSULT NOTE ADULT - SUBJECTIVE AND OBJECTIVE BOX
Patient is a 83y old  Male who presented to [facility] with a chief complaint of inability to walk     HPI:  83 year old  male, well known to our practice, with a  PMH of BPH, HTN, lumbar spinal stenosis, dementia and NPH, colon polyps, gerd, nafld, presenting after [place] patient from restaurant after patient noted inability to walk and was confused. Found to be hypoxic in the er with a reading of 88.  hospital course with  mild leukocytosis, ct chest with pleural and pericardial effusion and ct head without change from prior.notes mild abdominal distention and no bowel movement x ? several days. no nausea, vomiting ,gerd  or abdominal pain.      PAST MEDICAL HISTORY:  Lumbar spinal stenosis  ASHD  Mild dementia  OAB (overactive bladder)  Type 2 diabetes mellitus  BPH (benign prostatic hyperplasia)  Nephrotic syndrome  NICOLAS  H/O recurrent urinary tract infection  hyperlipidemia  NPH (Normal Pressure Hydrocephalus): s/p ventriculostomy, last one in 2012  Hypertension, Essential  NAFLD    PAST SURGICAL HX:  s/p ventriculostomy  s/p l-s stenosis repair x 2      Allergies  No Known Allergies      MEDICATIONS  (STANDING):  ascorbic acid 500 milliGRAM(s) Oral daily  aspirin enteric coated 81 milliGRAM(s) Oral daily  atorvastatin 40 milliGRAM(s) Oral at bedtime  carvedilol 6.25 milliGRAM(s) Oral every 12 hours  dextrose 5%. 1000 milliLiter(s) (50 mL/Hr) IV Continuous <Continuous>  dextrose 50% Injectable 12.5 Gram(s) IV Push once  dextrose 50% Injectable 25 Gram(s) IV Push once  dextrose 50% Injectable 25 Gram(s) IV Push once  donepezil 10 milliGRAM(s) Oral at bedtime  enalapril 20 milliGRAM(s) Oral daily  fluticasone propionate 50 MICROgram(s)/spray Nasal Spray 1 Spray(s) Both Nostrils two times a day  insulin glargine Injectable (LANTUS) 20 Unit(s) SubCutaneous at bedtime  insulin lispro (HumaLOG) corrective regimen sliding scale   SubCutaneous three times a day before meals  insulin lispro (HumaLOG) corrective regimen sliding scale   SubCutaneous at bedtime  ketotifen 0.025% Ophthalmic Solution 1 Drop(s) Both EYES two times a day  lactobacillus acidophilus 1 Tablet(s) Oral two times a day with meals  lidocaine   Patch 1 Patch Transdermal daily  pantoprazole    Tablet 40 milliGRAM(s) Oral before breakfast  pregabalin 50 milliGRAM(s) Oral two times a day  tamsulosin 0.4 milliGRAM(s) Oral at bedtime    MEDICATIONS  (PRN):  acetaminophen   Tablet. 650 milliGRAM(s) Oral every 6 hours PRN Mild Pain (1 - 3)  ALBUTerol/ipratropium for Nebulization 3 milliLiter(s) Nebulizer every 6 hours PRN Shortness of Breath and/or Wheezing  dextrose Gel 1 Dose(s) Oral once PRN Blood Glucose LESS THAN 70 milliGRAM(s)/deciliter  glucagon  Injectable 1 milliGRAM(s) IntraMuscular once PRN Glucose LESS THAN 70 milligrams/deciliter      social history  nonsmoker  alcohol- less than social    FAMILY HISTORY:  No pertinent family history in first degree relatives              Vital Signs Last 24 Hrs  T(C): 36.7 (26 Mar 2018 04:31), Max: 37 (25 Mar 2018 20:08)  T(F): 98 (26 Mar 2018 04:31), Max: 98.6 (25 Mar 2018 20:08)  HR: 60 (26 Mar 2018 04:31) (60 - 80)  BP: 115/63 (26 Mar 2018 04:31) (115/63 - 165/81)  BP(mean): --  RR: 20 (26 Mar 2018 04:31) (18 - 20)  SpO2: 94% (26 Mar 2018 04:31) (94% - 96%)        LABS:                        14.3   12.99 )-----------( 316      ( 25 Mar 2018 07:15 )             42.7     03-25    135  |  96  |  21  ----------------------------<  371<H>  4.4   |  23  |  1.25    Ca    9.3      25 Mar 2018 06:12    TPro  7.4  /  Alb  3.0<L>  /  TBili  0.7  /  DBili  x   /  AST  48<H>  /  ALT  16  /  AlkPhos  76  03-24    PT/INR - ( 24 Mar 2018 22:29 )   PT: 17.9 sec;   INR: 1.63 ratio         PTT - ( 24 Mar 2018 22:29 )  PTT:40.7 sec  Urinalysis Basic - ( 25 Mar 2018 12:37 )    Color: Yellow / Appearance: Clear / S.021 / pH: x  Gluc: x / Ketone: Small  / Bili: Negative / Urobili: Negative   Blood: x / Protein: Trace / Nitrite: Negative   Leuk Esterase: Negative / RBC: x / WBC 0-2 /HPF   Sq Epi: x / Non Sq Epi: OCC /HPF / Bacteria: x      I&O's Summary    25 Mar 2018 07:  -  26 Mar 2018 07:00  --------------------------------------------------------  IN: 1600 mL / OUT: 1000 mL / NET: 600 mL      RADIOLOGY & ADDITIONAL STUDIES:        [facility]

## 2018-03-26 NOTE — PHYSICAL THERAPY INITIAL EVALUATION ADULT - PLANNED THERAPY INTERVENTIONS, PT EVAL
Stairs; GOAL: Pt will safely navigate 12 steps independently in two weeks./gait training/strengthening/transfer training

## 2018-03-26 NOTE — CONSULT NOTE ADULT - GASTROINTESTINAL DETAILS
no rebound tenderness/no masses palpable/minimal abdominal distention/no rigidity/soft/nontender/bowel sounds normal/no guarding

## 2018-03-26 NOTE — PROGRESS NOTE ADULT - SUBJECTIVE AND OBJECTIVE BOX
CC: f/u for lethargy, leukocytosis    Patient reports feeling well, although confused    REVIEW OF SYSTEMS:  All other review of systems negative (Comprehensive ROS)    Antimicrobials off      Other Medications Reviewed    T(F): 98.2 (18 @ 12:20), Max: 98.6 (18 @ 20:08)  HR: 72 (18 @ 15:30)  BP: 123/58 (18 @ 15:30)  RR: 17 (18 @ 12:20)  SpO2: 96% (18 @ 15:30)  Wt(kg): --    PHYSICAL EXAM:  General: alert, no acute distress  Eyes:  anicteric, no conjunctival injection, no discharge  Oropharynx: no lesions or injection 	  Neck: supple, without adenopathy  Lungs: clear to auscultation  Heart: irregular rhythm; no murmur, rubs or gallops  Abdomen: soft, nondistended, nontender, without mass or organomegaly  Skin: no lesions  Extremities: symmetrical pitting leg edema  Neurologic: alert, O x person and "hospital," moves all extremities      LAB RESULTS:                        12.8   15.60 )-----------( 295      ( 26 Mar 2018 07:28 )             39.0         136  |  97  |  27<H>  ----------------------------<  175<H>  4.1   |  27  |  1.22    Ca    9.2      26 Mar 2018 07:02    TPro  7.4  /  Alb  3.0<L>  /  TBili  0.7  /  DBili  x   /  AST  48<H>  /  ALT  16  /  AlkPhos  76  03-24         Urinalysis Basic - ( 25 Mar 2018 12:37 )    Color: Yellow / Appearance: Clear / S.021 / pH: x  Gluc: x / Ketone: Small  / Bili: Negative / Urobili: Negative   Blood: x / Protein: Trace / Nitrite: Negative   Leuk Esterase: Negative / RBC: x / WBC 0-2 /HPF   Sq Epi: x / Non Sq Epi: OCC /HPF / Bacteria: x      MICROBIOLOGY:  RECENT CULTURES:   @ 04:58 .Blood Blood     No growth to date.    RADIOLOGY REVIEWED:  CT Chest No Cont (03.25.18 @ 09:37) >  1.  Small left pleural effusion, increased since 2018, and partial   left lower lobe compressive atelectasis.  2.  Pericardial effusion of greater than fluid density with pericardial   thickening can occur in the clinical setting of constrictive   pericarditis. This could either be evaluated by echocardiography.

## 2018-03-26 NOTE — PHYSICAL THERAPY INITIAL EVALUATION ADULT - PRECAUTIONS/LIMITATIONS, REHAB EVAL
fall precautions Pt PMH of BPH, HTN, lumbar spinal stenosis, dementia and NPH, history of ventriulostomy./fall precautions

## 2018-03-26 NOTE — PROGRESS NOTE ADULT - ASSESSMENT
83 year old  male PMH of BPH, HTN, lumbar spinal stenosis, dementia and NPH, history of ventriulostomy, presenting after EMS took patient from restaurant after patient fell to ground and was confused. Found to be hypoxic in the 88. Patient in ER was hypoxic to 84 on RA. Placed on nasal cannula. History of CHF and is on a "water pill" that is not lasix as this didn't work for him in the past. No worsening of chronic leg swelling over the past few days. No syncope, he remember the fall. Denies fevers for the past few days. No new meds.    CV: Had TTE 2/08/18. EF normal 65 % with normal LV, RV function. BP stable 131/66. Continue Coreg 6.25 mg bid and Vasotec 20 mg/day, ASA 81 mg/day and Crestor 10 mg/day. Continue telemetry which shows rate control A.flutter. Torsemide 20 mg/day resumed. Cardio is considering DCCV.      Pulmonary: CXR notes small left effusion, cannot r/o infection. CT chest non-contrast shows small left effusion, no obvious pneumonia or PVC.    hypoxemia.     Urology: Continue Flomax .4 mg/day for BPH.     Neuro: Continue Aricept 10 mg/day. PT eval requested.  Restarted Razadyne 8 mg/day. CT head shows no change in size of ventricles. NPH stable.       Endo: hold all oral meds. Add Lantus 20 units at night and SSC.  BS better 160-310.

## 2018-03-26 NOTE — PROGRESS NOTE ADULT - SUBJECTIVE AND OBJECTIVE BOX
INTERVAL HPI/OVERNIGHT EVENTS:  Pt seen and examined at bedside.     Allergies/Intolerance: No Known Allergies      MEDICATIONS  (STANDING):  ascorbic acid 500 milliGRAM(s) Oral daily  aspirin enteric coated 81 milliGRAM(s) Oral daily  atorvastatin 40 milliGRAM(s) Oral at bedtime  carvedilol 6.25 milliGRAM(s) Oral every 12 hours  dextrose 5%. 1000 milliLiter(s) (50 mL/Hr) IV Continuous <Continuous>  dextrose 50% Injectable 12.5 Gram(s) IV Push once  dextrose 50% Injectable 25 Gram(s) IV Push once  dextrose 50% Injectable 25 Gram(s) IV Push once  donepezil 10 milliGRAM(s) Oral at bedtime  enalapril 20 milliGRAM(s) Oral daily  fluticasone propionate 50 MICROgram(s)/spray Nasal Spray 1 Spray(s) Both Nostrils two times a day  galantamine ER 8 milliGRAM(s) Oral with breakfast  insulin glargine Injectable (LANTUS) 20 Unit(s) SubCutaneous at bedtime  insulin lispro (HumaLOG) corrective regimen sliding scale   SubCutaneous three times a day before meals  insulin lispro (HumaLOG) corrective regimen sliding scale   SubCutaneous at bedtime  ketotifen 0.025% Ophthalmic Solution 1 Drop(s) Both EYES two times a day  lactobacillus acidophilus 1 Tablet(s) Oral two times a day with meals  lidocaine   Patch 1 Patch Transdermal daily  pantoprazole    Tablet 40 milliGRAM(s) Oral before breakfast  pregabalin 50 milliGRAM(s) Oral two times a day  tamsulosin 0.4 milliGRAM(s) Oral at bedtime  torsemide 20 milliGRAM(s) Oral daily    MEDICATIONS  (PRN):  acetaminophen   Tablet. 650 milliGRAM(s) Oral every 6 hours PRN Mild Pain (1 - 3)  dextrose Gel 1 Dose(s) Oral once PRN Blood Glucose LESS THAN 70 milliGRAM(s)/deciliter  glucagon  Injectable 1 milliGRAM(s) IntraMuscular once PRN Glucose LESS THAN 70 milligrams/deciliter        ROS: all systems reviewed and wnl      PHYSICAL EXAMINATION:  Vital Signs Last 24 Hrs  T(C): 36.7 (26 Mar 2018 04:31), Max: 37 (25 Mar 2018 20:08)  T(F): 98 (26 Mar 2018 04:31), Max: 98.6 (25 Mar 2018 20:08)  HR: 60 (26 Mar 2018 04:31) (60 - 80)  BP: 115/63 (26 Mar 2018 04:31) (115/63 - 165/81)  BP(mean): --  RR: 20 (26 Mar 2018 04:31) (18 - 20)  SpO2: 94% (26 Mar 2018 04:31) (94% - 96%)  CAPILLARY BLOOD GLUCOSE      POCT Blood Glucose.: 155 mg/dL (26 Mar 2018 07:17)  POCT Blood Glucose.: 311 mg/dL (25 Mar 2018 21:43)  POCT Blood Glucose.: 379 mg/dL (25 Mar 2018 17:19)       @ 07:01  -   @ 07:00  --------------------------------------------------------  IN: 1600 mL / OUT: 1000 mL / NET: 600 mL        GENERAL:   NECK: supple, No JVD  CHEST/LUNG: clear to auscultation bilaterally; no rales, rhonchi, or wheezing b/l  HEART: normal S1, S2  ABDOMEN: BS+, soft, ND, NT   EXTREMITIES:  pulses palpable; no clubbing, cyanosis, or edema b/l LEs  SKIN: no rashes or lesions      LABS:                        12.8   15.60 )-----------( 295      ( 26 Mar 2018 07:28 )             39.0     03    136  |  97  |  27<H>  ----------------------------<  175<H>  4.1   |  27  |  1.22    Ca    9.2      26 Mar 2018 07:02    TPro  7.4  /  Alb  3.0<L>  /  TBili  0.7  /  DBili  x   /  AST  48<H>  /  ALT  16  /  AlkPhos  76  03-24    PT/INR - ( 24 Mar 2018 22:29 )   PT: 17.9 sec;   INR: 1.63 ratio         PTT - ( 24 Mar 2018 22:29 )  PTT:40.7 sec  Urinalysis Basic - ( 25 Mar 2018 12:37 )    Color: Yellow / Appearance: Clear / S.021 / pH: x  Gluc: x / Ketone: Small  / Bili: Negative / Urobili: Negative   Blood: x / Protein: Trace / Nitrite: Negative   Leuk Esterase: Negative / RBC: x / WBC 0-2 /HPF   Sq Epi: x / Non Sq Epi: OCC /HPF / Bacteria: x INTERVAL HPI/OVERNIGHT EVENTS:  Pt seen and examined at bedside.     Allergies/Intolerance: No Known Allergies      MEDICATIONS  (STANDING):  ascorbic acid 500 milliGRAM(s) Oral daily  aspirin enteric coated 81 milliGRAM(s) Oral daily  atorvastatin 40 milliGRAM(s) Oral at bedtime  carvedilol 6.25 milliGRAM(s) Oral every 12 hours  dextrose 5%. 1000 milliLiter(s) (50 mL/Hr) IV Continuous <Continuous>  dextrose 50% Injectable 12.5 Gram(s) IV Push once  dextrose 50% Injectable 25 Gram(s) IV Push once  dextrose 50% Injectable 25 Gram(s) IV Push once  donepezil 10 milliGRAM(s) Oral at bedtime  enalapril 20 milliGRAM(s) Oral daily  fluticasone propionate 50 MICROgram(s)/spray Nasal Spray 1 Spray(s) Both Nostrils two times a day  galantamine ER 8 milliGRAM(s) Oral with breakfast  insulin glargine Injectable (LANTUS) 20 Unit(s) SubCutaneous at bedtime  insulin lispro (HumaLOG) corrective regimen sliding scale   SubCutaneous three times a day before meals  insulin lispro (HumaLOG) corrective regimen sliding scale   SubCutaneous at bedtime  ketotifen 0.025% Ophthalmic Solution 1 Drop(s) Both EYES two times a day  lactobacillus acidophilus 1 Tablet(s) Oral two times a day with meals  lidocaine   Patch 1 Patch Transdermal daily  pantoprazole    Tablet 40 milliGRAM(s) Oral before breakfast  pregabalin 50 milliGRAM(s) Oral two times a day  tamsulosin 0.4 milliGRAM(s) Oral at bedtime  torsemide 20 milliGRAM(s) Oral daily    MEDICATIONS  (PRN):  acetaminophen   Tablet. 650 milliGRAM(s) Oral every 6 hours PRN Mild Pain (1 - 3)  dextrose Gel 1 Dose(s) Oral once PRN Blood Glucose LESS THAN 70 milliGRAM(s)/deciliter  glucagon  Injectable 1 milliGRAM(s) IntraMuscular once PRN Glucose LESS THAN 70 milligrams/deciliter        ROS: all systems reviewed and wnl      PHYSICAL EXAMINATION:  Vital Signs Last 24 Hrs  T(C): 36.7 (26 Mar 2018 04:31), Max: 37 (25 Mar 2018 20:08)  T(F): 98 (26 Mar 2018 04:31), Max: 98.6 (25 Mar 2018 20:08)  HR: 60 (26 Mar 2018 04:31) (60 - 80)  BP: 115/63 (26 Mar 2018 04:31) (115/63 - 165/81)  BP(mean): --  RR: 20 (26 Mar 2018 04:31) (18 - 20)  SpO2: 94% (26 Mar 2018 04:31) (94% - 96%)  CAPILLARY BLOOD GLUCOSE      POCT Blood Glucose.: 155 mg/dL (26 Mar 2018 07:17)  POCT Blood Glucose.: 311 mg/dL (25 Mar 2018 21:43)  POCT Blood Glucose.: 379 mg/dL (25 Mar 2018 17:19)       @ 07:01  -   @ 07:00  --------------------------------------------------------  IN: 1600 mL / OUT: 1000 mL / NET: 600 mL        GENERAL: stable in chair, comfortable, NAD, no SOB, CP at rest  NECK: supple, No JVD  CHEST/LUNG: clear to auscultation bilaterally; no rales, rhonchi, or wheezing b/l  HEART: normal S1, S2  ABDOMEN: BS+, soft, ND, NT   EXTREMITIES:  pulses palpable; no clubbing, cyanosis, or edema b/l LEs  SKIN: no rashes or lesions      LABS:                        12.8   15.60 )-----------( 295      ( 26 Mar 2018 07:28 )             39.0         136  |  97  |  27<H>  ----------------------------<  175<H>  4.1   |  27  |  1.22    Ca    9.2      26 Mar 2018 07:02    TPro  7.4  /  Alb  3.0<L>  /  TBili  0.7  /  DBili  x   /  AST  48<H>  /  ALT  16  /  AlkPhos  76  03-24    PT/INR - ( 24 Mar 2018 22:29 )   PT: 17.9 sec;   INR: 1.63 ratio         PTT - ( 24 Mar 2018 22:29 )  PTT:40.7 sec  Urinalysis Basic - ( 25 Mar 2018 12:37 )    Color: Yellow / Appearance: Clear / S.021 / pH: x  Gluc: x / Ketone: Small  / Bili: Negative / Urobili: Negative   Blood: x / Protein: Trace / Nitrite: Negative   Leuk Esterase: Negative / RBC: x / WBC 0-2 /HPF   Sq Epi: x / Non Sq Epi: OCC /HPF / Bacteria: x

## 2018-03-26 NOTE — PHYSICAL THERAPY INITIAL EVALUATION ADULT - GENERAL OBSERVATIONS, REHAB EVAL
Pt received sitting in chair, +tele, +pulse oximetry, +IVL, room air. Pt received sitting in chair, +tele, +pulse oximetry, +IVL, room air, +chair alarm.

## 2018-03-27 DIAGNOSIS — I48.92 UNSPECIFIED ATRIAL FLUTTER: ICD-10-CM

## 2018-03-27 LAB
ANION GAP SERPL CALC-SCNC: 11 MMOL/L — SIGNIFICANT CHANGE UP (ref 5–17)
BUN SERPL-MCNC: 28 MG/DL — HIGH (ref 7–23)
CALCIUM SERPL-MCNC: 9.3 MG/DL — SIGNIFICANT CHANGE UP (ref 8.4–10.5)
CHLORIDE SERPL-SCNC: 100 MMOL/L — SIGNIFICANT CHANGE UP (ref 96–108)
CO2 SERPL-SCNC: 31 MMOL/L — SIGNIFICANT CHANGE UP (ref 22–31)
CREAT SERPL-MCNC: 1.32 MG/DL — HIGH (ref 0.5–1.3)
GLUCOSE BLDC GLUCOMTR-MCNC: 177 MG/DL — HIGH (ref 70–99)
GLUCOSE BLDC GLUCOMTR-MCNC: 273 MG/DL — HIGH (ref 70–99)
GLUCOSE BLDC GLUCOMTR-MCNC: 277 MG/DL — HIGH (ref 70–99)
GLUCOSE BLDC GLUCOMTR-MCNC: 288 MG/DL — HIGH (ref 70–99)
GLUCOSE SERPL-MCNC: 201 MG/DL — HIGH (ref 70–99)
POTASSIUM SERPL-MCNC: 4.4 MMOL/L — SIGNIFICANT CHANGE UP (ref 3.5–5.3)
POTASSIUM SERPL-SCNC: 4.4 MMOL/L — SIGNIFICANT CHANGE UP (ref 3.5–5.3)
SODIUM SERPL-SCNC: 142 MMOL/L — SIGNIFICANT CHANGE UP (ref 135–145)

## 2018-03-27 PROCEDURE — 93306 TTE W/DOPPLER COMPLETE: CPT | Mod: 26

## 2018-03-27 PROCEDURE — 99223 1ST HOSP IP/OBS HIGH 75: CPT

## 2018-03-27 RX ORDER — HEPARIN SODIUM 5000 [USP'U]/ML
INJECTION INTRAVENOUS; SUBCUTANEOUS
Qty: 25000 | Refills: 0 | Status: DISCONTINUED | OUTPATIENT
Start: 2018-03-27 | End: 2018-03-28

## 2018-03-27 RX ORDER — HEPARIN SODIUM 5000 [USP'U]/ML
8000 INJECTION INTRAVENOUS; SUBCUTANEOUS EVERY 6 HOURS
Qty: 0 | Refills: 0 | Status: DISCONTINUED | OUTPATIENT
Start: 2018-03-27 | End: 2018-03-28

## 2018-03-27 RX ORDER — HEPARIN SODIUM 5000 [USP'U]/ML
4000 INJECTION INTRAVENOUS; SUBCUTANEOUS EVERY 6 HOURS
Qty: 0 | Refills: 0 | Status: DISCONTINUED | OUTPATIENT
Start: 2018-03-27 | End: 2018-03-28

## 2018-03-27 RX ADMIN — CARVEDILOL PHOSPHATE 6.25 MILLIGRAM(S): 80 CAPSULE, EXTENDED RELEASE ORAL at 17:43

## 2018-03-27 RX ADMIN — Medication 50 MILLIGRAM(S): at 17:41

## 2018-03-27 RX ADMIN — Medication 50 MILLIGRAM(S): at 06:24

## 2018-03-27 RX ADMIN — Medication 1 TABLET(S): at 17:43

## 2018-03-27 RX ADMIN — Medication 81 MILLIGRAM(S): at 12:09

## 2018-03-27 RX ADMIN — Medication 1 SPRAY(S): at 06:24

## 2018-03-27 RX ADMIN — Medication 20 MILLIGRAM(S): at 06:23

## 2018-03-27 RX ADMIN — INSULIN GLARGINE 20 UNIT(S): 100 INJECTION, SOLUTION SUBCUTANEOUS at 21:44

## 2018-03-27 RX ADMIN — HEPARIN SODIUM 1800 UNIT(S)/HR: 5000 INJECTION INTRAVENOUS; SUBCUTANEOUS at 17:43

## 2018-03-27 RX ADMIN — Medication 3: at 17:41

## 2018-03-27 RX ADMIN — Medication 1 TABLET(S): at 07:55

## 2018-03-27 RX ADMIN — GALANTAMINE HYDROBROMIDE 8 MILLIGRAM(S): 4 TABLET, FILM COATED ORAL at 07:56

## 2018-03-27 RX ADMIN — Medication 1: at 21:43

## 2018-03-27 RX ADMIN — CARVEDILOL PHOSPHATE 6.25 MILLIGRAM(S): 80 CAPSULE, EXTENDED RELEASE ORAL at 06:23

## 2018-03-27 RX ADMIN — Medication 3: at 12:09

## 2018-03-27 RX ADMIN — DONEPEZIL HYDROCHLORIDE 10 MILLIGRAM(S): 10 TABLET, FILM COATED ORAL at 21:44

## 2018-03-27 RX ADMIN — Medication 500 MILLIGRAM(S): at 12:09

## 2018-03-27 RX ADMIN — ATORVASTATIN CALCIUM 40 MILLIGRAM(S): 80 TABLET, FILM COATED ORAL at 21:43

## 2018-03-27 RX ADMIN — TAMSULOSIN HYDROCHLORIDE 0.4 MILLIGRAM(S): 0.4 CAPSULE ORAL at 21:43

## 2018-03-27 RX ADMIN — Medication 1: at 07:54

## 2018-03-27 RX ADMIN — PANTOPRAZOLE SODIUM 40 MILLIGRAM(S): 20 TABLET, DELAYED RELEASE ORAL at 06:23

## 2018-03-27 RX ADMIN — KETOTIFEN FUMARATE 1 DROP(S): 0.34 SOLUTION OPHTHALMIC at 06:23

## 2018-03-27 NOTE — PROGRESS NOTE ADULT - SUBJECTIVE AND OBJECTIVE BOX
HPI:  84 yo male HTN, DM2, HFpEF, nephrotic syndrome, NPH s/p ventriculostomy admitted s/p fall. Pt confused and hypoxic when ems arrived.  He has no recollection of episode.  Denies any prodrome of cp sob palpitations or lightheadedness.  In ED pt found to have new onset atrial flutter with controlled rate.  TTE  small to moderate pericardial effusion.    PAST MEDICAL & SURGICAL HISTORY:  Lumbar spinal stenosis  Vitamin D deficiency  Mild dementia  OAB (overactive bladder)  Type 2 diabetes mellitus  BPH (benign prostatic hyperplasia)  Nephrotic syndrome: pt was instructed to avoid taking NSAIDS  Chronic lower back pain: unclear etiology per wife - no hx trauma, possibly arthritis  H/O recurrent urinary tract infection  Chronic Back Pain: L3-L4, L4-L5 compression  NPH (Normal Pressure Hydrocephalus): s/p ventriculostomy, last one in 2012  Hypertension, Essential  Normal pressure hydrocephalus: s/p ventriculostomy of the third ventricle       Allergies    No Known Allergies    Intolerances          MEDICATIONS  (STANDING):  ascorbic acid 500 milliGRAM(s) Oral daily  aspirin enteric coated 81 milliGRAM(s) Oral daily  atorvastatin 40 milliGRAM(s) Oral at bedtime  carvedilol 6.25 milliGRAM(s) Oral every 12 hours  dextrose 5%. 1000 milliLiter(s) (50 mL/Hr) IV Continuous <Continuous>  dextrose 50% Injectable 12.5 Gram(s) IV Push once  dextrose 50% Injectable 25 Gram(s) IV Push once  dextrose 50% Injectable 25 Gram(s) IV Push once  donepezil 10 milliGRAM(s) Oral at bedtime  enalapril 20 milliGRAM(s) Oral daily  fluticasone propionate 50 MICROgram(s)/spray Nasal Spray 1 Spray(s) Both Nostrils two times a day  galantamine ER 8 milliGRAM(s) Oral with breakfast  insulin glargine Injectable (LANTUS) 20 Unit(s) SubCutaneous at bedtime  insulin lispro (HumaLOG) corrective regimen sliding scale   SubCutaneous three times a day before meals  insulin lispro (HumaLOG) corrective regimen sliding scale   SubCutaneous at bedtime  ketotifen 0.025% Ophthalmic Solution 1 Drop(s) Both EYES two times a day  lactobacillus acidophilus 1 Tablet(s) Oral two times a day with meals  lidocaine   Patch 1 Patch Transdermal daily  pantoprazole    Tablet 40 milliGRAM(s) Oral before breakfast  pregabalin 50 milliGRAM(s) Oral two times a day  tamsulosin 0.4 milliGRAM(s) Oral at bedtime  torsemide 20 milliGRAM(s) Oral daily    MEDICATIONS  (PRN):  acetaminophen   Tablet. 650 milliGRAM(s) Oral every 6 hours PRN Mild Pain (1 - 3)  dextrose Gel 1 Dose(s) Oral once PRN Blood Glucose LESS THAN 70 milliGRAM(s)/deciliter  glucagon  Injectable 1 milliGRAM(s) IntraMuscular once PRN Glucose LESS THAN 70 milligrams/deciliter            Vital Signs Last 24 Hrs  T(C): 36.8 (27 Mar 2018 06:15), Max: 36.8 (26 Mar 2018 12:20)  T(F): 98.2 (27 Mar 2018 06:15), Max: 98.2 (26 Mar 2018 12:20)  HR: 59 (27 Mar 2018 06:15) (53 - 72)  BP: 165/71 (27 Mar 2018 06:15) (113/55 - 165/71)  BP(mean): 79 (26 Mar 2018 15:30) (79 - 79)  RR: 18 (27 Mar 2018 06:15) (16 - 18)  SpO2: 96% (27 Mar 2018 06:15) (91% - 97%)  Daily     Daily Weight in k.4 (27 Mar 2018 07:05)  I&O's Detail    26 Mar 2018 07:  -  27 Mar 2018 07:00  --------------------------------------------------------  IN:    Oral Fluid: 1220 mL  Total IN: 1220 mL    OUT:    Voided: 1300 mL  Total OUT: 1300 mL    Total NET: -80 mL      27 Mar 2018 07:  -  27 Mar 2018 10:21  --------------------------------------------------------  IN:    Oral Fluid: 240 mL  Total IN: 240 mL    OUT:    Voided: 300 mL  Total OUT: 300 mL    Total NET: -60 mL          Physical Exam  neck without JVD  Lungs clear  cor s1s2 2/6 kamar rusb  abd soft   ext without edema  LABS    Urinalysis Basic - ( 25 Mar 2018 12:37 )    Color: Yellow / Appearance: Clear / S.021 / pH: x  Gluc: x / Ketone: Small  / Bili: Negative / Urobili: Negative   Blood: x / Protein: Trace / Nitrite: Negative   Leuk Esterase: Negative / RBC: x / WBC 0-2 /HPF   Sq Epi: x / Non Sq Epi: OCC /HPF / Bacteria: x      CARDIAC MARKERS ( 26 Mar 2018 07:02 )  x     / 0.02 ng/mL / 52 U/L / x     / 2.8 ng/mL      CBC Full  -  ( 26 Mar 2018 07:28 )  WBC Count : 15.60 K/uL  Hemoglobin : 12.8 g/dL  Hematocrit : 39.0 %  Platelet Count - Automated : 295 K/uL  Mean Cell Volume : 77.5 fl  Mean Cell Hemoglobin : 25.4 pg  Mean Cell Hemoglobin Concentration : 32.8 gm/dL  Auto Neutrophil # : x  Auto Lymphocyte # : x  Auto Monocyte # : x  Auto Eosinophil # : x  Auto Basophil # : x  Auto Neutrophil % : x  Auto Lymphocyte % : x  Auto Monocyte % : x  Auto Eosinophil % : x  Auto Basophil % : x    03-    142  |  100  |  28<H>  ----------------------------<  201<H>  4.4   |  31  |  1.32<H>    Ca    9.3      27 Mar 2018 07:09    CT head  < from: CT Head No Cont (18 @ 09:28) >    EXAM:  CT BRAIN                            PROCEDURE DATE:  2018            INTERPRETATION:  Noncontrast CT of the brain.    CLINICAL INDICATION:  Syncope with altered mental status. Evaluate for   parenchymal bleed.    TECHNIQUE: Axial CT scanning of the brain was obtained from the skull   base to the vertex without the administration of intravenous contrast.   Sagittal and coronal reformatted images were obtained.     COMPARISON: CT head 2018.    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    There is no acute intracranial hemorrhage or extra-axial fluid   collection. The gray-white matter differentiation is preserved. There is   no mass effect, midline shift or effacement of basal cisterns.    There is dilatation of the lateral ventricles, out of proportion to the   sulcal prominence, raising possibility of normal pressure hydrocephalus   versus central greater cortical atrophy. This finding is unchanged from   the prior exam.     There is mild mucosal thickening of the right maxillary sinus and ethmoid   air cells. chronic white matter microvascular ischemic changes are noted.    IMPRESSION:    No CT evidence for acute infarct or acute hemorrhage.    Unchanged enlarged lateral ventricles out of proportion to sulcal   prominence, raising possibility of normal pressure hydrocephalus versus   central greater than cortical atrophy.            < end of copied text >      EKG:  < from: 12 Lead ECG (18 @ 05:37) >    Ventricular Rate 63 BPM    Atrial Rate 252 BPM    QRS Duration 106 ms     ms    QTc 468 ms    P Axis 77 degrees    R Axis 11 degrees    T Axis 106 degrees    Diagnosis Line ATRIAL FLUTTER WITH VARIABLE A-V BLOCK  NONSPECIFIC ST AND T WAVE ABNORMALITY  PROLONGED QT  ABNORMAL ECG    CT chest  < from: CT Chest No Cont (18 @ 09:37) >    EXAM:  CT CHEST                            PROCEDURE DATE:  2018            INTERPRETATION:  CLINICAL INFORMATION: Hypoxia. Follow up admission chest   radiograph. Evaluate left lower lobe effusion or infection.    COMPARISON: CT chest 2018.    PROCEDURE:   CT of the Chest was performed without intravenous contrast.  Sagittal and coronal reformats were performed.    FINDINGS:    LUNGS, PLEURA AND LARGE AIRWAYS: Patent central airways. The left pleural   effusion occupies [id number]% of the left hemithorax has increased   in size since 2018, and subsegmental left lower lobe compressive   atelectasis. Few mucoid impacted bronchi in the right lower lobe and   associated subsegmental atelectasis.     VESSELS: The main pulmonary artery measures 3.2 cm. Normal caliber aorta.    HEART: Heart size is mildly enlarged. The pericardial effusion (small) is   unchanged in size, however, the effusion is of greater than simple fluid   density. It addition, the pericardium is thickened measuring greater than   4 mm anterior to the free wall of the right ventricle. In addition, there   are subtle striation of the pericardial fat adjacent to the lateral wall   of the left ventricle. Coronary artery calcifications involving the left   main, left anterior descending and left circumflex arteries.    MEDIASTINUM AND PETER: No lymphadenopathy.    CHEST WALL AND LOWER NECK: The thyroid gland is heterogeneous.    VISUALIZED UPPER ABDOMEN: Partially imaged right upper pole hypodense   lesions, likely cysts.    BONES: Within normal limits.    IMPRESSION:     1.  Small left pleural effusion, increased since 2018, and partial   left lower lobe compressive atelectasis.  2.  Pericardial effusion of greater than fluid density with pericardial   thickening can occur in the clinical setting of constrictive   pericarditis. This could either be evaluated by echocardiography.      Echo  < from: Transthoracic Echocardiogram (18 @ 12:24) >  -----------------------------------------------------------------------  PROCEDURE: Transthoracic echocardiogram with 2-D, M-Mode  and complete spectral and color flow Doppler.  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 Minimal mitral  regurgitation.  Aortic Valve/Aorta: Calcified trileaflet aortic valve with  normal opening. No aortic valve regurgitation seen.  Aortic root not well visualized.  Left Atrium: Left atrium not well visualized.  Left Ventricle: Endocardium not well visualized; grossly  normal left ventricular systolic function. Concentric left  ventricular hypertrophy.  Right Heart: Right atrium not well visualized. The right  ventricle is not well visualized; grossly normal right  ventricular systolic function. TAPSE > 2.0 cm (normal).  Tricuspid valve not well visualized. Pulmonic valve not  well visualized.  Pericardium/Pleura: Small to moderate pericardial effusion  (up to 1.4 cm) seen postero-lateral to the left ventricle.  Hemodynamic: Estimated right atrial pressure is 8 mm Hg.  ------------------------------------------------------------------------  Conclusions:  1. Endocardium not well visualized; grossly normal left  ventricular systolic function.  2. The right ventricle is not well visualized; grossly  normal right ventricular systolic function. TAPSE > 2.0 cm  (normal).  3. Small to moderate pericardial effusion (up to 1.4 cm)  seen postero-lateral tothe left ventricle.  *** Compared with echocardiogram of 2016, small  pericardial effusion is new, otherwise findings are  similar.  ------------------------------------------------------------------------  Confirmed on  2018 - 14:19:51 by Deandre Juarez M.D.    < end of copied text >          Assessment and Plan:  84 yo male HTN, DM2, HFpEF, nephrotic syndrome, NPH s/p ventriculostomy admitted s/p fall. Pt confused and hypoxic when ems arrived.  He has no recollection of episode.  Denies any prodrome of cp sob palpitations or lightheadedness.  In ED pt found to have new onset atrial flutter with controlled rate.  TTE  small to moderate pericardial effusion.  Pt is a CHADS-VASC 5 which puts him at significant risk for thromboembolic event.   At this point his risk benefit favors full anticoagulation while he is being evaluated by EP for possible MARYJANE/DCCV or aflutter ablation  Repeat TTE prior to IV heparin to ensure no enlargement of pericardial effusion  If IV heparin started then dc ASA  EP consult  Neuro f/u to evaluate long term fall risk at discharge  Continue other meds

## 2018-03-27 NOTE — CONSULT NOTE ADULT - ASSESSMENT
83 year old  male PMH of BPH, HTN, lumbar spinal stenosis, mild dementia and NPH, history of ventriulostomy, presents to ED after EMS took patient from restaurant after patient fell to ground and was confused. Pt was found to be hypoxic to 84 % on RA and new onset atrial flutter with controlled rate. Pt is currently not on anticoagulation. EP consulted for possible MARYJANE/ DCCV. 83 year old  male PMH of HTN, HFpEF, mild dementia, lumbar spinal stenosis, NPH and history of ventriulostomy, presents to ED after EMS took patient from restaurant after patient fell to ground with presymptom of dizziness and pt was found to be confused. In ED, Pt was hypoxic to 84 % on RA and found to be new onset atrial flutter with controlled rate. EP consulted for possible MARYJANE/ DCCV. 83 year old  male PMH of HTN, HFpEF (EF 65% on 2/2018), mild dementia, lumbar spinal stenosis, NPH and history of ventriulostomy, presents to ED after EMS took patient from restaurant after patient fell to ground with presymptom of dizziness and pt was found to be confused. In ED, Pt was hypoxic to 84 % on RA and found to be new onset atrial flutter with controlled rate. EP consulted for possible MARYJANE/ DCCV. 83 year old  male PMH of HTN, HFpEF (65% on 2/2018), lumbar spinal stenosis, mild dementia, NPH, s/p ventriculostomy and recent hospitalization with PNA (discharged from rehab on 2/23), recent diagnosed Atrial Flutter (started Pradaxa 3 weeks ago) presents to ED by EMS after patient fell to ground with prior symptom of dizziness (not loss of consciousness) and pt was found to be confused. Patient in ER was hypoxic to 84% on RA and noted to be rate controlled Atrial Flutter.

## 2018-03-27 NOTE — CONSULT NOTE ADULT - SUBJECTIVE AND OBJECTIVE BOX
HPI:  83 year old  male PMH of BPH, HTN, lumbar spinal stenosis, mild dementia and NPH, history of ventriulostomy, presenting after [place] patient from restaurant after patient fell to ground and was confused. Found to be hypoxic in the 88. Patient in ER was hypoxic to 84 on RA. Placed on nasal cannula. History of CHF and is on a "water pill" that is not lasix as this didn't work for him in the past. No worsening of chronic leg swelling over the past few days. No syncope, he remember the fall. Denies fevers for the past few days. No new meds.     PAST MEDICAL & SURGICAL HISTORY:  Lumbar spinal stenosis  Vitamin D deficiency  Mild dementia  OAB (overactive bladder)  Type 2 diabetes mellitus  BPH (benign prostatic hyperplasia)  Nephrotic syndrome: pt was instructed to avoid taking NSAIDS  Chronic lower back pain: unclear etiology per wife - no hx trauma, possibly arthritis  H/O recurrent urinary tract infection  Chronic Back Pain: L3-L4, L4-L5 compression  NPH (Normal Pressure Hydrocephalus): s/p ventriculostomy, last one in Jan 2012  Hypertension, Essential  Normal pressure hydrocephalus: s/p ventriculostomy of the third ventricle 2012      ROS:    General: Pt denies recent weight loss/fever/chills    Neurological: denies numbness or  sensation loss    Cardiovascular: denies chest pain/palpitations/leg edema    Respiratory and Thorax: denies SOB/cough/wheezing    Gastrointestinal: denies abdominal pain/diarrhea/constipation/bloody stool    Genitourinary: denies urinary frequency/urgency/ dysuria    Musculoskeletal: denies joint pain or swelling, denies restricted motion    Hematologic: denies abnormal bleeding  	  MEDICATIONS  (STANDING):  ascorbic acid 500 milliGRAM(s) Oral daily  aspirin enteric coated 81 milliGRAM(s) Oral daily  atorvastatin 40 milliGRAM(s) Oral at bedtime  carvedilol 6.25 milliGRAM(s) Oral every 12 hours  dextrose 5%. 1000 milliLiter(s) (50 mL/Hr) IV Continuous <Continuous>  dextrose 50% Injectable 12.5 Gram(s) IV Push once  dextrose 50% Injectable 25 Gram(s) IV Push once  dextrose 50% Injectable 25 Gram(s) IV Push once  donepezil 10 milliGRAM(s) Oral at bedtime  enalapril 20 milliGRAM(s) Oral daily  fluticasone propionate 50 MICROgram(s)/spray Nasal Spray 1 Spray(s) Both Nostrils two times a day  galantamine ER 8 milliGRAM(s) Oral with breakfast  insulin glargine Injectable (LANTUS) 20 Unit(s) SubCutaneous at bedtime  insulin lispro (HumaLOG) corrective regimen sliding scale   SubCutaneous three times a day before meals  insulin lispro (HumaLOG) corrective regimen sliding scale   SubCutaneous at bedtime  ketotifen 0.025% Ophthalmic Solution 1 Drop(s) Both EYES two times a day  lactobacillus acidophilus 1 Tablet(s) Oral two times a day with meals  lidocaine   Patch 1 Patch Transdermal daily  pantoprazole    Tablet 40 milliGRAM(s) Oral before breakfast  pregabalin 50 milliGRAM(s) Oral two times a day  tamsulosin 0.4 milliGRAM(s) Oral at bedtime    MEDICATIONS  (PRN):  acetaminophen   Tablet. 650 milliGRAM(s) Oral every 6 hours PRN Mild Pain (1 - 3)  dextrose Gel 1 Dose(s) Oral once PRN Blood Glucose LESS THAN 70 milliGRAM(s)/deciliter  glucagon  Injectable 1 milliGRAM(s) IntraMuscular once PRN Glucose LESS THAN 70 milligrams/deciliter      Allergies  No Known Allergies    SOCIAL HISTORY:    FAMILY HISTORY:  No pertinent family history in first degree relatives      Vital Signs Last 24 Hrs  T(C): 36.8 (27 Mar 2018 06:15), Max: 36.8 (27 Mar 2018 06:15)  T(F): 98.2 (27 Mar 2018 06:15), Max: 98.2 (27 Mar 2018 06:15)  HR: 59 (27 Mar 2018 06:15) (59 - 72)  BP: 165/71 (27 Mar 2018 06:15) (113/55 - 165/71)  BP(mean): 79 (26 Mar 2018 15:30) (79 - 79)  RR: 18 (27 Mar 2018 06:15) (16 - 18)  SpO2: 96% (27 Mar 2018 06:15) (91% - 96%)    Physical Exam:        LABS:                        12.8   15.60 )-----------( 295      ( 26 Mar 2018 07:28 )             39.0     03-27    142  |  100  |  28<H>  ----------------------------<  201<H>  4.4   |  31  |  1.32<H>    Ca    9.3      27 Mar 2018 07:09    RADIOLOGY & ADDITIONAL STUDIES:    TELE: A-flutter with ventricular rate at 50s-70s     EKG: A-Flutter at vent rate 63 bpm on 3/26/2018 HPI:  83 year old  male PMH of BPH, HTN, lumbar spinal stenosis, mild dementia and NPH, history of ventriulostomy, presenting after [place] patient from restaurant after patient fell to ground with prior symptom of dizziness and pt was found to be confused. Patient in ER was hypoxic to 84 on RA. Placed on nasal cannula. History of CHF and is on a "water pill" that is not lasix as this didn't work for him in the past. No worsening of chronic leg swelling over the past few days. No syncope, he remember the fall. Denies fevers for the past few days. No new meds.     PAST MEDICAL & SURGICAL HISTORY:  Lumbar spinal stenosis  Vitamin D deficiency  Mild dementia  OAB (overactive bladder)  Type 2 diabetes mellitus  BPH (benign prostatic hyperplasia)  Nephrotic syndrome: pt was instructed to avoid taking NSAIDS  Chronic lower back pain: unclear etiology per wife - no hx trauma, possibly arthritis  H/O recurrent urinary tract infection  Chronic Back Pain: L3-L4, L4-L5 compression  NPH (Normal Pressure Hydrocephalus): s/p ventriculostomy, last one in Jan 2012  Hypertension, Essential  Normal pressure hydrocephalus: s/p ventriculostomy of the third ventricle 2012      ROS:    General: Pt denies recent weight loss/fever/chills    Neurological: denies numbness or  sensation loss    Cardiovascular: denies chest pain/palpitations/leg edema    Respiratory and Thorax: denies SOB/cough/wheezing    Gastrointestinal: denies abdominal pain/diarrhea/constipation/bloody stool    Genitourinary: denies urinary frequency/urgency/ dysuria    Musculoskeletal: denies joint pain or swelling, denies restricted motion    Hematologic: denies abnormal bleeding  	  MEDICATIONS  (STANDING):  ascorbic acid 500 milliGRAM(s) Oral daily  aspirin enteric coated 81 milliGRAM(s) Oral daily  atorvastatin 40 milliGRAM(s) Oral at bedtime  carvedilol 6.25 milliGRAM(s) Oral every 12 hours  dextrose 5%. 1000 milliLiter(s) (50 mL/Hr) IV Continuous <Continuous>  dextrose 50% Injectable 12.5 Gram(s) IV Push once  dextrose 50% Injectable 25 Gram(s) IV Push once  dextrose 50% Injectable 25 Gram(s) IV Push once  donepezil 10 milliGRAM(s) Oral at bedtime  enalapril 20 milliGRAM(s) Oral daily  fluticasone propionate 50 MICROgram(s)/spray Nasal Spray 1 Spray(s) Both Nostrils two times a day  galantamine ER 8 milliGRAM(s) Oral with breakfast  insulin glargine Injectable (LANTUS) 20 Unit(s) SubCutaneous at bedtime  insulin lispro (HumaLOG) corrective regimen sliding scale   SubCutaneous three times a day before meals  insulin lispro (HumaLOG) corrective regimen sliding scale   SubCutaneous at bedtime  ketotifen 0.025% Ophthalmic Solution 1 Drop(s) Both EYES two times a day  lactobacillus acidophilus 1 Tablet(s) Oral two times a day with meals  lidocaine   Patch 1 Patch Transdermal daily  pantoprazole    Tablet 40 milliGRAM(s) Oral before breakfast  pregabalin 50 milliGRAM(s) Oral two times a day  tamsulosin 0.4 milliGRAM(s) Oral at bedtime    MEDICATIONS  (PRN):  acetaminophen   Tablet. 650 milliGRAM(s) Oral every 6 hours PRN Mild Pain (1 - 3)  dextrose Gel 1 Dose(s) Oral once PRN Blood Glucose LESS THAN 70 milliGRAM(s)/deciliter  glucagon  Injectable 1 milliGRAM(s) IntraMuscular once PRN Glucose LESS THAN 70 milligrams/deciliter      Allergies  No Known Allergies    SOCIAL HISTORY:    FAMILY HISTORY:  No pertinent family history in first degree relatives      Vital Signs Last 24 Hrs  T(C): 36.8 (27 Mar 2018 06:15), Max: 36.8 (27 Mar 2018 06:15)  T(F): 98.2 (27 Mar 2018 06:15), Max: 98.2 (27 Mar 2018 06:15)  HR: 59 (27 Mar 2018 06:15) (59 - 72)  BP: 165/71 (27 Mar 2018 06:15) (113/55 - 165/71)  BP(mean): 79 (26 Mar 2018 15:30) (79 - 79)  RR: 18 (27 Mar 2018 06:15) (16 - 18)  SpO2: 96% (27 Mar 2018 06:15) (91% - 96%)    Physical Exam:  General: pleasant, no acute distress  Pulmonary: slightly diminished at base, no wheezing or rales    Cardiovascular: RRR, normal S1S2  GI: soft, non-tender, non-distended   : + BPH, voiding freely   Extremities: + 1 LE edema B/L   Skin: warm to touch, no rashes or redness         LABS:                        12.8   15.60 )-----------( 295      ( 26 Mar 2018 07:28 )             39.0     03-27    142  |  100  |  28<H>  ----------------------------<  201<H>  4.4   |  31  |  1.32<H>    Ca    9.3      27 Mar 2018 07:09    RADIOLOGY & ADDITIONAL STUDIES:  TTE < from: Transthoracic Echocardiogram (03.27.18 @ 12:24) >  Conclusions:  Technically difficult limited study to evaluate the  pericardium.  1. Endocardium not well visualized; hyperdynamic left  ventricular systolic function.  2. Normal right ventricular size and function.  3. Thickened pericardium. Trace pericardial effusion.  *** Compared with echocardiogram of 2/8/2018, less  pericardial fluid is seen on today's study. The pericardium  is thickened with trace fluid today.    < end of copied text >    TELE: A-Flutter with ventricular rate at 50s-70s     EKG: A-Flutter at vent rate 63 bpm on 3/26/2018 HPI:  83 year old  male PMH of HTN, HFpEF (65% on 2/2018), lumbar spinal stenosis, mild dementia, NPH, s/p ventriculostomy and recent hospitalization with [facility] (discharged from rehab on 2/23) presents to ED by EMS after patient fell to ground with prior symptom of dizziness (not loss of consciousness) and pt was found to be confused. Patient in ER was hypoxic to 84% on RA and noted to be Atrial Flutter with rate controlled. As per wife, pt was seen by his cardiologist, Dr. Aric Medina 3 weeks ago and " irregular rhythm" was found on EKG, Pradaxa was started at that time with 24 hr holter monitor on.  Pt was told "remaining irregular rhythm" on follow up appointment on 3/23. Denies chest pain, sob, dizziness, light headedness or palpitation. Pt currently remains mild dry cough.     PAST MEDICAL & SURGICAL HISTORY:  Lumbar spinal stenosis  Vitamin D deficiency  Mild dementia  OAB (overactive bladder)  Type 2 diabetes mellitus  BPH (benign prostatic hyperplasia)  Nephrotic syndrome: pt was instructed to avoid taking NSAIDS  Chronic lower back pain: unclear etiology per wife - no hx trauma, possibly arthritis  H/O recurrent urinary tract infection  Chronic Back Pain: L3-L4, L4-L5 compression  NPH (Normal Pressure Hydrocephalus): s/p ventriculostomy, last one in Jan 2012  Hypertension, Essential  Normal pressure hydrocephalus: s/p ventriculostomy of the third ventricle 2012      ROS:    General: Pt denies recent weight loss/fever/chills    Neurological: denies numbness or  sensation loss    Cardiovascular: denies chest pain/palpitations/leg edema    Respiratory and Thorax: + dry cough, denies SOB/wheezing    Gastrointestinal: denies abdominal pain/diarrhea/constipation/bloody stool    Genitourinary: denies urinary frequency/urgency/ dysuria    Musculoskeletal: denies joint pain or swelling, denies restricted motion    Hematologic: denies abnormal bleeding  	  MEDICATIONS  (STANDING):  ascorbic acid 500 milliGRAM(s) Oral daily  aspirin enteric coated 81 milliGRAM(s) Oral daily  atorvastatin 40 milliGRAM(s) Oral at bedtime  carvedilol 6.25 milliGRAM(s) Oral every 12 hours  dextrose 5%. 1000 milliLiter(s) (50 mL/Hr) IV Continuous <Continuous>  dextrose 50% Injectable 12.5 Gram(s) IV Push once  dextrose 50% Injectable 25 Gram(s) IV Push once  dextrose 50% Injectable 25 Gram(s) IV Push once  donepezil 10 milliGRAM(s) Oral at bedtime  enalapril 20 milliGRAM(s) Oral daily  fluticasone propionate 50 MICROgram(s)/spray Nasal Spray 1 Spray(s) Both Nostrils two times a day  galantamine ER 8 milliGRAM(s) Oral with breakfast  insulin glargine Injectable (LANTUS) 20 Unit(s) SubCutaneous at bedtime  insulin lispro (HumaLOG) corrective regimen sliding scale   SubCutaneous three times a day before meals  insulin lispro (HumaLOG) corrective regimen sliding scale   SubCutaneous at bedtime  ketotifen 0.025% Ophthalmic Solution 1 Drop(s) Both EYES two times a day  lactobacillus acidophilus 1 Tablet(s) Oral two times a day with meals  lidocaine   Patch 1 Patch Transdermal daily  pantoprazole    Tablet 40 milliGRAM(s) Oral before breakfast  pregabalin 50 milliGRAM(s) Oral two times a day  tamsulosin 0.4 milliGRAM(s) Oral at bedtime    MEDICATIONS  (PRN):  acetaminophen   Tablet. 650 milliGRAM(s) Oral every 6 hours PRN Mild Pain (1 - 3)  dextrose Gel 1 Dose(s) Oral once PRN Blood Glucose LESS THAN 70 milliGRAM(s)/deciliter  glucagon  Injectable 1 milliGRAM(s) IntraMuscular once PRN Glucose LESS THAN 70 milligrams/deciliter      Allergies  No Known Allergies    SOCIAL HISTORY:    FAMILY HISTORY:  No pertinent family history in first degree relatives      Vital Signs Last 24 Hrs  T(C): 36.8 (27 Mar 2018 06:15), Max: 36.8 (27 Mar 2018 06:15)  T(F): 98.2 (27 Mar 2018 06:15), Max: 98.2 (27 Mar 2018 06:15)  HR: 59 (27 Mar 2018 06:15) (59 - 72)  BP: 165/71 (27 Mar 2018 06:15) (113/55 - 165/71)  BP(mean): 79 (26 Mar 2018 15:30) (79 - 79)  RR: 18 (27 Mar 2018 06:15) (16 - 18)  SpO2: 96% (27 Mar 2018 06:15) (91% - 96%)    Physical Exam:  General: pleasant, no acute distress  Pulmonary: slightly diminished at base, no wheezing or rales    Cardiovascular: RRR, normal S1S2  GI: soft, non-tender, non-distended   : + BPH, voiding freely   Extremities: + 1 LE edema B/L   Skin: warm to touch, no rashes or redness         LABS:                        12.8   15.60 )-----------( 295      ( 26 Mar 2018 07:28 )             39.0     03-27    142  |  100  |  28<H>  ----------------------------<  201<H>  4.4   |  31  |  1.32<H>    Ca    9.3      27 Mar 2018 07:09    RADIOLOGY & ADDITIONAL STUDIES:  TTE < from: Transthoracic Echocardiogram (03.27.18 @ 12:24) >  Conclusions:  Technically difficult limited study to evaluate the  pericardium.  1. Endocardium not well visualized; hyperdynamic left  ventricular systolic function.  2. Normal right ventricular size and function.  3. Thickened pericardium. Trace pericardial effusion.  *** Compared with echocardiogram of 2/8/2018, less  pericardial fluid is seen on today's study. The pericardium  is thickened with trace fluid today.    < end of copied text >    TELE: A-Flutter with ventricular rate at 50s-70s     EKG: A-Flutter at vent rate 63 bpm on 3/26/2018

## 2018-03-27 NOTE — CONSULT NOTE ADULT - ATTENDING COMMENTS
NPH  -HCT reviewed, unchanged from prior HCT   -continue to monitor   -PT   -Fall precaution     Dementia   -cont. aricept and galantamine     Hypoxia on admission   -management per primary team    Patient encouraged to follow up as outpatient for continued neurological care.
typical atrial flutter. unclear if related to his syncope. spoke with patient about cardioversion and ablation. after discussion patient and wife prefer ablation. Will obtain MARYJANE first. risks discussed in detail. hope would be to minimize long term anticoagulation if there is no atrial fibrillation post ablation. Ultimately may benefit from WATCHMAN device.

## 2018-03-27 NOTE — CONSULT NOTE ADULT - PROBLEM SELECTOR RECOMMENDATION 9
- continue tele  - maintain K > 4.0, Mg > 2.0, replete as needed   - TTE: pending reading   - continue Coreg 6.25 mg BID for rate control   - CHADS-Vasc: 5 (CHF, HTN, Age1, DM), continue ASA 81 mg for now, then switch to Heparin gtt once echo confirmed stable in prior pericardial effusion on 2/8/18 (small to moderate)  - NPO post MN for possible MARYJANE/DCCV tomorrow once therapeutic PTT   - EP will follow     # [id number] New onset Aflutter   - continue tele  - maintain K > 4.0, Mg > 2.0, replete as needed   - TTE: pending reading   - continue Coreg 6.25 mg BID for rate control   - CHADS-Vasc: 5 (CHF, HTN, Age3, DM), may start Heparin gtt once echo confirmed stable in prior pericardial effusion on 2/8/18, and d/c ASA.   - NPO post MN for possible MARYJANE/DCCV tomorrow once therapeutic PTT   - EP will follow     # [id number] New onset Aflutter   - continue tele  - maintain K > 4.0, Mg > 2.0, replete as needed   - TTE (3/27): trace pericardial effusion, hyperdynamic LV function.   - continue Coreg 6.25 mg BID for rate control   - CHADS-Vasc: 5 (CHF, HTN, Age3, DM), may start Heparin gtt once echo confirmed stable in prior pericardial effusion on 2/8/18, and d/c ASA.   - NPO post MN for possible MARYJANE/DCCV tomorrow once therapeutic PTT   - EP will follow     # [id number] recent diagnosed Atrial Flutter (rate controlled)   - continue tele  - maintain K > 4.0, Mg > 2.0, replete as needed   - TTE (3/27): trace pericardial effusion (improving from 2/2018), hyperdynamic LV function.   - continue Coreg 6.25 mg BID for rate control   - CHADS-Vasc: 5 (CHF, HTN, Age3, DM), start Heparin gtt now, d/c ASA   - NPO post MN for possible MARYJANE/ aflutter ablation tomorrow once therapeutic PTT  - please, send Type and screen x2    - EP will follow     # [id number]

## 2018-03-27 NOTE — PROGRESS NOTE ADULT - ASSESSMENT
83 year old  male PMH of BPH, HTN, lumbar spinal stenosis, dementia and NPH, history of ventriulostomy, presenting [place] patient [place] restaurant after patient fell to ground and was confused. Found to be hypoxic in the 88. Patient in ER was hypoxic to 84 on RA. Placed on nasal cannula. History of CHF and is on a "water pill" that is not lasix as this didn't work for him in the past. No worsening of chronic leg swelling over the past few days. No syncope, he remember the fall. Denies fevers for the past few days. No new meds.    CV: Had TTE 2/08/18. EF normal 65 % with normal LV, RV function. BP stable 131/66. Continue Coreg 6.25 mg bid and Vasotec 20 mg/day, ASA 81 mg/day and Crestor 10 mg/day. Continue telemetry which shows rate control A.flutter. Torsemide stopped today. Cardio is considering DCCV.  EP consult called. Cardio prefers updated TTE.     Pulmonary: CXR notes small left effusion, cannot r/o infection. CT chest non-contrast shows small left effusion, no obvious pneumonia or PVC.    hypoxemia.     Urology: Continue Flomax .4 mg/day for BPH.     Neuro: Continue Aricept 10 mg/day. PT eval requested.  Restarted Razadyne 8 mg/day. CT head shows no change in size of ventricles. NPH stable. No further workup needed.       Endo: hold all oral meds. Add Lantus 20 units at night and SSC.  BS better 160-310. 83 year old  male PMH of BPH, HTN, lumbar spinal stenosis, dementia and NPH, history of ventriulostomy, presenting after EMS took patient from restaurant after patient fell to ground and was confused. Found to be hypoxic in the 88. Patient in ER was hypoxic to 84 on RA. Placed on nasal cannula. History of CHF and is on a "water pill" that is not lasix as this didn't work for him in the past. No worsening of chronic leg swelling over the past few days. No syncope, he remember the fall. Denies fevers for the past few days. No new meds.    CV: Had TTE 2/08/18. EF normal 65 % with normal LV, RV function. A.flutter is new onset. BP stable 131/66. Continue Coreg 6.25 mg bid and Vasotec 20 mg/day, ASA 81 mg/day and Crestor 10 mg/day. Continue telemetry which shows rate control A.flutter. Torsemide stopped today. Cardio is considering DCCV.  EP consult called. Cardio prefers updated TTE.     Pulmonary: CXR notes small left effusion, cannot r/o infection. CT chest non-contrast shows small left effusion, no obvious pneumonia or PVC.    hypoxemia.     Urology: Continue Flomax .4 mg/day for BPH.     Neuro: Continue Aricept 10 mg/day. PT eval requested.  Restarted Razadyne 8 mg/day. CT head shows no change in size of ventricles. NPH stable. No further workup needed.       Endo: hold all oral meds. Add Lantus 20 units at night and SSC.  BS better 160-310.

## 2018-03-27 NOTE — PROGRESS NOTE ADULT - SUBJECTIVE AND OBJECTIVE BOX
INTERVAL HPI/OVERNIGHT EVENTS:  Pt seen and examined at bedside.     Allergies/Intolerance: No Known Allergies      MEDICATIONS  (STANDING):  ascorbic acid 500 milliGRAM(s) Oral daily  aspirin enteric coated 81 milliGRAM(s) Oral daily  atorvastatin 40 milliGRAM(s) Oral at bedtime  carvedilol 6.25 milliGRAM(s) Oral every 12 hours  dextrose 5%. 1000 milliLiter(s) (50 mL/Hr) IV Continuous <Continuous>  dextrose 50% Injectable 12.5 Gram(s) IV Push once  dextrose 50% Injectable 25 Gram(s) IV Push once  dextrose 50% Injectable 25 Gram(s) IV Push once  donepezil 10 milliGRAM(s) Oral at bedtime  enalapril 20 milliGRAM(s) Oral daily  fluticasone propionate 50 MICROgram(s)/spray Nasal Spray 1 Spray(s) Both Nostrils two times a day  galantamine ER 8 milliGRAM(s) Oral with breakfast  insulin glargine Injectable (LANTUS) 20 Unit(s) SubCutaneous at bedtime  insulin lispro (HumaLOG) corrective regimen sliding scale   SubCutaneous three times a day before meals  insulin lispro (HumaLOG) corrective regimen sliding scale   SubCutaneous at bedtime  ketotifen 0.025% Ophthalmic Solution 1 Drop(s) Both EYES two times a day  lactobacillus acidophilus 1 Tablet(s) Oral two times a day with meals  lidocaine   Patch 1 Patch Transdermal daily  pantoprazole    Tablet 40 milliGRAM(s) Oral before breakfast  pregabalin 50 milliGRAM(s) Oral two times a day  tamsulosin 0.4 milliGRAM(s) Oral at bedtime  torsemide 20 milliGRAM(s) Oral daily    MEDICATIONS  (PRN):  acetaminophen   Tablet. 650 milliGRAM(s) Oral every 6 hours PRN Mild Pain (1 - 3)  dextrose Gel 1 Dose(s) Oral once PRN Blood Glucose LESS THAN 70 milliGRAM(s)/deciliter  glucagon  Injectable 1 milliGRAM(s) IntraMuscular once PRN Glucose LESS THAN 70 milligrams/deciliter        ROS: all systems reviewed and wnl      PHYSICAL EXAMINATION:  Vital Signs Last 24 Hrs  T(C): 36.8 (27 Mar 2018 06:15), Max: 36.8 (26 Mar 2018 12:20)  T(F): 98.2 (27 Mar 2018 06:15), Max: 98.2 (26 Mar 2018 12:20)  HR: 59 (27 Mar 2018 06:15) (53 - 72)  BP: 165/71 (27 Mar 2018 06:15) (113/55 - 165/71)  BP(mean): 79 (26 Mar 2018 15:30) (79 - 79)  RR: 18 (27 Mar 2018 06:15) (16 - 18)  SpO2: 96% (27 Mar 2018 06:15) (91% - 97%)  CAPILLARY BLOOD GLUCOSE      POCT Blood Glucose.: 177 mg/dL (27 Mar 2018 07:42)  POCT Blood Glucose.: 256 mg/dL (26 Mar 2018 21:39)  POCT Blood Glucose.: 278 mg/dL (26 Mar 2018 17:32)  POCT Blood Glucose.: 171 mg/dL (26 Mar 2018 11:44)       @ 07:  -   @ 07:00  --------------------------------------------------------  IN: 1220 mL / OUT: 1300 mL / NET: -80 mL     @ 07: @ 09:34  --------------------------------------------------------  IN: 240 mL / OUT: 300 mL / NET: -60 mL        GENERAL:   NECK: supple, No JVD  CHEST/LUNG: clear to auscultation bilaterally; no rales, rhonchi, or wheezing b/l  HEART: normal S1, S2  ABDOMEN: BS+, soft, ND, NT   EXTREMITIES:  pulses palpable; no clubbing, cyanosis, or edema b/l LEs  SKIN: no rashes or lesions      LABS:                        12.8   15.60 )-----------( 295      ( 26 Mar 2018 07:28 )             39.0         142  |  100  |  28<H>  ----------------------------<  201<H>  4.4   |  31  |  1.32<H>    Ca    9.3      27 Mar 2018 07:09        Urinalysis Basic - ( 25 Mar 2018 12:37 )    Color: Yellow / Appearance: Clear / S.021 / pH: x  Gluc: x / Ketone: Small  / Bili: Negative / Urobili: Negative   Blood: x / Protein: Trace / Nitrite: Negative   Leuk Esterase: Negative / RBC: x / WBC 0-2 /HPF   Sq Epi: x / Non Sq Epi: OCC /HPF / Bacteria: x INTERVAL HPI/OVERNIGHT EVENTS:  Pt seen and examined at bedside.     Allergies/Intolerance: No Known Allergies      MEDICATIONS  (STANDING):  ascorbic acid 500 milliGRAM(s) Oral daily  aspirin enteric coated 81 milliGRAM(s) Oral daily  atorvastatin 40 milliGRAM(s) Oral at bedtime  carvedilol 6.25 milliGRAM(s) Oral every 12 hours  dextrose 5%. 1000 milliLiter(s) (50 mL/Hr) IV Continuous <Continuous>  dextrose 50% Injectable 12.5 Gram(s) IV Push once  dextrose 50% Injectable 25 Gram(s) IV Push once  dextrose 50% Injectable 25 Gram(s) IV Push once  donepezil 10 milliGRAM(s) Oral at bedtime  enalapril 20 milliGRAM(s) Oral daily  fluticasone propionate 50 MICROgram(s)/spray Nasal Spray 1 Spray(s) Both Nostrils two times a day  galantamine ER 8 milliGRAM(s) Oral with breakfast  insulin glargine Injectable (LANTUS) 20 Unit(s) SubCutaneous at bedtime  insulin lispro (HumaLOG) corrective regimen sliding scale   SubCutaneous three times a day before meals  insulin lispro (HumaLOG) corrective regimen sliding scale   SubCutaneous at bedtime  ketotifen 0.025% Ophthalmic Solution 1 Drop(s) Both EYES two times a day  lactobacillus acidophilus 1 Tablet(s) Oral two times a day with meals  lidocaine   Patch 1 Patch Transdermal daily  pantoprazole    Tablet 40 milliGRAM(s) Oral before breakfast  pregabalin 50 milliGRAM(s) Oral two times a day  tamsulosin 0.4 milliGRAM(s) Oral at bedtime  torsemide 20 milliGRAM(s) Oral daily    MEDICATIONS  (PRN):  acetaminophen   Tablet. 650 milliGRAM(s) Oral every 6 hours PRN Mild Pain (1 - 3)  dextrose Gel 1 Dose(s) Oral once PRN Blood Glucose LESS THAN 70 milliGRAM(s)/deciliter  glucagon  Injectable 1 milliGRAM(s) IntraMuscular once PRN Glucose LESS THAN 70 milligrams/deciliter        ROS: all systems reviewed and wnl      PHYSICAL EXAMINATION:  Vital Signs Last 24 Hrs  T(C): 36.8 (27 Mar 2018 06:15), Max: 36.8 (26 Mar 2018 12:20)  T(F): 98.2 (27 Mar 2018 06:15), Max: 98.2 (26 Mar 2018 12:20)  HR: 59 (27 Mar 2018 06:15) (53 - 72)  BP: 165/71 (27 Mar 2018 06:15) (113/55 - 165/71)  BP(mean): 79 (26 Mar 2018 15:30) (79 - 79)  RR: 18 (27 Mar 2018 06:15) (16 - 18)  SpO2: 96% (27 Mar 2018 06:15) (91% - 97%)  CAPILLARY BLOOD GLUCOSE      POCT Blood Glucose.: 177 mg/dL (27 Mar 2018 07:42)  POCT Blood Glucose.: 256 mg/dL (26 Mar 2018 21:39)  POCT Blood Glucose.: 278 mg/dL (26 Mar 2018 17:32)  POCT Blood Glucose.: 171 mg/dL (26 Mar 2018 11:44)       @ 07: @ 07:00  --------------------------------------------------------  IN: 1220 mL / OUT: 1300 mL / NET: -80 mL     @ 07: @ 09:34  --------------------------------------------------------  IN: 240 mL / OUT: 300 mL / NET: -60 mL        GENERAL: stable, ambulates well with PT, NAD, no SOB or CP.   NECK: supple, No JVD  CHEST/LUNG: clear to auscultation bilaterally; no rales, rhonchi, or wheezing b/l  HEART: normal S1, S2  ABDOMEN: BS+, soft, ND, NT   EXTREMITIES:  pulses palpable; no clubbing, cyanosis, or edema b/l LEs  SKIN: no rashes or lesions      LABS:                        12.8   15.60 )-----------( 295      ( 26 Mar 2018 07:28 )             39.0         142  |  100  |  28<H>  ----------------------------<  201<H>  4.4   |  31  |  1.32<H>    Ca    9.3      27 Mar 2018 07:09        Urinalysis Basic - ( 25 Mar 2018 12:37 )    Color: Yellow / Appearance: Clear / S.021 / pH: x  Gluc: x / Ketone: Small  / Bili: Negative / Urobili: Negative   Blood: x / Protein: Trace / Nitrite: Negative   Leuk Esterase: Negative / RBC: x / WBC 0-2 /HPF   Sq Epi: x / Non Sq Epi: OCC /HPF / Bacteria: x

## 2018-03-28 DIAGNOSIS — I48.3 TYPICAL ATRIAL FLUTTER: ICD-10-CM

## 2018-03-28 LAB
ANION GAP SERPL CALC-SCNC: 12 MMOL/L — SIGNIFICANT CHANGE UP (ref 5–17)
APTT BLD: 124.4 SEC — CRITICAL HIGH (ref 27.5–37.4)
APTT BLD: 73.3 SEC — HIGH (ref 27.5–37.4)
BLD GP AB SCN SERPL QL: NEGATIVE — SIGNIFICANT CHANGE UP
BUN SERPL-MCNC: 20 MG/DL — SIGNIFICANT CHANGE UP (ref 7–23)
CALCIUM SERPL-MCNC: 9.6 MG/DL — SIGNIFICANT CHANGE UP (ref 8.4–10.5)
CHLORIDE SERPL-SCNC: 100 MMOL/L — SIGNIFICANT CHANGE UP (ref 96–108)
CO2 SERPL-SCNC: 30 MMOL/L — SIGNIFICANT CHANGE UP (ref 22–31)
CREAT SERPL-MCNC: 1.14 MG/DL — SIGNIFICANT CHANGE UP (ref 0.5–1.3)
GLUCOSE BLDC GLUCOMTR-MCNC: 176 MG/DL — HIGH (ref 70–99)
GLUCOSE BLDC GLUCOMTR-MCNC: 205 MG/DL — HIGH (ref 70–99)
GLUCOSE BLDC GLUCOMTR-MCNC: 246 MG/DL — HIGH (ref 70–99)
GLUCOSE SERPL-MCNC: 271 MG/DL — HIGH (ref 70–99)
HCT VFR BLD CALC: 42.5 % — SIGNIFICANT CHANGE UP (ref 39–50)
HCT VFR BLD CALC: 46 % — SIGNIFICANT CHANGE UP (ref 39–50)
HGB BLD-MCNC: 13.9 G/DL — SIGNIFICANT CHANGE UP (ref 13–17)
HGB BLD-MCNC: 14.3 G/DL — SIGNIFICANT CHANGE UP (ref 13–17)
INR BLD: 1.22 RATIO — HIGH (ref 0.88–1.16)
MCHC RBC-ENTMCNC: 25.4 PG — LOW (ref 27–34)
MCHC RBC-ENTMCNC: 26.4 PG — LOW (ref 27–34)
MCHC RBC-ENTMCNC: 31.1 GM/DL — LOW (ref 32–36)
MCHC RBC-ENTMCNC: 32.8 GM/DL — SIGNIFICANT CHANGE UP (ref 32–36)
MCV RBC AUTO: 80.6 FL — SIGNIFICANT CHANGE UP (ref 80–100)
MCV RBC AUTO: 81.7 FL — SIGNIFICANT CHANGE UP (ref 80–100)
NRBC # BLD: 0 /100 WBCS — SIGNIFICANT CHANGE UP (ref 0–0)
PLATELET # BLD AUTO: 327 K/UL — SIGNIFICANT CHANGE UP (ref 150–400)
PLATELET # BLD AUTO: 353 K/UL — SIGNIFICANT CHANGE UP (ref 150–400)
POTASSIUM SERPL-MCNC: 3.8 MMOL/L — SIGNIFICANT CHANGE UP (ref 3.5–5.3)
POTASSIUM SERPL-SCNC: 3.8 MMOL/L — SIGNIFICANT CHANGE UP (ref 3.5–5.3)
PROTHROM AB SERPL-ACNC: 13.8 SEC — HIGH (ref 10–13.1)
RBC # BLD: 5.28 M/UL — SIGNIFICANT CHANGE UP (ref 4.2–5.8)
RBC # BLD: 5.63 M/UL — SIGNIFICANT CHANGE UP (ref 4.2–5.8)
RBC # FLD: 13 % — SIGNIFICANT CHANGE UP (ref 10.3–14.5)
RBC # FLD: 14.3 % — SIGNIFICANT CHANGE UP (ref 10.3–14.5)
RH IG SCN BLD-IMP: POSITIVE — SIGNIFICANT CHANGE UP
SODIUM SERPL-SCNC: 142 MMOL/L — SIGNIFICANT CHANGE UP (ref 135–145)
WBC # BLD: 8.69 K/UL — SIGNIFICANT CHANGE UP (ref 3.8–10.5)
WBC # BLD: 8.9 K/UL — SIGNIFICANT CHANGE UP (ref 3.8–10.5)
WBC # FLD AUTO: 8.69 K/UL — SIGNIFICANT CHANGE UP (ref 3.8–10.5)
WBC # FLD AUTO: 8.9 K/UL — SIGNIFICANT CHANGE UP (ref 3.8–10.5)

## 2018-03-28 PROCEDURE — 93653 COMPRE EP EVAL TX SVT: CPT

## 2018-03-28 PROCEDURE — 99233 SBSQ HOSP IP/OBS HIGH 50: CPT | Mod: 25

## 2018-03-28 PROCEDURE — 93325 DOPPLER ECHO COLOR FLOW MAPG: CPT | Mod: 26

## 2018-03-28 PROCEDURE — 93312 ECHO TRANSESOPHAGEAL: CPT | Mod: 26

## 2018-03-28 PROCEDURE — 93613 INTRACARDIAC EPHYS 3D MAPG: CPT

## 2018-03-28 PROCEDURE — 93320 DOPPLER ECHO COMPLETE: CPT | Mod: 26

## 2018-03-28 RX ORDER — HEPARIN SODIUM 5000 [USP'U]/ML
8000 INJECTION INTRAVENOUS; SUBCUTANEOUS EVERY 6 HOURS
Qty: 0 | Refills: 0 | Status: DISCONTINUED | OUTPATIENT
Start: 2018-03-28 | End: 2018-03-29

## 2018-03-28 RX ORDER — HEPARIN SODIUM 5000 [USP'U]/ML
4000 INJECTION INTRAVENOUS; SUBCUTANEOUS EVERY 6 HOURS
Qty: 0 | Refills: 0 | Status: DISCONTINUED | OUTPATIENT
Start: 2018-03-28 | End: 2018-03-29

## 2018-03-28 RX ORDER — INSULIN GLARGINE 100 [IU]/ML
24 INJECTION, SOLUTION SUBCUTANEOUS AT BEDTIME
Qty: 0 | Refills: 0 | Status: DISCONTINUED | OUTPATIENT
Start: 2018-03-28 | End: 2018-03-30

## 2018-03-28 RX ORDER — HEPARIN SODIUM 5000 [USP'U]/ML
INJECTION INTRAVENOUS; SUBCUTANEOUS
Qty: 25000 | Refills: 0 | Status: DISCONTINUED | OUTPATIENT
Start: 2018-03-28 | End: 2018-03-29

## 2018-03-28 RX ADMIN — TAMSULOSIN HYDROCHLORIDE 0.4 MILLIGRAM(S): 0.4 CAPSULE ORAL at 22:34

## 2018-03-28 RX ADMIN — INSULIN GLARGINE 24 UNIT(S): 100 INJECTION, SOLUTION SUBCUTANEOUS at 22:34

## 2018-03-28 RX ADMIN — Medication 20 MILLIGRAM(S): at 06:34

## 2018-03-28 RX ADMIN — Medication 1 TABLET(S): at 18:33

## 2018-03-28 RX ADMIN — Medication 2: at 07:46

## 2018-03-28 RX ADMIN — HEPARIN SODIUM 1600 UNIT(S)/HR: 5000 INJECTION INTRAVENOUS; SUBCUTANEOUS at 08:30

## 2018-03-28 RX ADMIN — Medication 1: at 18:33

## 2018-03-28 RX ADMIN — KETOTIFEN FUMARATE 1 DROP(S): 0.34 SOLUTION OPHTHALMIC at 06:35

## 2018-03-28 RX ADMIN — HEPARIN SODIUM 1800 UNIT(S)/HR: 5000 INJECTION INTRAVENOUS; SUBCUTANEOUS at 00:32

## 2018-03-28 RX ADMIN — PANTOPRAZOLE SODIUM 40 MILLIGRAM(S): 20 TABLET, DELAYED RELEASE ORAL at 06:35

## 2018-03-28 RX ADMIN — DONEPEZIL HYDROCHLORIDE 10 MILLIGRAM(S): 10 TABLET, FILM COATED ORAL at 22:58

## 2018-03-28 RX ADMIN — Medication 1 SPRAY(S): at 06:35

## 2018-03-28 RX ADMIN — Medication 50 MILLIGRAM(S): at 06:35

## 2018-03-28 RX ADMIN — CARVEDILOL PHOSPHATE 6.25 MILLIGRAM(S): 80 CAPSULE, EXTENDED RELEASE ORAL at 18:33

## 2018-03-28 RX ADMIN — ATORVASTATIN CALCIUM 40 MILLIGRAM(S): 80 TABLET, FILM COATED ORAL at 22:34

## 2018-03-28 RX ADMIN — HEPARIN SODIUM 1800 UNIT(S)/HR: 5000 INJECTION INTRAVENOUS; SUBCUTANEOUS at 20:31

## 2018-03-28 RX ADMIN — Medication 50 MILLIGRAM(S): at 18:33

## 2018-03-28 NOTE — PROGRESS NOTE ADULT - SUBJECTIVE AND OBJECTIVE BOX
INTERVAL HPI/OVERNIGHT EVENTS:  Pt seen and examined at bedside.     Allergies/Intolerance: No Known Allergies      MEDICATIONS  (STANDING):  ascorbic acid 500 milliGRAM(s) Oral daily  atorvastatin 40 milliGRAM(s) Oral at bedtime  carvedilol 6.25 milliGRAM(s) Oral every 12 hours  dextrose 5%. 1000 milliLiter(s) (50 mL/Hr) IV Continuous <Continuous>  dextrose 50% Injectable 12.5 Gram(s) IV Push once  dextrose 50% Injectable 25 Gram(s) IV Push once  dextrose 50% Injectable 25 Gram(s) IV Push once  donepezil 10 milliGRAM(s) Oral at bedtime  enalapril 20 milliGRAM(s) Oral daily  fluticasone propionate 50 MICROgram(s)/spray Nasal Spray 1 Spray(s) Both Nostrils two times a day  galantamine ER 8 milliGRAM(s) Oral with breakfast  heparin  Infusion.  Unit(s)/Hr (18 mL/Hr) IV Continuous <Continuous>  insulin glargine Injectable (LANTUS) 20 Unit(s) SubCutaneous at bedtime  insulin lispro (HumaLOG) corrective regimen sliding scale   SubCutaneous three times a day before meals  insulin lispro (HumaLOG) corrective regimen sliding scale   SubCutaneous at bedtime  ketotifen 0.025% Ophthalmic Solution 1 Drop(s) Both EYES two times a day  lactobacillus acidophilus 1 Tablet(s) Oral two times a day with meals  lidocaine   Patch 1 Patch Transdermal daily  pantoprazole    Tablet 40 milliGRAM(s) Oral before breakfast  pregabalin 50 milliGRAM(s) Oral two times a day  tamsulosin 0.4 milliGRAM(s) Oral at bedtime    MEDICATIONS  (PRN):  acetaminophen   Tablet. 650 milliGRAM(s) Oral every 6 hours PRN Mild Pain (1 - 3)  dextrose Gel 1 Dose(s) Oral once PRN Blood Glucose LESS THAN 70 milliGRAM(s)/deciliter  glucagon  Injectable 1 milliGRAM(s) IntraMuscular once PRN Glucose LESS THAN 70 milligrams/deciliter  heparin  Injectable 8000 Unit(s) IV Push every 6 hours PRN For aPTT less than 40  heparin  Injectable 4000 Unit(s) IV Push every 6 hours PRN For aPTT between 40 - 57        ROS: all systems reviewed and wnl      PHYSICAL EXAMINATION:  Vital Signs Last 24 Hrs  T(C): 36.7 (28 Mar 2018 04:35), Max: 37.1 (27 Mar 2018 17:26)  T(F): 98 (28 Mar 2018 04:35), Max: 98.7 (27 Mar 2018 17:26)  HR: 48 (28 Mar 2018 06:26) (48 - 67)  BP: 158/72 (28 Mar 2018 06:26) (135/71 - 165/71)  BP(mean): --  RR: 17 (28 Mar 2018 06:26) (16 - 18)  SpO2: 95% (28 Mar 2018 06:26) (92% - 95%)  CAPILLARY BLOOD GLUCOSE      POCT Blood Glucose.: 246 mg/dL (28 Mar 2018 07:27)  POCT Blood Glucose.: 288 mg/dL (27 Mar 2018 21:36)  POCT Blood Glucose.: 277 mg/dL (27 Mar 2018 17:23)  POCT Blood Glucose.: 273 mg/dL (27 Mar 2018 11:48)      03-27 @ 07:01  -  03-28 @ 07:00  --------------------------------------------------------  IN: 558 mL / OUT: 1400 mL / NET: -842 mL        GENERAL:   NECK: supple, No JVD  CHEST/LUNG: clear to auscultation bilaterally; no rales, rhonchi, or wheezing b/l  HEART: normal S1, S2  ABDOMEN: BS+, soft, ND, NT   EXTREMITIES:  pulses palpable; no clubbing, cyanosis, or edema b/l LEs  SKIN: no rashes or lesions      LABS:                        13.9   8.9   )-----------( 353      ( 27 Mar 2018 23:52 )             42.5     03-28    142  |  100  |  20  ----------------------------<  271<H>  3.8   |  30  |  1.14    Ca    9.6      28 Mar 2018 07:45      PTT - ( 28 Mar 2018 07:45 )  PTT:124.4 sec INTERVAL HPI/OVERNIGHT EVENTS:  Pt seen and examined at bedside.     Allergies/Intolerance: No Known Allergies      MEDICATIONS  (STANDING):  ascorbic acid 500 milliGRAM(s) Oral daily  atorvastatin 40 milliGRAM(s) Oral at bedtime  carvedilol 6.25 milliGRAM(s) Oral every 12 hours  dextrose 5%. 1000 milliLiter(s) (50 mL/Hr) IV Continuous <Continuous>  dextrose 50% Injectable 12.5 Gram(s) IV Push once  dextrose 50% Injectable 25 Gram(s) IV Push once  dextrose 50% Injectable 25 Gram(s) IV Push once  donepezil 10 milliGRAM(s) Oral at bedtime  enalapril 20 milliGRAM(s) Oral daily  fluticasone propionate 50 MICROgram(s)/spray Nasal Spray 1 Spray(s) Both Nostrils two times a day  galantamine ER 8 milliGRAM(s) Oral with breakfast  heparin  Infusion.  Unit(s)/Hr (18 mL/Hr) IV Continuous <Continuous>  insulin glargine Injectable (LANTUS) 20 Unit(s) SubCutaneous at bedtime  insulin lispro (HumaLOG) corrective regimen sliding scale   SubCutaneous three times a day before meals  insulin lispro (HumaLOG) corrective regimen sliding scale   SubCutaneous at bedtime  ketotifen 0.025% Ophthalmic Solution 1 Drop(s) Both EYES two times a day  lactobacillus acidophilus 1 Tablet(s) Oral two times a day with meals  lidocaine   Patch 1 Patch Transdermal daily  pantoprazole    Tablet 40 milliGRAM(s) Oral before breakfast  pregabalin 50 milliGRAM(s) Oral two times a day  tamsulosin 0.4 milliGRAM(s) Oral at bedtime    MEDICATIONS  (PRN):  acetaminophen   Tablet. 650 milliGRAM(s) Oral every 6 hours PRN Mild Pain (1 - 3)  dextrose Gel 1 Dose(s) Oral once PRN Blood Glucose LESS THAN 70 milliGRAM(s)/deciliter  glucagon  Injectable 1 milliGRAM(s) IntraMuscular once PRN Glucose LESS THAN 70 milligrams/deciliter  heparin  Injectable 8000 Unit(s) IV Push every 6 hours PRN For aPTT less than 40  heparin  Injectable 4000 Unit(s) IV Push every 6 hours PRN For aPTT between 40 - 57        ROS: all systems reviewed and wnl      PHYSICAL EXAMINATION:  Vital Signs Last 24 Hrs  T(C): 36.7 (28 Mar 2018 04:35), Max: 37.1 (27 Mar 2018 17:26)  T(F): 98 (28 Mar 2018 04:35), Max: 98.7 (27 Mar 2018 17:26)  HR: 48 (28 Mar 2018 06:26) (48 - 67)  BP: 158/72 (28 Mar 2018 06:26) (135/71 - 165/71)  BP(mean): --  RR: 17 (28 Mar 2018 06:26) (16 - 18)  SpO2: 95% (28 Mar 2018 06:26) (92% - 95%)  CAPILLARY BLOOD GLUCOSE      POCT Blood Glucose.: 246 mg/dL (28 Mar 2018 07:27)  POCT Blood Glucose.: 288 mg/dL (27 Mar 2018 21:36)  POCT Blood Glucose.: 277 mg/dL (27 Mar 2018 17:23)  POCT Blood Glucose.: 273 mg/dL (27 Mar 2018 11:48)      03-27 @ 07:01  -  03-28 @ 07:00  --------------------------------------------------------  IN: 558 mL / OUT: 1400 mL / NET: -842 mL        GENERAL: in bed, alert, NAD, no CP or SOB  NECK: supple, No JVD  CHEST/LUNG: clear to auscultation bilaterally; no rales, rhonchi, or wheezing b/l  HEART: normal S1, S2  ABDOMEN: BS+, soft, ND, NT   EXTREMITIES:  pulses palpable; no clubbing, cyanosis, or edema b/l LEs  SKIN: no rashes or lesions      LABS:                        13.9   8.9   )-----------( 353      ( 27 Mar 2018 23:52 )             42.5     03-28    142  |  100  |  20  ----------------------------<  271<H>  3.8   |  30  |  1.14    Ca    9.6      28 Mar 2018 07:45      PTT - ( 28 Mar 2018 07:45 )  PTT:124.4 sec

## 2018-03-28 NOTE — PROGRESS NOTE ADULT - ASSESSMENT
Atrial Flutter s/p successful ablation today    Continue present management  IV Heparin  Then po Pradaxa  Return for Watchman per EPS

## 2018-03-28 NOTE — PROGRESS NOTE ADULT - ATTENDING COMMENTS
I personally examined this patient
status post ablation. remains in sinus. anticoagulation. will follow.

## 2018-03-28 NOTE — PROGRESS NOTE ADULT - SUBJECTIVE AND OBJECTIVE BOX
INTERVAL HPI/OVERNIGHT EVENTS: briefly ventricular rate at 38 bpm overnight, remains mostly in 45-70s. denies chest pain/ discomfort, SOB, dizziness/ light headedness or palpitation.     MEDICATIONS  (STANDING):  ascorbic acid 500 milliGRAM(s) Oral daily  atorvastatin 40 milliGRAM(s) Oral at bedtime  carvedilol 6.25 milliGRAM(s) Oral every 12 hours  dextrose 5%. 1000 milliLiter(s) (50 mL/Hr) IV Continuous <Continuous>  dextrose 50% Injectable 12.5 Gram(s) IV Push once  dextrose 50% Injectable 25 Gram(s) IV Push once  dextrose 50% Injectable 25 Gram(s) IV Push once  donepezil 10 milliGRAM(s) Oral at bedtime  enalapril 20 milliGRAM(s) Oral daily  fluticasone propionate 50 MICROgram(s)/spray Nasal Spray 1 Spray(s) Both Nostrils two times a day  galantamine ER 8 milliGRAM(s) Oral with breakfast  heparin  Infusion.  Unit(s)/Hr (18 mL/Hr) IV Continuous <Continuous>  insulin glargine Injectable (LANTUS) 20 Unit(s) SubCutaneous at bedtime  insulin lispro (HumaLOG) corrective regimen sliding scale   SubCutaneous three times a day before meals  insulin lispro (HumaLOG) corrective regimen sliding scale   SubCutaneous at bedtime  ketotifen 0.025% Ophthalmic Solution 1 Drop(s) Both EYES two times a day  lactobacillus acidophilus 1 Tablet(s) Oral two times a day with meals  lidocaine   Patch 1 Patch Transdermal daily  pantoprazole    Tablet 40 milliGRAM(s) Oral before breakfast  pregabalin 50 milliGRAM(s) Oral two times a day  tamsulosin 0.4 milliGRAM(s) Oral at bedtime    MEDICATIONS  (PRN):  acetaminophen   Tablet. 650 milliGRAM(s) Oral every 6 hours PRN Mild Pain (1 - 3)  dextrose Gel 1 Dose(s) Oral once PRN Blood Glucose LESS THAN 70 milliGRAM(s)/deciliter  glucagon  Injectable 1 milliGRAM(s) IntraMuscular once PRN Glucose LESS THAN 70 milligrams/deciliter  heparin  Injectable 8000 Unit(s) IV Push every 6 hours PRN For aPTT less than 40  heparin  Injectable 4000 Unit(s) IV Push every 6 hours PRN For aPTT between 40 - 57      Allergies  No Known Allergies    ROS:  General: Pt denies fever/chills    Cardiovascular: denies chest pain/palpitations/leg edema    Respiratory and Thorax: denies SOB/wheezing, + dry cough     Gastrointestinal: denies abdominal pain/diarrhea/constipation/bloody stool    Musculoskeletal: denies joint pain or swelling, denies restricted motion    Skin: denies rashes/sores    Hematologic: denies abnormal bleeding    Vital Signs Last 24 Hrs  T(C): 36.7 (28 Mar 2018 04:35), Max: 37.1 (27 Mar 2018 17:26)  T(F): 98 (28 Mar 2018 04:35), Max: 98.7 (27 Mar 2018 17:26)  HR: 48 (28 Mar 2018 06:26) (48 - 67)  BP: 158/72 (28 Mar 2018 06:26) (135/71 - 165/71)  RR: 17 (28 Mar 2018 06:26) (16 - 18)  SpO2: 95% (28 Mar 2018 06:26) (92% - 95%)    Physical Exam:  Constitutional: well developed, well nourished,  and no acute distress    Neurological: Alert & Oriented x 3, forgetful at times     HEENT:   Neck supple.    Respiratory: CTA B/L, No wheezing/crackles/rhonchi    Cardiovascular: (+) S1 & S2, RRR    Gastrointestinal: soft, NT, nondistended, (+) BS    Genitourinary: non distended bladder, voiding freely    Extremities: + 1 edema in B/L LE,  No clubbing, No cyanosis    Skin:  normal skin color and pigmentation, no skin lesions      LABS:                        13.9   8.9   )-----------( 353      ( 27 Mar 2018 23:52 )             42.5     03-28    142  |  100  |  20  ----------------------------<  271<H>  3.8   |  30  |  1.14    Ca    9.6      28 Mar 2018 07:45      PTT - ( 28 Mar 2018 07:45 )  PTT:124.4 sec      RADIOLOGY & ADDITIONAL TESTS:  TTE < from: Transthoracic Echocardiogram (03.27.18 @ 12:24) >  Conclusions:  Technically difficult limited study to evaluate the  pericardium.  1. Endocardium not well visualized; hyperdynamic left  ventricular systolic function.  2. Normal right ventricular size and function.  3. Thickened pericardium. Trace pericardial effusion.  *** Compared with echocardiogram of 2/8/2018, less  pericardial fluid is seen on today's study. The pericardium  is thickened with trace fluid today.    < end of copied text >    TELE: Aflutter with ventricular rate at 45-70s, episode of rate at 38 bpm briefly at night

## 2018-03-28 NOTE — PROGRESS NOTE ADULT - SUBJECTIVE AND OBJECTIVE BOX
Pre-op Diagnosis: atrial flutter    Post-op Diagnosis: Same    Procedure: same    Electrophysiologist: Katty Dixon    Assistant: None    Anesthesia: general    Access: right femoral vein    Description: ablation of CTI    Complications: None    EBL: < 30 cc    Disposition: Stable    Plan:   Heparin in four hours with no bolus  bedrest four hours

## 2018-03-28 NOTE — PROGRESS NOTE ADULT - SUBJECTIVE AND OBJECTIVE BOX
Patient is a 83y old  Male who presents with a chief complaint of SOB and fatigue  Pt found to be in atrial flutter  He had successful Ablation today  Now sitting up in bed comfortably  Family at bedside  No CP or SOB      Allergies:   No Known Allergies      MEDICATIONS  (STANDING):  ascorbic acid 500 milliGRAM(s) Oral daily  atorvastatin 40 milliGRAM(s) Oral at bedtime  carvedilol 6.25 milliGRAM(s) Oral every 12 hours  dextrose 5%. 1000 milliLiter(s) (50 mL/Hr) IV Continuous <Continuous>  dextrose 50% Injectable 12.5 Gram(s) IV Push once  dextrose 50% Injectable 25 Gram(s) IV Push once  dextrose 50% Injectable 25 Gram(s) IV Push once  donepezil 10 milliGRAM(s) Oral at bedtime  enalapril 20 milliGRAM(s) Oral daily  fluticasone propionate 50 MICROgram(s)/spray Nasal Spray 1 Spray(s) Both Nostrils two times a day  galantamine ER 8 milliGRAM(s) Oral with breakfast  heparin  Infusion.  Unit(s)/Hr (18 mL/Hr) IV Continuous <Continuous>  insulin glargine Injectable (LANTUS) 24 Unit(s) SubCutaneous at bedtime  insulin lispro (HumaLOG) corrective regimen sliding scale   SubCutaneous three times a day before meals  insulin lispro (HumaLOG) corrective regimen sliding scale   SubCutaneous at bedtime  ketotifen 0.025% Ophthalmic Solution 1 Drop(s) Both EYES two times a day  lactobacillus acidophilus 1 Tablet(s) Oral two times a day with meals  lidocaine   Patch 1 Patch Transdermal daily  pantoprazole    Tablet 40 milliGRAM(s) Oral before breakfast  pregabalin 50 milliGRAM(s) Oral two times a day  tamsulosin 0.4 milliGRAM(s) Oral at bedtime    MEDICATIONS  (PRN):  acetaminophen   Tablet. 650 milliGRAM(s) Oral every 6 hours PRN Mild Pain (1 - 3)  dextrose Gel 1 Dose(s) Oral once PRN Blood Glucose LESS THAN 70 milliGRAM(s)/deciliter  glucagon  Injectable 1 milliGRAM(s) IntraMuscular once PRN Glucose LESS THAN 70 milligrams/deciliter  heparin  Injectable 8000 Unit(s) IV Push every 6 hours PRN For aPTT less than 40  heparin  Injectable 4000 Unit(s) IV Push every 6 hours PRN For aPTT between 40 - 57      Daily     Daily   Drug Dosing Weight  Height (cm): 177.8 (25 Mar 2018 02:43)  Weight (kg): 97.6 (26 Mar 2018 04:41)  BMI (kg/m2): 30.9 (26 Mar 2018 04:41)  BSA (m2): 2.15 (26 Mar 2018 04:41)  Vital Signs Last 24 Hrs  T(C): 36.9 (28 Mar 2018 17:00), Max: 36.9 (28 Mar 2018 17:00)  T(F): 98.4 (28 Mar 2018 17:00), Max: 98.4 (28 Mar 2018 17:00)  HR: 93 (28 Mar 2018 18:36) (48 - 93)  BP: 151/74 (28 Mar 2018 18:36) (113/676 - 165/71)  BP(mean): 96 (28 Mar 2018 17:00) (82 - 104)  RR: 14 (28 Mar 2018 17:00) (14 - 18)  SpO2: 97% (28 Mar 2018 17:00) (93% - 97%)    I&O's Summary    27 Mar 2018 07:01  -  28 Mar 2018 07:00  --------------------------------------------------------  IN: 558 mL / OUT: 1400 mL / NET: -842 mL    28 Mar 2018 07:01  -  28 Mar 2018 18:51  --------------------------------------------------------  IN: 0 mL / OUT: 0 mL / NET: 0 mL        PHYSICAL EXAM:    General Appearance:    Comfortable, AAO X 3, in no distress.   HEENT:                       Atraumatic, PERRLA, EOMI, conjunctiva clear.   Neck:                          Supple, no adenopathy, no thyromegaly, no JVD, no carotid bruit  Back:                          Symmetric, no CV angle fullness or tenderness, no soft tissue tenderness.  Chest:                         Clear to auscultation, no wheezes, rales or rhonchi, symmetric air entry, no tachypnea, retractions or cyanosis  Heart:                          S1, S2 normal, no gallop, no murmur.  Abdomen:                    Soft, non-tender, bowel sounds active. No palpable masses.   Extremities:                 no cyanosis, no edema, no joint swelling. Peripheral pulses normal  Skin:                           Skin color, texture normal. No rashes   Neurologic:                  Alert and oriented X3, cranial nerves intact, sensory and motor normal.      INTERPRETATION OF TELEMETRY:    ECG:< from: 12 Lead ECG (03.26.18 @ 05:37) >  Ventricular Rate 63 BPM    Atrial Rate 252 BPM    QRS Duration 106 ms     ms    QTc 468 ms    P Axis 77 degrees    R Axis 11 degrees    T Axis 106 degrees    Diagnosis Line ATRIAL FLUTTER WITH VARIABLE A-V BLOCK  NONSPECIFIC ST AND T WAVE ABNORMALITY  PROLONGED QT  ABNORMAL ECG    < end of copied text >        < from: Transthoracic Echocardiogram (03.27.18 @ 12:24) >  Patient name: TAZ MARSHALL  YOB: 1935   Age: 83 (M)   MR#: [id number]  Study Date: 3/27/2018  Location: [address] [place], [place] [id number]: [facility]  Study quality: Technically difficult  Referring Physician: Vasu Beckwith MD  Blood Pressure: 165/71 mmHg  Height: 178 cm  Weight: 98 kg  BSA: 2.2 m2  ------------------------------------------------------------------------  PROCEDURE: Transthoracic echocardiogram with 2-D, M-Mode  and complete spectral and color flow Doppler.  INDICATION: Pericardial effusion (noninflammatory) (I31.3)  ------------------------------------------------------------------------  Observations:  Left Ventricle: Endocardium not well visualized;  hyperdynamic left ventricular systolic function.  Right Heart: Normal right atrium. Normal right ventricular  size and function. Tricuspid valve not well visualized,  probably normal.  Pericardium/Pleura: Thickened pericardium. Trace  pericardial effusion.  Hemodynamic: Estimated right atrial pressure is 8 mm Hg.  Estimated right ventricular systolic pressure equals 36 mm  Hg, assuming right atrial pressure equals 8 mm Hg,  consistent with borderline pulmonary hypertension.  ------------------------------------------------------------------------  Conclusions:  Technically difficult limited study to evaluate the  pericardium.  1. Endocardium not well visualized; hyperdynamic left  ventricular systolic function.  2. Normal right ventricular size and function.  3. Thickened pericardium. Trace pericardial effusion.  *** Compared with echocardiogram of 2/8/2018, less  pericardial fluid is seen on today's study. The pericardium  is thickened with trace fluid today.  ------------------------------------------------------------------------  Confirmed on  3/27/2018 - 14:23:59 by Makayla Flanagan M.D.  ------------------------------------------------------------------------    < end of copied text >    LABS:                        14.3   8.69  )-----------( 327      ( 28 Mar 2018 11:15 )             46.0     03-28    142  |  100  |  20  ----------------------------<  271<H>  3.8   |  30  |  1.14    Ca    9.6      28 Mar 2018 07:45            03-26 @ 07:02  Trop-I --  CK     52  CK MB  --    03-25 @ 03:41  Trop-I --  CK     58  CK MB  --    03-24 @ 22:29  Trop-I --  CK     85  CK MB  --    Pro BNP  2268 03-24 @ 22:29  D Dimer  -- 03-24 @ 22:29    PT/INR - ( 28 Mar 2018 11:15 )   PT: 13.8 sec;   INR: 1.22 ratio         PTT - ( 28 Mar 2018 07:45 )  PTT:124.4 sec          RADIOLOGY & ADDITIONAL STUDIES:    HEALTH ISSUES - PROBLEM Dx:  Typical atrial flutter: Typical atrial flutter  Atrial flutter: Atrial flutter  HFPEF  HTN

## 2018-03-28 NOTE — PROGRESS NOTE ADULT - ASSESSMENT
83 year old  male PMH of BPH, HTN, lumbar spinal stenosis, dementia and NPH, history of ventriulostomy, presenting after EMS took patient from restaurant after patient fell to ground and was confused. Found to be hypoxic in the 88. Patient in ER was hypoxic to 84 on RA. Placed on nasal cannula. History of CHF and is on a "water pill" that is not lasix as this didn't work for him in the past. No worsening of chronic leg swelling over the past few days. No syncope, he remember the fall. Denies fevers for the past few days. No new meds.    CV: Had TTE 2/08/18. EF normal 65 % with normal LV, RV function. A.flutter is new onset. TTE yesterday showed resolution of pericardial effustion. BP stable 131/66. Continue Coreg 6.25 mg bid and Vasotec 20 mg/day, ASA 81 mg/day and Crestor 10 mg/day. Continue telemetry which shows rate control A.flutter. Torsemide stopped today. On IV Heparin. For DCCV after MARYJANE today.  EP consult called and reviewed.      Pulmonary: CXR notes small left effusion, cannot r/o infection. CT chest non-contrast shows small left effusion, no obvious pneumonia or PVC.    hypoxemia.     Urology: Continue Flomax .4 mg/day for BPH.     Neuro: Continue Aricept 10 mg/day. PT eval requested.  Restarted Razadyne 8 mg/day. CT head shows no change in size of ventricles. NPH stable. No further workup needed.       Endo: hold all oral meds. Add Lantus 20 units at night and SSC.  BS better 160-310.

## 2018-03-28 NOTE — PROGRESS NOTE ADULT - PROBLEM SELECTOR PLAN 1
- félix on tele  - may decrease Coreg in setting of bradycardia (home dose of Coreg; 6.25 mg daily as confirmed with wife)   - maintain K > 4.0, Mg > 2.0, replete as needed   - TTE (3/27): trace pericardial effusion (improving from 2/2018), hyperdynamic LV function   - Continue Heparin gtt, CHADS-Vasc: 5 (CHF, HTN, Age3, DM)  - remain NPO post MN for possible MARYJANE/ aflutter ablation today  - contact number: Wife (Maria Eugenia): [phone] (cell), [phone] (office)   - EP will follow     # [phone]

## 2018-03-29 LAB
ANION GAP SERPL CALC-SCNC: 12 MMOL/L — SIGNIFICANT CHANGE UP (ref 5–17)
APTT BLD: 104.1 SEC — HIGH (ref 27.5–37.4)
APTT BLD: 59.2 SEC — HIGH (ref 27.5–37.4)
APTT BLD: 80.6 SEC — HIGH (ref 27.5–37.4)
BUN SERPL-MCNC: 16 MG/DL — SIGNIFICANT CHANGE UP (ref 7–23)
CALCIUM SERPL-MCNC: 9.2 MG/DL — SIGNIFICANT CHANGE UP (ref 8.4–10.5)
CHLORIDE SERPL-SCNC: 103 MMOL/L — SIGNIFICANT CHANGE UP (ref 96–108)
CO2 SERPL-SCNC: 27 MMOL/L — SIGNIFICANT CHANGE UP (ref 22–31)
CREAT SERPL-MCNC: 1.13 MG/DL — SIGNIFICANT CHANGE UP (ref 0.5–1.3)
GLUCOSE BLDC GLUCOMTR-MCNC: 187 MG/DL — HIGH (ref 70–99)
GLUCOSE BLDC GLUCOMTR-MCNC: 203 MG/DL — HIGH (ref 70–99)
GLUCOSE BLDC GLUCOMTR-MCNC: 265 MG/DL — HIGH (ref 70–99)
GLUCOSE BLDC GLUCOMTR-MCNC: 310 MG/DL — HIGH (ref 70–99)
GLUCOSE SERPL-MCNC: 242 MG/DL — HIGH (ref 70–99)
HCT VFR BLD CALC: 41.1 % — SIGNIFICANT CHANGE UP (ref 39–50)
HCT VFR BLD CALC: 42.9 % — SIGNIFICANT CHANGE UP (ref 39–50)
HGB BLD-MCNC: 13 G/DL — SIGNIFICANT CHANGE UP (ref 13–17)
HGB BLD-MCNC: 14 G/DL — SIGNIFICANT CHANGE UP (ref 13–17)
MCHC RBC-ENTMCNC: 25 PG — LOW (ref 27–34)
MCHC RBC-ENTMCNC: 26.3 PG — LOW (ref 27–34)
MCHC RBC-ENTMCNC: 31.6 GM/DL — LOW (ref 32–36)
MCHC RBC-ENTMCNC: 32.6 GM/DL — SIGNIFICANT CHANGE UP (ref 32–36)
MCV RBC AUTO: 78.9 FL — LOW (ref 80–100)
MCV RBC AUTO: 80.7 FL — SIGNIFICANT CHANGE UP (ref 80–100)
NRBC # BLD: 0 /100 WBCS — SIGNIFICANT CHANGE UP (ref 0–0)
PLATELET # BLD AUTO: 321 K/UL — SIGNIFICANT CHANGE UP (ref 150–400)
PLATELET # BLD AUTO: 326 K/UL — SIGNIFICANT CHANGE UP (ref 150–400)
POTASSIUM SERPL-MCNC: 3.9 MMOL/L — SIGNIFICANT CHANGE UP (ref 3.5–5.3)
POTASSIUM SERPL-SCNC: 3.9 MMOL/L — SIGNIFICANT CHANGE UP (ref 3.5–5.3)
RBC # BLD: 5.21 M/UL — SIGNIFICANT CHANGE UP (ref 4.2–5.8)
RBC # BLD: 5.31 M/UL — SIGNIFICANT CHANGE UP (ref 4.2–5.8)
RBC # FLD: 13.1 % — SIGNIFICANT CHANGE UP (ref 10.3–14.5)
RBC # FLD: 14.5 % — SIGNIFICANT CHANGE UP (ref 10.3–14.5)
SODIUM SERPL-SCNC: 142 MMOL/L — SIGNIFICANT CHANGE UP (ref 135–145)
WBC # BLD: 12.5 K/UL — HIGH (ref 3.8–10.5)
WBC # BLD: 12.6 K/UL — HIGH (ref 3.8–10.5)
WBC # FLD AUTO: 12.5 K/UL — HIGH (ref 3.8–10.5)
WBC # FLD AUTO: 12.6 K/UL — HIGH (ref 3.8–10.5)

## 2018-03-29 PROCEDURE — 93010 ELECTROCARDIOGRAM REPORT: CPT

## 2018-03-29 RX ORDER — DABIGATRAN ETEXILATE MESYLATE 150 MG/1
150 CAPSULE ORAL EVERY 12 HOURS
Qty: 0 | Refills: 0 | Status: DISCONTINUED | OUTPATIENT
Start: 2018-03-29 | End: 2018-03-30

## 2018-03-29 RX ADMIN — Medication 2: at 12:24

## 2018-03-29 RX ADMIN — Medication 20 MILLIGRAM(S): at 06:05

## 2018-03-29 RX ADMIN — HEPARIN SODIUM 1600 UNIT(S)/HR: 5000 INJECTION INTRAVENOUS; SUBCUTANEOUS at 10:34

## 2018-03-29 RX ADMIN — DONEPEZIL HYDROCHLORIDE 10 MILLIGRAM(S): 10 TABLET, FILM COATED ORAL at 21:28

## 2018-03-29 RX ADMIN — Medication 1 TABLET(S): at 07:57

## 2018-03-29 RX ADMIN — PANTOPRAZOLE SODIUM 40 MILLIGRAM(S): 20 TABLET, DELAYED RELEASE ORAL at 06:05

## 2018-03-29 RX ADMIN — Medication 500 MILLIGRAM(S): at 12:24

## 2018-03-29 RX ADMIN — Medication 50 MILLIGRAM(S): at 06:05

## 2018-03-29 RX ADMIN — GALANTAMINE HYDROBROMIDE 8 MILLIGRAM(S): 4 TABLET, FILM COATED ORAL at 07:57

## 2018-03-29 RX ADMIN — Medication 50 MILLIGRAM(S): at 18:14

## 2018-03-29 RX ADMIN — TAMSULOSIN HYDROCHLORIDE 0.4 MILLIGRAM(S): 0.4 CAPSULE ORAL at 21:28

## 2018-03-29 RX ADMIN — Medication 2: at 22:03

## 2018-03-29 RX ADMIN — CARVEDILOL PHOSPHATE 6.25 MILLIGRAM(S): 80 CAPSULE, EXTENDED RELEASE ORAL at 06:05

## 2018-03-29 RX ADMIN — ATORVASTATIN CALCIUM 40 MILLIGRAM(S): 80 TABLET, FILM COATED ORAL at 21:28

## 2018-03-29 RX ADMIN — Medication 3: at 18:14

## 2018-03-29 RX ADMIN — KETOTIFEN FUMARATE 1 DROP(S): 0.34 SOLUTION OPHTHALMIC at 06:06

## 2018-03-29 RX ADMIN — HEPARIN SODIUM 1800 UNIT(S)/HR: 5000 INJECTION INTRAVENOUS; SUBCUTANEOUS at 03:26

## 2018-03-29 RX ADMIN — Medication 1 TABLET(S): at 18:13

## 2018-03-29 RX ADMIN — INSULIN GLARGINE 24 UNIT(S): 100 INJECTION, SOLUTION SUBCUTANEOUS at 22:02

## 2018-03-29 RX ADMIN — Medication 1: at 07:58

## 2018-03-29 RX ADMIN — DABIGATRAN ETEXILATE MESYLATE 150 MILLIGRAM(S): 150 CAPSULE ORAL at 18:13

## 2018-03-29 NOTE — PROGRESS NOTE ADULT - SUBJECTIVE AND OBJECTIVE BOX
INTERVAL HPI/OVERNIGHT EVENTS:  Pt seen and examined at bedside.     Allergies/Intolerance: No Known Allergies      MEDICATIONS  (STANDING):  ascorbic acid 500 milliGRAM(s) Oral daily  atorvastatin 40 milliGRAM(s) Oral at bedtime  carvedilol 6.25 milliGRAM(s) Oral every 12 hours  dextrose 5%. 1000 milliLiter(s) (50 mL/Hr) IV Continuous <Continuous>  dextrose 50% Injectable 12.5 Gram(s) IV Push once  dextrose 50% Injectable 25 Gram(s) IV Push once  dextrose 50% Injectable 25 Gram(s) IV Push once  donepezil 10 milliGRAM(s) Oral at bedtime  enalapril 20 milliGRAM(s) Oral daily  fluticasone propionate 50 MICROgram(s)/spray Nasal Spray 1 Spray(s) Both Nostrils two times a day  galantamine ER 8 milliGRAM(s) Oral with breakfast  insulin glargine Injectable (LANTUS) 24 Unit(s) SubCutaneous at bedtime  insulin lispro (HumaLOG) corrective regimen sliding scale   SubCutaneous three times a day before meals  insulin lispro (HumaLOG) corrective regimen sliding scale   SubCutaneous at bedtime  ketotifen 0.025% Ophthalmic Solution 1 Drop(s) Both EYES two times a day  lactobacillus acidophilus 1 Tablet(s) Oral two times a day with meals  lidocaine   Patch 1 Patch Transdermal daily  pantoprazole    Tablet 40 milliGRAM(s) Oral before breakfast  pregabalin 50 milliGRAM(s) Oral two times a day  tamsulosin 0.4 milliGRAM(s) Oral at bedtime    MEDICATIONS  (PRN):  acetaminophen   Tablet. 650 milliGRAM(s) Oral every 6 hours PRN Mild Pain (1 - 3)  dextrose Gel 1 Dose(s) Oral once PRN Blood Glucose LESS THAN 70 milliGRAM(s)/deciliter  glucagon  Injectable 1 milliGRAM(s) IntraMuscular once PRN Glucose LESS THAN 70 milligrams/deciliter        ROS: all systems reviewed and wnl      PHYSICAL EXAMINATION:  Vital Signs Last 24 Hrs  T(C): 37.1 (29 Mar 2018 04:35), Max: 37.1 (29 Mar 2018 04:35)  T(F): 98.7 (29 Mar 2018 04:35), Max: 98.7 (29 Mar 2018 04:35)  HR: 81 (29 Mar 2018 04:35) (50 - 93)  BP: 127/78 (29 Mar 2018 04:35) (113/676 - 157/78)  BP(mean): 96 (28 Mar 2018 17:00) (82 - 104)  RR: 18 (29 Mar 2018 04:35) (14 - 19)  SpO2: 94% (29 Mar 2018 04:35) (91% - 97%)  CAPILLARY BLOOD GLUCOSE      POCT Blood Glucose.: 187 mg/dL (29 Mar 2018 07:43)  POCT Blood Glucose.: 205 mg/dL (28 Mar 2018 21:43)  POCT Blood Glucose.: 176 mg/dL (28 Mar 2018 17:51)      03-28 @ 07:01  -  03-29 @ 07:00  --------------------------------------------------------  IN: 300 mL / OUT: 0 mL / NET: 300 mL    03-29 @ 07:01 - 03-29 @ 12:09  --------------------------------------------------------  IN: 240 mL / OUT: 0 mL / NET: 240 mL        GENERAL:   NECK: supple, No JVD  CHEST/LUNG: clear to auscultation bilaterally; no rales, rhonchi, or wheezing b/l  HEART: normal S1, S2  ABDOMEN: BS+, soft, ND, NT   EXTREMITIES:  pulses palpable; no clubbing, cyanosis, or edema b/l LEs  SKIN: no rashes or lesions      LABS:                        13.0   12.60 )-----------( 326      ( 29 Mar 2018 07:43 )             41.1     03-29    142  |  103  |  16  ----------------------------<  242<H>  3.9   |  27  |  1.13    Ca    9.2      29 Mar 2018 05:20      PT/INR - ( 28 Mar 2018 11:15 )   PT: 13.8 sec;   INR: 1.22 ratio         PTT - ( 29 Mar 2018 09:41 )  PTT:104.1 sec INTERVAL HPI/OVERNIGHT EVENTS:  Pt seen and examined at bedside.     Allergies/Intolerance: No Known Allergies      MEDICATIONS  (STANDING):  ascorbic acid 500 milliGRAM(s) Oral daily  atorvastatin 40 milliGRAM(s) Oral at bedtime  carvedilol 6.25 milliGRAM(s) Oral every 12 hours  dextrose 5%. 1000 milliLiter(s) (50 mL/Hr) IV Continuous <Continuous>  dextrose 50% Injectable 12.5 Gram(s) IV Push once  dextrose 50% Injectable 25 Gram(s) IV Push once  dextrose 50% Injectable 25 Gram(s) IV Push once  donepezil 10 milliGRAM(s) Oral at bedtime  enalapril 20 milliGRAM(s) Oral daily  fluticasone propionate 50 MICROgram(s)/spray Nasal Spray 1 Spray(s) Both Nostrils two times a day  galantamine ER 8 milliGRAM(s) Oral with breakfast  insulin glargine Injectable (LANTUS) 24 Unit(s) SubCutaneous at bedtime  insulin lispro (HumaLOG) corrective regimen sliding scale   SubCutaneous three times a day before meals  insulin lispro (HumaLOG) corrective regimen sliding scale   SubCutaneous at bedtime  ketotifen 0.025% Ophthalmic Solution 1 Drop(s) Both EYES two times a day  lactobacillus acidophilus 1 Tablet(s) Oral two times a day with meals  lidocaine   Patch 1 Patch Transdermal daily  pantoprazole    Tablet 40 milliGRAM(s) Oral before breakfast  pregabalin 50 milliGRAM(s) Oral two times a day  tamsulosin 0.4 milliGRAM(s) Oral at bedtime    MEDICATIONS  (PRN):  acetaminophen   Tablet. 650 milliGRAM(s) Oral every 6 hours PRN Mild Pain (1 - 3)  dextrose Gel 1 Dose(s) Oral once PRN Blood Glucose LESS THAN 70 milliGRAM(s)/deciliter  glucagon  Injectable 1 milliGRAM(s) IntraMuscular once PRN Glucose LESS THAN 70 milligrams/deciliter        ROS: all systems reviewed and wnl      PHYSICAL EXAMINATION:  Vital Signs Last 24 Hrs  T(C): 37.1 (29 Mar 2018 04:35), Max: 37.1 (29 Mar 2018 04:35)  T(F): 98.7 (29 Mar 2018 04:35), Max: 98.7 (29 Mar 2018 04:35)  HR: 81 (29 Mar 2018 04:35) (50 - 93)  BP: 127/78 (29 Mar 2018 04:35) (113/676 - 157/78)  BP(mean): 96 (28 Mar 2018 17:00) (82 - 104)  RR: 18 (29 Mar 2018 04:35) (14 - 19)  SpO2: 94% (29 Mar 2018 04:35) (91% - 97%)  CAPILLARY BLOOD GLUCOSE      POCT Blood Glucose.: 187 mg/dL (29 Mar 2018 07:43)  POCT Blood Glucose.: 205 mg/dL (28 Mar 2018 21:43)  POCT Blood Glucose.: 176 mg/dL (28 Mar 2018 17:51)      03-28 @ 07:01  -  03-29 @ 07:00  --------------------------------------------------------  IN: 300 mL / OUT: 0 mL / NET: 300 mL    03-29 @ 07:01 - 03-29 @ 12:09  --------------------------------------------------------  IN: 240 mL / OUT: 0 mL / NET: 240 mL        GENERAL: in bed, NAD, comfortable, no CP, fevers or SOB  NECK: supple, No JVD  CHEST/LUNG: clear to auscultation bilaterally; no rales, rhonchi, or wheezing b/l  HEART: normal S1, S2  ABDOMEN: BS+, soft, ND, NT   EXTREMITIES:  pulses palpable; no clubbing, cyanosis, or edema b/l LEs  SKIN: no rashes or lesions      LABS:                        13.0   12.60 )-----------( 326      ( 29 Mar 2018 07:43 )             41.1     03-29    142  |  103  |  16  ----------------------------<  242<H>  3.9   |  27  |  1.13    Ca    9.2      29 Mar 2018 05:20      PT/INR - ( 28 Mar 2018 11:15 )   PT: 13.8 sec;   INR: 1.22 ratio         PTT - ( 29 Mar 2018 09:41 )  PTT:104.1 sec

## 2018-03-29 NOTE — PROGRESS NOTE ADULT - SUBJECTIVE AND OBJECTIVE BOX
INTERVAL HPI/OVERNIGHT EVENTS:  s/p ablation of atrial flutter  negative bowel movement x 3 days  feels distended  no nausea or vomiting or abdominal pain  tolerating po      Vital Signs Last 24 Hrs  T(C): 37.1 (29 Mar 2018 04:35), Max: 37.1 (29 Mar 2018 04:35)  T(F): 98.7 (29 Mar 2018 04:35), Max: 98.7 (29 Mar 2018 04:35)  HR: 81 (29 Mar 2018 04:35) (50 - 93)  BP: 127/78 (29 Mar 2018 04:35) (113/676 - 157/78)  BP(mean): 96 (28 Mar 2018 17:00) (82 - 104)  RR: 18 (29 Mar 2018 04:35) (14 - 19)  SpO2: 94% (29 Mar 2018 04:35) (91% - 97%)        LABS:                        14.0   12.5  )-----------( 321      ( 29 Mar 2018 02:39 )             42.9     03-29    142  |  103  |  16  ----------------------------<  242<H>  3.9   |  27  |  1.13    Ca    9.2      29 Mar 2018 05:20      PT/INR - ( 28 Mar 2018 11:15 )   PT: 13.8 sec;   INR: 1.22 ratio         PTT - ( 29 Mar 2018 02:39 )  PTT:80.6 sec    I&O's Summary    28 Mar 2018 07:01  -  29 Mar 2018 07:00  --------------------------------------------------------  IN: 300 mL / OUT: 0 mL / NET: 300 mL        MEDICATIONS  (STANDING):  ascorbic acid 500 milliGRAM(s) Oral daily  atorvastatin 40 milliGRAM(s) Oral at bedtime  carvedilol 6.25 milliGRAM(s) Oral every 12 hours  dextrose 5%. 1000 milliLiter(s) (50 mL/Hr) IV Continuous <Continuous>  dextrose 50% Injectable 12.5 Gram(s) IV Push once  dextrose 50% Injectable 25 Gram(s) IV Push once  dextrose 50% Injectable 25 Gram(s) IV Push once  donepezil 10 milliGRAM(s) Oral at bedtime  enalapril 20 milliGRAM(s) Oral daily  fluticasone propionate 50 MICROgram(s)/spray Nasal Spray 1 Spray(s) Both Nostrils two times a day  galantamine ER 8 milliGRAM(s) Oral with breakfast  heparin  Infusion.  Unit(s)/Hr (18 mL/Hr) IV Continuous <Continuous>  insulin glargine Injectable (LANTUS) 24 Unit(s) SubCutaneous at bedtime  insulin lispro (HumaLOG) corrective regimen sliding scale   SubCutaneous three times a day before meals  insulin lispro (HumaLOG) corrective regimen sliding scale   SubCutaneous at bedtime  ketotifen 0.025% Ophthalmic Solution 1 Drop(s) Both EYES two times a day  lactobacillus acidophilus 1 Tablet(s) Oral two times a day with meals  lidocaine   Patch 1 Patch Transdermal daily  pantoprazole    Tablet 40 milliGRAM(s) Oral before breakfast  pregabalin 50 milliGRAM(s) Oral two times a day  tamsulosin 0.4 milliGRAM(s) Oral at bedtime    MEDICATIONS  (PRN):  acetaminophen   Tablet. 650 milliGRAM(s) Oral every 6 hours PRN Mild Pain (1 - 3)  dextrose Gel 1 Dose(s) Oral once PRN Blood Glucose LESS THAN 70 milliGRAM(s)/deciliter  glucagon  Injectable 1 milliGRAM(s) IntraMuscular once PRN Glucose LESS THAN 70 milligrams/deciliter  heparin  Injectable 8000 Unit(s) IV Push every 6 hours PRN For aPTT less than 40  heparin  Injectable 4000 Unit(s) IV Push every 6 hours PRN For aPTT between 40 - 57        RADIOLOGY & ADDITIONAL TESTS:                [facility]

## 2018-03-29 NOTE — PROGRESS NOTE ADULT - ASSESSMENT
83 year old  male PMH of BPH, HTN, lumbar spinal stenosis, dementia and NPH, history of ventriulostomy, presenting after [place] patient from restaurant after patient fell to ground and was confused. Found to be hypoxic in the 88. Patient in ER was hypoxic to 84 on RA. Placed on nasal cannula. History of CHF and is on a "water pill" that is not lasix as this didn't work for him in the past. No worsening of chronic leg swelling over the past few days. No syncope, he remember the fall. Denies fevers for the past few days. No new meds.    CV: Had TTE 2/08/18. EF normal 65 % with normal LV, RV function. A.flutter is new onset. TTE yesterday showed resolution of pericardial effustion. BP stable 131/66. Continue Coreg 6.25 mg bid and Vasotec 20 mg/day, ASA 81 mg/day and Crestor 10 mg/day. Continue telemetry which shows NSR after successful A.flutter ablation yesterday. Torsemide stopped today. IV Heparin stopped today. Change to Pradaxa 150 mg bid later today. Discharge to home in AM. EP consult reviewed and appreciated.       Pulmonary: CXR notes small left effusion, cannot r/o infection. CT chest non-contrast shows small left effusion, no obvious pneumonia or PVC.    hypoxemia.     Urology: Continue Flomax .4 mg/day for BPH.     Neuro: Continue Aricept 10 mg/day. PT eval requested.  Restarted Razadyne 8 mg/day. CT head shows no change in size of ventricles. NPH stable. No further workup needed.       Endo: hold all oral meds. Add Lantus 20 units at night and SSC.  BS better 160-310.

## 2018-03-29 NOTE — PROGRESS NOTE ADULT - GASTROINTESTINAL DETAILS
no rebound tenderness/bowel sounds normal/no rigidity/no organomegaly/soft/no guarding/no masses palpable

## 2018-03-29 NOTE — PROGRESS NOTE ADULT - SUBJECTIVE AND OBJECTIVE BOX
HPI: feeling tired     MEDICATIONS  (STANDING):  ascorbic acid 500 milliGRAM(s) Oral daily  atorvastatin 40 milliGRAM(s) Oral at bedtime  carvedilol 6.25 milliGRAM(s) Oral every 12 hours  dextrose 5%. 1000 milliLiter(s) (50 mL/Hr) IV Continuous <Continuous>  dextrose 50% Injectable 12.5 Gram(s) IV Push once  dextrose 50% Injectable 25 Gram(s) IV Push once  dextrose 50% Injectable 25 Gram(s) IV Push once  donepezil 10 milliGRAM(s) Oral at bedtime  enalapril 20 milliGRAM(s) Oral daily  fluticasone propionate 50 MICROgram(s)/spray Nasal Spray 1 Spray(s) Both Nostrils two times a day  galantamine ER 8 milliGRAM(s) Oral with breakfast  heparin  Infusion.  Unit(s)/Hr (18 mL/Hr) IV Continuous <Continuous>  insulin glargine Injectable (LANTUS) 24 Unit(s) SubCutaneous at bedtime  insulin lispro (HumaLOG) corrective regimen sliding scale   SubCutaneous three times a day before meals  insulin lispro (HumaLOG) corrective regimen sliding scale   SubCutaneous at bedtime  ketotifen 0.025% Ophthalmic Solution 1 Drop(s) Both EYES two times a day  lactobacillus acidophilus 1 Tablet(s) Oral two times a day with meals  lidocaine   Patch 1 Patch Transdermal daily  pantoprazole    Tablet 40 milliGRAM(s) Oral before breakfast  pregabalin 50 milliGRAM(s) Oral two times a day  tamsulosin 0.4 milliGRAM(s) Oral at bedtime    MEDICATIONS  (PRN):  acetaminophen   Tablet. 650 milliGRAM(s) Oral every 6 hours PRN Mild Pain (1 - 3)  dextrose Gel 1 Dose(s) Oral once PRN Blood Glucose LESS THAN 70 milliGRAM(s)/deciliter  glucagon  Injectable 1 milliGRAM(s) IntraMuscular once PRN Glucose LESS THAN 70 milligrams/deciliter  heparin  Injectable 8000 Unit(s) IV Push every 6 hours PRN For aPTT less than 40  heparin  Injectable 4000 Unit(s) IV Push every 6 hours PRN For aPTT between 40 - 57    Allergies No Known Allergies    REVIEW OF SYSTEM:    Constitutional: denies fever chills states tired   Neuro: denies headache,  dizziness  Resp: denies cough, wheezing, shortness of breath  CVS: denies chest pain, palpitations   G: denies abdominal pain, nausea, vomiting, diarrhea   : states urinating well   Skin:  bleeding ng, burning, rashes, or lesions   Msk: states right shin sore from fall in restaurant      Vital Signs Last 24 Hrs  T(C): 37.1 (29 Mar 2018 04:35), Max: 37.1 (29 Mar 2018 04:35)  T(F): 98.7 (29 Mar 2018 04:35), Max: 98.7 (29 Mar 2018 04:35)  HR: 81 (29 Mar 2018 04:35) (50 - 93)  BP: 127/78 (29 Mar 2018 04:35) (113/676 - 157/78)  BP(mean): 96 (28 Mar 2018 17:00) (82 - 104)  RR: 18 (29 Mar 2018 04:35) (14 - 19)  SpO2: 94% (29 Mar 2018 04:35) (91% - 97%)    Physical Exam:  General : elderly obese white male in no acute distress  Neuro : Alert and oriented x 2-3  no focal deficits  HEENT : Sclera clear,  neck supple  Lungs: Clear to Ascultation, no wheezing , rales or rhonchi   Cardiovascular : + 1 +2, RRR, no murmurs, no rubs  GI : obese abdomen soft, NT, ND, + BS   : no suprapubic tenderness, wearing depends under garment   Extremities : +1 pitting edema BLE calves ankles + 2 DP and +2 PT, feet warm   Skin : right groin flat no hematoma, no bleeding, no ecchymosis     TELE: SR 70   EKG:     LABS:                        13.0   12.60 )-----------( 326      ( 29 Mar 2018 07:43 )             41.1     03-29    142  |  103  |  16  ----------------------------<  242<H>  3.9   |  27  |  1.13    Ca    9.2      29 Mar 2018 05:20   PT/INR - ( 28 Mar 2018 11:15 )   PT: 13.8 sec;   INR: 1.22 ratio    PTT - ( 29 Mar 2018 09:41 )  PTT:104.1 sec   RADIOLOGY & ADDITIONAL TESTS:  TTE (3/27): trace pericardial effusion (improving from 2/2018), hyperdynamic LV function HPI: feeling tired     MEDICATIONS  (STANDING):  ascorbic acid 500 milliGRAM(s) Oral daily  atorvastatin 40 milliGRAM(s) Oral at bedtime  carvedilol 6.25 milliGRAM(s) Oral every 12 hours  dextrose 5%. 1000 milliLiter(s) (50 mL/Hr) IV Continuous <Continuous>  dextrose 50% Injectable 12.5 Gram(s) IV Push once  dextrose 50% Injectable 25 Gram(s) IV Push once  dextrose 50% Injectable 25 Gram(s) IV Push once  donepezil 10 milliGRAM(s) Oral at bedtime  enalapril 20 milliGRAM(s) Oral daily  fluticasone propionate 50 MICROgram(s)/spray Nasal Spray 1 Spray(s) Both Nostrils two times a day  galantamine ER 8 milliGRAM(s) Oral with breakfast  heparin  Infusion.  Unit(s)/Hr (18 mL/Hr) IV Continuous <Continuous>  insulin glargine Injectable (LANTUS) 24 Unit(s) SubCutaneous at bedtime  insulin lispro (HumaLOG) corrective regimen sliding scale   SubCutaneous three times a day before meals  insulin lispro (HumaLOG) corrective regimen sliding scale   SubCutaneous at bedtime  ketotifen 0.025% Ophthalmic Solution 1 Drop(s) Both EYES two times a day  lactobacillus acidophilus 1 Tablet(s) Oral two times a day with meals  lidocaine   Patch 1 Patch Transdermal daily  pantoprazole    Tablet 40 milliGRAM(s) Oral before breakfast  pregabalin 50 milliGRAM(s) Oral two times a day  tamsulosin 0.4 milliGRAM(s) Oral at bedtime    MEDICATIONS  (PRN):  acetaminophen   Tablet. 650 milliGRAM(s) Oral every 6 hours PRN Mild Pain (1 - 3)  dextrose Gel 1 Dose(s) Oral once PRN Blood Glucose LESS THAN 70 milliGRAM(s)/deciliter  glucagon  Injectable 1 milliGRAM(s) IntraMuscular once PRN Glucose LESS THAN 70 milligrams/deciliter  heparin  Injectable 8000 Unit(s) IV Push every 6 hours PRN For aPTT less than 40  heparin  Injectable 4000 Unit(s) IV Push every 6 hours PRN For aPTT between 40 - 57    Allergies No Known Allergies    REVIEW OF SYSTEM:    Constitutional: denies fever chills states tired   Neuro: denies headache,  dizziness  Resp: denies cough, wheezing, shortness of breath  CVS: denies chest pain, palpitations   G: denies abdominal pain, nausea, vomiting, diarrhea   : states urinating well   Skin:  bleeding ng, burning, rashes, or lesions   Msk: states right shin sore from fall in restaurant      Vital Signs Last 24 Hrs  T(C): 37.1 (29 Mar 2018 04:35), Max: 37.1 (29 Mar 2018 04:35)  T(F): 98.7 (29 Mar 2018 04:35), Max: 98.7 (29 Mar 2018 04:35)  HR: 81 (29 Mar 2018 04:35) (50 - 93)  BP: 127/78 (29 Mar 2018 04:35) (113/676 - 157/78)  BP(mean): 96 (28 Mar 2018 17:00) (82 - 104)  RR: 18 (29 Mar 2018 04:35) (14 - 19)  SpO2: 94% (29 Mar 2018 04:35) (91% - 97%)    Physical Exam:  General : elderly obese white male in no acute distress  Neuro : Alert and oriented x 2-3  no focal deficits  HEENT : Sclera clear,  neck supple  Lungs: Clear to Ascultation, no wheezing , rales or rhonchi   Cardiovascular : + 1 +2, RRR, no murmurs, no rubs  GI : obese abdomen soft, NT, ND, + BS   : no suprapubic tenderness, wearing depends under garment   Extremities : +1 pitting edema BLE calves ankles + 2 DP and +2 PT, feet warm   Skin : right groin flat no hematoma, no bleeding, no ecchymosis     TELE: SR 70 , 11 seconfd episode 11 secs 2:1 AVB this am   EKG:     LABS:                        13.0   12.60 )-----------( 326      ( 29 Mar 2018 07:43 )             41.1     03-29    142  |  103  |  16  ----------------------------<  242<H>  3.9   |  27  |  1.13    Ca    9.2      29 Mar 2018 05:20   PT/INR - ( 28 Mar 2018 11:15 )   PT: 13.8 sec;   INR: 1.22 ratio    PTT - ( 29 Mar 2018 09:41 )  PTT:104.1 sec   RADIOLOGY & ADDITIONAL TESTS:  < from: Transthoracic Echocardiogram (03.27.18 @ 12:24) >  Patient name: TAZ MARSHALL  YOB: 1935   Age: 83 (M)   MR#: [id number]  Study Date: 3/27/2018  Location: [address] [place], [place] [id number]: [facility]  Study quality: Technically difficult  Referring Physician: Vasu Beckwith MD  Blood Pressure: 165/71 mmHg  Height: 178 cm  Weight: 98 kg  BSA: 2.2 m2  ------------------------------------------------------------------------  PROCEDURE: Transthoracic echocardiogram with 2-D, M-Mode  and complete spectral and color flow Doppler.  INDICATION: Pericardial effusion (noninflammatory) (I31.3)  ------------------------------------------------------------------------  Observations:  Left Ventricle: Endocardium not well visualized;  hyperdynamic left ventricular systolic function.  Right Heart: Normal right atrium. Normal right ventricular  size and function. Tricuspid valve not well visualized,  probably normal.  Pericardium/Pleura: Thickened pericardium. Trace  pericardial effusion.  Hemodynamic: Estimated right atrial pressure is 8 mm Hg.  Estimated right ventricular systolic pressure equals 36 mm  Hg, assuming right atrial pressure equals 8 mm Hg,  consistent with borderline pulmonary hypertension.  ------------------------------------------------------------------------  Conclusions:  Technically difficult limited study to evaluate the  pericardium.  1. Endocardium not well visualized; hyperdynamic left  ventricular systolic function.  2. Normal right ventricular size and function.  3. Thickened pericardium. Trace pericardial effusion.  *** Compared with echocardiogram of 2/8/2018, less  pericardial fluid is seen on today's study. The pericardium  is thickened with trace fluid today.  ------------------------------------------------------------------------  Confirmed on  3/27/2018 - 14:23:59 by Makayla Flanagan M.D.  ------------------------------------------------------------------------

## 2018-03-29 NOTE — PROGRESS NOTE ADULT - SUBJECTIVE AND OBJECTIVE BOX
CC: f/u for leukocytosis     Patient reports he had afib ablation yesterday and feels well    REVIEW OF SYSTEMS:  All other review of systems negative (Comprehensive ROS)    Antimicrobials Day #  :    Other Medications Reviewed    T(F): 97.6 (03-29-18 @ 14:29), Max: 98.7 (03-29-18 @ 04:35)  HR: 82 (03-29-18 @ 14:29)  BP: 132/76 (03-29-18 @ 14:29)  RR: 18 (03-29-18 @ 14:29)  SpO2: 93% (03-29-18 @ 14:29)  Wt(kg): --    PHYSICAL EXAM:  General: alert, no acute distress  Eyes:  anicteric, no conjunctival injection, no discharge  Oropharynx: no lesions or injection 	  Neck: supple, without adenopathy  Lungs: course  to auscultation  Heart: s1s2 2/6 sys m  Abdomen: soft, nondistended, nontender, without mass or organomegaly  Skin: no lesions  Extremities: no clubbing, cyanosis,. slight pedal  edema  Neurologic: alert, moves all extremities    LAB RESULTS:                        13.0   12.60 )-----------( 326      ( 29 Mar 2018 07:43 )             41.1     03-29    142  |  103  |  16  ----------------------------<  242<H>  3.9   |  27  |  1.13    Ca    9.2      29 Mar 2018 05:20          MICROBIOLOGY:  RECENT CULTURES:  03-25 @ 04:58 .Blood Blood     No growth to date.          RADIOLOGY REVIEWED:  < from: CT Chest No Cont (03.25.18 @ 09:37) >  MPRESSION:     1.  Small left pleural effusion, increased since 2/7/2018, and partial   left lower lobe compressive atelectasis.  2.  Pericardial effusion of greater than fluid density with pericardial   thickening can occur in the clinical setting of constrictive   pericarditis. This could either be evaluated by echocardiography.        < end of copied text >  Assessment:Patient admitted for weakness and fall, had recent stay for pneumonia, has nph with vps and needs some assistance at baseline now s/p afib ablation. LEukocytosis on admission moderated and no infection found. I suspect today's mild leukocytosis is just reactive to procedure    Plan: monitor off antibiotic  supportive care  further w/u if leukocytosis persists

## 2018-03-29 NOTE — PROGRESS NOTE ADULT - SUBJECTIVE AND OBJECTIVE BOX
Patient is a 83y old  Male     Farooq feels well today.  OOB in chair  No CP or SOB        Allergies:   No Known Allergies      MEDICATIONS  (STANDING):  ascorbic acid 500 milliGRAM(s) Oral daily  atorvastatin 40 milliGRAM(s) Oral at bedtime  carvedilol 6.25 milliGRAM(s) Oral every 12 hours  dextrose 5%. 1000 milliLiter(s) (50 mL/Hr) IV Continuous <Continuous>  dextrose 50% Injectable 12.5 Gram(s) IV Push once  dextrose 50% Injectable 25 Gram(s) IV Push once  dextrose 50% Injectable 25 Gram(s) IV Push once  donepezil 10 milliGRAM(s) Oral at bedtime  enalapril 20 milliGRAM(s) Oral daily  fluticasone propionate 50 MICROgram(s)/spray Nasal Spray 1 Spray(s) Both Nostrils two times a day  galantamine ER 8 milliGRAM(s) Oral with breakfast  heparin  Infusion.  Unit(s)/Hr (18 mL/Hr) IV Continuous <Continuous>  insulin glargine Injectable (LANTUS) 24 Unit(s) SubCutaneous at bedtime  insulin lispro (HumaLOG) corrective regimen sliding scale   SubCutaneous three times a day before meals  insulin lispro (HumaLOG) corrective regimen sliding scale   SubCutaneous at bedtime  ketotifen 0.025% Ophthalmic Solution 1 Drop(s) Both EYES two times a day  lactobacillus acidophilus 1 Tablet(s) Oral two times a day with meals  lidocaine   Patch 1 Patch Transdermal daily  pantoprazole    Tablet 40 milliGRAM(s) Oral before breakfast  pregabalin 50 milliGRAM(s) Oral two times a day  tamsulosin 0.4 milliGRAM(s) Oral at bedtime    MEDICATIONS  (PRN):  acetaminophen   Tablet. 650 milliGRAM(s) Oral every 6 hours PRN Mild Pain (1 - 3)  dextrose Gel 1 Dose(s) Oral once PRN Blood Glucose LESS THAN 70 milliGRAM(s)/deciliter  glucagon  Injectable 1 milliGRAM(s) IntraMuscular once PRN Glucose LESS THAN 70 milligrams/deciliter  heparin  Injectable 8000 Unit(s) IV Push every 6 hours PRN For aPTT less than 40  heparin  Injectable 4000 Unit(s) IV Push every 6 hours PRN For aPTT between 40 - 57      Daily     Daily   Drug Dosing Weight  Height (cm): 177.8 (25 Mar 2018 02:43)  Weight (kg): 97.6 (26 Mar 2018 04:41)  BMI (kg/m2): 30.9 (26 Mar 2018 04:41)  BSA (m2): 2.15 (26 Mar 2018 04:41)  Vital Signs Last 24 Hrs  T(C): 37.1 (29 Mar 2018 04:35), Max: 37.1 (29 Mar 2018 04:35)  T(F): 98.7 (29 Mar 2018 04:35), Max: 98.7 (29 Mar 2018 04:35)  HR: 81 (29 Mar 2018 04:35) (50 - 93)  BP: 127/78 (29 Mar 2018 04:35) (113/676 - 157/78)  BP(mean): 96 (28 Mar 2018 17:00) (82 - 104)  RR: 18 (29 Mar 2018 04:35) (14 - 19)  SpO2: 94% (29 Mar 2018 04:35) (91% - 97%)    I&O's Summary    28 Mar 2018 07:01  -  29 Mar 2018 07:00  --------------------------------------------------------  IN: 300 mL / OUT: 0 mL / NET: 300 mL        PHYSICAL EXAM:    General Appearance:    Comfortable, AAO X 3, in no distress.   HEENT:                       Atraumatic, PERRLA, EOMI, conjunctiva clear.   Neck:                          Supple, no adenopathy, no thyromegaly, no JVD, no carotid bruit  Back:                          Symmetric, no CV angle fullness or tenderness, no soft tissue tenderness.  Chest:                         Clear to auscultation, no wheezes, rales or rhonchi, symmetric air entry, no tachypnea, retractions or cyanosis  Heart:                          S1, S2 normal, no gallop, no murmur.  Abdomen:                    Soft, non-tender, bowel sounds active. No palpable masses.   Extremities:                 no cyanosis, no edema, no joint swelling. Peripheral pulses normal  Skin:                           Skin color, texture normal. No rashes   Neurologic:                  Alert and oriented X3, cranial nerves intact, sensory and motor normal.      INTERPRETATION OF TELEMETRY:     ECG: RSR    LABS:                        13.0   12.60 )-----------( 326      ( 29 Mar 2018 07:43 )             41.1     03-29    142  |  103  |  16  ----------------------------<  242<H>  3.9   |  27  |  1.13    Ca    9.2      29 Mar 2018 05:20            03-26 @ 07:02  Trop-I --  CK     52  CK MB  --    03-25 @ 03:41  Trop-I --  CK     58  CK MB  --    03-24 @ 22:29  Trop-I --  CK     85  CK MB  --    Pro BNP  2268 03-24 @ 22:29  D Dimer  -- 03-24 @ 22:29    PT/INR - ( 28 Mar 2018 11:15 )   PT: 13.8 sec;   INR: 1.22 ratio         PTT - ( 29 Mar 2018 09:41 )  PTT:104.1 sec          RADIOLOGY & ADDITIONAL STUDIES:    HEALTH ISSUES - PROBLEM Dx:  Typical atrial flutter: Typical atrial flutter  Atrial flutter: Atrial flutter

## 2018-03-29 NOTE — PROGRESS NOTE ADULT - ASSESSMENT
Doing well S/P Atrial flutter Ablation  HFPEF  HTN  Continue present management  Start Pradaxa  Physical therapy  EPS follow up  Future Watchman  Discharge planning

## 2018-03-29 NOTE — PROGRESS NOTE ADULT - ASSESSMENT
83 year old  male PMH of HTN, HFpEF (EF 65% on 2/2018), lumbar spinal stenosis, mild dementia, NPH, s/p ventriculostomy and recent hospitalization with PNA and recent diagnosed Atrial Flutter (started Pradaxa 3 weeks ago) presents to ED by EMS after patient fell to ground with prior symptom of dizziness and pt was found to be confused hypoxic O2 sat in ED  to 84% on RA in a rate controlled Atrial Flutter now s/p Aflutter ablation 3/28 83 year old  male PMH of HTN, HFpEF (EF 65% on 2/2018), lumbar spinal stenosis, mild dementia, NPH, s/p ventriculostomy and recent hospitalization with PNA and recent diagnosed Atrial Flutter (started Pradaxa 3 weeks ago) presents to ED by EMS after patient fell to ground with prior symptom of dizziness and pt was found to be confused hypoxic O2 sat in ED  to 84% on RA in a rate controlled Atrial Flutter now s/p Aflutter ablation 3/28        contact number: Wife (Maria Eugenia): [phone] (cell), [phone] (office)

## 2018-03-29 NOTE — PROVIDER CONTACT NOTE (OTHER) - ACTION/TREATMENT ORDERED:
continue to monitor.
As per provider, ekg, and one set of cardiac enzymes. Tylenol for pain. Will continue to monitor.
monitor
Will reassess, continue to monitor.
continue to monitor

## 2018-03-29 NOTE — PROVIDER CONTACT NOTE (OTHER) - BACKGROUND
DX hypoxemia, confusion and fall
Patient was admitted for leukocytosis, CHF, Aflutter
Pt came in for hypoxemia
Admitting Dx: Hypoxemia

## 2018-03-29 NOTE — PROGRESS NOTE ADULT - ASSESSMENT
consider trial of miralax  suggest sono abdomen  continue protonix  serial wbc-?etiology of leukocytosis  ambulate with assistance

## 2018-03-29 NOTE — PROGRESS NOTE ADULT - PROBLEM SELECTOR PLAN 1
monitor telemetry   post procedure teaching done with patient   keep K+>4, MG++>2  If cleared by Neurology please discontinue Heparin drip and restart Pradaxa 150 mg BID today , CHADS-Vasc: 5   contact number: Wife Lion): [phone] (cell), [phone] (office)   dispo F/U in EP clinic with Katty kamara MD on 5/14/18 at 11:15 am [phone] # [id number] monitor telemetry   post procedure teaching done with patient   keep K+>4, MG++>2  If cleared by Neurology please discontinue Heparin drip and restart Pradaxa 150 mg BID today , CHADS-Vasc: 5     dispo F/U in EP clinic with Katty kamara MD on 5/14/18 at 11:15 am [phone]    # [id number] monitor telemetry   post procedure teaching done with patient   keep K+>4, MG++>2  If cleared by Neurology please discontinue Heparin drip and restart Pradaxa 150 mg BID today , CHADS-Vasc: 5   f/u as OP for a watchman device   dispo F/U in EP clinic with Katty kamara MD on 5/14/18 at 11:15 am [phone]    # [id number] monitor telemetry   post procedure teaching done with patient   keep K+>4, MG++>2  episode of 2:1 AVB for seconds x 1 , monitor telemetry ,  may need to hold coreg  If cleared by Neurology please discontinue Heparin drip and restart Pradaxa 150 mg BID today , CHADS-Vasc: 5   f/u as OP for a watchman device   dispo F/U in EP clinic with Katty kamara MD on 5/14/18 at 11:15 am [phone]    # [id number]

## 2018-03-29 NOTE — PROVIDER CONTACT NOTE (OTHER) - ASSESSMENT
VSS
VSS   BP 12/73  HR 64  pt asymptomatic lying in bed   no complaints of chest pain/ sob
Patient A/Ox4, desaturated on continuous pulse ox on telemetry. Patient went as low as 86%. No complaints of SOB, however was wheezing when assessing patient. Placed 2L, patient improved to 96%
Pt denies chest pain/palpitations. VSS. 98.7F HR 67 /64 RR 16 92% on room air.
VSS. Pt denies SOB

## 2018-03-30 ENCOUNTER — TRANSCRIPTION ENCOUNTER (OUTPATIENT)
Age: 83
End: 2018-03-30

## 2018-03-30 VITALS — WEIGHT: 213.41 LBS

## 2018-03-30 LAB
CULTURE RESULTS: SIGNIFICANT CHANGE UP
CULTURE RESULTS: SIGNIFICANT CHANGE UP
GLUCOSE BLDC GLUCOMTR-MCNC: 204 MG/DL — HIGH (ref 70–99)
HCT VFR BLD CALC: 41.9 % — SIGNIFICANT CHANGE UP (ref 39–50)
HGB BLD-MCNC: 13 G/DL — SIGNIFICANT CHANGE UP (ref 13–17)
MCHC RBC-ENTMCNC: 25.3 PG — LOW (ref 27–34)
MCHC RBC-ENTMCNC: 31 GM/DL — LOW (ref 32–36)
MCV RBC AUTO: 81.7 FL — SIGNIFICANT CHANGE UP (ref 80–100)
NRBC # BLD: 0 /100 WBCS — SIGNIFICANT CHANGE UP (ref 0–0)
PLATELET # BLD AUTO: 277 K/UL — SIGNIFICANT CHANGE UP (ref 150–400)
RBC # BLD: 5.13 M/UL — SIGNIFICANT CHANGE UP (ref 4.2–5.8)
RBC # FLD: 14.4 % — SIGNIFICANT CHANGE UP (ref 10.3–14.5)
SPECIMEN SOURCE: SIGNIFICANT CHANGE UP
SPECIMEN SOURCE: SIGNIFICANT CHANGE UP
WBC # BLD: 11.09 K/UL — HIGH (ref 3.8–10.5)
WBC # FLD AUTO: 11.09 K/UL — HIGH (ref 3.8–10.5)

## 2018-03-30 PROCEDURE — 82553 CREATINE MB FRACTION: CPT

## 2018-03-30 PROCEDURE — 97110 THERAPEUTIC EXERCISES: CPT

## 2018-03-30 PROCEDURE — 93325 DOPPLER ECHO COLOR FLOW MAPG: CPT

## 2018-03-30 PROCEDURE — 82550 ASSAY OF CK (CPK): CPT

## 2018-03-30 PROCEDURE — 85027 COMPLETE CBC AUTOMATED: CPT

## 2018-03-30 PROCEDURE — 97116 GAIT TRAINING THERAPY: CPT

## 2018-03-30 PROCEDURE — 96374 THER/PROPH/DIAG INJ IV PUSH: CPT

## 2018-03-30 PROCEDURE — 82962 GLUCOSE BLOOD TEST: CPT

## 2018-03-30 PROCEDURE — 84132 ASSAY OF SERUM POTASSIUM: CPT

## 2018-03-30 PROCEDURE — 93613 INTRACARDIAC EPHYS 3D MAPG: CPT

## 2018-03-30 PROCEDURE — 82565 ASSAY OF CREATININE: CPT

## 2018-03-30 PROCEDURE — 82803 BLOOD GASES ANY COMBINATION: CPT

## 2018-03-30 PROCEDURE — 80048 BASIC METABOLIC PNL TOTAL CA: CPT

## 2018-03-30 PROCEDURE — 83605 ASSAY OF LACTIC ACID: CPT

## 2018-03-30 PROCEDURE — 87581 M.PNEUMON DNA AMP PROBE: CPT

## 2018-03-30 PROCEDURE — 96375 TX/PRO/DX INJ NEW DRUG ADDON: CPT

## 2018-03-30 PROCEDURE — 93653 COMPRE EP EVAL TX SVT: CPT

## 2018-03-30 PROCEDURE — 93306 TTE W/DOPPLER COMPLETE: CPT

## 2018-03-30 PROCEDURE — C1732: CPT

## 2018-03-30 PROCEDURE — 86900 BLOOD TYPING SEROLOGIC ABO: CPT

## 2018-03-30 PROCEDURE — 93320 DOPPLER ECHO COMPLETE: CPT

## 2018-03-30 PROCEDURE — C1766: CPT

## 2018-03-30 PROCEDURE — 84484 ASSAY OF TROPONIN QUANT: CPT

## 2018-03-30 PROCEDURE — 86901 BLOOD TYPING SEROLOGIC RH(D): CPT

## 2018-03-30 PROCEDURE — 94640 AIRWAY INHALATION TREATMENT: CPT

## 2018-03-30 PROCEDURE — 85014 HEMATOCRIT: CPT

## 2018-03-30 PROCEDURE — C1894: CPT

## 2018-03-30 PROCEDURE — 70450 CT HEAD/BRAIN W/O DYE: CPT

## 2018-03-30 PROCEDURE — 87633 RESP VIRUS 12-25 TARGETS: CPT

## 2018-03-30 PROCEDURE — 93312 ECHO TRANSESOPHAGEAL: CPT

## 2018-03-30 PROCEDURE — 82947 ASSAY GLUCOSE BLOOD QUANT: CPT

## 2018-03-30 PROCEDURE — 85730 THROMBOPLASTIN TIME PARTIAL: CPT

## 2018-03-30 PROCEDURE — 85610 PROTHROMBIN TIME: CPT

## 2018-03-30 PROCEDURE — 81001 URINALYSIS AUTO W/SCOPE: CPT

## 2018-03-30 PROCEDURE — 80053 COMPREHEN METABOLIC PANEL: CPT

## 2018-03-30 PROCEDURE — 82330 ASSAY OF CALCIUM: CPT

## 2018-03-30 PROCEDURE — 73590 X-RAY EXAM OF LOWER LEG: CPT

## 2018-03-30 PROCEDURE — 84295 ASSAY OF SERUM SODIUM: CPT

## 2018-03-30 PROCEDURE — 71045 X-RAY EXAM CHEST 1 VIEW: CPT

## 2018-03-30 PROCEDURE — 83880 ASSAY OF NATRIURETIC PEPTIDE: CPT

## 2018-03-30 PROCEDURE — 87486 CHLMYD PNEUM DNA AMP PROBE: CPT

## 2018-03-30 PROCEDURE — 86850 RBC ANTIBODY SCREEN: CPT

## 2018-03-30 PROCEDURE — 87798 DETECT AGENT NOS DNA AMP: CPT

## 2018-03-30 PROCEDURE — 73560 X-RAY EXAM OF KNEE 1 OR 2: CPT

## 2018-03-30 PROCEDURE — 93005 ELECTROCARDIOGRAM TRACING: CPT

## 2018-03-30 PROCEDURE — 87040 BLOOD CULTURE FOR BACTERIA: CPT

## 2018-03-30 PROCEDURE — 71250 CT THORAX DX C-: CPT

## 2018-03-30 PROCEDURE — 82435 ASSAY OF BLOOD CHLORIDE: CPT

## 2018-03-30 PROCEDURE — 97162 PT EVAL MOD COMPLEX 30 MIN: CPT

## 2018-03-30 PROCEDURE — C1730: CPT

## 2018-03-30 PROCEDURE — 99285 EMERGENCY DEPT VISIT HI MDM: CPT | Mod: 25

## 2018-03-30 RX ORDER — ENOXAPARIN SODIUM 100 MG/ML
15 INJECTION SUBCUTANEOUS
Qty: 0 | Refills: 0 | COMMUNITY

## 2018-03-30 RX ORDER — ASCORBIC ACID 60 MG
1 TABLET,CHEWABLE ORAL
Qty: 0 | Refills: 0 | DISCHARGE
Start: 2018-03-30

## 2018-03-30 RX ORDER — ACARBOSE 25 MG/1
1 TABLET ORAL
Qty: 0 | Refills: 0 | COMMUNITY

## 2018-03-30 RX ORDER — INSULIN GLARGINE 100 [IU]/ML
24 INJECTION, SOLUTION SUBCUTANEOUS
Qty: 0 | Refills: 0 | COMMUNITY
Start: 2018-03-30

## 2018-03-30 RX ORDER — SOLIFENACIN SUCCINATE 10 MG/1
1 TABLET ORAL
Qty: 0 | Refills: 0 | COMMUNITY

## 2018-03-30 RX ORDER — MIRABEGRON 50 MG/1
1 TABLET, EXTENDED RELEASE ORAL
Qty: 0 | Refills: 0 | COMMUNITY

## 2018-03-30 RX ORDER — LACTOBACILLUS ACIDOPHILUS 100MM CELL
1 CAPSULE ORAL
Qty: 30 | Refills: 0
Start: 2018-03-30 | End: 2018-04-28

## 2018-03-30 RX ORDER — METHENAMINE MANDELATE 1 G
1 TABLET ORAL
Qty: 0 | Refills: 0 | COMMUNITY

## 2018-03-30 RX ORDER — HYDROMORPHONE HYDROCHLORIDE 2 MG/ML
1 INJECTION INTRAMUSCULAR; INTRAVENOUS; SUBCUTANEOUS
Qty: 0 | Refills: 0 | COMMUNITY

## 2018-03-30 RX ORDER — ASPIRIN/CALCIUM CARB/MAGNESIUM 324 MG
1 TABLET ORAL
Qty: 0 | Refills: 0 | COMMUNITY

## 2018-03-30 RX ORDER — CARVEDILOL PHOSPHATE 80 MG/1
1 CAPSULE, EXTENDED RELEASE ORAL
Qty: 0 | Refills: 0 | COMMUNITY

## 2018-03-30 RX ADMIN — GALANTAMINE HYDROBROMIDE 8 MILLIGRAM(S): 4 TABLET, FILM COATED ORAL at 07:39

## 2018-03-30 RX ADMIN — Medication 1 SPRAY(S): at 05:34

## 2018-03-30 RX ADMIN — KETOTIFEN FUMARATE 1 DROP(S): 0.34 SOLUTION OPHTHALMIC at 05:34

## 2018-03-30 RX ADMIN — Medication 50 MILLIGRAM(S): at 05:34

## 2018-03-30 RX ADMIN — DABIGATRAN ETEXILATE MESYLATE 150 MILLIGRAM(S): 150 CAPSULE ORAL at 05:34

## 2018-03-30 RX ADMIN — Medication 1 TABLET(S): at 07:39

## 2018-03-30 RX ADMIN — PANTOPRAZOLE SODIUM 40 MILLIGRAM(S): 20 TABLET, DELAYED RELEASE ORAL at 05:34

## 2018-03-30 RX ADMIN — Medication 2: at 07:39

## 2018-03-30 RX ADMIN — Medication 20 MILLIGRAM(S): at 05:34

## 2018-03-30 NOTE — DISCHARGE NOTE ADULT - PATIENT PORTAL LINK FT
You can access the [facility] Patient Portal, offered by [facility], by registering with the following website: [email or web address]

## 2018-03-30 NOTE — DISCHARGE NOTE ADULT - PLAN OF CARE
Symptoms improved Aflutter s/p Ablation Low salt diet  Activity as tolerated.  Take all medication as prescribed.  Follow up with your medical doctor for routine blood pressure monitoring at your next visit.  Notify your doctor if you have any of the following symptoms:   Dizziness, Lightheadedness, Blurry vision, Headache, Chest pain, Shortness of breath HgA1C this admission.  Make sure you get your HgA1c checked every three months.  If you take oral diabetes medications, check your blood glucose two times a day.  If you take insulin, check your blood glucose before meals and at bedtime.  It's important not to skip any meals.  Keep a log of your blood glucose results and always take it with you to your doctor appointments.  Keep a list of your current medications including injectables and over the counter medications and bring this medication list with you to all your doctor appointments.  If you have not seen your ophthalmologist this year call for appointment.  Check your feet daily for redness, sores, or openings. Do not self treat. If no improvement in two days call your primary care physician for an appointment.  Low blood sugar (hypoglycemia) is a blood sugar below 70mg/dl. Check your blood sugar if you feel signs/symptoms of hypoglycemia. If your blood sugar is below 70 take 15 grams of carbohydrates (ex 4 oz of apple juice, 3-4 glucose tablets, or 4-6 oz of regular soda) wait 15 minutes and repeat blood sugar to make sure it comes up above 70.  If your blood sugar is above 70 and you are due for a meal, have a meal.  If you are not due for a meal have a snack.  This snack helps keeps your blood sugar at a safe range.

## 2018-03-30 NOTE — DISCHARGE NOTE ADULT - CARE PROVIDER_API CALL
Adam Corbett), Cardiology; Internal Medicine  [address] [facility]  [address]  [place] [address]  Phone: [phone]  Fax: [phone]    Eleazar Duque), Gastroenterology; Internal Medicine  [address] [facility] [address] [facility] [address]  [place] [address]  Phone: [phone]  Fax: [phone]    Berry Apple (DO), EndocrinologyMetabDiabetes; Internal Medicine  [address] [place] [address]  Phone: [phone]  Fax: [phone]

## 2018-03-30 NOTE — PROGRESS NOTE ADULT - SUBJECTIVE AND OBJECTIVE BOX
HPI: no further 2:1 AVB    MEDICATIONS  (STANDING):  ascorbic acid 500 milliGRAM(s) Oral daily  atorvastatin 40 milliGRAM(s) Oral at bedtime  dabigatran 150 milliGRAM(s) Oral every 12 hours  dextrose 5%. 1000 milliLiter(s) (50 mL/Hr) IV Continuous <Continuous>  dextrose 50% Injectable 12.5 Gram(s) IV Push once  dextrose 50% Injectable 25 Gram(s) IV Push once  dextrose 50% Injectable 25 Gram(s) IV Push once  donepezil 10 milliGRAM(s) Oral at bedtime  enalapril 20 milliGRAM(s) Oral daily  fluticasone propionate 50 MICROgram(s)/spray Nasal Spray 1 Spray(s) Both Nostrils two times a day  galantamine ER 8 milliGRAM(s) Oral with breakfast  insulin glargine Injectable (LANTUS) 24 Unit(s) SubCutaneous at bedtime  insulin lispro (HumaLOG) corrective regimen sliding scale   SubCutaneous three times a day before meals  insulin lispro (HumaLOG) corrective regimen sliding scale   SubCutaneous at bedtime  ketotifen 0.025% Ophthalmic Solution 1 Drop(s) Both EYES two times a day  lactobacillus acidophilus 1 Tablet(s) Oral two times a day with meals  lidocaine   Patch 1 Patch Transdermal daily  pantoprazole    Tablet 40 milliGRAM(s) Oral before breakfast  pregabalin 50 milliGRAM(s) Oral two times a day  tamsulosin 0.4 milliGRAM(s) Oral at bedtime  torsemide 20 milliGRAM(s) Oral daily    MEDICATIONS  (PRN):  acetaminophen   Tablet. 650 milliGRAM(s) Oral every 6 hours PRN Mild Pain (1 - 3)  dextrose Gel 1 Dose(s) Oral once PRN Blood Glucose LESS THAN 70 milliGRAM(s)/deciliter  glucagon  Injectable 1 milliGRAM(s) IntraMuscular once PRN Glucose LESS THAN 70 milligrams/deciliter    Allergies No Known Allergies    REVIEW OF SYSTEM:  Constitutional: denies fever chills states tired   Neuro: denies headache,  dizziness  Resp: denies cough, wheezing, shortness of breath  CVS: denies chest pain, palpitations   G: denies abdominal pain, nausea, vomiting, diarrhea   : states urinating well   Skin:  bleeding ng, burning, rashes, or lesions   Msk: states right shin sore from fall in restaurant      Vital Signs Last 24 Hrs  T(C): 36.7 (30 Mar 2018 04:27), Max: 37 (29 Mar 2018 20:52)  T(F): 98 (30 Mar 2018 04:27), Max: 98.6 (29 Mar 2018 20:52)  HR: 75 (30 Mar 2018 04:27) (75 - 84)  BP: 141/85 (30 Mar 2018 04:27) (129/66 - 141/85)  RR: 18 (30 Mar 2018 04:27) (18 - 19)  SpO2: 95% (30 Mar 2018 04:27) (93% - 95%)    Physical Exam:  General : elderly obese white male in no acute distress  Neuro : Alert and oriented x 2-3  no focal deficits  HEENT : Sclera clear,  neck supple  Lungs: Clear to Ascultation, no wheezing , rales or rhonchi   Cardiovascular : + 1 +2, RRR, no murmurs, no rubs  GI : obese abdomen soft, NT, ND, + BS   : no suprapubic tenderness, wearing depends under garment   Extremities : +1 pitting edema BLE calves ankles + 2 DP and +2 PT, feet warm   Skin : right groin flat no hematoma, no bleeding, no ecchymosis     TELE: SR first degree AVB 70's occ PVC  EKG:    LABS:                        13.0   11.09 )-----------( 277      ( 30 Mar 2018 07:50 )             41.9     03-29    142  |  103  |  16  ----------------------------<  242<H>  3.9   |  27  |  1.13    Ca    9.2      29 Mar 2018 05:20  PT/INR - ( 28 Mar 2018 11:15 )   PT: 13.8 sec;   INR: 1.22 ratio     PTT - ( 29 Mar 2018 09:41 )  PTT:104.1 sec  RADIOLOGY & ADDITIONAL TESTS:

## 2018-03-30 NOTE — PROGRESS NOTE ADULT - PROVIDER SPECIALTY LIST ADULT
Cardiology
Electrophysiology
Hospitalist
Infectious Disease
Infectious Disease
Cardiology
Electrophysiology
Gastroenterology

## 2018-03-30 NOTE — PROGRESS NOTE ADULT - ASSESSMENT
83 year old  male PMH of HTN, HFpEF (EF 65% on 2/2018), lumbar spinal stenosis, mild dementia, NPH, s/p ventriculostomy and recent hospitalization with PNA and recent diagnosed Atrial Flutter (started Pradaxa 3 weeks ago) presents to ED by EMS after patient fell to ground with prior symptom of dizziness and pt was found to be confused hypoxic O2 sat in ED  to 84% on RA in a rate controlled Atrial Flutter now s/p Aflutter ablation 3/28        contact number: Wife (Maria Eugenia): [phone] (cell), [phone] (office)

## 2018-03-30 NOTE — PROGRESS NOTE ADULT - SUBJECTIVE AND OBJECTIVE BOX
INTERVAL HPI/OVERNIGHT EVENTS:  Pt seen and examined at bedside.     Allergies/Intolerance: No Known Allergies      MEDICATIONS  (STANDING):  ascorbic acid 500 milliGRAM(s) Oral daily  atorvastatin 40 milliGRAM(s) Oral at bedtime  dabigatran 150 milliGRAM(s) Oral every 12 hours  dextrose 5%. 1000 milliLiter(s) (50 mL/Hr) IV Continuous <Continuous>  dextrose 50% Injectable 12.5 Gram(s) IV Push once  dextrose 50% Injectable 25 Gram(s) IV Push once  dextrose 50% Injectable 25 Gram(s) IV Push once  donepezil 10 milliGRAM(s) Oral at bedtime  enalapril 20 milliGRAM(s) Oral daily  fluticasone propionate 50 MICROgram(s)/spray Nasal Spray 1 Spray(s) Both Nostrils two times a day  galantamine ER 8 milliGRAM(s) Oral with breakfast  insulin glargine Injectable (LANTUS) 24 Unit(s) SubCutaneous at bedtime  insulin lispro (HumaLOG) corrective regimen sliding scale   SubCutaneous three times a day before meals  insulin lispro (HumaLOG) corrective regimen sliding scale   SubCutaneous at bedtime  ketotifen 0.025% Ophthalmic Solution 1 Drop(s) Both EYES two times a day  lactobacillus acidophilus 1 Tablet(s) Oral two times a day with meals  lidocaine   Patch 1 Patch Transdermal daily  pantoprazole    Tablet 40 milliGRAM(s) Oral before breakfast  pregabalin 50 milliGRAM(s) Oral two times a day  tamsulosin 0.4 milliGRAM(s) Oral at bedtime    MEDICATIONS  (PRN):  acetaminophen   Tablet. 650 milliGRAM(s) Oral every 6 hours PRN Mild Pain (1 - 3)  dextrose Gel 1 Dose(s) Oral once PRN Blood Glucose LESS THAN 70 milliGRAM(s)/deciliter  glucagon  Injectable 1 milliGRAM(s) IntraMuscular once PRN Glucose LESS THAN 70 milligrams/deciliter        ROS: all systems reviewed and wnl      PHYSICAL EXAMINATION:  Vital Signs Last 24 Hrs  T(C): 36.7 (30 Mar 2018 04:27), Max: 37 (29 Mar 2018 20:52)  T(F): 98 (30 Mar 2018 04:27), Max: 98.6 (29 Mar 2018 20:52)  HR: 75 (30 Mar 2018 04:27) (75 - 84)  BP: 141/85 (30 Mar 2018 04:27) (129/66 - 141/85)  BP(mean): --  RR: 18 (30 Mar 2018 04:27) (18 - 19)  SpO2: 95% (30 Mar 2018 04:27) (93% - 95%)  CAPILLARY BLOOD GLUCOSE      POCT Blood Glucose.: 204 mg/dL (30 Mar 2018 07:20)  POCT Blood Glucose.: 310 mg/dL (29 Mar 2018 21:42)  POCT Blood Glucose.: 265 mg/dL (29 Mar 2018 17:30)  POCT Blood Glucose.: 203 mg/dL (29 Mar 2018 12:09)      03-29 @ 07:01  -  03-30 @ 07:00  --------------------------------------------------------  IN: 960 mL / OUT: 200 mL / NET: 760 mL    03-30 @ 07:01 - 03-30 @ 08:18  --------------------------------------------------------  IN: 360 mL / OUT: 0 mL / NET: 360 mL        GENERAL:   NECK: supple, No JVD  CHEST/LUNG: clear to auscultation bilaterally; no rales, rhonchi, or wheezing b/l  HEART: normal S1, S2  ABDOMEN: BS+, soft, ND, NT   EXTREMITIES:  pulses palpable; no clubbing, cyanosis, or edema b/l LEs  SKIN: no rashes or lesions      LABS:                        13.0   12.60 )-----------( 326      ( 29 Mar 2018 07:43 )             41.1     03-29    142  |  103  |  16  ----------------------------<  242<H>  3.9   |  27  |  1.13    Ca    9.2      29 Mar 2018 05:20      PT/INR - ( 28 Mar 2018 11:15 )   PT: 13.8 sec;   INR: 1.22 ratio         PTT - ( 29 Mar 2018 09:41 )  PTT:104.1 sec INTERVAL HPI/OVERNIGHT EVENTS:  Pt seen and examined at bedside.     Allergies/Intolerance: No Known Allergies      MEDICATIONS  (STANDING):  ascorbic acid 500 milliGRAM(s) Oral daily  atorvastatin 40 milliGRAM(s) Oral at bedtime  dabigatran 150 milliGRAM(s) Oral every 12 hours  dextrose 5%. 1000 milliLiter(s) (50 mL/Hr) IV Continuous <Continuous>  dextrose 50% Injectable 12.5 Gram(s) IV Push once  dextrose 50% Injectable 25 Gram(s) IV Push once  dextrose 50% Injectable 25 Gram(s) IV Push once  donepezil 10 milliGRAM(s) Oral at bedtime  enalapril 20 milliGRAM(s) Oral daily  fluticasone propionate 50 MICROgram(s)/spray Nasal Spray 1 Spray(s) Both Nostrils two times a day  galantamine ER 8 milliGRAM(s) Oral with breakfast  insulin glargine Injectable (LANTUS) 24 Unit(s) SubCutaneous at bedtime  insulin lispro (HumaLOG) corrective regimen sliding scale   SubCutaneous three times a day before meals  insulin lispro (HumaLOG) corrective regimen sliding scale   SubCutaneous at bedtime  ketotifen 0.025% Ophthalmic Solution 1 Drop(s) Both EYES two times a day  lactobacillus acidophilus 1 Tablet(s) Oral two times a day with meals  lidocaine   Patch 1 Patch Transdermal daily  pantoprazole    Tablet 40 milliGRAM(s) Oral before breakfast  pregabalin 50 milliGRAM(s) Oral two times a day  tamsulosin 0.4 milliGRAM(s) Oral at bedtime    MEDICATIONS  (PRN):  acetaminophen   Tablet. 650 milliGRAM(s) Oral every 6 hours PRN Mild Pain (1 - 3)  dextrose Gel 1 Dose(s) Oral once PRN Blood Glucose LESS THAN 70 milliGRAM(s)/deciliter  glucagon  Injectable 1 milliGRAM(s) IntraMuscular once PRN Glucose LESS THAN 70 milligrams/deciliter        ROS: all systems reviewed and wnl      PHYSICAL EXAMINATION:  Vital Signs Last 24 Hrs  T(C): 36.7 (30 Mar 2018 04:27), Max: 37 (29 Mar 2018 20:52)  T(F): 98 (30 Mar 2018 04:27), Max: 98.6 (29 Mar 2018 20:52)  HR: 75 (30 Mar 2018 04:27) (75 - 84)  BP: 141/85 (30 Mar 2018 04:27) (129/66 - 141/85)  BP(mean): --  RR: 18 (30 Mar 2018 04:27) (18 - 19)  SpO2: 95% (30 Mar 2018 04:27) (93% - 95%)  CAPILLARY BLOOD GLUCOSE      POCT Blood Glucose.: 204 mg/dL (30 Mar 2018 07:20)  POCT Blood Glucose.: 310 mg/dL (29 Mar 2018 21:42)  POCT Blood Glucose.: 265 mg/dL (29 Mar 2018 17:30)  POCT Blood Glucose.: 203 mg/dL (29 Mar 2018 12:09)      03-29 @ 07:01  -  03-30 @ 07:00  --------------------------------------------------------  IN: 960 mL / OUT: 200 mL / NET: 760 mL    03-30 @ 07:01 - 03-30 @ 08:18  --------------------------------------------------------  IN: 360 mL / OUT: 0 mL / NET: 360 mL        GENERAL: stable, no new complaints, no CP or fevers.   NECK: supple, No JVD  CHEST/LUNG: clear to auscultation bilaterally; no rales, rhonchi, or wheezing b/l  HEART: normal S1, S2  ABDOMEN: BS+, soft, ND, NT   EXTREMITIES:  pulses palpable; no clubbing, cyanosis, or edema b/l LEs  SKIN: no rashes or lesions      LABS:                        13.0   12.60 )-----------( 326      ( 29 Mar 2018 07:43 )             41.1     03-29    142  |  103  |  16  ----------------------------<  242<H>  3.9   |  27  |  1.13    Ca    9.2      29 Mar 2018 05:20      PT/INR - ( 28 Mar 2018 11:15 )   PT: 13.8 sec;   INR: 1.22 ratio         PTT - ( 29 Mar 2018 09:41 )  PTT:104.1 sec

## 2018-03-30 NOTE — DISCHARGE NOTE ADULT - MEDICATION SUMMARY - MEDICATIONS TO TAKE
I will START or STAY ON the medications listed below when I get home from the hospital:    acetaminophen 500 mg oral tablet  -- 2 tab(s) by mouth once a day, As Needed  -- Indication: For PAIN    Aspirin Enteric Coated 81 mg oral delayed release tablet  -- 1 tab(s) by mouth once a day  -- Indication: For CAD    enalapril 20 mg oral tablet  -- 1 tab(s) by mouth once a day  -- Indication: For HTN    tamsulosin 0.4 mg oral capsule  -- 1 cap(s) by mouth once a day (at bedtime)  -- verified with duane reade  -- Indication: For BPH    Pradaxa 150 mg oral capsule  -- 1 cap(s) by mouth 2 times a day  -- Indication: For Atrial flutter    Lyrica 50 mg oral capsule  -- 1 cap(s) by mouth 2 times a day  -- Indication: For PAIN    Januvia 50 mg oral tablet  -- 1 tab(s) by mouth once a day  -- Indication: For DM2    glipiZIDE 10 mg oral tablet, extended release  -- 1 tab(s) by mouth 2 times a day  -- verified with duane reade  -- Indication: For DM2    insulin glargine  -- 24 unit(s) subcutaneous once a day (at bedtime)  -- Indication: For DM2    Crestor 10 mg oral tablet  -- 1 tab(s) by mouth once a day (at bedtime)  -- verified with duane reade  -- Indication: For HLD    galantamine 8 mg oral capsule, extended release  -- 1 cap(s) by mouth once a day (in the morning)  -- verified with duane reade  -- Indication: For DEMENTIA    donepezil 10 mg oral tablet  -- 1 tab(s) by mouth once a day (at bedtime)  -- verified with duane reade  -- Indication: For DEMENTIA     lidocaine 5% topical film  -- Apply on skin to affected area once a day, As Needed  -- Indication: For PAIN    fluticasone 50 mcg/inh nasal spray  -- 1 spray(s) into nose 2 times a day  -- Indication: For SOB    azelastine 0.05% ophthalmic solution  -- 1 drop(s) in each eye 2 times a day  -- Indication: For EYE DROPS    lactobacillus acidophilus oral capsule  -- 1 cap(s) by mouth once a day   -- Indication: For PROPHYLACTIC     omeprazole 20 mg oral delayed release capsule  -- 1 cap(s) by mouth once a day  -- Indication: For PROPHYLACTIC     Centrum oral tablet  -- 1 tab(s) by mouth once a day  -- Indication: For PROPHYLACTIC     Vitamin B-12  -- 1 tab(s) by mouth once a day  -- Indication: For PROPHYLACTIC     Vitamin C 1000 mg oral tablet  -- 1 tab(s) by mouth once a day (at bedtime)  -- Indication: For PROPHYLACTIC     ascorbic acid 500 mg oral tablet  -- 1 tab(s) by mouth once a day  -- Indication: For PROPHYLACTIC I will START or STAY ON the medications listed below when I get home from the hospital:    acetaminophen 500 mg oral tablet  -- 2 tab(s) by mouth once a day, As Needed  -- Indication: For pain    Aspirin Enteric Coated 81 mg oral delayed release tablet  -- 1 tab(s) by mouth once a day  -- Indication: For CAD    enalapril 20 mg oral tablet  -- 1 tab(s) by mouth once a day  -- Indication: For HTN     tamsulosin 0.4 mg oral capsule  -- 1 cap(s) by mouth once a day (at bedtime)  -- verified with duanenallely smarte  -- Indication: For BPH    Pradaxa 150 mg oral capsule  -- 1 cap(s) by mouth 2 times a day  -- Indication: For Atrial flutter, unspecified type    Lyrica 50 mg oral capsule  -- 1 cap(s) by mouth 2 times a day  -- Indication: For PAIN    Januvia 50 mg oral tablet  -- 1 tab(s) by mouth once a day  -- Indication: For Type 2 diabetes mellitus with complication, unspecified long term insulin use status    glipiZIDE 10 mg oral tablet, extended release  -- 1 tab(s) by mouth 2 times a day  -- verified with duane reade  -- Indication: For Type 2 diabetes mellitus with complication, unspecified long term insulin use status    insulin glargine  -- 24 unit(s) subcutaneous once a day (at bedtime)  -- Indication: For Type 2 diabetes mellitus with complication, unspecified long term insulin use status    Crestor 10 mg oral tablet  -- 1 tab(s) by mouth once a day (at bedtime)  -- verified with duane reade  -- Indication: For HLD     galantamine 8 mg oral capsule, extended release  -- 1 cap(s) by mouth once a day (in the morning)  -- verified with duanenallely smarte  -- Indication: For DEMENTIA    donepezil 10 mg oral tablet  -- 1 tab(s) by mouth once a day (at bedtime)  -- verified with duane reade  -- Indication: For DEMENTIA     lidocaine 5% topical film  -- Apply on skin to affected area once a day, As Needed  -- Indication: For PAIN    torsemide 20 mg oral tablet  -- 1 tab(s) by mouth once a day  -- Indication: For CHF    fluticasone 50 mcg/inh nasal spray  -- 1 spray(s) into nose 2 times a day  -- Indication: For NASAL SPRAY    azelastine 0.05% ophthalmic solution  -- 1 drop(s) in each eye 2 times a day  -- Indication: For EYE DROPS    lactobacillus acidophilus oral capsule  -- 1 cap(s) by mouth once a day   -- Indication: For PROPHYLACTIC    omeprazole 20 mg oral delayed release capsule  -- 1 cap(s) by mouth once a day  -- Indication: For PROPHYLACTIC    Centrum oral tablet  -- 1 tab(s) by mouth once a day  -- Indication: For PROPHYLACTIC    Vitamin B-12  -- 1 tab(s) by mouth once a day  -- Indication: For PROPHYLACTIC    Vitamin C 1000 mg oral tablet  -- 1 tab(s) by mouth once a day (at bedtime)  -- Indication: For PROPHYLACTIC    ascorbic acid 500 mg oral tablet  -- 1 tab(s) by mouth once a day  -- Indication: For PROPHYLACTIC

## 2018-03-30 NOTE — PROGRESS NOTE ADULT - PROBLEM SELECTOR PLAN 1
monitor telemetry no further 2:1 AVB noted on telemetry   post procedure teaching done with patient   keep K+>4, MG++>2  c/w pradaxa 150 mg BID today , CHADS-Vasc: 5   f/u as OP for a watchman device   dispo F/U in EP clinic with Katty kamara MD on 5/14/18 at 11:15 am [phone]    # [phone]

## 2018-03-30 NOTE — DISCHARGE NOTE ADULT - MEDICATION SUMMARY - MEDICATIONS TO STOP TAKING
I will STOP taking the medications listed below when I get home from the hospital:    carvedilol 6.25 mg oral tablet  -- 1 tab(s) by mouth 2 times a day  -- verified with duane reade    methenamine hippurate 1 g oral tablet  -- 1 tab(s) by mouth once a day    Myrbetriq 25 mg oral tablet, extended release  -- 1 tab(s) by mouth once a day    VESIcare 10 mg oral tablet  -- 1 tab(s) by mouth once a day    acarbose 50 mg oral tablet  -- 1 tab(s) by mouth 2 times a day    acarbose 100 mg oral tablet  -- 1 tab(s) by mouth 2 times a day    carvedilol 6.25 mg oral tablet  -- 1 tab(s) by mouth once a day (at bedtime)  -- verified with duane reade    methenamine hippurate 1 g oral tablet  -- 1 tab(s) by mouth once a day (at bedtime)    torsemide 20 mg oral tablet  -- 1 tab(s) by mouth once a day    HYDROmorphone 2 mg oral tablet  -- 1 tab(s) by mouth 3 times a day, As Needed I will STOP taking the medications listed below when I get home from the hospital:    carvedilol 6.25 mg oral tablet  -- 1 tab(s) by mouth 2 times a day  -- verified with duane reade    methenamine hippurate 1 g oral tablet  -- 1 tab(s) by mouth once a day    Myrbetriq 25 mg oral tablet, extended release  -- 1 tab(s) by mouth once a day    VESIcare 10 mg oral tablet  -- 1 tab(s) by mouth once a day    carvedilol 6.25 mg oral tablet  -- 1 tab(s) by mouth once a day (at bedtime)  -- verified with duane reade    HYDROmorphone 2 mg oral tablet  -- 1 tab(s) by mouth 3 times a day, As Needed

## 2018-03-30 NOTE — DISCHARGE NOTE ADULT - MEDICATION SUMMARY - MEDICATIONS TO CHANGE
I will SWITCH the dose or number of times a day I take the medications listed below when I get home from the hospital:    glipiZIDE 10 mg oral tablet  -- 1 tab(s) by mouth 2 times a day  -- verified with duane reade    donepezil 10 mg oral tablet  -- 1 tab(s) by mouth once a day (at bedtime)  -- verified with duane reade    Lanmarlee Solostar Pen 100 units/mL subcutaneous solution  -- 15 unit(s) subcutaneous once a day (at bedtime) I will SWITCH the dose or number of times a day I take the medications listed below when I get home from the hospital:    glipiZIDE 10 mg oral tablet  -- 1 tab(s) by mouth 2 times a day  -- verified with duane reade

## 2018-03-30 NOTE — DISCHARGE NOTE ADULT - HOSPITAL COURSE
83 year old  male PMH of BPH, HTN, lumbar spinal stenosis, dementia and NPH, history of ventriulostomy, presenting after [place] patient from restaurant after patient fell to ground and was confused. Found to be hypoxic in the 88. Patient in ER was hypoxic to 84 on RA. Placed on nasal cannula. History of CHF and is on a "water pill" that is not lasix as this didn't work for him in the past. No worsening of chronic leg swelling over the past few days. No syncope, he remember the fall. Denies fevers for the past few days. No new meds.    CV: Had TTE 2/08/18. EF normal 65 % with normal LV, RV function. A.flutter is new onset. TTE yesterday showed resolution of pericardial effustion. BP stable 131/66. Continue Coreg 6.25 mg bid and Vasotec 20 mg/day, ASA 81 mg/day and Crestor 10 mg/day. Continue telemetry which shows NSR after successful A.flutter ablation yesterday. Torsemide stopped today. IV Heparin stopped today. Change to Pradaxa 150 mg bid later today. Discharge to home in AM. EP consult reviewed and appreciated.       Pulmonary: CXR notes small left effusion, cannot r/o infection. CT chest non-contrast shows small left effusion, no obvious pneumonia or PVC.    hypoxemia.     Urology: Continue Flomax .4 mg/day for BPH.     Neuro: Continue Aricept 10 mg/day. PT eval requested.  Restarted Razadyne 8 mg/day. CT head shows no change in size of ventricles. NPH stable. No further workup needed.       Endo: hold all oral meds. Add Lantus 20 units at night and SSC.  BS better 160-310.    d/c to rehab 83 year old  male PMH of BPH, HTN, lumbar spinal stenosis, dementia from NPH, history of ventriulostomy, presenting after EMS took patient from restaurant after patient fell to ground and was confused. Found to be hypoxic in the 88. Patient in ER was hypoxic to 84 on RA. Placed on nasal cannula. History of CHF and is on a "water pill" that is not lasix as this didn't work for him in the past. No worsening of chronic leg swelling over the past few days. No syncope, he remember the fall. Denies fevers for the past few days. No new meds.    CV: Had TTE 2/08/18. EF normal 65 % with normal LV, RV function. A.flutter is new onset. TTE yesterday showed resolution of pericardial effustion. BP stable 131/66. Continue Coreg 6.25 mg bid and Vasotec 20 mg/day, ASA 81 mg/day and Crestor 10 mg/day. Continue telemetry which shows NSR after successful A.flutter ablation yesterday by EPS. Torsemide resumed today. IV Heparin stopped today. Change to Pradaxa 150 mg bid. Coreg stopped after a.flutter ablation.     Discharge to rehab today. EP consult reviewed and appreciated.  CXR notes small left effusion, cannot r/o infection. CT chest non-contrast shows small left effusion, no obvious pneumonia or PVC. Continue Flomax .4 mg/day for BPH. Continue Aricept 10 mg/day. PT eval requested.  Restarted Razadyne 8 mg/day. CT head shows no change in size of ventricles. NPH stable. No further workup needed per neurology.  Continue Lantus 20 units at night and SSC.  BS better 160-310.    d/c to rehab today.

## 2018-03-30 NOTE — DISCHARGE NOTE ADULT - SECONDARY DIAGNOSIS.
Atrial flutter, unspecified type Hypertension, essential Type 2 diabetes mellitus with complication, unspecified long term insulin use status Mild dementia

## 2018-04-18 ENCOUNTER — APPOINTMENT (OUTPATIENT)
Dept: GERIATRICS | Facility: CLINIC | Age: 83
End: 2018-04-18

## 2018-05-14 ENCOUNTER — APPOINTMENT (OUTPATIENT)
Dept: ELECTROPHYSIOLOGY | Facility: CLINIC | Age: 83
End: 2018-05-14

## 2018-05-18 ENCOUNTER — INPATIENT (INPATIENT)
Facility: HOSPITAL | Age: 83
LOS: 2 days | Discharge: HOME | DRG: 292 | End: 2018-05-21
Attending: INTERNAL MEDICINE | Admitting: INTERNAL MEDICINE
Payer: MEDICARE

## 2018-05-18 ENCOUNTER — APPOINTMENT (OUTPATIENT)
Dept: GERIATRICS | Facility: CLINIC | Age: 83
End: 2018-05-18
Payer: MEDICARE

## 2018-05-18 VITALS — OXYGEN SATURATION: 98 % | TEMPERATURE: 98.5 F | WEIGHT: 215 LBS | BODY MASS INDEX: 31.29 KG/M2 | HEART RATE: 72 BPM

## 2018-05-18 VITALS
HEART RATE: 65 BPM | SYSTOLIC BLOOD PRESSURE: 165 MMHG | DIASTOLIC BLOOD PRESSURE: 73 MMHG | RESPIRATION RATE: 16 BRPM | OXYGEN SATURATION: 98 %

## 2018-05-18 VITALS — DIASTOLIC BLOOD PRESSURE: 80 MMHG | SYSTOLIC BLOOD PRESSURE: 150 MMHG

## 2018-05-18 DIAGNOSIS — N05.2 UNSPECIFIED NEPHRITIC SYNDROME WITH DIFFUSE MEMBRANOUS GLOMERULONEPHRITIS: ICD-10-CM

## 2018-05-18 DIAGNOSIS — M54.5 LOW BACK PAIN: ICD-10-CM

## 2018-05-18 DIAGNOSIS — Z71.89 OTHER SPECIFIED COUNSELING: ICD-10-CM

## 2018-05-18 DIAGNOSIS — G91.2 (IDIOPATHIC) NORMAL PRESSURE HYDROCEPHALUS: Chronic | ICD-10-CM

## 2018-05-18 DIAGNOSIS — M51.26 OTHER INTERVERTEBRAL DISC DISPLACEMENT, LUMBAR REGION: ICD-10-CM

## 2018-05-18 DIAGNOSIS — I48.3 TYPICAL ATRIAL FLUTTER: ICD-10-CM

## 2018-05-18 DIAGNOSIS — I50.9 HEART FAILURE, UNSPECIFIED: ICD-10-CM

## 2018-05-18 DIAGNOSIS — I50.33 ACUTE ON CHRONIC DIASTOLIC (CONGESTIVE) HEART FAILURE: ICD-10-CM

## 2018-05-18 DIAGNOSIS — G91.2 (IDIOPATHIC) NORMAL PRESSURE HYDROCEPHALUS: ICD-10-CM

## 2018-05-18 LAB
ALBUMIN SERPL ELPH-MCNC: 3 G/DL — LOW (ref 3.3–5)
ALP SERPL-CCNC: 113 U/L — SIGNIFICANT CHANGE UP (ref 40–120)
ALT FLD-CCNC: 28 U/L — SIGNIFICANT CHANGE UP (ref 10–45)
ANION GAP SERPL CALC-SCNC: 10 MMOL/L — SIGNIFICANT CHANGE UP (ref 5–17)
APTT BLD: 28.8 SEC — SIGNIFICANT CHANGE UP (ref 27.5–37.4)
AST SERPL-CCNC: 21 U/L — SIGNIFICANT CHANGE UP (ref 10–40)
BASOPHILS # BLD AUTO: 0 K/UL — SIGNIFICANT CHANGE UP (ref 0–0.2)
BASOPHILS NFR BLD AUTO: 0.3 % — SIGNIFICANT CHANGE UP (ref 0–2)
BILIRUB SERPL-MCNC: 0.2 MG/DL — SIGNIFICANT CHANGE UP (ref 0.2–1.2)
BUN SERPL-MCNC: 15 MG/DL — SIGNIFICANT CHANGE UP (ref 7–23)
CALCIUM SERPL-MCNC: 9.2 MG/DL — SIGNIFICANT CHANGE UP (ref 8.4–10.5)
CHLORIDE SERPL-SCNC: 101 MMOL/L — SIGNIFICANT CHANGE UP (ref 96–108)
CK MB CFR SERPL CALC: 3.2 NG/ML — SIGNIFICANT CHANGE UP (ref 0–6.7)
CO2 SERPL-SCNC: 24 MMOL/L — SIGNIFICANT CHANGE UP (ref 22–31)
CREAT SERPL-MCNC: 0.91 MG/DL — SIGNIFICANT CHANGE UP (ref 0.5–1.3)
EOSINOPHIL # BLD AUTO: 0.4 K/UL — SIGNIFICANT CHANGE UP (ref 0–0.5)
EOSINOPHIL NFR BLD AUTO: 4.4 % — SIGNIFICANT CHANGE UP (ref 0–6)
GAS PNL BLDV: SIGNIFICANT CHANGE UP
GLUCOSE BLDC GLUCOMTR-MCNC: 293 MG/DL — HIGH (ref 70–99)
GLUCOSE BLDC GLUCOMTR-MCNC: 317 MG/DL — HIGH (ref 70–99)
GLUCOSE SERPL-MCNC: 275 MG/DL — HIGH (ref 70–99)
HCT VFR BLD CALC: 42.5 % — SIGNIFICANT CHANGE UP (ref 39–50)
HGB BLD-MCNC: 14.1 G/DL — SIGNIFICANT CHANGE UP (ref 13–17)
INR BLD: 1.1 RATIO — SIGNIFICANT CHANGE UP (ref 0.88–1.16)
LYMPHOCYTES # BLD AUTO: 1.2 K/UL — SIGNIFICANT CHANGE UP (ref 1–3.3)
LYMPHOCYTES # BLD AUTO: 13.2 % — SIGNIFICANT CHANGE UP (ref 13–44)
MCHC RBC-ENTMCNC: 26.2 PG — LOW (ref 27–34)
MCHC RBC-ENTMCNC: 33.1 GM/DL — SIGNIFICANT CHANGE UP (ref 32–36)
MCV RBC AUTO: 79.1 FL — LOW (ref 80–100)
MONOCYTES # BLD AUTO: 0.7 K/UL — SIGNIFICANT CHANGE UP (ref 0–0.9)
MONOCYTES NFR BLD AUTO: 8.4 % — SIGNIFICANT CHANGE UP (ref 2–14)
NEUTROPHILS # BLD AUTO: 6.4 K/UL — SIGNIFICANT CHANGE UP (ref 1.8–7.4)
NEUTROPHILS NFR BLD AUTO: 73.8 % — SIGNIFICANT CHANGE UP (ref 43–77)
NT-PROBNP SERPL-SCNC: 1475 PG/ML — HIGH (ref 0–300)
PLATELET # BLD AUTO: 421 K/UL — HIGH (ref 150–400)
POTASSIUM SERPL-MCNC: 4.7 MMOL/L — SIGNIFICANT CHANGE UP (ref 3.5–5.3)
POTASSIUM SERPL-SCNC: 4.7 MMOL/L — SIGNIFICANT CHANGE UP (ref 3.5–5.3)
PROT SERPL-MCNC: 6.9 G/DL — SIGNIFICANT CHANGE UP (ref 6–8.3)
PROTHROM AB SERPL-ACNC: 12 SEC — SIGNIFICANT CHANGE UP (ref 9.8–12.7)
RBC # BLD: 5.37 M/UL — SIGNIFICANT CHANGE UP (ref 4.2–5.8)
RBC # FLD: 14.8 % — HIGH (ref 10.3–14.5)
SODIUM SERPL-SCNC: 135 MMOL/L — SIGNIFICANT CHANGE UP (ref 135–145)
TROPONIN T SERPL-MCNC: 0.01 NG/ML — SIGNIFICANT CHANGE UP (ref 0–0.06)
WBC # BLD: 8.7 K/UL — SIGNIFICANT CHANGE UP (ref 3.8–10.5)
WBC # FLD AUTO: 8.7 K/UL — SIGNIFICANT CHANGE UP (ref 3.8–10.5)

## 2018-05-18 PROCEDURE — 99285 EMERGENCY DEPT VISIT HI MDM: CPT | Mod: 25

## 2018-05-18 PROCEDURE — 93010 ELECTROCARDIOGRAM REPORT: CPT

## 2018-05-18 PROCEDURE — 71046 X-RAY EXAM CHEST 2 VIEWS: CPT | Mod: 26

## 2018-05-18 PROCEDURE — 99205 OFFICE O/P NEW HI 60 MIN: CPT | Mod: GC,PD

## 2018-05-18 RX ORDER — INSULIN LISPRO 100/ML
VIAL (ML) SUBCUTANEOUS AT BEDTIME
Qty: 0 | Refills: 0 | Status: DISCONTINUED | OUTPATIENT
Start: 2018-05-18 | End: 2018-05-21

## 2018-05-18 RX ORDER — HYDROMORPHONE HYDROCHLORIDE 2 MG/1
2 TABLET ORAL
Refills: 0 | Status: ACTIVE | COMMUNITY
Start: 2018-05-18

## 2018-05-18 RX ORDER — INSULIN GLARGINE 100 [IU]/ML
25 INJECTION, SOLUTION SUBCUTANEOUS AT BEDTIME
Qty: 0 | Refills: 0 | Status: DISCONTINUED | OUTPATIENT
Start: 2018-05-18 | End: 2018-05-21

## 2018-05-18 RX ORDER — DEXTROSE 50 % IN WATER 50 %
25 SYRINGE (ML) INTRAVENOUS ONCE
Qty: 0 | Refills: 0 | Status: DISCONTINUED | OUTPATIENT
Start: 2018-05-18 | End: 2018-05-21

## 2018-05-18 RX ORDER — DEXTROSE 50 % IN WATER 50 %
12.5 SYRINGE (ML) INTRAVENOUS ONCE
Qty: 0 | Refills: 0 | Status: DISCONTINUED | OUTPATIENT
Start: 2018-05-18 | End: 2018-05-21

## 2018-05-18 RX ORDER — DEXTROSE 50 % IN WATER 50 %
15 SYRINGE (ML) INTRAVENOUS ONCE
Qty: 0 | Refills: 0 | Status: DISCONTINUED | OUTPATIENT
Start: 2018-05-18 | End: 2018-05-21

## 2018-05-18 RX ORDER — ENOXAPARIN SODIUM 100 MG/ML
40 INJECTION SUBCUTANEOUS DAILY
Qty: 0 | Refills: 0 | Status: DISCONTINUED | OUTPATIENT
Start: 2018-05-18 | End: 2018-05-21

## 2018-05-18 RX ORDER — KETOTIFEN FUMARATE 0.34 MG/ML
1 SOLUTION OPHTHALMIC
Qty: 0 | Refills: 0 | Status: DISCONTINUED | OUTPATIENT
Start: 2018-05-18 | End: 2018-05-21

## 2018-05-18 RX ORDER — GALANTAMINE 8 MG/1
8 CAPSULE, EXTENDED RELEASE ORAL
Qty: 30 | Refills: 0 | Status: ACTIVE | COMMUNITY
Start: 2017-11-27

## 2018-05-18 RX ORDER — FLUTICASONE PROPIONATE 50 MCG
1 SPRAY, SUSPENSION NASAL
Qty: 0 | Refills: 0 | Status: DISCONTINUED | OUTPATIENT
Start: 2018-05-18 | End: 2018-05-21

## 2018-05-18 RX ORDER — FUROSEMIDE 40 MG
40 TABLET ORAL ONCE
Qty: 0 | Refills: 0 | Status: COMPLETED | OUTPATIENT
Start: 2018-05-18 | End: 2018-05-18

## 2018-05-18 RX ORDER — ASCORBIC ACID 60 MG
500 TABLET,CHEWABLE ORAL DAILY
Qty: 0 | Refills: 0 | Status: DISCONTINUED | OUTPATIENT
Start: 2018-05-18 | End: 2018-05-21

## 2018-05-18 RX ORDER — SODIUM CHLORIDE 9 MG/ML
1000 INJECTION, SOLUTION INTRAVENOUS
Qty: 0 | Refills: 0 | Status: DISCONTINUED | OUTPATIENT
Start: 2018-05-18 | End: 2018-05-21

## 2018-05-18 RX ORDER — OXYBUTYNIN CHLORIDE 5 MG
10 TABLET ORAL
Qty: 0 | Refills: 0 | Status: DISCONTINUED | OUTPATIENT
Start: 2018-05-18 | End: 2018-05-21

## 2018-05-18 RX ORDER — ASCORBIC ACID 60 MG
1 TABLET,CHEWABLE ORAL
Qty: 0 | Refills: 0 | COMMUNITY

## 2018-05-18 RX ORDER — PREGABALIN 225 MG/1
1 CAPSULE ORAL
Qty: 0 | Refills: 0 | COMMUNITY

## 2018-05-18 RX ORDER — PANTOPRAZOLE SODIUM 20 MG/1
40 TABLET, DELAYED RELEASE ORAL
Qty: 0 | Refills: 0 | Status: DISCONTINUED | OUTPATIENT
Start: 2018-05-18 | End: 2018-05-21

## 2018-05-18 RX ORDER — ATORVASTATIN CALCIUM 80 MG/1
40 TABLET, FILM COATED ORAL AT BEDTIME
Qty: 0 | Refills: 0 | Status: DISCONTINUED | OUTPATIENT
Start: 2018-05-18 | End: 2018-05-21

## 2018-05-18 RX ORDER — CARVEDILOL PHOSPHATE 80 MG/1
6.25 CAPSULE, EXTENDED RELEASE ORAL EVERY 12 HOURS
Qty: 0 | Refills: 0 | Status: DISCONTINUED | OUTPATIENT
Start: 2018-05-18 | End: 2018-05-21

## 2018-05-18 RX ORDER — INSULIN LISPRO 100/ML
VIAL (ML) SUBCUTANEOUS
Qty: 0 | Refills: 0 | Status: DISCONTINUED | OUTPATIENT
Start: 2018-05-18 | End: 2018-05-21

## 2018-05-18 RX ORDER — ACARBOSE 100 MG/1
100 TABLET ORAL 3 TIMES DAILY
Refills: 0 | Status: ACTIVE | COMMUNITY
Start: 2018-05-18

## 2018-05-18 RX ORDER — MIRABEGRON 25 MG/1
25 TABLET, FILM COATED, EXTENDED RELEASE ORAL DAILY
Refills: 0 | Status: ACTIVE | COMMUNITY
Start: 2018-05-18

## 2018-05-18 RX ORDER — ASPIRIN/CALCIUM CARB/MAGNESIUM 324 MG
81 TABLET ORAL DAILY
Qty: 0 | Refills: 0 | Status: DISCONTINUED | OUTPATIENT
Start: 2018-05-18 | End: 2018-05-21

## 2018-05-18 RX ORDER — FUROSEMIDE 40 MG
40 TABLET ORAL DAILY
Qty: 0 | Refills: 0 | Status: DISCONTINUED | OUTPATIENT
Start: 2018-05-18 | End: 2018-05-20

## 2018-05-18 RX ORDER — MULTIVIT-MIN/FERROUS GLUCONATE 9 MG/15 ML
1 LIQUID (ML) ORAL
Qty: 0 | Refills: 0 | COMMUNITY

## 2018-05-18 RX ORDER — PREGABALIN 50 MG/1
50 CAPSULE ORAL TWICE DAILY
Refills: 0 | Status: ACTIVE | COMMUNITY
Start: 2018-05-18

## 2018-05-18 RX ORDER — GALANTAMINE HYDROBROMIDE 4 MG/1
8 TABLET, FILM COATED ORAL
Qty: 0 | Refills: 0 | Status: DISCONTINUED | OUTPATIENT
Start: 2018-05-18 | End: 2018-05-21

## 2018-05-18 RX ORDER — DONEPEZIL HYDROCHLORIDE 10 MG/1
10 TABLET, FILM COATED ORAL AT BEDTIME
Qty: 0 | Refills: 0 | Status: DISCONTINUED | OUTPATIENT
Start: 2018-05-18 | End: 2018-05-21

## 2018-05-18 RX ORDER — LACTOBACILLUS ACIDOPHILUS 100MM CELL
1 CAPSULE ORAL
Qty: 0 | Refills: 0 | Status: DISCONTINUED | OUTPATIENT
Start: 2018-05-18 | End: 2018-05-21

## 2018-05-18 RX ORDER — SOLIFENACIN SUCCINATE 10 MG/1
10 TABLET, FILM COATED ORAL DAILY
Refills: 0 | Status: ACTIVE | COMMUNITY
Start: 2018-05-18

## 2018-05-18 RX ORDER — INSULIN GLARGINE 100 [IU]/ML
100 INJECTION, SOLUTION SUBCUTANEOUS
Refills: 0 | Status: ACTIVE | COMMUNITY
Start: 2018-05-18

## 2018-05-18 RX ORDER — GLUCAGON INJECTION, SOLUTION 0.5 MG/.1ML
1 INJECTION, SOLUTION SUBCUTANEOUS ONCE
Qty: 0 | Refills: 0 | Status: DISCONTINUED | OUTPATIENT
Start: 2018-05-18 | End: 2018-05-21

## 2018-05-18 RX ORDER — TAMSULOSIN HYDROCHLORIDE 0.4 MG/1
0.4 CAPSULE ORAL AT BEDTIME
Qty: 0 | Refills: 0 | Status: DISCONTINUED | OUTPATIENT
Start: 2018-05-18 | End: 2018-05-21

## 2018-05-18 RX ORDER — DABIGATRAN ETEXILATE MESYLATE 150 MG/1
150 CAPSULE ORAL EVERY 12 HOURS
Qty: 0 | Refills: 0 | Status: DISCONTINUED | OUTPATIENT
Start: 2018-05-18 | End: 2018-05-18

## 2018-05-18 RX ADMIN — TAMSULOSIN HYDROCHLORIDE 0.4 MILLIGRAM(S): 0.4 CAPSULE ORAL at 22:56

## 2018-05-18 RX ADMIN — Medication 2: at 23:14

## 2018-05-18 RX ADMIN — Medication 40 MILLIGRAM(S): at 16:03

## 2018-05-18 RX ADMIN — CARVEDILOL PHOSPHATE 6.25 MILLIGRAM(S): 80 CAPSULE, EXTENDED RELEASE ORAL at 18:29

## 2018-05-18 RX ADMIN — DONEPEZIL HYDROCHLORIDE 10 MILLIGRAM(S): 10 TABLET, FILM COATED ORAL at 22:57

## 2018-05-18 RX ADMIN — INSULIN GLARGINE 25 UNIT(S): 100 INJECTION, SOLUTION SUBCUTANEOUS at 23:14

## 2018-05-18 RX ADMIN — ATORVASTATIN CALCIUM 40 MILLIGRAM(S): 80 TABLET, FILM COATED ORAL at 22:56

## 2018-05-18 RX ADMIN — ENOXAPARIN SODIUM 40 MILLIGRAM(S): 100 INJECTION SUBCUTANEOUS at 18:28

## 2018-05-18 RX ADMIN — Medication 1 TABLET(S): at 18:21

## 2018-05-18 RX ADMIN — Medication 50 MILLIGRAM(S): at 18:29

## 2018-05-18 RX ADMIN — Medication 10 MILLIGRAM(S): at 18:21

## 2018-05-18 RX ADMIN — Medication 500 MILLIGRAM(S): at 18:20

## 2018-05-18 NOTE — H&P ADULT - NSHPPHYSICALEXAM_GEN_ALL_CORE
PHYSICAL EXAMINATION:  Vital Signs Last 24 Hrs  T(C): 36.7 (18 May 2018 14:25), Max: 36.7 (18 May 2018 14:25)  T(F): 98.1 (18 May 2018 14:25), Max: 98.1 (18 May 2018 14:25)  HR: 68 (18 May 2018 14:25) (65 - 68)  BP: 157/80 (18 May 2018 14:25) (157/80 - 165/73)  BP(mean): --  RR: 16 (18 May 2018 14:25) (16 - 16)  SpO2: 92% (18 May 2018 15:21) (92% - 98%)  CAPILLARY BLOOD GLUCOSE      POCT Blood Glucose.: 240 mg/dL (18 May 2018 14:20)      GENERAL: NAD, well-groomed, well-developed  HEAD:  atraumatic, normocephalic  EYES: sclera anicteric  ENMT: mucous membranes moist  NECK: supple, No JVD  CHEST/LUNG: clear to auscultation bilaterally; no rales, rhonchi, or wheezing b/l  HEART: normal S1, S2  ABDOMEN: BS+, soft, ND, NT   EXTREMITIES:  pulses palpable; no clubbing, cyanosis, or edema b/l LEs  NEURO: awake, alert, interactive; moves all extremities  SKIN: no rashes or lesions PHYSICAL EXAMINATION:  Vital Signs Last 24 Hrs  T(C): 36.7 (18 May 2018 14:25), Max: 36.7 (18 May 2018 14:25)  T(F): 98.1 (18 May 2018 14:25), Max: 98.1 (18 May 2018 14:25)  HR: 68 (18 May 2018 14:25) (65 - 68)  BP: 157/80 (18 May 2018 14:25) (157/80 - 165/73)  BP(mean): --  RR: 16 (18 May 2018 14:25) (16 - 16)  SpO2: 92% (18 May 2018 15:21) (92% - 98%)  CAPILLARY BLOOD GLUCOSE      POCT Blood Glucose.: 240 mg/dL (18 May 2018 14:20)      GENERAL: NAD, comfortable, seen in ER, no SOB at rest  HEAD:  atraumatic, normocephalic  EYES: sclera anicteric  ENMT: mucous membranes moist  NECK: supple, No JVD  CHEST/LUNG: clear to auscultation bilaterally; no rales, rhonchi, or wheezing b/l  HEART: normal S1, S2  ABDOMEN: BS+, soft, ND, NT   EXTREMITIES:  pulses palpable; no clubbing, cyanosis, or edema b/l LEs  NEURO: awake, alert, interactive; moves all extremities  SKIN: no rashes or lesions PHYSICAL EXAMINATION:  Vital Signs Last 24 Hrs  T(C): 36.7 (18 May 2018 14:25), Max: 36.7 (18 May 2018 14:25)  T(F): 98.1 (18 May 2018 14:25), Max: 98.1 (18 May 2018 14:25)  HR: 68 (18 May 2018 14:25) (65 - 68)  BP: 157/80 (18 May 2018 14:25) (157/80 - 165/73)  BP(mean): --  RR: 16 (18 May 2018 14:25) (16 - 16)  SpO2: 92% (18 May 2018 15:21) (92% - 98%)  CAPILLARY BLOOD GLUCOSE      POCT Blood Glucose.: 240 mg/dL (18 May 2018 14:20)      GENERAL: NAD, comfortable, seen in ER, no SOB at rest  HEAD:  atraumatic, normocephalic  EYES: sclera anicteric  ENMT: mucous membranes moist  NECK: supple, No JVD  CHEST/LUNG: clear to auscultation bilaterally; no rales, rhonchi, or wheezing b/l  HEART: normal S1, S2  ABDOMEN: BS+, soft, ND, NT   EXTREMITIES:  pulses palpable; no clubbing, cyanosis, 1 plus edema b/l LEs  NEURO: awake, alert, interactive; moves all extremities  SKIN: no rashes or lesions

## 2018-05-18 NOTE — H&P ADULT - HISTORY OF PRESENT ILLNESS
82 yo male PMH HTN, mild dementia, chronic back pain, diabetes mellitus(II), recently diagnosed CHF (03/18 was on turosemide but no longer on any diuretic) BIBEMS presenting to ED from his geriatric physician Dr. Welch due to "heart failure". Patient was there for routine checkup and his O2 sat was found to be low and upon ambulatory sat the physician felt he was in heart failure and sent to ED. Wife at bedside endorses she's noticed increased LE swelling last 2 weeks and has been trying to get him to come to ED but pt refused. Reports chronic dry cough.     Patient denies chest pain, fevrs, shortness of breath, weakness, nausea/vomiting, abdominal pain, hemoptysis or abdominal pain. 82 yo male PMH HTN, PAF, mild dementia, chronic back pain, diabetes mellitus(II), CHF, was on turosemide but was stopped, BIBEMS presenting to ED from his geriatric physician Dr. Welch due to "heart failure". Patient was there for routine checkup and his O2 sat was found to be low and upon ambulatory sat the physician felt he was in heart failure and sent to ED. Wife at bedside endorses she's noticed increased LE swelling last 2 weeks and has been trying to get him to come to ED but pt refused. Reports chronic dry cough.     Patient denies chest pain, fevers, nausea/vomiting, abdominal pain, hemoptysis, dysuria or abdominal pain.

## 2018-05-18 NOTE — ED PROVIDER NOTE - ATTENDING CONTRIBUTION TO CARE
attending Haider: 83yM h/o HTN, mild dementia, chronic back pain, DMII, recently diagnosed CHF previously on torsemide but currently off diuretics BIBEMS from doctor office for "heart failure". Pt seen in office for routine visit, reportedly found to have hypoxia on ambulation. Pt's wife noted increased LE edema x 2 weeks. Chronic dry cough. On exam, hypoxic 91-92% on RA, diminished breath sounds at bilateral bases, pedal edema bilateral LE, no respiratory distress. Will obtain labs, ekg, place on tele, cxr, IV diuresis and admit.

## 2018-05-18 NOTE — ED PROVIDER NOTE - MUSCULOSKELETAL, MLM
Spine appears normal, range of motion is not limited, no muscle or joint tenderness. No calf swelling; erythema or asymmetry.

## 2018-05-18 NOTE — H&P ADULT - NSHPLABSRESULTS_GEN_ALL_CORE
LABS:                        14.1   8.7   )-----------( 421      ( 18 May 2018 15:06 )             42.5     05-18    135  |  101  |  15  ----------------------------<  275<H>  4.7   |  24  |  0.91    Ca    9.2      18 May 2018 15:06    TPro  6.9  /  Alb  3.0<L>  /  TBili  0.2  /  DBili  x   /  AST  21  /  ALT  28  /  AlkPhos  113  05-18    PT/INR - ( 18 May 2018 15:15 )   PT: 12.0 sec;   INR: 1.10 ratio         PTT - ( 18 May 2018 15:15 )  PTT:28.8 sec        RADIOLOGY & ADDITIONAL TESTS:

## 2018-05-18 NOTE — H&P ADULT - ASSESSMENT
82 yo male PMH HTN, PAF, mild dementia, chronic back pain, diabetes mellitus(II), CHF, was on turosemide but was stopped, BIBEMS presenting to ED from his geriatric physician Dr. Welch due to "heart failure". Patient was there for routine checkup and his O2 sat was found to be low and upon ambulatory sat the physician felt he was in heart failure and sent to ED. Wife at bedside endorses she's noticed increased LE swelling last 2 weeks and has been trying to get him to come to ED but pt refused. Reports chronic dry cough.     CV: IVP Lasix 40 mg every AM. BNP of 1,475 actually normal for age of 83. CXR shows mild bilateral pleural effusions.  Pradaxa has been stopped per wife. Continue Coreg 6.25 mg bid, ASA 81 mg/day and Lipitor 40 mg/day.     Neuro: Dementia and chronic NPH. Continue Aricept 10 mg/day and Galantamine 8 mg/day.     Urology: Continue Flomax .4 mg/day and Vesicare 10 mg/day.     Endo: Continue Lantus 25 units/day at night. Hold 2 oral agents from home while in hospital. 84 yo male PMH HTN, PAF, mild dementia, chronic back pain, diabetes mellitus(II), CHF, was on turosemide but was stopped, BIBEMS presenting to ED from his geriatric physician Dr. Welch due to "heart failure". Patient was there for routine checkup and his O2 sat was found to be low and upon ambulatory sat the physician felt he was in heart failure and sent to ED. Wife at bedside endorses she's noticed increased LE swelling last 2 weeks and has been trying to get him to come to ED but pt refused. Reports chronic dry cough.     CV: SOB appears to be mild CHF. IVP Lasix 40 mg every AM. BNP of 1,475 actually normal for age of 83. CXR shows mild bilateral pleural effusions.  Pradaxa has been stopped per wife. Continue Coreg 6.25 mg bid, ASA 81 mg/day and Lipitor 40 mg/day.     Neuro: Dementia and chronic NPH. Continue Aricept 10 mg/day and Galantamine 8 mg/day.     Urology: Continue Flomax .4 mg/day and Vesicare 10 mg/day.     Endo: Continue Lantus 25 units/day at night. Hold 2 oral agents from home while in hospital.

## 2018-05-18 NOTE — ED PROVIDER NOTE - OBJECTIVE STATEMENT
82 yo male PMHx of 82 yo male PMHx of HTN, mild dementia, chronic back pain, type 2 diabetes mellitus, and recently diagnosed CHF (03/18 was on turosemide but no longer on any diuretic) BIBEMS presenting to ED from his geriatric physician Dr. Welch due to "heart failure". Patient was there for routine checkup and his O2 sat was found to be low and upon ambulatory sat the physician felt he was in heart failure and sent to ED. Wife at bedside endorses she's noticed increased LE swelling last 2 weeks and has been trying to get him to come to ED but pt refused. Reports chronic dry cough. Patient denies feeling any symptoms, no chest pain, no shortness of breath, no weakness, no nausea/vomiting, no abdominal pain, no hemoptysis.

## 2018-05-18 NOTE — ED ADULT NURSE NOTE - OBJECTIVE STATEMENT
· H 84 yo male PMHx of HTN, mild dementia, chronic back pain, type 2 diabetes mellitus, and recently diagnosed CHF (03/18 was on turosemide but no longer on any diuretic) BIBEMS presenting to ED from his geriatric physician Dr. Welch due to "heart failure". Patient was there for routine checkup and his O2 sat was found to be low and upon ambulatory sat the physician felt he was in heart failure and sent to ED. Wife at bedside endorses she's noticed increased LE swelling last 2 weeks and has been trying to get him to come to ED but pt refused. Reports chronic dry cough. Patient denies feeling any symptoms, no chest pain, no shortness of breath, no weakness, no nausea/vomiting, no abdominal pain, no hemoptysis.

## 2018-05-19 LAB
ANION GAP SERPL CALC-SCNC: 13 MMOL/L — SIGNIFICANT CHANGE UP (ref 5–17)
BUN SERPL-MCNC: 15 MG/DL — SIGNIFICANT CHANGE UP (ref 7–23)
CALCIUM SERPL-MCNC: 9.4 MG/DL — SIGNIFICANT CHANGE UP (ref 8.4–10.5)
CHLORIDE SERPL-SCNC: 100 MMOL/L — SIGNIFICANT CHANGE UP (ref 96–108)
CO2 SERPL-SCNC: 27 MMOL/L — SIGNIFICANT CHANGE UP (ref 22–31)
CREAT SERPL-MCNC: 1.02 MG/DL — SIGNIFICANT CHANGE UP (ref 0.5–1.3)
GLUCOSE BLDC GLUCOMTR-MCNC: 183 MG/DL — HIGH (ref 70–99)
GLUCOSE BLDC GLUCOMTR-MCNC: 285 MG/DL — HIGH (ref 70–99)
GLUCOSE BLDC GLUCOMTR-MCNC: 285 MG/DL — HIGH (ref 70–99)
GLUCOSE BLDC GLUCOMTR-MCNC: 389 MG/DL — HIGH (ref 70–99)
GLUCOSE SERPL-MCNC: 205 MG/DL — HIGH (ref 70–99)
HBA1C BLD-MCNC: 9.2 % — HIGH (ref 4–5.6)
HCT VFR BLD CALC: 42 % — SIGNIFICANT CHANGE UP (ref 39–50)
HGB BLD-MCNC: 13.5 G/DL — SIGNIFICANT CHANGE UP (ref 13–17)
MCHC RBC-ENTMCNC: 25 PG — LOW (ref 27–34)
MCHC RBC-ENTMCNC: 32.1 GM/DL — SIGNIFICANT CHANGE UP (ref 32–36)
MCV RBC AUTO: 77.8 FL — LOW (ref 80–100)
PLATELET # BLD AUTO: 370 K/UL — SIGNIFICANT CHANGE UP (ref 150–400)
POTASSIUM SERPL-MCNC: 3.9 MMOL/L — SIGNIFICANT CHANGE UP (ref 3.5–5.3)
POTASSIUM SERPL-SCNC: 3.9 MMOL/L — SIGNIFICANT CHANGE UP (ref 3.5–5.3)
RBC # BLD: 5.4 M/UL — SIGNIFICANT CHANGE UP (ref 4.2–5.8)
RBC # FLD: 15.9 % — HIGH (ref 10.3–14.5)
SODIUM SERPL-SCNC: 140 MMOL/L — SIGNIFICANT CHANGE UP (ref 135–145)
WBC # BLD: 8.53 K/UL — SIGNIFICANT CHANGE UP (ref 3.8–10.5)
WBC # FLD AUTO: 8.53 K/UL — SIGNIFICANT CHANGE UP (ref 3.8–10.5)

## 2018-05-19 RX ADMIN — Medication 1 SPRAY(S): at 17:00

## 2018-05-19 RX ADMIN — Medication 1: at 21:35

## 2018-05-19 RX ADMIN — Medication 10 MILLIGRAM(S): at 05:21

## 2018-05-19 RX ADMIN — Medication 1: at 07:40

## 2018-05-19 RX ADMIN — Medication 50 MILLIGRAM(S): at 05:23

## 2018-05-19 RX ADMIN — KETOTIFEN FUMARATE 1 DROP(S): 0.34 SOLUTION OPHTHALMIC at 05:22

## 2018-05-19 RX ADMIN — Medication 500 MILLIGRAM(S): at 11:41

## 2018-05-19 RX ADMIN — DONEPEZIL HYDROCHLORIDE 10 MILLIGRAM(S): 10 TABLET, FILM COATED ORAL at 21:35

## 2018-05-19 RX ADMIN — Medication 1 TABLET(S): at 07:40

## 2018-05-19 RX ADMIN — CARVEDILOL PHOSPHATE 6.25 MILLIGRAM(S): 80 CAPSULE, EXTENDED RELEASE ORAL at 17:00

## 2018-05-19 RX ADMIN — CARVEDILOL PHOSPHATE 6.25 MILLIGRAM(S): 80 CAPSULE, EXTENDED RELEASE ORAL at 06:27

## 2018-05-19 RX ADMIN — Medication 81 MILLIGRAM(S): at 11:41

## 2018-05-19 RX ADMIN — ATORVASTATIN CALCIUM 40 MILLIGRAM(S): 80 TABLET, FILM COATED ORAL at 21:35

## 2018-05-19 RX ADMIN — PANTOPRAZOLE SODIUM 40 MILLIGRAM(S): 20 TABLET, DELAYED RELEASE ORAL at 05:21

## 2018-05-19 RX ADMIN — Medication 50 MILLIGRAM(S): at 17:03

## 2018-05-19 RX ADMIN — Medication 5: at 16:59

## 2018-05-19 RX ADMIN — GALANTAMINE HYDROBROMIDE 8 MILLIGRAM(S): 4 TABLET, FILM COATED ORAL at 07:40

## 2018-05-19 RX ADMIN — Medication 1 TABLET(S): at 16:59

## 2018-05-19 RX ADMIN — Medication 3: at 11:40

## 2018-05-19 RX ADMIN — INSULIN GLARGINE 25 UNIT(S): 100 INJECTION, SOLUTION SUBCUTANEOUS at 21:36

## 2018-05-19 RX ADMIN — Medication 40 MILLIGRAM(S): at 05:21

## 2018-05-19 RX ADMIN — ENOXAPARIN SODIUM 40 MILLIGRAM(S): 100 INJECTION SUBCUTANEOUS at 11:41

## 2018-05-19 RX ADMIN — KETOTIFEN FUMARATE 1 DROP(S): 0.34 SOLUTION OPHTHALMIC at 17:01

## 2018-05-19 RX ADMIN — TAMSULOSIN HYDROCHLORIDE 0.4 MILLIGRAM(S): 0.4 CAPSULE ORAL at 21:35

## 2018-05-19 RX ADMIN — Medication 10 MILLIGRAM(S): at 17:00

## 2018-05-19 NOTE — PROGRESS NOTE ADULT - SUBJECTIVE AND OBJECTIVE BOX
INTERVAL HPI/OVERNIGHT EVENTS:  Pt seen and examined at bedside.     Allergies/Intolerance: No Known Allergies      MEDICATIONS  (STANDING):  ascorbic acid 500 milliGRAM(s) Oral daily  aspirin enteric coated 81 milliGRAM(s) Oral daily  atorvastatin 40 milliGRAM(s) Oral at bedtime  carvedilol 6.25 milliGRAM(s) Oral every 12 hours  dextrose 5%. 1000 milliLiter(s) (50 mL/Hr) IV Continuous <Continuous>  dextrose 50% Injectable 12.5 Gram(s) IV Push once  dextrose 50% Injectable 25 Gram(s) IV Push once  dextrose 50% Injectable 25 Gram(s) IV Push once  donepezil 10 milliGRAM(s) Oral at bedtime  enoxaparin Injectable 40 milliGRAM(s) SubCutaneous daily  fluticasone propionate 50 MICROgram(s)/spray Nasal Spray 1 Spray(s) Both Nostrils two times a day  furosemide   Injectable 40 milliGRAM(s) IV Push daily  galantamine ER 8 milliGRAM(s) Oral with breakfast  insulin glargine Injectable (LANTUS) 25 Unit(s) SubCutaneous at bedtime  insulin lispro (HumaLOG) corrective regimen sliding scale   SubCutaneous three times a day before meals  insulin lispro (HumaLOG) corrective regimen sliding scale   SubCutaneous at bedtime  ketotifen 0.025% Ophthalmic Solution 1 Drop(s) Both EYES two times a day  lactobacillus acidophilus 1 Tablet(s) Oral two times a day with meals  oxybutynin 10 milliGRAM(s) Oral two times a day  pantoprazole    Tablet 40 milliGRAM(s) Oral before breakfast  pregabalin 50 milliGRAM(s) Oral two times a day  tamsulosin 0.4 milliGRAM(s) Oral at bedtime    MEDICATIONS  (PRN):  dextrose 40% Gel 15 Gram(s) Oral once PRN Blood Glucose LESS THAN 70 milliGRAM(s)/deciliter  glucagon  Injectable 1 milliGRAM(s) IntraMuscular once PRN Glucose LESS THAN 70 milligrams/deciliter        ROS: all systems reviewed and wnl      PHYSICAL EXAMINATION:  Vital Signs Last 24 Hrs  T(C): 36.6 (19 May 2018 04:26), Max: 36.8 (18 May 2018 17:23)  T(F): 97.9 (19 May 2018 04:26), Max: 98.3 (18 May 2018 17:23)  HR: 63 (19 May 2018 06:26) (57 - 76)  BP: 154/84 (19 May 2018 06:26) (148/68 - 172/80)  BP(mean): --  RR: 18 (19 May 2018 04:26) (16 - 19)  SpO2: 96% (19 May 2018 04:26) (92% - 98%)  CAPILLARY BLOOD GLUCOSE      POCT Blood Glucose.: 183 mg/dL (19 May 2018 07:33)  POCT Blood Glucose.: 317 mg/dL (18 May 2018 23:01)  POCT Blood Glucose.: 293 mg/dL (18 May 2018 21:06)  POCT Blood Glucose.: 240 mg/dL (18 May 2018 14:20)      05-18 @ 07:01  -  05-19 @ 07:00  --------------------------------------------------------  IN: 480 mL / OUT: 200 mL / NET: 280 mL        GENERAL:   NECK: supple, No JVD  CHEST/LUNG: clear to auscultation bilaterally; no rales, rhonchi, or wheezing b/l  HEART: normal S1, S2  ABDOMEN: BS+, soft, ND, NT   EXTREMITIES:  pulses palpable; no clubbing, cyanosis, or edema b/l LEs  SKIN: no rashes or lesions      LABS:                        14.1   8.7   )-----------( 421      ( 18 May 2018 15:06 )             42.5     05-19    140  |  100  |  15  ----------------------------<  205<H>  3.9   |  27  |  1.02    Ca    9.4      19 May 2018 07:18    TPro  6.9  /  Alb  3.0<L>  /  TBili  0.2  /  DBili  x   /  AST  21  /  ALT  28  /  AlkPhos  113  05-18    PT/INR - ( 18 May 2018 15:15 )   PT: 12.0 sec;   INR: 1.10 ratio         PTT - ( 18 May 2018 15:15 )  PTT:28.8 sec INTERVAL HPI/OVERNIGHT EVENTS:  Pt seen and examined at bedside.     Allergies/Intolerance: No Known Allergies      MEDICATIONS  (STANDING):  ascorbic acid 500 milliGRAM(s) Oral daily  aspirin enteric coated 81 milliGRAM(s) Oral daily  atorvastatin 40 milliGRAM(s) Oral at bedtime  carvedilol 6.25 milliGRAM(s) Oral every 12 hours  dextrose 5%. 1000 milliLiter(s) (50 mL/Hr) IV Continuous <Continuous>  dextrose 50% Injectable 12.5 Gram(s) IV Push once  dextrose 50% Injectable 25 Gram(s) IV Push once  dextrose 50% Injectable 25 Gram(s) IV Push once  donepezil 10 milliGRAM(s) Oral at bedtime  enoxaparin Injectable 40 milliGRAM(s) SubCutaneous daily  fluticasone propionate 50 MICROgram(s)/spray Nasal Spray 1 Spray(s) Both Nostrils two times a day  furosemide   Injectable 40 milliGRAM(s) IV Push daily  galantamine ER 8 milliGRAM(s) Oral with breakfast  insulin glargine Injectable (LANTUS) 25 Unit(s) SubCutaneous at bedtime  insulin lispro (HumaLOG) corrective regimen sliding scale   SubCutaneous three times a day before meals  insulin lispro (HumaLOG) corrective regimen sliding scale   SubCutaneous at bedtime  ketotifen 0.025% Ophthalmic Solution 1 Drop(s) Both EYES two times a day  lactobacillus acidophilus 1 Tablet(s) Oral two times a day with meals  oxybutynin 10 milliGRAM(s) Oral two times a day  pantoprazole    Tablet 40 milliGRAM(s) Oral before breakfast  pregabalin 50 milliGRAM(s) Oral two times a day  tamsulosin 0.4 milliGRAM(s) Oral at bedtime    MEDICATIONS  (PRN):  dextrose 40% Gel 15 Gram(s) Oral once PRN Blood Glucose LESS THAN 70 milliGRAM(s)/deciliter  glucagon  Injectable 1 milliGRAM(s) IntraMuscular once PRN Glucose LESS THAN 70 milligrams/deciliter        ROS: all systems reviewed and wnl      PHYSICAL EXAMINATION:  Vital Signs Last 24 Hrs  T(C): 36.6 (19 May 2018 04:26), Max: 36.8 (18 May 2018 17:23)  T(F): 97.9 (19 May 2018 04:26), Max: 98.3 (18 May 2018 17:23)  HR: 63 (19 May 2018 06:26) (57 - 76)  BP: 154/84 (19 May 2018 06:26) (148/68 - 172/80)  BP(mean): --  RR: 18 (19 May 2018 04:26) (16 - 19)  SpO2: 96% (19 May 2018 04:26) (92% - 98%)  CAPILLARY BLOOD GLUCOSE      POCT Blood Glucose.: 183 mg/dL (19 May 2018 07:33)  POCT Blood Glucose.: 317 mg/dL (18 May 2018 23:01)  POCT Blood Glucose.: 293 mg/dL (18 May 2018 21:06)  POCT Blood Glucose.: 240 mg/dL (18 May 2018 14:20)      05-18 @ 07:01  -  05-19 @ 07:00  --------------------------------------------------------  IN: 480 mL / OUT: 200 mL / NET: 280 mL        GENERAL: stable in bed, less SOB, no fevers or cough  NECK: supple, No JVD  CHEST/LUNG: clear to auscultation bilaterally; no rales, rhonchi, or wheezing b/l  HEART: normal S1, S2  ABDOMEN: BS+, soft, ND, NT   EXTREMITIES:  pulses palpable; no clubbing, cyanosis, or edema b/l LEs  SKIN: no rashes or lesions      LABS:                        14.1   8.7   )-----------( 421      ( 18 May 2018 15:06 )             42.5     05-19    140  |  100  |  15  ----------------------------<  205<H>  3.9   |  27  |  1.02    Ca    9.4      19 May 2018 07:18    TPro  6.9  /  Alb  3.0<L>  /  TBili  0.2  /  DBili  x   /  AST  21  /  ALT  28  /  AlkPhos  113  05-18    PT/INR - ( 18 May 2018 15:15 )   PT: 12.0 sec;   INR: 1.10 ratio         PTT - ( 18 May 2018 15:15 )  PTT:28.8 sec

## 2018-05-19 NOTE — PROGRESS NOTE ADULT - ASSESSMENT
82 yo male PMH HTN, PAF, mild dementia, chronic back pain, diabetes mellitus(II), CHF, was on turosemide but was stopped, BIBEMS presenting to ED from his geriatric physician Dr. Welch due to "heart failure". Patient was there for routine checkup and his O2 sat was found to be low and upon ambulatory sat the physician felt he was in heart failure and sent to ED. Wife at bedside endorses she's noticed increased LE swelling last 2 weeks and has been trying to get him to come to ED but pt refused. Reports chronic dry cough.     CV: SOB appears to be mild CHF. IVP Lasix 40 mg every AM. BNP of 1,475 actually normal for age of 83. CXR shows mild bilateral pleural effusions. Pradaxa has been stopped per wife. Continue Coreg 6.25 mg bid, ASA 81 mg/day and Lipitor 40 mg/day. Change back to Demedex  Monday and discharge home.      Neuro: Dementia and chronic NPH. Continue Aricept 10 mg/day and Galantamine 8 mg/day.     Urology: Continue Flomax .4 mg/day and Vesicare 10 mg/day.     Endo: Continue Lantus 25 units/day at night. Hold 2 oral agents from home while in hospital.  BS controlled 183.

## 2018-05-20 LAB
ANION GAP SERPL CALC-SCNC: 10 MMOL/L — SIGNIFICANT CHANGE UP (ref 5–17)
BUN SERPL-MCNC: 17 MG/DL — SIGNIFICANT CHANGE UP (ref 7–23)
CALCIUM SERPL-MCNC: 9.3 MG/DL — SIGNIFICANT CHANGE UP (ref 8.4–10.5)
CHLORIDE SERPL-SCNC: 99 MMOL/L — SIGNIFICANT CHANGE UP (ref 96–108)
CO2 SERPL-SCNC: 30 MMOL/L — SIGNIFICANT CHANGE UP (ref 22–31)
CREAT SERPL-MCNC: 1.16 MG/DL — SIGNIFICANT CHANGE UP (ref 0.5–1.3)
GLUCOSE BLDC GLUCOMTR-MCNC: 223 MG/DL — HIGH (ref 70–99)
GLUCOSE BLDC GLUCOMTR-MCNC: 361 MG/DL — HIGH (ref 70–99)
GLUCOSE BLDC GLUCOMTR-MCNC: 365 MG/DL — HIGH (ref 70–99)
GLUCOSE BLDC GLUCOMTR-MCNC: 399 MG/DL — HIGH (ref 70–99)
GLUCOSE BLDC GLUCOMTR-MCNC: 482 MG/DL — CRITICAL HIGH (ref 70–99)
GLUCOSE SERPL-MCNC: 221 MG/DL — HIGH (ref 70–99)
POTASSIUM SERPL-MCNC: 4 MMOL/L — SIGNIFICANT CHANGE UP (ref 3.5–5.3)
POTASSIUM SERPL-SCNC: 4 MMOL/L — SIGNIFICANT CHANGE UP (ref 3.5–5.3)
SODIUM SERPL-SCNC: 139 MMOL/L — SIGNIFICANT CHANGE UP (ref 135–145)

## 2018-05-20 RX ADMIN — CARVEDILOL PHOSPHATE 6.25 MILLIGRAM(S): 80 CAPSULE, EXTENDED RELEASE ORAL at 05:45

## 2018-05-20 RX ADMIN — Medication 10 MILLIGRAM(S): at 05:46

## 2018-05-20 RX ADMIN — Medication 50 MILLIGRAM(S): at 05:46

## 2018-05-20 RX ADMIN — ENOXAPARIN SODIUM 40 MILLIGRAM(S): 100 INJECTION SUBCUTANEOUS at 11:28

## 2018-05-20 RX ADMIN — Medication 1 TABLET(S): at 08:01

## 2018-05-20 RX ADMIN — CARVEDILOL PHOSPHATE 6.25 MILLIGRAM(S): 80 CAPSULE, EXTENDED RELEASE ORAL at 17:39

## 2018-05-20 RX ADMIN — GALANTAMINE HYDROBROMIDE 8 MILLIGRAM(S): 4 TABLET, FILM COATED ORAL at 08:01

## 2018-05-20 RX ADMIN — KETOTIFEN FUMARATE 1 DROP(S): 0.34 SOLUTION OPHTHALMIC at 17:39

## 2018-05-20 RX ADMIN — ATORVASTATIN CALCIUM 40 MILLIGRAM(S): 80 TABLET, FILM COATED ORAL at 23:03

## 2018-05-20 RX ADMIN — Medication 10 MILLIGRAM(S): at 17:39

## 2018-05-20 RX ADMIN — Medication 5: at 11:27

## 2018-05-20 RX ADMIN — Medication 2: at 08:01

## 2018-05-20 RX ADMIN — PANTOPRAZOLE SODIUM 40 MILLIGRAM(S): 20 TABLET, DELAYED RELEASE ORAL at 05:46

## 2018-05-20 RX ADMIN — Medication 40 MILLIGRAM(S): at 05:46

## 2018-05-20 RX ADMIN — INSULIN GLARGINE 25 UNIT(S): 100 INJECTION, SOLUTION SUBCUTANEOUS at 23:00

## 2018-05-20 RX ADMIN — KETOTIFEN FUMARATE 1 DROP(S): 0.34 SOLUTION OPHTHALMIC at 05:46

## 2018-05-20 RX ADMIN — Medication 3: at 23:02

## 2018-05-20 RX ADMIN — Medication 50 MILLIGRAM(S): at 17:47

## 2018-05-20 RX ADMIN — Medication 81 MILLIGRAM(S): at 11:28

## 2018-05-20 RX ADMIN — DONEPEZIL HYDROCHLORIDE 10 MILLIGRAM(S): 10 TABLET, FILM COATED ORAL at 23:03

## 2018-05-20 RX ADMIN — Medication 5: at 17:47

## 2018-05-20 RX ADMIN — Medication 1 TABLET(S): at 17:39

## 2018-05-20 RX ADMIN — Medication 500 MILLIGRAM(S): at 11:28

## 2018-05-20 RX ADMIN — TAMSULOSIN HYDROCHLORIDE 0.4 MILLIGRAM(S): 0.4 CAPSULE ORAL at 23:03

## 2018-05-20 NOTE — PHYSICAL THERAPY INITIAL EVALUATION ADULT - CRITERIA FOR SKILLED THERAPEUTIC INTERVENTIONS
impairments found/anticipated discharge recommendation/home w/ home PT for progressive ambulation training, transfers, bed mobs, and safety assessment pt has all necessary equip see above

## 2018-05-20 NOTE — PROGRESS NOTE ADULT - ASSESSMENT
84 yo male PMH HTN, PAF, mild dementia, chronic back pain, diabetes mellitus(II), CHF, was on turosemide but was stopped, BIBEMS presenting to ED from his geriatric physician Dr. Welch due to "heart failure". Patient was there for routine checkup and his O2 sat was found to be low and upon ambulatory sat the physician felt he was in heart failure and sent to ED. Wife at bedside endorses she's noticed increased LE swelling last 2 weeks and has been trying to get him to come to ED but pt refused. Reports chronic dry cough.     CV: SOB appears to be mild CHF. IVP Lasix 40 mg every AM. BNP of 1,475 actually normal for age of 83. CXR shows mild bilateral pleural effusions. Pradaxa has been stopped per wife. Continue Coreg 6.25 mg bid, ASA 81 mg/day and Lipitor 40 mg/day. Change back to Demedex 20 mg/day AM Monday and discharge home.      Neuro: Dementia and chronic NPH. Continue Aricept 10 mg/day and Galantamine 8 mg/day.     Urology: Continue Flomax .4 mg/day and Vesicare 10 mg/day.     Endo: Continue Lantus 25 units/day at night. Hold 2 oral agents from home while in hospital.  BS controlled 222.

## 2018-05-20 NOTE — PHYSICAL THERAPY INITIAL EVALUATION ADULT - PLANNED THERAPY INTERVENTIONS, PT EVAL
GOAL: to negotiate 3 steps w/ handrail w/ independently/gait training/strengthening/balance training/transfer training

## 2018-05-20 NOTE — PHYSICAL THERAPY INITIAL EVALUATION ADULT - ADDITIONAL COMMENTS
endorses she's noticed increased LE swelling last 2 weeks and has been trying to get him to come to ED but pt refused. Reports chronic dry cough.   Lives w/ wife on private home. endorses she's noticed increased LE swelling last 2 weeks and has been trying to get him to come to ED but pt refused. Reports chronic dry cough.   Lives w/ wife on private home 2 step then landing and then one more step, readers for glasses, B/L hearing aids, R handed, has HHA 9 hours 5 days  week

## 2018-05-20 NOTE — PROVIDER CONTACT NOTE (OTHER) - REASON
oxygen saturation is 93% on room air; patient denies shortness of breath; no signs of respiratory distress

## 2018-05-20 NOTE — PROVIDER CONTACT NOTE (OTHER) - ASSESSMENT
patient a&ox4 denies shortness of breath; hob elevated 30-45 degrees.  no signs of respiratory distress.

## 2018-05-20 NOTE — PHYSICAL THERAPY INITIAL EVALUATION ADULT - PERTINENT HX OF CURRENT PROBLEM, REHAB EVAL
82 yo male admtted on 5/18/18 PMH HTN, PAF, mild dementia, chronic back pain, diabetes mellitus(II), CHF, was on turosemide but was stopped, BIBEMS presenting to ED from his geriatric physician Dr. Welch due to "heart failure". Patient was there for routine checkup and his O2 sat was found to be low and upon ambulatory sat the physician felt he was in heart failure and sent to ED. Wife at bedside

## 2018-05-20 NOTE — PROGRESS NOTE ADULT - SUBJECTIVE AND OBJECTIVE BOX
INTERVAL HPI/OVERNIGHT EVENTS:  Pt seen and examined at bedside.     Allergies/Intolerance: No Known Allergies      MEDICATIONS  (STANDING):  ascorbic acid 500 milliGRAM(s) Oral daily  aspirin enteric coated 81 milliGRAM(s) Oral daily  atorvastatin 40 milliGRAM(s) Oral at bedtime  carvedilol 6.25 milliGRAM(s) Oral every 12 hours  dextrose 5%. 1000 milliLiter(s) (50 mL/Hr) IV Continuous <Continuous>  dextrose 50% Injectable 12.5 Gram(s) IV Push once  dextrose 50% Injectable 25 Gram(s) IV Push once  dextrose 50% Injectable 25 Gram(s) IV Push once  donepezil 10 milliGRAM(s) Oral at bedtime  enoxaparin Injectable 40 milliGRAM(s) SubCutaneous daily  fluticasone propionate 50 MICROgram(s)/spray Nasal Spray 1 Spray(s) Both Nostrils two times a day  furosemide   Injectable 40 milliGRAM(s) IV Push daily  galantamine ER 8 milliGRAM(s) Oral with breakfast  insulin glargine Injectable (LANTUS) 25 Unit(s) SubCutaneous at bedtime  insulin lispro (HumaLOG) corrective regimen sliding scale   SubCutaneous three times a day before meals  insulin lispro (HumaLOG) corrective regimen sliding scale   SubCutaneous at bedtime  ketotifen 0.025% Ophthalmic Solution 1 Drop(s) Both EYES two times a day  lactobacillus acidophilus 1 Tablet(s) Oral two times a day with meals  oxybutynin 10 milliGRAM(s) Oral two times a day  pantoprazole    Tablet 40 milliGRAM(s) Oral before breakfast  pregabalin 50 milliGRAM(s) Oral two times a day  tamsulosin 0.4 milliGRAM(s) Oral at bedtime    MEDICATIONS  (PRN):  dextrose 40% Gel 15 Gram(s) Oral once PRN Blood Glucose LESS THAN 70 milliGRAM(s)/deciliter  glucagon  Injectable 1 milliGRAM(s) IntraMuscular once PRN Glucose LESS THAN 70 milligrams/deciliter        ROS: all systems reviewed and wnl      PHYSICAL EXAMINATION:  Vital Signs Last 24 Hrs  T(C): 36.5 (20 May 2018 04:25), Max: 36.7 (19 May 2018 12:54)  T(F): 97.7 (20 May 2018 04:25), Max: 98 (19 May 2018 12:54)  HR: 74 (20 May 2018 05:42) (67 - 76)  BP: 130/79 (20 May 2018 05:42) (121/64 - 152/79)  BP(mean): --  RR: 18 (20 May 2018 04:25) (18 - 18)  SpO2: 98% (20 May 2018 04:25) (96% - 98%)  CAPILLARY BLOOD GLUCOSE      POCT Blood Glucose.: 223 mg/dL (20 May 2018 07:37)  POCT Blood Glucose.: 285 mg/dL (19 May 2018 21:13)  POCT Blood Glucose.: 389 mg/dL (19 May 2018 16:31)  POCT Blood Glucose.: 285 mg/dL (19 May 2018 11:37)      05-19 @ 07:01  -  05-20 @ 07:00  --------------------------------------------------------  IN: 840 mL / OUT: 1950 mL / NET: -1110 mL        GENERAL:   NECK: supple, No JVD  CHEST/LUNG: clear to auscultation bilaterally; no rales, rhonchi, or wheezing b/l  HEART: normal S1, S2  ABDOMEN: BS+, soft, ND, NT   EXTREMITIES:  pulses palpable; no clubbing, cyanosis, or edema b/l LEs  SKIN: no rashes or lesions      LABS:                        13.5   8.53  )-----------( 370      ( 19 May 2018 08:32 )             42.0     05-20    139  |  99  |  17  ----------------------------<  221<H>  4.0   |  30  |  1.16    Ca    9.3      20 May 2018 06:54    TPro  6.9  /  Alb  3.0<L>  /  TBili  0.2  /  DBili  x   /  AST  21  /  ALT  28  /  AlkPhos  113  05-18    PT/INR - ( 18 May 2018 15:15 )   PT: 12.0 sec;   INR: 1.10 ratio         PTT - ( 18 May 2018 15:15 )  PTT:28.8 sec INTERVAL HPI/OVERNIGHT EVENTS:  Pt seen and examined at bedside.     Allergies/Intolerance: No Known Allergies      MEDICATIONS  (STANDING):  ascorbic acid 500 milliGRAM(s) Oral daily  aspirin enteric coated 81 milliGRAM(s) Oral daily  atorvastatin 40 milliGRAM(s) Oral at bedtime  carvedilol 6.25 milliGRAM(s) Oral every 12 hours  dextrose 5%. 1000 milliLiter(s) (50 mL/Hr) IV Continuous <Continuous>  dextrose 50% Injectable 12.5 Gram(s) IV Push once  dextrose 50% Injectable 25 Gram(s) IV Push once  dextrose 50% Injectable 25 Gram(s) IV Push once  donepezil 10 milliGRAM(s) Oral at bedtime  enoxaparin Injectable 40 milliGRAM(s) SubCutaneous daily  fluticasone propionate 50 MICROgram(s)/spray Nasal Spray 1 Spray(s) Both Nostrils two times a day  furosemide   Injectable 40 milliGRAM(s) IV Push daily  galantamine ER 8 milliGRAM(s) Oral with breakfast  insulin glargine Injectable (LANTUS) 25 Unit(s) SubCutaneous at bedtime  insulin lispro (HumaLOG) corrective regimen sliding scale   SubCutaneous three times a day before meals  insulin lispro (HumaLOG) corrective regimen sliding scale   SubCutaneous at bedtime  ketotifen 0.025% Ophthalmic Solution 1 Drop(s) Both EYES two times a day  lactobacillus acidophilus 1 Tablet(s) Oral two times a day with meals  oxybutynin 10 milliGRAM(s) Oral two times a day  pantoprazole    Tablet 40 milliGRAM(s) Oral before breakfast  pregabalin 50 milliGRAM(s) Oral two times a day  tamsulosin 0.4 milliGRAM(s) Oral at bedtime    MEDICATIONS  (PRN):  dextrose 40% Gel 15 Gram(s) Oral once PRN Blood Glucose LESS THAN 70 milliGRAM(s)/deciliter  glucagon  Injectable 1 milliGRAM(s) IntraMuscular once PRN Glucose LESS THAN 70 milligrams/deciliter        ROS: all systems reviewed and wnl      PHYSICAL EXAMINATION:  Vital Signs Last 24 Hrs  T(C): 36.5 (20 May 2018 04:25), Max: 36.7 (19 May 2018 12:54)  T(F): 97.7 (20 May 2018 04:25), Max: 98 (19 May 2018 12:54)  HR: 74 (20 May 2018 05:42) (67 - 76)  BP: 130/79 (20 May 2018 05:42) (121/64 - 152/79)  BP(mean): --  RR: 18 (20 May 2018 04:25) (18 - 18)  SpO2: 98% (20 May 2018 04:25) (96% - 98%)  CAPILLARY BLOOD GLUCOSE      POCT Blood Glucose.: 223 mg/dL (20 May 2018 07:37)  POCT Blood Glucose.: 285 mg/dL (19 May 2018 21:13)  POCT Blood Glucose.: 389 mg/dL (19 May 2018 16:31)  POCT Blood Glucose.: 285 mg/dL (19 May 2018 11:37)      05-19 @ 07:01  -  05-20 @ 07:00  --------------------------------------------------------  IN: 840 mL / OUT: 1950 mL / NET: -1110 mL        GENERAL: stable in chair, comfortable, NAD, no SOB  NECK: supple, No JVD  CHEST/LUNG: clear to auscultation bilaterally; no rales, rhonchi, or wheezing b/l  HEART: normal S1, S2  ABDOMEN: BS+, soft, ND, NT   EXTREMITIES:  pulses palpable; no clubbing, cyanosis, or edema b/l LEs  SKIN: no rashes or lesions      LABS:                        13.5   8.53  )-----------( 370      ( 19 May 2018 08:32 )             42.0     05-20    139  |  99  |  17  ----------------------------<  221<H>  4.0   |  30  |  1.16    Ca    9.3      20 May 2018 06:54    TPro  6.9  /  Alb  3.0<L>  /  TBili  0.2  /  DBili  x   /  AST  21  /  ALT  28  /  AlkPhos  113  05-18    PT/INR - ( 18 May 2018 15:15 )   PT: 12.0 sec;   INR: 1.10 ratio         PTT - ( 18 May 2018 15:15 )  PTT:28.8 sec

## 2018-05-21 ENCOUNTER — TRANSCRIPTION ENCOUNTER (OUTPATIENT)
Age: 83
End: 2018-05-21

## 2018-05-21 VITALS — WEIGHT: 209.88 LBS

## 2018-05-21 LAB
GLUCOSE BLDC GLUCOMTR-MCNC: 250 MG/DL — HIGH (ref 70–99)
GLUCOSE BLDC GLUCOMTR-MCNC: 287 MG/DL — HIGH (ref 70–99)
GLUCOSE BLDC GLUCOMTR-MCNC: 337 MG/DL — HIGH (ref 70–99)
GLUCOSE BLDC GLUCOMTR-MCNC: 400 MG/DL — HIGH (ref 70–99)

## 2018-05-21 PROCEDURE — 94640 AIRWAY INHALATION TREATMENT: CPT

## 2018-05-21 PROCEDURE — 82553 CREATINE MB FRACTION: CPT

## 2018-05-21 PROCEDURE — 97161 PT EVAL LOW COMPLEX 20 MIN: CPT

## 2018-05-21 PROCEDURE — 83036 HEMOGLOBIN GLYCOSYLATED A1C: CPT

## 2018-05-21 PROCEDURE — 85027 COMPLETE CBC AUTOMATED: CPT

## 2018-05-21 PROCEDURE — 84484 ASSAY OF TROPONIN QUANT: CPT

## 2018-05-21 PROCEDURE — 85730 THROMBOPLASTIN TIME PARTIAL: CPT

## 2018-05-21 PROCEDURE — 99285 EMERGENCY DEPT VISIT HI MDM: CPT | Mod: 25

## 2018-05-21 PROCEDURE — 80053 COMPREHEN METABOLIC PANEL: CPT

## 2018-05-21 PROCEDURE — 82803 BLOOD GASES ANY COMBINATION: CPT

## 2018-05-21 PROCEDURE — 82947 ASSAY GLUCOSE BLOOD QUANT: CPT

## 2018-05-21 PROCEDURE — 93005 ELECTROCARDIOGRAM TRACING: CPT

## 2018-05-21 PROCEDURE — 83880 ASSAY OF NATRIURETIC PEPTIDE: CPT

## 2018-05-21 PROCEDURE — 71046 X-RAY EXAM CHEST 2 VIEWS: CPT

## 2018-05-21 PROCEDURE — 80048 BASIC METABOLIC PNL TOTAL CA: CPT

## 2018-05-21 PROCEDURE — 83605 ASSAY OF LACTIC ACID: CPT

## 2018-05-21 PROCEDURE — 85014 HEMATOCRIT: CPT

## 2018-05-21 PROCEDURE — 96374 THER/PROPH/DIAG INJ IV PUSH: CPT

## 2018-05-21 PROCEDURE — 84132 ASSAY OF SERUM POTASSIUM: CPT

## 2018-05-21 PROCEDURE — 82330 ASSAY OF CALCIUM: CPT

## 2018-05-21 PROCEDURE — 84295 ASSAY OF SERUM SODIUM: CPT

## 2018-05-21 PROCEDURE — 82435 ASSAY OF BLOOD CHLORIDE: CPT

## 2018-05-21 PROCEDURE — 82962 GLUCOSE BLOOD TEST: CPT

## 2018-05-21 PROCEDURE — 85610 PROTHROMBIN TIME: CPT

## 2018-05-21 RX ORDER — OXYBUTYNIN CHLORIDE 5 MG
2 TABLET ORAL
Qty: 0 | Refills: 0 | DISCHARGE
Start: 2018-05-21

## 2018-05-21 RX ORDER — DABIGATRAN ETEXILATE MESYLATE 150 MG/1
1 CAPSULE ORAL
Qty: 0 | Refills: 0 | COMMUNITY

## 2018-05-21 RX ORDER — CARVEDILOL PHOSPHATE 80 MG/1
1 CAPSULE, EXTENDED RELEASE ORAL
Qty: 0 | Refills: 0 | COMMUNITY
Start: 2018-05-21

## 2018-05-21 RX ORDER — FLUTICASONE PROPIONATE 50 MCG
1 SPRAY, SUSPENSION NASAL
Qty: 0 | Refills: 0 | COMMUNITY

## 2018-05-21 RX ADMIN — GALANTAMINE HYDROBROMIDE 8 MILLIGRAM(S): 4 TABLET, FILM COATED ORAL at 11:49

## 2018-05-21 RX ADMIN — Medication 81 MILLIGRAM(S): at 11:49

## 2018-05-21 RX ADMIN — Medication 20 MILLIGRAM(S): at 05:43

## 2018-05-21 RX ADMIN — Medication 5: at 14:31

## 2018-05-21 RX ADMIN — ENOXAPARIN SODIUM 40 MILLIGRAM(S): 100 INJECTION SUBCUTANEOUS at 11:49

## 2018-05-21 RX ADMIN — Medication 1 TABLET(S): at 09:52

## 2018-05-21 RX ADMIN — Medication 10 MILLIGRAM(S): at 05:16

## 2018-05-21 RX ADMIN — KETOTIFEN FUMARATE 1 DROP(S): 0.34 SOLUTION OPHTHALMIC at 05:15

## 2018-05-21 RX ADMIN — PANTOPRAZOLE SODIUM 40 MILLIGRAM(S): 20 TABLET, DELAYED RELEASE ORAL at 05:14

## 2018-05-21 RX ADMIN — Medication 3: at 09:53

## 2018-05-21 RX ADMIN — Medication 500 MILLIGRAM(S): at 11:49

## 2018-05-21 RX ADMIN — Medication 50 MILLIGRAM(S): at 05:19

## 2018-05-21 RX ADMIN — CARVEDILOL PHOSPHATE 6.25 MILLIGRAM(S): 80 CAPSULE, EXTENDED RELEASE ORAL at 05:19

## 2018-05-21 NOTE — DISCHARGE NOTE ADULT - ADDITIONAL INSTRUCTIONS
follow up with cardiologist on 5/29/18 at 3pm  appointment with PMd on 5/29 at 4:30 pm  home care follow up

## 2018-05-21 NOTE — DISCHARGE NOTE ADULT - MEDICATION SUMMARY - MEDICATIONS TO STOP TAKING
I will STOP taking the medications listed below when I get home from the hospital:    lidocaine 5% topical film  -- Apply on skin to affected area once a day, As Needed    Pradaxa 150 mg oral capsule  -- 1 cap(s) by mouth 2 times a day    Vitamin C 1000 mg oral tablet  -- 1 tab(s) by mouth once a day (at bedtime)    fluticasone 50 mcg/inh nasal spray  -- 1 spray(s) into nose 2 times a day    enalapril 20 mg oral tablet  -- 1 tab(s) by mouth once a day

## 2018-05-21 NOTE — DISCHARGE NOTE ADULT - CARE PROVIDERS DIRECT ADDRESSES
,DirectAddress_Unknown,[email or web address] ,DirectAddress_Unknown,[email or web address],DirectAddress_Unknown

## 2018-05-21 NOTE — DIETITIAN INITIAL EVALUATION ADULT. - ENERGY NEEDS
IBW= 160  lbs +/- 10%   used for calorie protein estimation  84 yo male PMH HTN, PAF, mild dementia, chronic back pain, diabetes mellitus(II), CHF, was on turosemide but was stopped, BIBEMS presenting to ED from his geriatric physician Dr. Welch due to "heart failure".  Wife at bedside endorses she's noticed increased LE swelling last 2 weeks and has been trying to get him to come to ED but pt refused.     Dx: Acute Heart Failure

## 2018-05-21 NOTE — CONSULT NOTE ADULT - SUBJECTIVE AND OBJECTIVE BOX
Patient is a 83y old  Male who presents with a chief complaint of SOB (18 May 2018 16:45)      HPI:  82 yo male PMH HTN, PAF, mild dementia, chronic back pain, diabetes mellitus(II), CHF, was on torsemide but was stopped, BIBEMS presenting to ED from his geriatric physician Dr. Welch due to "heart failure" and hypoxia. Patient was there for routine checkup and his O2 sat was found to be low and upon ambulation the physician felt he was in heart failure and sent to ED. Wife at bedside endorses she's noticed increased LE swelling last 2 weeks and has been trying to get him to come to ED but pt refused. Reports chronic dry cough.     Patient denies chest pain, fevers, nausea/vomiting, abdominal pain, hemoptysis, dysuria or abdominal pain. (18 May 2018 16:45)      PAST MEDICAL & SURGICAL HISTORY:  Atrial flutter/AF  Lumbar spinal stenosis  Vitamin D deficiency  Mild dementia  OAB (overactive bladder)  Type 2 diabetes mellitus  BPH (benign prostatic hyperplasia)  Nephrotic syndrome: pt was instructed to avoid taking NSAIDS  Chronic lower back pain: unclear etiology per wife - no hx trauma, possibly arthritis  H/O recurrent urinary tract infection  Chronic Back Pain: L3-L4, L4-L5 compression  HTN  Normal pressure hydrocephalus: s/p ventriculostomy of the third ventricle 2012  Atrial flutter ablation    MEDICATIONS  (STANDING):  ascorbic acid 500 milliGRAM(s) Oral daily  aspirin enteric coated 81 milliGRAM(s) Oral daily  atorvastatin 40 milliGRAM(s) Oral at bedtime  carvedilol 6.25 milliGRAM(s) Oral every 12 hours  dextrose 5%. 1000 milliLiter(s) (50 mL/Hr) IV Continuous <Continuous>  dextrose 50% Injectable 12.5 Gram(s) IV Push once  dextrose 50% Injectable 25 Gram(s) IV Push once  dextrose 50% Injectable 25 Gram(s) IV Push once  donepezil 10 milliGRAM(s) Oral at bedtime  enoxaparin Injectable 40 milliGRAM(s) SubCutaneous daily  fluticasone propionate 50 MICROgram(s)/spray Nasal Spray 1 Spray(s) Both Nostrils two times a day  galantamine ER 8 milliGRAM(s) Oral with breakfast  insulin glargine Injectable (LANTUS) 25 Unit(s) SubCutaneous at bedtime  insulin lispro (HumaLOG) corrective regimen sliding scale   SubCutaneous three times a day before meals  insulin lispro (HumaLOG) corrective regimen sliding scale   SubCutaneous at bedtime  ketotifen 0.025% Ophthalmic Solution 1 Drop(s) Both EYES two times a day  lactobacillus acidophilus 1 Tablet(s) Oral two times a day with meals  oxybutynin 10 milliGRAM(s) Oral two times a day  pantoprazole    Tablet 40 milliGRAM(s) Oral before breakfast  pregabalin 50 milliGRAM(s) Oral two times a day  tamsulosin 0.4 milliGRAM(s) Oral at bedtime  torsemide 20 milliGRAM(s) Oral daily    MEDICATIONS  (PRN):  dextrose 40% Gel 15 Gram(s) Oral once PRN Blood Glucose LESS THAN 70 milliGRAM(s)/deciliter  glucagon  Injectable 1 milliGRAM(s) IntraMuscular once PRN Glucose LESS THAN 70 milligrams/deciliter    Allergies: NKDA    FAMILY HISTORY:  No pertinent family history in first degree relatives      SOCIAL HISTORY: , retired    CIGARETTES: former smoker    REVIEW OF SYSTEMS: See HPI, rest negative  	  Vital Signs Last 24 Hrs  T(C): 36.7 (21 May 2018 05:06), Max: 36.9 (20 May 2018 19:48)  T(F): 98 (21 May 2018 05:06), Max: 98.4 (20 May 2018 19:48)  HR: 71 (21 May 2018 05:06) (70 - 76)  BP: 119/57 (21 May 2018 05:06) (119/57 - 148/76)  BP(mean): --  RR: 18 (21 May 2018 05:06) (18 - 18)  SpO2: 94% (21 May 2018 05:06) (93% - 96%)    PHYSICAL EXAM:    Constitutional: WD, WN in NAD  Neck: no JVD noted  Respiratory: clear lungs  Cardiovascular: RRR, 1/6 JONY  Gastrointestinal: BS present, NT, ND, no masses  Extremities: trace to 1+ guerda ankle edema  Neurological: grossly nonfocal    TELEMETRY:    ECG:< from: 12 Lead ECG (05.18.18 @ 14:23) >  Ventricular Rate 65 BPM    Atrial Rate 65 BPM    P-R Interval 252 ms    QRS Duration 108 ms     ms    QTc 447 ms    P Axis 92 degrees    R Axis -19 degrees    T Axis 96 degrees    Diagnosis Line SINUS RHYTHM WITH 1ST DEGREE A-V BLOCK  NONSPECIFIC T WAVE ABNORMALITY    < end of copied text >        LABS:    05-20    139  |  99  |  17  ----------------------------<  221<H>  4.0   |  30  |  1.16    Ca    9.3      20 May 2018 06:54      I&O's Summary    20 May 2018 07:01  -  21 May 2018 07:00  --------------------------------------------------------  IN: 1200 mL / OUT: 1650 mL / NET: -450 mL      BNP- 1475  RADIOLOGY & ADDITIONAL STUDIES:  < from: Xray Chest 2 Views PA/Lat (05.18.18 @ 15:30) >  EXAM:  XR CHEST PA LAT 2V                            PROCEDURE DATE:  05/18/2018            INTERPRETATION:  PA and lateral projection of the chest was obtained on   May 18, 2018.    Indication: Decreased O2 saturation. Bilateral Rales.    Impression:    Bilateral small pleural effusion cannot be ruled out. Platelike   atelectasis left lower lobe.                      ADAMS DIXON M.D., ATTENDING RADIOLOGIST  This document has been electronically signed. May 18 2018  4:11PM    < end of copied text >    IMPRESSION:  Acute diastolic HF  PAF/flutter  COPD  HTN  BPH    RECOMMENDATIONS:  Continue current meds- d/c on oral torsemide  Office f/u next week  Off Pradaxa since 14 day monitor without AF/flutter  Supportive care

## 2018-05-21 NOTE — DIETITIAN INITIAL EVALUATION ADULT. - NS AS NUTRI INTERV ED CONTENT
1800 calorie meal plan, low sodium diet; relatinship of HgbA1C to daily glucose control/Nutrition relationship to health/disease/Recommended modifications/Purpose of the nutrition education

## 2018-05-21 NOTE — DISCHARGE NOTE ADULT - PATIENT PORTAL LINK FT
You can access the [facility] Patient Portal, offered by [facility], by registering with the following website: [email or web address]

## 2018-05-21 NOTE — DISCHARGE NOTE ADULT - PLAN OF CARE
maintain stable continue your heart medication  low salt diet  monitor weight as described  follow up with your cardiologist HgA1C this admission. 9.2  Make sure you get your HgA1c checked every three months.  If you take oral diabetes medications, check your blood glucose two times a day.  If you take insulin, check your blood glucose before meals and at bedtime.  It's important not to skip any meals.  Keep a log of your blood glucose results and always take it with you to your doctor appointments.  Keep a list of your current medications including injectables and over the counter medications and bring this medication list with you to all your doctor appointments.  If you have not seen your opthalmologist this year call for appointment.  Check your feet daily for redness, sores, or openings. Do not self treat. If no improvement in two days call your primary care physician for an appointment.  Low blood sugar (hypoglycemia) is a blood sugar below 70mg/dl. Check your blood sugar if you feel signs/symptoms of hypoglycemia. If your blood sugar is below 70 take 15 grams of carbohydrates (ex 4 oz of apple juice, 3-4 glucosr tablets, or 4-6 oz of regular soda) wait 15 minutes and repeat blood sugar to make sure it comes up above 70.  If your blood sugar is above 70 and you are due for a meal, have a meal.  If you are not due for a meal have a snack.  This snack helps keeps your blood sugar at a safe range. continue your medication continue your medication  fall precaution

## 2018-05-21 NOTE — DISCHARGE NOTE ADULT - CARE PLAN
Principal Discharge DX:	CHF (congestive heart failure)  Goal:	maintain stable  Assessment and plan of treatment:	continue your heart medication  low salt diet  monitor weight as described  follow up with your cardiologist Principal Discharge DX:	CHF (congestive heart failure)  Goal:	maintain stable  Assessment and plan of treatment:	continue your heart medication  low salt diet  monitor weight as described  follow up with your cardiologist  Secondary Diagnosis:	Type 2 diabetes mellitus  Assessment and plan of treatment:	HgA1C this admission. 9.2  Make sure you get your HgA1c checked every three months.  If you take oral diabetes medications, check your blood glucose two times a day.  If you take insulin, check your blood glucose before meals and at bedtime.  It's important not to skip any meals.  Keep a log of your blood glucose results and always take it with you to your doctor appointments.  Keep a list of your current medications including injectables and over the counter medications and bring this medication list with you to all your doctor appointments.  If you have not seen your opthalmologist this year call for appointment.  Check your feet daily for redness, sores, or openings. Do not self treat. If no improvement in two days call your primary care physician for an appointment.  Low blood sugar (hypoglycemia) is a blood sugar below 70mg/dl. Check your blood sugar if you feel signs/symptoms of hypoglycemia. If your blood sugar is below 70 take 15 grams of carbohydrates (ex 4 oz of apple juice, 3-4 glucosr tablets, or 4-6 oz of regular soda) wait 15 minutes and repeat blood sugar to make sure it comes up above 70.  If your blood sugar is above 70 and you are due for a meal, have a meal.  If you are not due for a meal have a snack.  This snack helps keeps your blood sugar at a safe range.  Secondary Diagnosis:	BPH (benign prostatic hyperplasia)  Assessment and plan of treatment:	continue your medication  Secondary Diagnosis:	Mild dementia  Assessment and plan of treatment:	continue your medication  fall precaution

## 2018-05-21 NOTE — DISCHARGE NOTE ADULT - HOME CARE AGENCY
Visiting Nurse Services [facility] [phone]  RN to call and set up appointment. Physical therapy to follow

## 2018-05-21 NOTE — PROGRESS NOTE ADULT - SUBJECTIVE AND OBJECTIVE BOX
INTERVAL HPI/OVERNIGHT EVENTS:  Pt seen and examined at bedside.     Allergies/Intolerance: No Known Allergies      MEDICATIONS  (STANDING):  ascorbic acid 500 milliGRAM(s) Oral daily  aspirin enteric coated 81 milliGRAM(s) Oral daily  atorvastatin 40 milliGRAM(s) Oral at bedtime  carvedilol 6.25 milliGRAM(s) Oral every 12 hours  dextrose 5%. 1000 milliLiter(s) (50 mL/Hr) IV Continuous <Continuous>  dextrose 50% Injectable 12.5 Gram(s) IV Push once  dextrose 50% Injectable 25 Gram(s) IV Push once  dextrose 50% Injectable 25 Gram(s) IV Push once  donepezil 10 milliGRAM(s) Oral at bedtime  enoxaparin Injectable 40 milliGRAM(s) SubCutaneous daily  fluticasone propionate 50 MICROgram(s)/spray Nasal Spray 1 Spray(s) Both Nostrils two times a day  galantamine ER 8 milliGRAM(s) Oral with breakfast  insulin glargine Injectable (LANTUS) 25 Unit(s) SubCutaneous at bedtime  insulin lispro (HumaLOG) corrective regimen sliding scale   SubCutaneous three times a day before meals  insulin lispro (HumaLOG) corrective regimen sliding scale   SubCutaneous at bedtime  ketotifen 0.025% Ophthalmic Solution 1 Drop(s) Both EYES two times a day  lactobacillus acidophilus 1 Tablet(s) Oral two times a day with meals  oxybutynin 10 milliGRAM(s) Oral two times a day  pantoprazole    Tablet 40 milliGRAM(s) Oral before breakfast  pregabalin 50 milliGRAM(s) Oral two times a day  tamsulosin 0.4 milliGRAM(s) Oral at bedtime  torsemide 20 milliGRAM(s) Oral daily    MEDICATIONS  (PRN):  dextrose 40% Gel 15 Gram(s) Oral once PRN Blood Glucose LESS THAN 70 milliGRAM(s)/deciliter  glucagon  Injectable 1 milliGRAM(s) IntraMuscular once PRN Glucose LESS THAN 70 milligrams/deciliter        ROS: all systems reviewed and wnl      PHYSICAL EXAMINATION:  Vital Signs Last 24 Hrs  T(C): 36.7 (21 May 2018 05:06), Max: 36.9 (20 May 2018 19:48)  T(F): 98 (21 May 2018 05:06), Max: 98.4 (20 May 2018 19:48)  HR: 71 (21 May 2018 05:06) (70 - 76)  BP: 119/57 (21 May 2018 05:06) (119/57 - 148/76)  BP(mean): --  RR: 18 (21 May 2018 05:06) (18 - 18)  SpO2: 94% (21 May 2018 05:06) (93% - 96%)  CAPILLARY BLOOD GLUCOSE      POCT Blood Glucose.: 250 mg/dL (21 May 2018 08:48)  POCT Blood Glucose.: 361 mg/dL (20 May 2018 22:08)  POCT Blood Glucose.: 365 mg/dL (20 May 2018 17:34)  POCT Blood Glucose.: 399 mg/dL (20 May 2018 11:18)  POCT Blood Glucose.: 482 mg/dL (20 May 2018 11:17)      05-20 @ 07:01  -  05-21 @ 07:00  --------------------------------------------------------  IN: 1200 mL / OUT: 1650 mL / NET: -450 mL        GENERAL:   NECK: supple, No JVD  CHEST/LUNG: clear to auscultation bilaterally; no rales, rhonchi, or wheezing b/l  HEART: normal S1, S2  ABDOMEN: BS+, soft, ND, NT   EXTREMITIES:  pulses palpable; no clubbing, cyanosis, or edema b/l LEs  SKIN: no rashes or lesions      LABS:    05-20    139  |  99  |  17  ----------------------------<  221<H>  4.0   |  30  |  1.16    Ca    9.3      20 May 2018 06:54 INTERVAL HPI/OVERNIGHT EVENTS:  Pt seen and examined at bedside.     Allergies/Intolerance: No Known Allergies      MEDICATIONS  (STANDING):  ascorbic acid 500 milliGRAM(s) Oral daily  aspirin enteric coated 81 milliGRAM(s) Oral daily  atorvastatin 40 milliGRAM(s) Oral at bedtime  carvedilol 6.25 milliGRAM(s) Oral every 12 hours  dextrose 5%. 1000 milliLiter(s) (50 mL/Hr) IV Continuous <Continuous>  dextrose 50% Injectable 12.5 Gram(s) IV Push once  dextrose 50% Injectable 25 Gram(s) IV Push once  dextrose 50% Injectable 25 Gram(s) IV Push once  donepezil 10 milliGRAM(s) Oral at bedtime  enoxaparin Injectable 40 milliGRAM(s) SubCutaneous daily  fluticasone propionate 50 MICROgram(s)/spray Nasal Spray 1 Spray(s) Both Nostrils two times a day  galantamine ER 8 milliGRAM(s) Oral with breakfast  insulin glargine Injectable (LANTUS) 25 Unit(s) SubCutaneous at bedtime  insulin lispro (HumaLOG) corrective regimen sliding scale   SubCutaneous three times a day before meals  insulin lispro (HumaLOG) corrective regimen sliding scale   SubCutaneous at bedtime  ketotifen 0.025% Ophthalmic Solution 1 Drop(s) Both EYES two times a day  lactobacillus acidophilus 1 Tablet(s) Oral two times a day with meals  oxybutynin 10 milliGRAM(s) Oral two times a day  pantoprazole    Tablet 40 milliGRAM(s) Oral before breakfast  pregabalin 50 milliGRAM(s) Oral two times a day  tamsulosin 0.4 milliGRAM(s) Oral at bedtime  torsemide 20 milliGRAM(s) Oral daily    MEDICATIONS  (PRN):  dextrose 40% Gel 15 Gram(s) Oral once PRN Blood Glucose LESS THAN 70 milliGRAM(s)/deciliter  glucagon  Injectable 1 milliGRAM(s) IntraMuscular once PRN Glucose LESS THAN 70 milligrams/deciliter        ROS: all systems reviewed and wnl      PHYSICAL EXAMINATION:  Vital Signs Last 24 Hrs  T(C): 36.7 (21 May 2018 05:06), Max: 36.9 (20 May 2018 19:48)  T(F): 98 (21 May 2018 05:06), Max: 98.4 (20 May 2018 19:48)  HR: 71 (21 May 2018 05:06) (70 - 76)  BP: 119/57 (21 May 2018 05:06) (119/57 - 148/76)  BP(mean): --  RR: 18 (21 May 2018 05:06) (18 - 18)  SpO2: 94% (21 May 2018 05:06) (93% - 96%)  CAPILLARY BLOOD GLUCOSE      POCT Blood Glucose.: 250 mg/dL (21 May 2018 08:48)  POCT Blood Glucose.: 361 mg/dL (20 May 2018 22:08)  POCT Blood Glucose.: 365 mg/dL (20 May 2018 17:34)  POCT Blood Glucose.: 399 mg/dL (20 May 2018 11:18)  POCT Blood Glucose.: 482 mg/dL (20 May 2018 11:17)      05-20 @ 07:01  -  05-21 @ 07:00  --------------------------------------------------------  IN: 1200 mL / OUT: 1650 mL / NET: -450 mL        GENERAL: stable, No new complaints  NECK: supple, No JVD  CHEST/LUNG: clear to auscultation bilaterally; no rales, rhonchi, or wheezing b/l  HEART: normal S1, S2  ABDOMEN: BS+, soft, ND, NT   EXTREMITIES:  pulses palpable; no clubbing, cyanosis, or edema b/l LEs  SKIN: no rashes or lesions      LABS:    05-20    139  |  99  |  17  ----------------------------<  221<H>  4.0   |  30  |  1.16    Ca    9.3      20 May 2018 06:54

## 2018-05-21 NOTE — DIETITIAN INITIAL EVALUATION ADULT. - OTHER INFO
nutrition consult for elevated HgbA1C >9%. Pt visited at beside, abdominal  distention noted/ obesity. Patient states he has been a diabetic for 2 years, that he "tries to follow a diabetic diet", tests his glucose in the morning and sugar is usually "100 something".  Pt's wife cooks for him but patient enjoys eating in restaurants for dinner almost nitely, primarily Italian food. Diet education with strong emphasis on portion control, spacing of meals and eating 50% of restaurant dinner entree". ALso sodium content cannot be controlled in restaurants, excessive sodium in all restaurant foods emphasized with negetive effect on CHF. Definition and goal for A1C presented with 1800 calorie 5 day meal plan

## 2018-05-21 NOTE — DISCHARGE NOTE ADULT - CARE PROVIDER_API CALL
Adam Corbett), Cardiology; Internal Medicine  [address] [facility]  [address]  [place] [address]  Phone: [phone]  Fax: [phone]    Jen Ware), Geriatric Medicine; Internal Medicine  [address] [facility]  [address]  [place] [address]  Phone: [phone]  Fax: [phone] Adam Corbett), Cardiology; Internal Medicine  [address] [facility]  [address]  [place] [address]  Phone: [phone]  Fax: [phone]    Jen Ware), Geriatric Medicine; Internal Medicine  [address] [facility]  [address]  [place] [address]  Phone: [phone]  Fax: [phone]    Eleazar Duque), Gastroenterology; Internal Medicine  [address] [facility]  [address]  [place] [address]  Phone: [phone]  Fax: [phone]

## 2018-05-21 NOTE — DISCHARGE NOTE ADULT - HOSPITAL COURSE
82 yo male PMH HTN, PAF, mild dementia, chronic back pain, diabetes mellitus(II), CHF, was on torsemide but was stopped, BIBEMS presenting to ED from his geriatric physician Dr. Welch due to "heart failure" and hypoxia.  Acute  on chronic diastolic HF; seen by cardiology ; was give IV diuretics and resume home medication with oral torsemide;  seen by physical therapy and advised home physical therapy/ home care. 84 yo male PMH HTN, PAF, mild dementia, chronic back pain, diabetes mellitus(II), CHF, was on torsemide but was stopped, BIBEMS presenting to ED from his geriatric physician Dr. Welch due to "heart failure" and hypoxia.  Acute  on chronic diastolic HF; seen by cardiology ; was give IV diuretics and resume home medication with oral torsemide;  seen by physical therapy and advised home physical therapy/ home care. follow up with cardiologist on 5/29/18 at 3 pm; 84 yo male PMH HTN, PAF, mild dementia, chronic back pain, diabetes mellitus(II), CHF, was on torsemide but was stopped, BIBEMS presenting to ED from his geriatric physician Dr. Wlech due to "heart failure" and hypoxia.    Was treated for acute on chronic diastolic HF; seen by cardiology ; was give IV diuretics and resume home medication with oral torsemide;  seen by physical therapy and advised home physical therapy/ home care. follow up with cardiologist on 5/29/18 at 3 pm.   CKD III stable.

## 2018-05-21 NOTE — DISCHARGE NOTE ADULT - PROVIDER TOKENS
TOKEN:'[id number]',TOKEN:'[id number]' TOKEN:'[id number]'

## 2018-05-21 NOTE — DISCHARGE NOTE ADULT - MEDICATION SUMMARY - MEDICATIONS TO TAKE
I will START or STAY ON the medications listed below when I get home from the hospital:    acetaminophen 500 mg oral tablet  -- 2 tab(s) by mouth once a day, As Needed  -- Indication: For pain    Aspirin Enteric Coated 81 mg oral delayed release tablet  -- 1 tab(s) by mouth once a day  -- Indication: For antiplatelet    tamsulosin 0.4 mg oral capsule  -- 1 cap(s) by mouth once a day (at bedtime)  -- verified with duanenallely smarte  -- Indication: For prostate    Lyrica 50 mg oral capsule  -- 1 cap(s) by mouth 2 times a day  -- Indication: For pain    Januvia 50 mg oral tablet  -- 1 tab(s) by mouth once a day  -- Indication: For type 2 diabetes    insulin glargine  -- 24 unit(s) subcutaneous once a day (at bedtime)  -- Indication: For type 2 diabetes    glipiZIDE 10 mg oral tablet, extended release  -- 1 tab(s) by mouth 2 times a day  -- verified with duane reade  -- Indication: For type 2 diabetes    Crestor 10 mg oral tablet  -- 1 tab(s) by mouth once a day (at bedtime)  -- verified with duane reade  -- Indication: For lipids lowering    carvedilol 6.25 mg oral tablet  -- 1 tab(s) by mouth every 12 hours  -- Indication: For CHF (congestive heart failure)    galantamine 8 mg oral capsule, extended release  -- 1 cap(s) by mouth once a day (in the morning)  -- verified with duane reade  -- Indication: For Cognition    donepezil 10 mg oral tablet  -- 1 tab(s) by mouth once a day (at bedtime)  -- verified with duane reade  -- Indication: For Cognition    torsemide 20 mg oral tablet  -- 1 tab(s) by mouth once a day  -- Indication: For CHF (congestive heart failure)    azelastine 0.05% ophthalmic solution  -- 1 drop(s) in each eye 2 times a day  -- Indication: For eye drops    lactobacillus acidophilus oral capsule  -- 1 cap(s) by mouth once a day   -- Indication: For probiotic    omeprazole 20 mg oral delayed release capsule  -- 1 cap(s) by mouth once a day  -- Indication: For for stomach    oxybutynin 5 mg oral tablet  -- 2 tab(s) by mouth 2 times a day  -- Indication: For urinary spasmodics    ascorbic acid 500 mg oral tablet  -- 1 tab(s) by mouth once a day  -- Indication: For vitamin I will START or STAY ON the medications listed below when I get home from the hospital:    acetaminophen 500 mg oral tablet  -- 2 tab(s) by mouth once a day, As Needed  -- Indication: For pain    Aspirin Enteric Coated 81 mg oral delayed release tablet  -- 1 tab(s) by mouth once a day  -- Indication: For antiplatelet    tamsulosin 0.4 mg oral capsule  -- 1 cap(s) by mouth once a day (at bedtime)  -- verified with duanenallely smarte  -- Indication: For prostate    Lyrica 50 mg oral capsule  -- 1 cap(s) by mouth 2 times a day  -- Indication: For pain    Januvia 50 mg oral tablet  -- 1 tab(s) by mouth once a day  -- Indication: For type 2 diabetes    insulin glargine  -- 24 unit(s) subcutaneous once a day (at bedtime)  -- Indication: For type 2 diabetes    glipiZIDE 10 mg oral tablet, extended release  -- 1 tab(s) by mouth 2 times a day  -- verified with duane reade  -- Indication: For type 2 diabetes    Crestor 10 mg oral tablet  -- 1 tab(s) by mouth once a day (at bedtime)  -- verified with duane reade  -- Indication: For lipids lowering    carvedilol 6.25 mg oral tablet  -- 1 tab(s) by mouth every 12 hours  -- Indication: For CHF (congestive heart failure)    galantamine 8 mg oral capsule, extended release  -- 1 cap(s) by mouth once a day (in the morning)  -- verified with duane reade  -- Indication: For Cognition    donepezil 10 mg oral tablet  -- 1 tab(s) by mouth once a day (at bedtime)  -- verified with duane reade  -- Indication: For Cognition    torsemide 20 mg oral tablet  -- 1 tab(s) by mouth once a day  -- Indication: For CHF (congestive heart failure)    azelastine 0.05% ophthalmic solution  -- 1 drop(s) in each eye 2 times a day  -- Indication: For eye drops    lactobacillus acidophilus oral capsule  -- 1 cap(s) by mouth once a day   -- Indication: For probiotic    omeprazole 20 mg oral delayed release capsule  -- 1 cap(s) by mouth once a day  -- Indication: For for stomach    oxybutynin 5 mg oral tablet  -- 2 tab(s) by mouth 2 times a day  -- Indication: For urinary spasmodics    Centrum oral tablet  -- 1 tab(s) by mouth once a day  -- Indication: For vitamin    ascorbic acid 500 mg oral tablet  -- 1 tab(s) by mouth once a day  -- Indication: For vitamin

## 2018-09-01 ENCOUNTER — OUTPATIENT (OUTPATIENT)
Dept: OUTPATIENT SERVICES | Facility: HOSPITAL | Age: 83
LOS: 1 days | End: 2018-09-01
Payer: MEDICARE

## 2018-09-01 DIAGNOSIS — G91.2 (IDIOPATHIC) NORMAL PRESSURE HYDROCEPHALUS: Chronic | ICD-10-CM

## 2018-09-01 PROCEDURE — G9001: CPT

## 2018-09-28 ENCOUNTER — INPATIENT (INPATIENT)
Facility: HOSPITAL | Age: 83
LOS: 6 days | Discharge: HOME | DRG: 690 | End: 2018-10-05
Attending: INTERNAL MEDICINE | Admitting: INTERNAL MEDICINE
Payer: MEDICARE

## 2018-09-28 VITALS
RESPIRATION RATE: 17 BRPM | TEMPERATURE: 100 F | DIASTOLIC BLOOD PRESSURE: 76 MMHG | OXYGEN SATURATION: 94 % | SYSTOLIC BLOOD PRESSURE: 156 MMHG | HEART RATE: 98 BPM

## 2018-09-28 DIAGNOSIS — F03.90 UNSPECIFIED DEMENTIA WITHOUT BEHAVIORAL DISTURBANCE: ICD-10-CM

## 2018-09-28 DIAGNOSIS — N30.01 ACUTE CYSTITIS WITH HEMATURIA: ICD-10-CM

## 2018-09-28 DIAGNOSIS — M48.061 SPINAL STENOSIS, LUMBAR REGION WITHOUT NEUROGENIC CLAUDICATION: ICD-10-CM

## 2018-09-28 DIAGNOSIS — E55.9 VITAMIN D DEFICIENCY, UNSPECIFIED: ICD-10-CM

## 2018-09-28 DIAGNOSIS — N04.9 NEPHROTIC SYNDROME WITH UNSPECIFIED MORPHOLOGIC CHANGES: ICD-10-CM

## 2018-09-28 DIAGNOSIS — G91.2 (IDIOPATHIC) NORMAL PRESSURE HYDROCEPHALUS: ICD-10-CM

## 2018-09-28 DIAGNOSIS — N40.0 BENIGN PROSTATIC HYPERPLASIA WITHOUT LOWER URINARY TRACT SYMPTOMS: ICD-10-CM

## 2018-09-28 DIAGNOSIS — G91.2 (IDIOPATHIC) NORMAL PRESSURE HYDROCEPHALUS: Chronic | ICD-10-CM

## 2018-09-28 DIAGNOSIS — I10 ESSENTIAL (PRIMARY) HYPERTENSION: ICD-10-CM

## 2018-09-28 DIAGNOSIS — E11.9 TYPE 2 DIABETES MELLITUS WITHOUT COMPLICATIONS: ICD-10-CM

## 2018-09-28 LAB
ALBUMIN SERPL ELPH-MCNC: 3.6 G/DL — SIGNIFICANT CHANGE UP (ref 3.3–5)
ALP SERPL-CCNC: 97 U/L — SIGNIFICANT CHANGE UP (ref 40–120)
ALT FLD-CCNC: 18 U/L — SIGNIFICANT CHANGE UP (ref 10–45)
ANION GAP SERPL CALC-SCNC: 13 MMOL/L — SIGNIFICANT CHANGE UP (ref 5–17)
APPEARANCE UR: SIGNIFICANT CHANGE UP
AST SERPL-CCNC: 16 U/L — SIGNIFICANT CHANGE UP (ref 10–40)
BACTERIA # UR AUTO: ABNORMAL
BASOPHILS # BLD AUTO: 0 K/UL — SIGNIFICANT CHANGE UP (ref 0–0.2)
BASOPHILS NFR BLD AUTO: 0 % — SIGNIFICANT CHANGE UP (ref 0–2)
BILIRUB SERPL-MCNC: 1 MG/DL — SIGNIFICANT CHANGE UP (ref 0.2–1.2)
BILIRUB UR-MCNC: NEGATIVE — SIGNIFICANT CHANGE UP
BUN SERPL-MCNC: 15 MG/DL — SIGNIFICANT CHANGE UP (ref 7–23)
CALCIUM SERPL-MCNC: 9.2 MG/DL — SIGNIFICANT CHANGE UP (ref 8.4–10.5)
CHLORIDE SERPL-SCNC: 100 MMOL/L — SIGNIFICANT CHANGE UP (ref 96–108)
CK MB CFR SERPL CALC: 2.5 NG/ML — SIGNIFICANT CHANGE UP (ref 0–6.7)
CO2 SERPL-SCNC: 23 MMOL/L — SIGNIFICANT CHANGE UP (ref 22–31)
COLOR SPEC: YELLOW — SIGNIFICANT CHANGE UP
CREAT SERPL-MCNC: 1.09 MG/DL — SIGNIFICANT CHANGE UP (ref 0.5–1.3)
DIFF PNL FLD: ABNORMAL
EOSINOPHIL # BLD AUTO: 0 K/UL — SIGNIFICANT CHANGE UP (ref 0–0.5)
EOSINOPHIL NFR BLD AUTO: 0.2 % — SIGNIFICANT CHANGE UP (ref 0–6)
EPI CELLS # UR: 5 /HPF — SIGNIFICANT CHANGE UP
GAS PNL BLDV: SIGNIFICANT CHANGE UP
GAS PNL BLDV: SIGNIFICANT CHANGE UP
GLUCOSE BLDC GLUCOMTR-MCNC: 224 MG/DL — HIGH (ref 70–99)
GLUCOSE BLDC GLUCOMTR-MCNC: 276 MG/DL — HIGH (ref 70–99)
GLUCOSE BLDC GLUCOMTR-MCNC: 278 MG/DL — HIGH (ref 70–99)
GLUCOSE SERPL-MCNC: 229 MG/DL — HIGH (ref 70–99)
GLUCOSE UR QL: ABNORMAL
HCT VFR BLD CALC: 51 % — HIGH (ref 39–50)
HGB BLD-MCNC: 16.3 G/DL — SIGNIFICANT CHANGE UP (ref 13–17)
HYALINE CASTS # UR AUTO: 0 /LPF — SIGNIFICANT CHANGE UP (ref 0–2)
KETONES UR-MCNC: NEGATIVE — SIGNIFICANT CHANGE UP
LEUKOCYTE ESTERASE UR-ACNC: ABNORMAL
LYMPHOCYTES # BLD AUTO: 0.7 K/UL — LOW (ref 1–3.3)
LYMPHOCYTES # BLD AUTO: 3.2 % — LOW (ref 13–44)
MCHC RBC-ENTMCNC: 25.3 PG — LOW (ref 27–34)
MCHC RBC-ENTMCNC: 32 GM/DL — SIGNIFICANT CHANGE UP (ref 32–36)
MCV RBC AUTO: 79 FL — LOW (ref 80–100)
MONOCYTES # BLD AUTO: 1.9 K/UL — HIGH (ref 0–0.9)
MONOCYTES NFR BLD AUTO: 9 % — SIGNIFICANT CHANGE UP (ref 2–14)
NEUTROPHILS # BLD AUTO: 18.7 K/UL — HIGH (ref 1.8–7.4)
NEUTROPHILS NFR BLD AUTO: 87.6 % — HIGH (ref 43–77)
NITRITE UR-MCNC: NEGATIVE — SIGNIFICANT CHANGE UP
PH UR: 7 — SIGNIFICANT CHANGE UP (ref 5–8)
PLATELET # BLD AUTO: 216 K/UL — SIGNIFICANT CHANGE UP (ref 150–400)
POTASSIUM SERPL-MCNC: 4.3 MMOL/L — SIGNIFICANT CHANGE UP (ref 3.5–5.3)
POTASSIUM SERPL-SCNC: 4.3 MMOL/L — SIGNIFICANT CHANGE UP (ref 3.5–5.3)
PROT SERPL-MCNC: 6.9 G/DL — SIGNIFICANT CHANGE UP (ref 6–8.3)
PROT UR-MCNC: ABNORMAL
RBC # BLD: 6.45 M/UL — HIGH (ref 4.2–5.8)
RBC # FLD: 14.1 % — SIGNIFICANT CHANGE UP (ref 10.3–14.5)
RBC CASTS # UR COMP ASSIST: >50 /HPF — HIGH (ref 0–4)
SODIUM SERPL-SCNC: 136 MMOL/L — SIGNIFICANT CHANGE UP (ref 135–145)
SP GR SPEC: 1.02 — SIGNIFICANT CHANGE UP (ref 1.01–1.02)
TROPONIN T, HIGH SENSITIVITY RESULT: 44 NG/L — SIGNIFICANT CHANGE UP (ref 0–51)
UROBILINOGEN FLD QL: NEGATIVE — SIGNIFICANT CHANGE UP
WBC # BLD: 21.4 K/UL — HIGH (ref 3.8–10.5)
WBC # FLD AUTO: 21.4 K/UL — HIGH (ref 3.8–10.5)
WBC UR QL: >50 /HPF — SIGNIFICANT CHANGE UP (ref 0–5)

## 2018-09-28 PROCEDURE — 93010 ELECTROCARDIOGRAM REPORT: CPT

## 2018-09-28 PROCEDURE — 76770 US EXAM ABDO BACK WALL COMP: CPT | Mod: 26

## 2018-09-28 PROCEDURE — 76872 US TRANSRECTAL: CPT | Mod: 26

## 2018-09-28 PROCEDURE — 71045 X-RAY EXAM CHEST 1 VIEW: CPT | Mod: 26

## 2018-09-28 PROCEDURE — 99285 EMERGENCY DEPT VISIT HI MDM: CPT | Mod: GC,25

## 2018-09-28 RX ORDER — CEFTRIAXONE 500 MG/1
1 INJECTION, POWDER, FOR SOLUTION INTRAMUSCULAR; INTRAVENOUS EVERY 24 HOURS
Qty: 0 | Refills: 0 | Status: DISCONTINUED | OUTPATIENT
Start: 2018-09-28 | End: 2018-10-01

## 2018-09-28 RX ORDER — DONEPEZIL HYDROCHLORIDE 10 MG/1
10 TABLET, FILM COATED ORAL AT BEDTIME
Qty: 0 | Refills: 0 | Status: DISCONTINUED | OUTPATIENT
Start: 2018-09-28 | End: 2018-10-05

## 2018-09-28 RX ORDER — MULTIVIT-MIN/FERROUS GLUCONATE 9 MG/15 ML
1 LIQUID (ML) ORAL DAILY
Qty: 0 | Refills: 0 | Status: DISCONTINUED | OUTPATIENT
Start: 2018-09-28 | End: 2018-10-05

## 2018-09-28 RX ORDER — ASCORBIC ACID 60 MG
500 TABLET,CHEWABLE ORAL DAILY
Qty: 0 | Refills: 0 | Status: DISCONTINUED | OUTPATIENT
Start: 2018-09-28 | End: 2018-10-05

## 2018-09-28 RX ORDER — OXYBUTYNIN CHLORIDE 5 MG
10 TABLET ORAL
Qty: 0 | Refills: 0 | Status: DISCONTINUED | OUTPATIENT
Start: 2018-09-28 | End: 2018-10-05

## 2018-09-28 RX ORDER — ACETAMINOPHEN 500 MG
650 TABLET ORAL ONCE
Qty: 0 | Refills: 0 | Status: COMPLETED | OUTPATIENT
Start: 2018-09-28 | End: 2018-09-28

## 2018-09-28 RX ORDER — DEXTROSE 50 % IN WATER 50 %
12.5 SYRINGE (ML) INTRAVENOUS ONCE
Qty: 0 | Refills: 0 | Status: DISCONTINUED | OUTPATIENT
Start: 2018-09-28 | End: 2018-10-05

## 2018-09-28 RX ORDER — TAMSULOSIN HYDROCHLORIDE 0.4 MG/1
0.4 CAPSULE ORAL AT BEDTIME
Qty: 0 | Refills: 0 | Status: DISCONTINUED | OUTPATIENT
Start: 2018-09-28 | End: 2018-10-05

## 2018-09-28 RX ORDER — LACTOBACILLUS ACIDOPHILUS 100MM CELL
1 CAPSULE ORAL DAILY
Qty: 0 | Refills: 0 | Status: DISCONTINUED | OUTPATIENT
Start: 2018-09-28 | End: 2018-10-05

## 2018-09-28 RX ORDER — SODIUM CHLORIDE 9 MG/ML
1000 INJECTION, SOLUTION INTRAVENOUS
Qty: 0 | Refills: 0 | Status: DISCONTINUED | OUTPATIENT
Start: 2018-09-28 | End: 2018-10-05

## 2018-09-28 RX ORDER — PANTOPRAZOLE SODIUM 20 MG/1
40 TABLET, DELAYED RELEASE ORAL
Qty: 0 | Refills: 0 | Status: DISCONTINUED | OUTPATIENT
Start: 2018-09-28 | End: 2018-10-05

## 2018-09-28 RX ORDER — ACETAMINOPHEN 500 MG
500 TABLET ORAL
Qty: 0 | Refills: 0 | Status: DISCONTINUED | OUTPATIENT
Start: 2018-09-28 | End: 2018-09-28

## 2018-09-28 RX ORDER — DEXTROSE 50 % IN WATER 50 %
25 SYRINGE (ML) INTRAVENOUS ONCE
Qty: 0 | Refills: 0 | Status: DISCONTINUED | OUTPATIENT
Start: 2018-09-28 | End: 2018-10-05

## 2018-09-28 RX ORDER — ACETAMINOPHEN 500 MG
1000 TABLET ORAL ONCE
Qty: 0 | Refills: 0 | Status: COMPLETED | OUTPATIENT
Start: 2018-09-28 | End: 2018-09-28

## 2018-09-28 RX ORDER — CEFTRIAXONE 500 MG/1
1 INJECTION, POWDER, FOR SOLUTION INTRAMUSCULAR; INTRAVENOUS ONCE
Qty: 0 | Refills: 0 | Status: COMPLETED | OUTPATIENT
Start: 2018-09-28 | End: 2018-09-28

## 2018-09-28 RX ORDER — IPRATROPIUM/ALBUTEROL SULFATE 18-103MCG
3 AEROSOL WITH ADAPTER (GRAM) INHALATION ONCE
Qty: 0 | Refills: 0 | Status: COMPLETED | OUTPATIENT
Start: 2018-09-28 | End: 2018-09-28

## 2018-09-28 RX ORDER — INSULIN LISPRO 100/ML
VIAL (ML) SUBCUTANEOUS
Qty: 0 | Refills: 0 | Status: DISCONTINUED | OUTPATIENT
Start: 2018-09-28 | End: 2018-10-01

## 2018-09-28 RX ORDER — ACETAMINOPHEN 500 MG
650 TABLET ORAL EVERY 6 HOURS
Qty: 0 | Refills: 0 | Status: DISCONTINUED | OUTPATIENT
Start: 2018-09-28 | End: 2018-10-05

## 2018-09-28 RX ORDER — INSULIN LISPRO 100/ML
VIAL (ML) SUBCUTANEOUS AT BEDTIME
Qty: 0 | Refills: 0 | Status: DISCONTINUED | OUTPATIENT
Start: 2018-09-28 | End: 2018-10-03

## 2018-09-28 RX ORDER — ASPIRIN/CALCIUM CARB/MAGNESIUM 324 MG
81 TABLET ORAL DAILY
Qty: 0 | Refills: 0 | Status: DISCONTINUED | OUTPATIENT
Start: 2018-09-28 | End: 2018-10-05

## 2018-09-28 RX ORDER — SODIUM CHLORIDE 9 MG/ML
1000 INJECTION INTRAMUSCULAR; INTRAVENOUS; SUBCUTANEOUS ONCE
Qty: 0 | Refills: 0 | Status: COMPLETED | OUTPATIENT
Start: 2018-09-28 | End: 2018-09-28

## 2018-09-28 RX ORDER — INSULIN GLARGINE 100 [IU]/ML
30 INJECTION, SOLUTION SUBCUTANEOUS AT BEDTIME
Qty: 0 | Refills: 0 | Status: DISCONTINUED | OUTPATIENT
Start: 2018-09-28 | End: 2018-10-02

## 2018-09-28 RX ORDER — SODIUM CHLORIDE 9 MG/ML
500 INJECTION INTRAMUSCULAR; INTRAVENOUS; SUBCUTANEOUS ONCE
Qty: 0 | Refills: 0 | Status: COMPLETED | OUTPATIENT
Start: 2018-09-28 | End: 2018-09-28

## 2018-09-28 RX ORDER — ATORVASTATIN CALCIUM 80 MG/1
40 TABLET, FILM COATED ORAL AT BEDTIME
Qty: 0 | Refills: 0 | Status: DISCONTINUED | OUTPATIENT
Start: 2018-09-28 | End: 2018-10-05

## 2018-09-28 RX ORDER — CARVEDILOL PHOSPHATE 80 MG/1
6.25 CAPSULE, EXTENDED RELEASE ORAL EVERY 12 HOURS
Qty: 0 | Refills: 0 | Status: DISCONTINUED | OUTPATIENT
Start: 2018-09-28 | End: 2018-10-05

## 2018-09-28 RX ORDER — DEXTROSE 50 % IN WATER 50 %
15 SYRINGE (ML) INTRAVENOUS ONCE
Qty: 0 | Refills: 0 | Status: DISCONTINUED | OUTPATIENT
Start: 2018-09-28 | End: 2018-10-05

## 2018-09-28 RX ORDER — GLUCAGON INJECTION, SOLUTION 0.5 MG/.1ML
1 INJECTION, SOLUTION SUBCUTANEOUS ONCE
Qty: 0 | Refills: 0 | Status: DISCONTINUED | OUTPATIENT
Start: 2018-09-28 | End: 2018-10-05

## 2018-09-28 RX ADMIN — DONEPEZIL HYDROCHLORIDE 10 MILLIGRAM(S): 10 TABLET, FILM COATED ORAL at 22:04

## 2018-09-28 RX ADMIN — CEFTRIAXONE 100 GRAM(S): 500 INJECTION, POWDER, FOR SOLUTION INTRAMUSCULAR; INTRAVENOUS at 18:45

## 2018-09-28 RX ADMIN — SODIUM CHLORIDE 666.67 MILLILITER(S): 9 INJECTION INTRAMUSCULAR; INTRAVENOUS; SUBCUTANEOUS at 11:34

## 2018-09-28 RX ADMIN — ATORVASTATIN CALCIUM 40 MILLIGRAM(S): 80 TABLET, FILM COATED ORAL at 22:03

## 2018-09-28 RX ADMIN — CEFTRIAXONE 100 GRAM(S): 500 INJECTION, POWDER, FOR SOLUTION INTRAMUSCULAR; INTRAVENOUS at 10:51

## 2018-09-28 RX ADMIN — Medication 1000 MILLIGRAM(S): at 09:34

## 2018-09-28 RX ADMIN — INSULIN GLARGINE 30 UNIT(S): 100 INJECTION, SOLUTION SUBCUTANEOUS at 22:04

## 2018-09-28 RX ADMIN — Medication 650 MILLIGRAM(S): at 18:50

## 2018-09-28 RX ADMIN — Medication 50 MILLIGRAM(S): at 18:51

## 2018-09-28 RX ADMIN — Medication 1: at 22:04

## 2018-09-28 RX ADMIN — Medication 10 MILLIGRAM(S): at 18:50

## 2018-09-28 RX ADMIN — CARVEDILOL PHOSPHATE 6.25 MILLIGRAM(S): 80 CAPSULE, EXTENDED RELEASE ORAL at 18:50

## 2018-09-28 RX ADMIN — TAMSULOSIN HYDROCHLORIDE 0.4 MILLIGRAM(S): 0.4 CAPSULE ORAL at 22:03

## 2018-09-28 RX ADMIN — Medication 3: at 18:49

## 2018-09-28 RX ADMIN — Medication 1000 MILLIGRAM(S): at 11:27

## 2018-09-28 RX ADMIN — SODIUM CHLORIDE 1000 MILLILITER(S): 9 INJECTION INTRAMUSCULAR; INTRAVENOUS; SUBCUTANEOUS at 10:15

## 2018-09-28 RX ADMIN — Medication 3 MILLILITER(S): at 20:15

## 2018-09-28 RX ADMIN — SODIUM CHLORIDE 1000 MILLILITER(S): 9 INJECTION INTRAMUSCULAR; INTRAVENOUS; SUBCUTANEOUS at 09:02

## 2018-09-28 NOTE — PROVIDER CONTACT NOTE (OTHER) - BACKGROUND
Pt admit for AMS,fever,weakness,frequent urination w/ PMH of BPH, T2DM, lumbar spinal stenosis,mild dementia,nephrotic syndrome, normal pressure hydrocephalus.

## 2018-09-28 NOTE — PROVIDER CONTACT NOTE (OTHER) - RECOMMENDATIONS
Give ceftriaxone IV abx as ordered. Give higher dose of tylenol instead of giving standing 500mg tylenol PO.

## 2018-09-28 NOTE — H&P ADULT - NSHPLABSRESULTS_GEN_ALL_CORE
CARDIAC MARKERS ( 28 Sep 2018 09:25 )  x     / x     / x     / x     / 2.5 ng/mL      CBC Full  -  ( 28 Sep 2018 09:25 )  WBC Count : 21.4 K/uL  Hemoglobin : 16.3 g/dL  Hematocrit : 51.0 %  Platelet Count - Automated : 216 K/uL  Mean Cell Volume : 79.0 fl  Mean Cell Hemoglobin : 25.3 pg  Mean Cell Hemoglobin Concentration : 32.0 gm/dL  Auto Neutrophil # : 18.7 K/uL  Auto Lymphocyte # : 0.7 K/uL  Auto Monocyte # : 1.9 K/uL  Auto Eosinophil # : 0.0 K/uL  Auto Basophil # : 0.0 K/uL  Auto Neutrophil % : 87.6 %  Auto Lymphocyte % : 3.2 %  Auto Monocyte % : 9.0 %  Auto Eosinophil % : 0.2 %  Auto Basophil % : 0.0 %        135  |  108  |  15  ----------------------------<  215<H>  4.3   |  17<L>  |  1.24    Ca    8.6      29 Sep 2018 06:27  Mg     1.8         TPro  6.0  /  Alb  2.5<L>  /  TBili  0.5  /  DBili  x   /  AST  20  /  ALT  21  /  AlkPhos  97      LIVER FUNCTIONS - ( 29 Sep 2018 06:27 )  Alb: 2.5 g/dL / Pro: 6.0 g/dL / ALK PHOS: 97 U/L / ALT: 21 U/L / AST: 20 U/L / GGT: x             Urinalysis Basic - ( 28 Sep 2018 09:43 )    Color: Yellow / Appearance: cloudy / S.016 / pH: x  Gluc: x / Ketone: Negative  / Bili: Negative / Urobili: Negative   Blood: x / Protein: 30 mg/dL / Nitrite: Negative   Leuk Esterase: Moderate / RBC: >50 /hpf / WBC >50 /hpf   Sq Epi: x / Non Sq Epi: 5 /hpf / Bacteria: Few

## 2018-09-28 NOTE — H&P ADULT - NSHPPHYSICALEXAM_GEN_ALL_CORE
PHYSICAL EXAM:    GENERAL: NAD, well nourished and conversant  HEAD:  Atraumatic  EYES: EOM, PERRLA, conjunctiva pink and sclera white  ENT: No tonsillar erythema, exudates, or enlargement, moist mucous membranes, good dentition, no lesions  NECK: Supple, No JVD, normal thyroid, carotids with normal upstrokes and no bruits  CHEST/LUNG: Clear to auscultation bilaterally, No rales, rhonchi, wheezing, or rubs  HEART: Regular rate and rhythm, No murmurs, rubs, or gallops  ABDOMEN: Soft, nondistended, no masses, guarding, tenderness or rebound, bowel sounds present  EXTREMITIES:  2+ Peripheral Pulses, No clubbing, cyanosis, or edema. No arthritis of shoulders, elbows, hands, hips, knees, ankles, or feet. No DJD C spine, T spine, or L/S spine  LYMPH: No lymphadenopathy noted  SKIN: No rashes or lesions  NERVOUS SYSTEM:  confused not agitated Motor Strength 5/5 right upper and right lower.  5/5 left upper and left lower extremities, DTRs 2+ intact and symmetric

## 2018-09-28 NOTE — ED ADULT NURSE NOTE - NSIMPLEMENTINTERV_GEN_ALL_ED
Implemented All Fall with Harm Risk Interventions:  [place] to call system. Call bell, personal items and telephone within reach. Instruct patient to call for assistance. Room bathroom lighting operational. Non-slip footwear when patient is off stretcher. Physically safe environment: no spills, clutter or unnecessary equipment. Stretcher in lowest position, wheels locked, appropriate side rails in place. Provide visual cue, wrist band, yellow gown, etc. Monitor gait and stability. Monitor for mental status changes and reorient to person, place, and time. Review medications for side effects contributing to fall risk. Reinforce activity limits and safety measures with patient and family. Provide visual clues: red socks.

## 2018-09-28 NOTE — ED ADULT NURSE NOTE - OBJECTIVE STATEMENT
83 year old male with Hx of hydrocephalus and urinary incontinence presents to the ED complaining of weakness, fever, and frequent urination. As per the wife she was able to wake him but he was 'barely responding'. Pt is 101. rectally. 20G IV in place by EMS in right AC, flushes easily, + blood return. Pt wife at bedside. Pt A&O x 3, asking repetitive questions, able top give year and month but not day, aware that he's in the hospital. Urine collected via clean catch method.  Pt sating 9on RA, placed on NC 2L  NC for a sat of 97. Pt has strong use of all extremities. Facial symmetry intact, BG done on arrival 225. Pt is A&O x 4, VSS, afebrile, ambulates with assistance at home. Pt denies NVD, SOB, or chest pain at this time.

## 2018-09-28 NOTE — ED PROVIDER NOTE - ATTENDING CONTRIBUTION TO CARE
Attending MD Byrnes:  I personally have seen and examined this patient.  Resident note reviewed and agree on plan of care and except where noted.  See MDM for details.

## 2018-09-28 NOTE — ED PROVIDER NOTE - OBJECTIVE STATEMENT
83M hx HTN, DM2 on oral meds, hx of UTIs, mild dementia and Normal pressure hydrocephalus  s/p ventriculostomy of the third ventricle 2012 presents with AMS since this AM. Wife had trouble waking pt up this morning so called EMS. Pt was unarousable with sternal rub, FS was 242 and T 100.1F. On transport, 150cc of IVF were given, frequent PVCs were seen on monitor and pt was satting at 90% on RA. Wife endorsed that Pt had a lot more frequent urination since yesterday. Pt denies abdominal pain, F/C, N/V, diarrhea, constipation, dysuria, flank pain, hematuria, CP, SOB, weakness, speech changes or HA. 83M hx HTN, DM2 on oral meds, hx of UTIs, mild dementia and Normal pressure hydrocephalus  s/p ventriculostomy of the third ventricle 2012 presents with AMS since this AM. Wife had trouble waking pt up this morning so called EMS. Pt was unarousable with sternal rub, FS was 242 and T 100.1F. On transport, 150cc of IVF were given, frequent PVCs were seen on monitor and pt was satting at 90% on RA. Wife endorsed that Pt had a lot more frequent urination since yesterday. Pt denies abdominal pain, F/C, N/V, diarrhea, constipation, dysuria, flank pain, hematuria, CP, SOB, weakness, speech changes or HA.  PCP: Dr Eleazar Duque

## 2018-09-28 NOTE — CHART NOTE - NSCHARTNOTEFT_GEN_A_CORE
Called by RN to evaluate pt for fever 102.2. Pt seen and examined at bedside, eyes closed, easily arousable, with O2 via NC. NAD. A & O X3, reports some infrequent cough, denies SOB, chest pain, dizziness, abdominal pain , N & V, diarrhea, dysuria.       Vital Signs Last 24 Hrs  T(C): 37.5 (28 Sep 2018 20:22), Max: 39 (28 Sep 2018 18:34)  T(F): 99.5 (28 Sep 2018 20:22), Max: 102.2 (28 Sep 2018 18:34)  HR: 88 (28 Sep 2018 18:34) (78 - 98)  BP: 146/72 (28 Sep 2018 18:34) (127/78 - 169/78)  BP(mean): --  RR: 20 (28 Sep 2018 20:22) (17 - 20)  SpO2: 96% (28 Sep 2018 20:22) (94% - 96%)      Labs:                          16.3   21.4  )-----------( 216      ( 28 Sep 2018 09:25 )             51.0     09-28    136  |  100  |  15  ----------------------------<  229<H>  4.3   |  23  |  1.09    Ca    9.2      28 Sep 2018 09:25  Mg     1.8     09-28    TPro  6.9  /  Alb  3.6  /  TBili  1.0  /  DBili  x   /  AST  16  /  ALT  18  /  AlkPhos  97  09-28    CARDIAC MARKERS ( 28 Sep 2018 09:25 )  x     / x     / x     / x     / 2.5 ng/mL          Radiology:  < from: Xray Chest 1 View- PORTABLE-Urgent (09.28.18 @ 10:11) >    MPRESSION: No evidence of acute cardiopulmonary disease.    < from: US Kidney and Bladder (09.28.18 @ 15:58) >    MPRESSION:     No hydronephrosis.    Evaluation for pyelonephritis is limited.   No   significant change since 3/13/2018.          < end of copied text >    Physical Exam:  General: NAD, easily arousable  Neurology: A&Ox3, nonfocal, WEAVER x 4  Head:  Normocephalic, atraumatic  Respiratory: CTA B/L  CV: RRR, S1S2  Abdominal: Soft, NT, ND no palpable mass  MSK: No edema, + peripheral pulses, FROM all 4 extremity    Assessment & Plan:    83M hx HTN, DM2 on oral meds, hx of UTIs, mild dementia and Normal pressure hydrocephalus  s/p ventriculostomy of the third ventricle 2012 presents with AMS. Fever 101 in ED, UA grossly positive, renal US negative for hydronephrosis.  Now fever 102.2    - Tylenol 650 mg PO X1, and coolign measures  - Continue with Ceftriaxone  - Blood culture and urine culture sent in ER, follow up with results  - Encourage oral fluid intake  - Monitor vital signs q 4 hours     Galina Sinha NP-C  #[id number]

## 2018-09-28 NOTE — ED PROVIDER NOTE - MEDICAL DECISION MAKING DETAILS
Attending MD yBrnes: 84 yo male with PMH for HTN, DM type 2 on oral meds, frequent UTIs, presents with wife for AMS, patient lethargic and urinating a lot over the past few days.  , temp 101 orally.   Now patient alert and oriented.  Denies any symptoms.  /76 HR 98, Sat 93% RA, oral temp 100.1.  HEENT 2mm pupils, MMM, Heart RRR, no murmurs, lungs with clear BS bilaterally, Abd soft NTND, Ext with no edema, no CVAT, neuro non focal motor and sensory exam, LE with trace pitting edema bilaterally.  DDX infection in urine or lungs, DKA, ACS, seizures (but now hx).  Plan: labs, cxr, urine culture, UA, ekg, cardiac enzymes, VBF Attending MD Byrnes: 82 yo male with PMH for HTN, DM type 2 on oral meds, frequent UTIs, presents with wife for AMS, patient lethargic and urinating a lot over the past few days.  , temp 100.1 orally.   Now patient alert and oriented.  Denies any symptoms.  /76 HR 98, Sat 93% RA, oral temp 101.  HEENT 2mm pupils, MMM, Heart RRR, no murmurs, lungs with clear BS bilaterally, Abd soft NTND, Ext with no edema, no CVAT, neuro non focal motor and sensory exam, LE with trace pitting edema bilaterally.  DDX infection in urine or lungs, DKA, ACS, seizures (but no hx).  Plan: labs, cxr, urine culture, UA, ekg, cardiac enzymes, VBF

## 2018-09-28 NOTE — ED PROVIDER NOTE - PROGRESS NOTE DETAILS
Attending MD Byrnes:  spoke with patient's PMD Dr. Eleazar Higgins and he would like patient admitted to Dr. Shannon's group.  Dr. Scott on call.

## 2018-09-28 NOTE — ED PROVIDER NOTE - NS ED ROS FT
Constitutional: no fevers, no chills.  Eyes: no visual changes.  Ears: no ear drainage, no ear pain.  Nose: no nasal congestion.  Mouth/Throat: no sore throat.  Cardiovascular: no chest pain.  Respiratory: no shortness of breath, no wheezing, no cough  Gastrointestinal: no nausea, no vomiting, no diarrhea, no abdominal pain.  MSK: no flank pain, no back pain.  Genitourinary: no dysuria, no hematuria. +increased frequency in urination   Skin: no rashes.  Neuro: no headache, +AMS  Psychiatric: no known mental health issues. Constitutional: + fevers, no chills.  Eyes: no visual changes.  Ears: no ear drainage, no ear pain.  Nose: no nasal congestion.  Mouth/Throat: no sore throat.  Cardiovascular: no chest pain.  Respiratory: no shortness of breath, no wheezing, no cough  Gastrointestinal: no nausea, no vomiting, no diarrhea, no abdominal pain.  MSK: no flank pain, no back pain.  Genitourinary: no dysuria, no hematuria. +increased frequency in urination   Skin: no rashes.  Neuro: no headache, +AMS  Psychiatric: no known mental health issues.

## 2018-09-28 NOTE — PROVIDER CONTACT NOTE (OTHER) - ASSESSMENT
Pt A&Ox4,lethargic,forgetul @times,warm to touch,w/ VS as charted. Pt oral temp 102.2F. Pt lying in bed,incontinent of urine,on 4LNC O2.

## 2018-09-28 NOTE — ED PROVIDER NOTE - PHYSICAL EXAMINATION
GEN: Well appearing, well nourished, in no apparent distress, comfortably lying in bed, joking around with EMS  HEAD: NCAT  HEENT: PERRL, Airway patent, MMM, no tongue biting, neck supple, no LAD  LUNG: CTAB, no adventitious sounds, no retractions, no nasal flaring, satting 93% on RA  CV: RRR, no murmurs,   Abd: soft, NT, obese, no rebound or guarding, BS+ in all quadrants, no CVAT  MSK: WWP, Pulses 2+ in extremities, Trace pedal edema b/L, no leg redness or tenderness, no visible deformities  Neuro:  CN II-XII in tact. AAOx3,   Skin: Warm and dry, strawberry angiomas   Psych: normal mood and affect

## 2018-09-28 NOTE — H&P ADULT - ASSESSMENT
83M hx HTN, DM2 on oral meds, hx of UTIs, mild dementia and Normal pressure hydrocephalus  s/p ventriculostomy of the third ventricle 2012 presents with AMS since this AM. Wife had trouble waking pt up this morning so called EMS. Pt was unarousable with sternal rub, FS was 242 and T 100.1F. On transport, 150cc of IVF were given, frequent PVCs were seen on monitor and pt was satting at 90% on RA. Wife endorsed that Pt had a lot more frequent urination since yesterday. Pt denies abdominal pain, F/C, N/V, diarrhea, constipation, dysuria, flank pain, hematuria, CP, SOB, weakness, speech changes or HA

## 2018-09-29 LAB
ALBUMIN SERPL ELPH-MCNC: 2.5 G/DL — LOW (ref 3.3–5)
ALP SERPL-CCNC: 97 U/L — SIGNIFICANT CHANGE UP (ref 40–120)
ALT FLD-CCNC: 21 U/L — SIGNIFICANT CHANGE UP (ref 10–45)
ANION GAP SERPL CALC-SCNC: 10 MMOL/L — SIGNIFICANT CHANGE UP (ref 5–17)
AST SERPL-CCNC: 20 U/L — SIGNIFICANT CHANGE UP (ref 10–40)
BILIRUB SERPL-MCNC: 0.5 MG/DL — SIGNIFICANT CHANGE UP (ref 0.2–1.2)
BUN SERPL-MCNC: 15 MG/DL — SIGNIFICANT CHANGE UP (ref 7–23)
CALCIUM SERPL-MCNC: 8.6 MG/DL — SIGNIFICANT CHANGE UP (ref 8.4–10.5)
CHLORIDE SERPL-SCNC: 108 MMOL/L — SIGNIFICANT CHANGE UP (ref 96–108)
CO2 SERPL-SCNC: 17 MMOL/L — LOW (ref 22–31)
CREAT SERPL-MCNC: 1.24 MG/DL — SIGNIFICANT CHANGE UP (ref 0.5–1.3)
GLUCOSE BLDC GLUCOMTR-MCNC: 206 MG/DL — HIGH (ref 70–99)
GLUCOSE BLDC GLUCOMTR-MCNC: 236 MG/DL — HIGH (ref 70–99)
GLUCOSE BLDC GLUCOMTR-MCNC: 252 MG/DL — HIGH (ref 70–99)
GLUCOSE BLDC GLUCOMTR-MCNC: 299 MG/DL — HIGH (ref 70–99)
GLUCOSE SERPL-MCNC: 215 MG/DL — HIGH (ref 70–99)
HBA1C BLD-MCNC: 9.2 % — HIGH (ref 4–5.6)
HCT VFR BLD CALC: 49.8 % — SIGNIFICANT CHANGE UP (ref 39–50)
HGB BLD-MCNC: 16.2 G/DL — SIGNIFICANT CHANGE UP (ref 13–17)
MCHC RBC-ENTMCNC: 25.5 PG — LOW (ref 27–34)
MCHC RBC-ENTMCNC: 32.5 GM/DL — SIGNIFICANT CHANGE UP (ref 32–36)
MCV RBC AUTO: 78.6 FL — LOW (ref 80–100)
PLATELET # BLD AUTO: 215 K/UL — SIGNIFICANT CHANGE UP (ref 150–400)
POTASSIUM SERPL-MCNC: 4.3 MMOL/L — SIGNIFICANT CHANGE UP (ref 3.5–5.3)
POTASSIUM SERPL-SCNC: 4.3 MMOL/L — SIGNIFICANT CHANGE UP (ref 3.5–5.3)
PROT SERPL-MCNC: 6 G/DL — SIGNIFICANT CHANGE UP (ref 6–8.3)
PSA FLD-MCNC: 11.63 NG/ML — HIGH (ref 0–4)
RBC # BLD: 6.33 M/UL — HIGH (ref 4.2–5.8)
RBC # FLD: 13.7 % — SIGNIFICANT CHANGE UP (ref 10.3–14.5)
SODIUM SERPL-SCNC: 135 MMOL/L — SIGNIFICANT CHANGE UP (ref 135–145)
WBC # BLD: 22 K/UL — HIGH (ref 3.8–10.5)
WBC # FLD AUTO: 22 K/UL — HIGH (ref 3.8–10.5)

## 2018-09-29 RX ORDER — INFLUENZA VIRUS VACCINE 15; 15; 15; 15 UG/.5ML; UG/.5ML; UG/.5ML; UG/.5ML
0.5 SUSPENSION INTRAMUSCULAR ONCE
Qty: 0 | Refills: 0 | Status: DISCONTINUED | OUTPATIENT
Start: 2018-09-29 | End: 2018-10-05

## 2018-09-29 RX ADMIN — CARVEDILOL PHOSPHATE 6.25 MILLIGRAM(S): 80 CAPSULE, EXTENDED RELEASE ORAL at 17:28

## 2018-09-29 RX ADMIN — Medication 50 MILLIGRAM(S): at 17:28

## 2018-09-29 RX ADMIN — DONEPEZIL HYDROCHLORIDE 10 MILLIGRAM(S): 10 TABLET, FILM COATED ORAL at 22:29

## 2018-09-29 RX ADMIN — Medication 1 TABLET(S): at 12:31

## 2018-09-29 RX ADMIN — CARVEDILOL PHOSPHATE 6.25 MILLIGRAM(S): 80 CAPSULE, EXTENDED RELEASE ORAL at 05:10

## 2018-09-29 RX ADMIN — PANTOPRAZOLE SODIUM 40 MILLIGRAM(S): 20 TABLET, DELAYED RELEASE ORAL at 05:10

## 2018-09-29 RX ADMIN — Medication 20 MILLIGRAM(S): at 05:10

## 2018-09-29 RX ADMIN — Medication 500 MILLIGRAM(S): at 12:31

## 2018-09-29 RX ADMIN — Medication 2: at 12:31

## 2018-09-29 RX ADMIN — Medication 81 MILLIGRAM(S): at 12:31

## 2018-09-29 RX ADMIN — TAMSULOSIN HYDROCHLORIDE 0.4 MILLIGRAM(S): 0.4 CAPSULE ORAL at 22:29

## 2018-09-29 RX ADMIN — Medication 10 MILLIGRAM(S): at 05:10

## 2018-09-29 RX ADMIN — Medication 10 MILLIGRAM(S): at 17:29

## 2018-09-29 RX ADMIN — Medication 2: at 08:41

## 2018-09-29 RX ADMIN — Medication 3: at 17:03

## 2018-09-29 RX ADMIN — Medication 50 MILLIGRAM(S): at 05:10

## 2018-09-29 RX ADMIN — INSULIN GLARGINE 30 UNIT(S): 100 INJECTION, SOLUTION SUBCUTANEOUS at 22:30

## 2018-09-29 RX ADMIN — ATORVASTATIN CALCIUM 40 MILLIGRAM(S): 80 TABLET, FILM COATED ORAL at 22:29

## 2018-09-29 RX ADMIN — CEFTRIAXONE 100 GRAM(S): 500 INJECTION, POWDER, FOR SOLUTION INTRAMUSCULAR; INTRAVENOUS at 17:30

## 2018-09-29 RX ADMIN — Medication 1: at 22:29

## 2018-09-29 NOTE — PROGRESS NOTE ADULT - SUBJECTIVE AND OBJECTIVE BOX
83M hx HTN, DM2 on oral meds, hx of UTIs, mild dementia and Normal pressure hydrocephalus  s/p ventriculostomy of the third ventricle  presents with AMS since this AM. Wife had trouble waking pt up this morning so called EMS. Pt was unarousable with sternal rub, FS was 242 and T 100.1F. On transport, 150cc of IVF were given, frequent PVCs were seen on monitor and pt was satting at 90% on RA. Wife endorsed that Pt had a lot more frequent urination since yesterday. Pt denies abdominal pain, F/C, N/V, diarrhea, constipation, dysuria, flank pain, hematuria, CP, SOB, weakness, speech changes or HA             MEDICATIONS  (STANDING):  ascorbic acid 500 milliGRAM(s) Oral daily  aspirin enteric coated 81 milliGRAM(s) Oral daily  atorvastatin 40 milliGRAM(s) Oral at bedtime  carvedilol 6.25 milliGRAM(s) Oral every 12 hours  cefTRIAXone   IVPB 1 Gram(s) IV Intermittent every 24 hours  dextrose 5%. 1000 milliLiter(s) (50 mL/Hr) IV Continuous <Continuous>  dextrose 50% Injectable 12.5 Gram(s) IV Push once  dextrose 50% Injectable 25 Gram(s) IV Push once  dextrose 50% Injectable 25 Gram(s) IV Push once  donepezil 10 milliGRAM(s) Oral at bedtime  influenza   Vaccine 0.5 milliLiter(s) IntraMuscular once  insulin glargine Injectable (LANTUS) 30 Unit(s) SubCutaneous at bedtime  insulin lispro (HumaLOG) corrective regimen sliding scale   SubCutaneous three times a day before meals  insulin lispro (HumaLOG) corrective regimen sliding scale   SubCutaneous at bedtime  lactobacillus acidophilus 1 Tablet(s) Oral daily  multivitamin/minerals 1 Tablet(s) Oral daily  oxybutynin 10 milliGRAM(s) Oral two times a day  pantoprazole    Tablet 40 milliGRAM(s) Oral before breakfast  pregabalin 50 milliGRAM(s) Oral two times a day  tamsulosin 0.4 milliGRAM(s) Oral at bedtime  torsemide 20 milliGRAM(s) Oral daily    MEDICATIONS  (PRN):  acetaminophen   Tablet .. 650 milliGRAM(s) Oral every 6 hours PRN Temp greater or equal to 38C (100.4F)  dextrose 40% Gel 15 Gram(s) Oral once PRN Blood Glucose LESS THAN 70 milliGRAM(s)/deciliter  glucagon  Injectable 1 milliGRAM(s) IntraMuscular once PRN Glucose LESS THAN 70 milligrams/deciliter          VITALS:   T(C): 37 (18 @ 17:23), Max: 37.9 (18 @ 19:50)  HR: 83 (18 @ 17:23) (74 - 83)  BP: 143/78 (18 @ 17:23) (133/84 - 145/66)  RR: 20 (18 @ 17:23) (20 - 20)  SpO2: 95% (18 @ 17:23) (94% - 96%)  Wt(kg): --    PHYSICAL EXAM:    	GENERAL: NAD, well nourished and conversant  	HEAD:  Atraumatic  	EYES: EOM, PERRLA, conjunctiva pink and sclera white  	ENT: No tonsillar erythema, exudates, or enlargement, moist mucous membranes, good dentition, no lesions  	NECK: Supple, No JVD, normal thyroid, carotids with normal upstrokes and no bruits  	CHEST/LUNG: Clear to auscultation bilaterally, No rales, rhonchi, wheezing, or rubs  	HEART: Regular rate and rhythm, No murmurs, rubs, or gallops  	ABDOMEN: Soft, nondistended, no masses, guarding, tenderness or rebound, bowel sounds present  	EXTREMITIES:  2+ Peripheral Pulses, No clubbing, cyanosis, or edema. No arthritis of shoulders, elbows, hands, hips, knees, ankles, or feet. No DJD C spine, T spine, or L/S spine  	LYMPH: No lymphadenopathy noted  	SKIN: No rashes or lesions  NERVOUS SYSTEM:  confused not agitated Motor Strength 5/5 right upper and right lower.  5/5 left upper and left lower extremities, DTRs 2+ intact and symmetric    LABS:    CARDIAC MARKERS ( 28 Sep 2018 09:25 )  x     / x     / x     / x     / 2.5 ng/mL      CBC Full  -  ( 29 Sep 2018 10:03 )  WBC Count : 22.0 K/uL  Hemoglobin : 16.2 g/dL  Hematocrit : 49.8 %  Platelet Count - Automated : 215 K/uL  Mean Cell Volume : 78.6 fl  Mean Cell Hemoglobin : 25.5 pg  Mean Cell Hemoglobin Concentration : 32.5 gm/dL  Auto Neutrophil # : x  Auto Lymphocyte # : x  Auto Monocyte # : x  Auto Eosinophil # : x  Auto Basophil # : x  Auto Neutrophil % : x  Auto Lymphocyte % : x  Auto Monocyte % : x  Auto Eosinophil % : x  Auto Basophil % : x        135  |  108  |  15  ----------------------------<  215<H>  4.3   |  17<L>  |  1.24    Ca    8.6      29 Sep 2018 06:27  Mg     1.8         TPro  6.0  /  Alb  2.5<L>  /  TBili  0.5  /  DBili  x   /  AST  20  /  ALT  21  /  AlkPhos  97      LIVER FUNCTIONS - ( 29 Sep 2018 06:27 )  Alb: 2.5 g/dL / Pro: 6.0 g/dL / ALK PHOS: 97 U/L / ALT: 21 U/L / AST: 20 U/L / GGT: x             Urinalysis Basic - ( 28 Sep 2018 09:43 )    Color: Yellow / Appearance: cloudy / S.016 / pH: x  Gluc: x / Ketone: Negative  / Bili: Negative / Urobili: Negative   Blood: x / Protein: 30 mg/dL / Nitrite: Negative   Leuk Esterase: Moderate / RBC: >50 /hpf / WBC >50 /hpf   Sq Epi: x / Non Sq Epi: 5 /hpf / Bacteria: Few      CAPILLARY BLOOD GLUCOSE      POCT Blood Glucose.: 299 mg/dL (29 Sep 2018 16:47)  POCT Blood Glucose.: 236 mg/dL (29 Sep 2018 12:04)  POCT Blood Glucose.: 206 mg/dL (29 Sep 2018 08:02)  POCT Blood Glucose.: 278 mg/dL (28 Sep 2018 21:48)      RADIOLOGY & ADDITIONAL TESTS:

## 2018-09-30 LAB
-  AMIKACIN: SIGNIFICANT CHANGE UP
-  AMOXICILLIN/CLAVULANIC ACID: SIGNIFICANT CHANGE UP
-  AMPICILLIN/SULBACTAM: SIGNIFICANT CHANGE UP
-  AMPICILLIN: SIGNIFICANT CHANGE UP
-  AZTREONAM: SIGNIFICANT CHANGE UP
-  CEFAZOLIN: SIGNIFICANT CHANGE UP
-  CEFEPIME: SIGNIFICANT CHANGE UP
-  CEFOXITIN: SIGNIFICANT CHANGE UP
-  CEFTRIAXONE: SIGNIFICANT CHANGE UP
-  CIPROFLOXACIN: SIGNIFICANT CHANGE UP
-  ERTAPENEM: SIGNIFICANT CHANGE UP
-  GENTAMICIN: SIGNIFICANT CHANGE UP
-  IMIPENEM: SIGNIFICANT CHANGE UP
-  LEVOFLOXACIN: SIGNIFICANT CHANGE UP
-  MEROPENEM: SIGNIFICANT CHANGE UP
-  NITROFURANTOIN: SIGNIFICANT CHANGE UP
-  PIPERACILLIN/TAZOBACTAM: SIGNIFICANT CHANGE UP
-  TIGECYCLINE: SIGNIFICANT CHANGE UP
-  TOBRAMYCIN: SIGNIFICANT CHANGE UP
-  TRIMETHOPRIM/SULFAMETHOXAZOLE: SIGNIFICANT CHANGE UP
ANION GAP SERPL CALC-SCNC: 12 MMOL/L — SIGNIFICANT CHANGE UP (ref 5–17)
BUN SERPL-MCNC: 18 MG/DL — SIGNIFICANT CHANGE UP (ref 7–23)
CALCIUM SERPL-MCNC: 8.8 MG/DL — SIGNIFICANT CHANGE UP (ref 8.4–10.5)
CHLORIDE SERPL-SCNC: 103 MMOL/L — SIGNIFICANT CHANGE UP (ref 96–108)
CO2 SERPL-SCNC: 24 MMOL/L — SIGNIFICANT CHANGE UP (ref 22–31)
CREAT SERPL-MCNC: 1.29 MG/DL — SIGNIFICANT CHANGE UP (ref 0.5–1.3)
CULTURE RESULTS: SIGNIFICANT CHANGE UP
GLUCOSE BLDC GLUCOMTR-MCNC: 216 MG/DL — HIGH (ref 70–99)
GLUCOSE BLDC GLUCOMTR-MCNC: 326 MG/DL — HIGH (ref 70–99)
GLUCOSE BLDC GLUCOMTR-MCNC: 360 MG/DL — HIGH (ref 70–99)
GLUCOSE SERPL-MCNC: 180 MG/DL — HIGH (ref 70–99)
HCT VFR BLD CALC: 44.5 % — SIGNIFICANT CHANGE UP (ref 39–50)
HGB BLD-MCNC: 15.3 G/DL — SIGNIFICANT CHANGE UP (ref 13–17)
MCHC RBC-ENTMCNC: 26.3 PG — LOW (ref 27–34)
MCHC RBC-ENTMCNC: 34.4 GM/DL — SIGNIFICANT CHANGE UP (ref 32–36)
MCV RBC AUTO: 76.5 FL — LOW (ref 80–100)
METHOD TYPE: SIGNIFICANT CHANGE UP
ORGANISM # SPEC MICROSCOPIC CNT: SIGNIFICANT CHANGE UP
ORGANISM # SPEC MICROSCOPIC CNT: SIGNIFICANT CHANGE UP
PLATELET # BLD AUTO: 205 K/UL — SIGNIFICANT CHANGE UP (ref 150–400)
POTASSIUM SERPL-MCNC: 4 MMOL/L — SIGNIFICANT CHANGE UP (ref 3.5–5.3)
POTASSIUM SERPL-SCNC: 4 MMOL/L — SIGNIFICANT CHANGE UP (ref 3.5–5.3)
RBC # BLD: 5.82 M/UL — HIGH (ref 4.2–5.8)
RBC # FLD: 14.5 % — SIGNIFICANT CHANGE UP (ref 10.3–14.5)
SODIUM SERPL-SCNC: 139 MMOL/L — SIGNIFICANT CHANGE UP (ref 135–145)
SPECIMEN SOURCE: SIGNIFICANT CHANGE UP
WBC # BLD: 12.52 K/UL — HIGH (ref 3.8–10.5)
WBC # FLD AUTO: 12.52 K/UL — HIGH (ref 3.8–10.5)

## 2018-09-30 RX ADMIN — Medication 81 MILLIGRAM(S): at 12:43

## 2018-09-30 RX ADMIN — CARVEDILOL PHOSPHATE 6.25 MILLIGRAM(S): 80 CAPSULE, EXTENDED RELEASE ORAL at 17:54

## 2018-09-30 RX ADMIN — ATORVASTATIN CALCIUM 40 MILLIGRAM(S): 80 TABLET, FILM COATED ORAL at 22:41

## 2018-09-30 RX ADMIN — TAMSULOSIN HYDROCHLORIDE 0.4 MILLIGRAM(S): 0.4 CAPSULE ORAL at 22:41

## 2018-09-30 RX ADMIN — Medication 50 MILLIGRAM(S): at 05:21

## 2018-09-30 RX ADMIN — Medication 4: at 17:39

## 2018-09-30 RX ADMIN — Medication 10 MILLIGRAM(S): at 17:55

## 2018-09-30 RX ADMIN — Medication 500 MILLIGRAM(S): at 12:43

## 2018-09-30 RX ADMIN — Medication 1 TABLET(S): at 12:43

## 2018-09-30 RX ADMIN — Medication 10 MILLIGRAM(S): at 05:21

## 2018-09-30 RX ADMIN — Medication 2: at 08:11

## 2018-09-30 RX ADMIN — CEFTRIAXONE 100 GRAM(S): 500 INJECTION, POWDER, FOR SOLUTION INTRAMUSCULAR; INTRAVENOUS at 17:55

## 2018-09-30 RX ADMIN — Medication 20 MILLIGRAM(S): at 05:21

## 2018-09-30 RX ADMIN — DONEPEZIL HYDROCHLORIDE 10 MILLIGRAM(S): 10 TABLET, FILM COATED ORAL at 22:41

## 2018-09-30 RX ADMIN — Medication 50 MILLIGRAM(S): at 17:54

## 2018-09-30 RX ADMIN — Medication 5: at 12:33

## 2018-09-30 RX ADMIN — PANTOPRAZOLE SODIUM 40 MILLIGRAM(S): 20 TABLET, DELAYED RELEASE ORAL at 05:21

## 2018-09-30 RX ADMIN — CARVEDILOL PHOSPHATE 6.25 MILLIGRAM(S): 80 CAPSULE, EXTENDED RELEASE ORAL at 05:21

## 2018-09-30 NOTE — PROGRESS NOTE ADULT - SUBJECTIVE AND OBJECTIVE BOX
83M hx HTN, DM2 on oral meds, hx of UTIs, mild dementia and Normal pressure hydrocephalus  s/p ventriculostomy of the third ventricle 2012 presents with AMS since this AM. Wife had trouble waking pt up this morning so called EMS. Pt was unarousable with sternal rub, FS was 242 and T 100.1F. On transport, 150cc of IVF were given, frequent PVCs were seen on monitor and pt was satting at 90% on RA. Wife endorsed that Pt had a lot more frequent urination since yesterday. Pt denies abdominal pain, F/C, N/V, diarrhea, constipation, dysuria, flank pain, hematuria, CP, SOB, weakness, speech changes or HA     MEDICATIONS  (STANDING):  ascorbic acid 500 milliGRAM(s) Oral daily  aspirin enteric coated 81 milliGRAM(s) Oral daily  atorvastatin 40 milliGRAM(s) Oral at bedtime  carvedilol 6.25 milliGRAM(s) Oral every 12 hours  cefTRIAXone   IVPB 1 Gram(s) IV Intermittent every 24 hours  dextrose 5%. 1000 milliLiter(s) (50 mL/Hr) IV Continuous <Continuous>  dextrose 50% Injectable 12.5 Gram(s) IV Push once  dextrose 50% Injectable 25 Gram(s) IV Push once  dextrose 50% Injectable 25 Gram(s) IV Push once  donepezil 10 milliGRAM(s) Oral at bedtime  influenza   Vaccine 0.5 milliLiter(s) IntraMuscular once  insulin glargine Injectable (LANTUS) 30 Unit(s) SubCutaneous at bedtime  insulin lispro (HumaLOG) corrective regimen sliding scale   SubCutaneous three times a day before meals  insulin lispro (HumaLOG) corrective regimen sliding scale   SubCutaneous at bedtime  lactobacillus acidophilus 1 Tablet(s) Oral daily  multivitamin/minerals 1 Tablet(s) Oral daily  oxybutynin 10 milliGRAM(s) Oral two times a day  pantoprazole    Tablet 40 milliGRAM(s) Oral before breakfast  pregabalin 50 milliGRAM(s) Oral two times a day  tamsulosin 0.4 milliGRAM(s) Oral at bedtime  torsemide 20 milliGRAM(s) Oral daily    MEDICATIONS  (PRN):  acetaminophen   Tablet .. 650 milliGRAM(s) Oral every 6 hours PRN Temp greater or equal to 38C (100.4F)  dextrose 40% Gel 15 Gram(s) Oral once PRN Blood Glucose LESS THAN 70 milliGRAM(s)/deciliter  glucagon  Injectable 1 milliGRAM(s) IntraMuscular once PRN Glucose LESS THAN 70 milligrams/deciliter          VITALS:   T(C): 36.9 (09-30-18 @ 22:08), Max: 36.9 (09-30-18 @ 05:17)  HR: 71 (09-30-18 @ 22:08) (65 - 78)  BP: 158/77 (09-30-18 @ 22:08) (145/81 - 158/77)  RR: 18 (09-30-18 @ 22:08) (18 - 20)  SpO2: 93% (09-30-18 @ 22:08) (93% - 95%)  Wt(kg): --      PHYSICAL EXAM:    	GENERAL: NAD, well nourished and conversant  	HEAD:  Atraumatic  	EYES: EOM, PERRLA, conjunctiva pink and sclera white  	ENT: No tonsillar erythema, exudates, or enlargement, moist mucous membranes, good dentition, no lesions  	NECK: Supple, No JVD, normal thyroid, carotids with normal upstrokes and no bruits  	CHEST/LUNG: Clear to auscultation bilaterally, No rales, rhonchi, wheezing, or rubs  	HEART: Regular rate and rhythm, No murmurs, rubs, or gallops  	ABDOMEN: Soft, nondistended, no masses, guarding, tenderness or rebound, bowel sounds present  	EXTREMITIES:  2+ Peripheral Pulses, No clubbing, cyanosis, or edema. No arthritis of shoulders, elbows, hands, hips, knees, ankles, or feet. No DJD C spine, T spine, or L/S spine  	LYMPH: No lymphadenopathy noted  	SKIN: No rashes or lesions  NERVOUS SYSTEM:  minimally confused Motor Strength 5/5 right upper and right lower.  5/5 left upper and left lower extremities, DTRs 2+ intact and symmetric  LABS:        CBC Full  -  ( 30 Sep 2018 08:18 )  WBC Count : 12.52 K/uL  Hemoglobin : 15.3 g/dL  Hematocrit : 44.5 %  Platelet Count - Automated : 205 K/uL  Mean Cell Volume : 76.5 fl  Mean Cell Hemoglobin : 26.3 pg  Mean Cell Hemoglobin Concentration : 34.4 gm/dL  Auto Neutrophil # : x  Auto Lymphocyte # : x  Auto Monocyte # : x  Auto Eosinophil # : x  Auto Basophil # : x  Auto Neutrophil % : x  Auto Lymphocyte % : x  Auto Monocyte % : x  Auto Eosinophil % : x  Auto Basophil % : x    09-30    139  |  103  |  18  ----------------------------<  180<H>  4.0   |  24  |  1.29    Ca    8.8      30 Sep 2018 06:54    TPro  6.0  /  Alb  2.5<L>  /  TBili  0.5  /  DBili  x   /  AST  20  /  ALT  21  /  AlkPhos  97  09-29    LIVER FUNCTIONS - ( 29 Sep 2018 06:27 )  Alb: 2.5 g/dL / Pro: 6.0 g/dL / ALK PHOS: 97 U/L / ALT: 21 U/L / AST: 20 U/L / GGT: x               CAPILLARY BLOOD GLUCOSE      POCT Blood Glucose.: 326 mg/dL (30 Sep 2018 17:02)  POCT Blood Glucose.: 360 mg/dL (30 Sep 2018 12:26)  POCT Blood Glucose.: 216 mg/dL (30 Sep 2018 07:57)      RADIOLOGY & ADDITIONAL TESTS:          MEDICATIONS  (STANDING):  ascorbic acid 500 milliGRAM(s) Oral daily  aspirin enteric coated 81 milliGRAM(s) Oral daily  atorvastatin 40 milliGRAM(s) Oral at bedtime  carvedilol 6.25 milliGRAM(s) Oral every 12 hours  cefTRIAXone   IVPB 1 Gram(s) IV Intermittent every 24 hours  dextrose 5%. 1000 milliLiter(s) (50 mL/Hr) IV Continuous <Continuous>  dextrose 50% Injectable 12.5 Gram(s) IV Push once  dextrose 50% Injectable 25 Gram(s) IV Push once  dextrose 50% Injectable 25 Gram(s) IV Push once  donepezil 10 milliGRAM(s) Oral at bedtime  influenza   Vaccine 0.5 milliLiter(s) IntraMuscular once  insulin glargine Injectable (LANTUS) 30 Unit(s) SubCutaneous at bedtime  insulin lispro (HumaLOG) corrective regimen sliding scale   SubCutaneous three times a day before meals  insulin lispro (HumaLOG) corrective regimen sliding scale   SubCutaneous at bedtime  lactobacillus acidophilus 1 Tablet(s) Oral daily  multivitamin/minerals 1 Tablet(s) Oral daily  oxybutynin 10 milliGRAM(s) Oral two times a day  pantoprazole    Tablet 40 milliGRAM(s) Oral before breakfast  pregabalin 50 milliGRAM(s) Oral two times a day  tamsulosin 0.4 milliGRAM(s) Oral at bedtime  torsemide 20 milliGRAM(s) Oral daily    MEDICATIONS  (PRN):  acetaminophen   Tablet .. 650 milliGRAM(s) Oral every 6 hours PRN Temp greater or equal to 38C (100.4F)  dextrose 40% Gel 15 Gram(s) Oral once PRN Blood Glucose LESS THAN 70 milliGRAM(s)/deciliter  glucagon  Injectable 1 milliGRAM(s) IntraMuscular once PRN Glucose LESS THAN 70 milligrams/deciliter          VITALS:   T(C): 36.9 (09-30-18 @ 22:08), Max: 36.9 (09-30-18 @ 05:17)  HR: 71 (09-30-18 @ 22:08) (65 - 78)  BP: 158/77 (09-30-18 @ 22:08) (145/81 - 158/77)  RR: 18 (09-30-18 @ 22:08) (18 - 20)  SpO2: 93% (09-30-18 @ 22:08) (93% - 95%)  Wt(kg): --        LABS:        CBC Full  -  ( 30 Sep 2018 08:18 )  WBC Count : 12.52 K/uL  Hemoglobin : 15.3 g/dL  Hematocrit : 44.5 %  Platelet Count - Automated : 205 K/uL  Mean Cell Volume : 76.5 fl  Mean Cell Hemoglobin : 26.3 pg  Mean Cell Hemoglobin Concentration : 34.4 gm/dL  Auto Neutrophil # : x  Auto Lymphocyte # : x  Auto Monocyte # : x  Auto Eosinophil # : x  Auto Basophil # : x  Auto Neutrophil % : x  Auto Lymphocyte % : x  Auto Monocyte % : x  Auto Eosinophil % : x  Auto Basophil % : x    09-30    139  |  103  |  18  ----------------------------<  180<H>  4.0   |  24  |  1.29    Ca    8.8      30 Sep 2018 06:54    TPro  6.0  /  Alb  2.5<L>  /  TBili  0.5  /  DBili  x   /  AST  20  /  ALT  21  /  AlkPhos  97  09-29    LIVER FUNCTIONS - ( 29 Sep 2018 06:27 )  Alb: 2.5 g/dL / Pro: 6.0 g/dL / ALK PHOS: 97 U/L / ALT: 21 U/L / AST: 20 U/L / GGT: x               CAPILLARY BLOOD GLUCOSE      POCT Blood Glucose.: 326 mg/dL (30 Sep 2018 17:02)  POCT Blood Glucose.: 360 mg/dL (30 Sep 2018 12:26)  POCT Blood Glucose.: 216 mg/dL (30 Sep 2018 07:57)      RADIOLOGY & ADDITIONAL TESTS:

## 2018-10-01 DIAGNOSIS — Z71.89 OTHER SPECIFIED COUNSELING: ICD-10-CM

## 2018-10-01 LAB
ANION GAP SERPL CALC-SCNC: 12 MMOL/L — SIGNIFICANT CHANGE UP (ref 5–17)
BASOPHILS # BLD AUTO: 0.01 K/UL — SIGNIFICANT CHANGE UP (ref 0–0.2)
BASOPHILS NFR BLD AUTO: 0.1 % — SIGNIFICANT CHANGE UP (ref 0–2)
BUN SERPL-MCNC: 20 MG/DL — SIGNIFICANT CHANGE UP (ref 7–23)
CALCIUM SERPL-MCNC: 9 MG/DL — SIGNIFICANT CHANGE UP (ref 8.4–10.5)
CHLORIDE SERPL-SCNC: 101 MMOL/L — SIGNIFICANT CHANGE UP (ref 96–108)
CO2 SERPL-SCNC: 26 MMOL/L — SIGNIFICANT CHANGE UP (ref 22–31)
CREAT SERPL-MCNC: 1.25 MG/DL — SIGNIFICANT CHANGE UP (ref 0.5–1.3)
EOSINOPHIL # BLD AUTO: 0.26 K/UL — SIGNIFICANT CHANGE UP (ref 0–0.5)
EOSINOPHIL NFR BLD AUTO: 3.2 % — SIGNIFICANT CHANGE UP (ref 0–6)
GLUCOSE BLDC GLUCOMTR-MCNC: 300 MG/DL — HIGH (ref 70–99)
GLUCOSE BLDC GLUCOMTR-MCNC: 301 MG/DL — HIGH (ref 70–99)
GLUCOSE BLDC GLUCOMTR-MCNC: 337 MG/DL — HIGH (ref 70–99)
GLUCOSE BLDC GLUCOMTR-MCNC: 398 MG/DL — HIGH (ref 70–99)
GLUCOSE BLDC GLUCOMTR-MCNC: 406 MG/DL — HIGH (ref 70–99)
GLUCOSE BLDC GLUCOMTR-MCNC: 457 MG/DL — CRITICAL HIGH (ref 70–99)
GLUCOSE SERPL-MCNC: 275 MG/DL — HIGH (ref 70–99)
HCT VFR BLD CALC: 45.5 % — SIGNIFICANT CHANGE UP (ref 39–50)
HGB BLD-MCNC: 15.4 G/DL — SIGNIFICANT CHANGE UP (ref 13–17)
IMM GRANULOCYTES NFR BLD AUTO: 0.2 % — SIGNIFICANT CHANGE UP (ref 0–1.5)
LYMPHOCYTES # BLD AUTO: 0.93 K/UL — LOW (ref 1–3.3)
LYMPHOCYTES # BLD AUTO: 11.5 % — LOW (ref 13–44)
MCHC RBC-ENTMCNC: 25.7 PG — LOW (ref 27–34)
MCHC RBC-ENTMCNC: 33.8 GM/DL — SIGNIFICANT CHANGE UP (ref 32–36)
MCV RBC AUTO: 75.8 FL — LOW (ref 80–100)
MONOCYTES # BLD AUTO: 1.12 K/UL — HIGH (ref 0–0.9)
MONOCYTES NFR BLD AUTO: 13.9 % — SIGNIFICANT CHANGE UP (ref 2–14)
NEUTROPHILS # BLD AUTO: 5.73 K/UL — SIGNIFICANT CHANGE UP (ref 1.8–7.4)
NEUTROPHILS NFR BLD AUTO: 71.1 % — SIGNIFICANT CHANGE UP (ref 43–77)
PLATELET # BLD AUTO: 245 K/UL — SIGNIFICANT CHANGE UP (ref 150–400)
POTASSIUM SERPL-MCNC: 3.9 MMOL/L — SIGNIFICANT CHANGE UP (ref 3.5–5.3)
POTASSIUM SERPL-SCNC: 3.9 MMOL/L — SIGNIFICANT CHANGE UP (ref 3.5–5.3)
RBC # BLD: 6 M/UL — HIGH (ref 4.2–5.8)
RBC # FLD: 14 % — SIGNIFICANT CHANGE UP (ref 10.3–14.5)
SODIUM SERPL-SCNC: 139 MMOL/L — SIGNIFICANT CHANGE UP (ref 135–145)
WBC # BLD: 8.07 K/UL — SIGNIFICANT CHANGE UP (ref 3.8–10.5)
WBC # FLD AUTO: 8.07 K/UL — SIGNIFICANT CHANGE UP (ref 3.8–10.5)

## 2018-10-01 RX ORDER — INSULIN LISPRO 100/ML
VIAL (ML) SUBCUTANEOUS
Qty: 0 | Refills: 0 | Status: DISCONTINUED | OUTPATIENT
Start: 2018-10-01 | End: 2018-10-05

## 2018-10-01 RX ORDER — CEFTRIAXONE 500 MG/1
1 INJECTION, POWDER, FOR SOLUTION INTRAMUSCULAR; INTRAVENOUS EVERY 24 HOURS
Qty: 0 | Refills: 0 | Status: DISCONTINUED | OUTPATIENT
Start: 2018-10-01 | End: 2018-10-05

## 2018-10-01 RX ADMIN — Medication 20 MILLIGRAM(S): at 05:16

## 2018-10-01 RX ADMIN — Medication 10 MILLIGRAM(S): at 17:25

## 2018-10-01 RX ADMIN — INSULIN GLARGINE 30 UNIT(S): 100 INJECTION, SOLUTION SUBCUTANEOUS at 02:26

## 2018-10-01 RX ADMIN — CEFTRIAXONE 100 GRAM(S): 500 INJECTION, POWDER, FOR SOLUTION INTRAMUSCULAR; INTRAVENOUS at 17:25

## 2018-10-01 RX ADMIN — Medication 500 MILLIGRAM(S): at 12:26

## 2018-10-01 RX ADMIN — Medication 12: at 17:25

## 2018-10-01 RX ADMIN — ATORVASTATIN CALCIUM 40 MILLIGRAM(S): 80 TABLET, FILM COATED ORAL at 21:27

## 2018-10-01 RX ADMIN — Medication 1 TABLET(S): at 12:26

## 2018-10-01 RX ADMIN — Medication 1: at 02:24

## 2018-10-01 RX ADMIN — CARVEDILOL PHOSPHATE 6.25 MILLIGRAM(S): 80 CAPSULE, EXTENDED RELEASE ORAL at 17:25

## 2018-10-01 RX ADMIN — PANTOPRAZOLE SODIUM 40 MILLIGRAM(S): 20 TABLET, DELAYED RELEASE ORAL at 05:16

## 2018-10-01 RX ADMIN — INSULIN GLARGINE 30 UNIT(S): 100 INJECTION, SOLUTION SUBCUTANEOUS at 21:27

## 2018-10-01 RX ADMIN — Medication 81 MILLIGRAM(S): at 12:26

## 2018-10-01 RX ADMIN — Medication 4: at 12:24

## 2018-10-01 RX ADMIN — Medication 3: at 21:28

## 2018-10-01 RX ADMIN — TAMSULOSIN HYDROCHLORIDE 0.4 MILLIGRAM(S): 0.4 CAPSULE ORAL at 21:27

## 2018-10-01 RX ADMIN — Medication 50 MILLIGRAM(S): at 17:25

## 2018-10-01 RX ADMIN — CARVEDILOL PHOSPHATE 6.25 MILLIGRAM(S): 80 CAPSULE, EXTENDED RELEASE ORAL at 05:15

## 2018-10-01 RX ADMIN — Medication 10 MILLIGRAM(S): at 05:16

## 2018-10-01 RX ADMIN — Medication 50 MILLIGRAM(S): at 05:16

## 2018-10-01 RX ADMIN — Medication 4: at 08:39

## 2018-10-01 RX ADMIN — DONEPEZIL HYDROCHLORIDE 10 MILLIGRAM(S): 10 TABLET, FILM COATED ORAL at 21:27

## 2018-10-01 NOTE — PHYSICAL THERAPY INITIAL EVALUATION ADULT - ADDITIONAL COMMENTS
as per pt, resides in a PH with spouse, 2 stairs to enter with railings, 1 flight up with chair lift, PTA, pt amb (I) with RW, required assist for ADls.

## 2018-10-01 NOTE — PHYSICAL THERAPY INITIAL EVALUATION ADULT - PERTINENT HX OF CURRENT PROBLEM, REHAB EVAL
83yoM with Dm, HTN, hx of UTI, mild dementia, NPH, p/w AMS, found acute cystitis, CXR (-), ECG 9/28, 1st deg AV block with occasional PVCs, LVH with QRS widening

## 2018-10-01 NOTE — PROGRESS NOTE ADULT - SUBJECTIVE AND OBJECTIVE BOX
83M hx HTN, DM2 on oral meds, hx of UTIs, mild dementia and Normal pressure hydrocephalus  s/p ventriculostomy of the third ventricle 2012 presents with AMS since this AM. Wife had trouble waking pt up this morning so called EMS. Pt was unarousable with sternal rub, FS was 242 and T 100.1F. On transport, 150cc of IVF were given, frequent PVCs were seen on monitor and pt was satting at 90% on RA. Wife endorsed that Pt had a lot more frequent urination since yesterday. Pt denies abdominal pain, F/C, N/V, diarrhea, constipation, dysuria, flank pain, hematuria, CP, SOB, weakness, speech changes or HA       MEDICATIONS  (STANDING):  ascorbic acid 500 milliGRAM(s) Oral daily  aspirin enteric coated 81 milliGRAM(s) Oral daily  atorvastatin 40 milliGRAM(s) Oral at bedtime  carvedilol 6.25 milliGRAM(s) Oral every 12 hours  cefTRIAXone   IVPB 1 Gram(s) IV Intermittent every 24 hours  dextrose 5%. 1000 milliLiter(s) (50 mL/Hr) IV Continuous <Continuous>  dextrose 50% Injectable 12.5 Gram(s) IV Push once  dextrose 50% Injectable 25 Gram(s) IV Push once  dextrose 50% Injectable 25 Gram(s) IV Push once  donepezil 10 milliGRAM(s) Oral at bedtime  influenza   Vaccine 0.5 milliLiter(s) IntraMuscular once  insulin glargine Injectable (LANTUS) 30 Unit(s) SubCutaneous at bedtime  insulin lispro (HumaLOG) corrective regimen sliding scale   SubCutaneous three times a day before meals  insulin lispro (HumaLOG) corrective regimen sliding scale   SubCutaneous at bedtime  lactobacillus acidophilus 1 Tablet(s) Oral daily  multivitamin/minerals 1 Tablet(s) Oral daily  oxybutynin 10 milliGRAM(s) Oral two times a day  pantoprazole    Tablet 40 milliGRAM(s) Oral before breakfast  pregabalin 50 milliGRAM(s) Oral two times a day  tamsulosin 0.4 milliGRAM(s) Oral at bedtime  torsemide 20 milliGRAM(s) Oral daily    MEDICATIONS  (PRN):  acetaminophen   Tablet .. 650 milliGRAM(s) Oral every 6 hours PRN Temp greater or equal to 38C (100.4F)  dextrose 40% Gel 15 Gram(s) Oral once PRN Blood Glucose LESS THAN 70 milliGRAM(s)/deciliter  glucagon  Injectable 1 milliGRAM(s) IntraMuscular once PRN Glucose LESS THAN 70 milligrams/deciliter          VITALS:   T(C): 36.8 (10-01-18 @ 21:17), Max: 36.9 (10-01-18 @ 05:13)  HR: 70 (10-01-18 @ 21:17) (70 - 76)  BP: 154/75 (10-01-18 @ 21:17) (145/75 - 166/80)  RR: 18 (10-01-18 @ 21:17) (18 - 18)  SpO2: 95% (10-01-18 @ 21:17) (93% - 95%)  Wt(kg): --    PHYSICAL EXAM:    	GENERAL: NAD, well nourished and conversant  	HEAD:  Atraumatic  	EYES: EOM, PERRLA, conjunctiva pink and sclera white  	ENT: No tonsillar erythema, exudates, or enlargement, moist mucous membranes, good dentition, no lesions  	NECK: Supple, No JVD, normal thyroid, carotids with normal upstrokes and no bruits  	CHEST/LUNG: Clear to auscultation bilaterally, No rales, rhonchi, wheezing, or rubs  	HEART: Regular rate and rhythm, No murmurs, rubs, or gallops  	ABDOMEN: Soft, nondistended, no masses, guarding, tenderness or rebound, bowel sounds present  	EXTREMITIES:  2+ Peripheral Pulses, No clubbing, cyanosis, or edema. No arthritis of shoulders, elbows, hands, hips, knees, ankles, or feet. No DJD C spine, T spine, or L/S spine  	LYMPH: No lymphadenopathy noted  	SKIN: No rashes or lesions  NERVOUS SYSTEM:  minimally confused Motor Strength 5/5 right upper and right lower.  5/5 left upper and left lower extremities, DTRs 2+ intact and symmetric    LABS:        CBC Full  -  ( 01 Oct 2018 09:32 )  WBC Count : 8.07 K/uL  Hemoglobin : 15.4 g/dL  Hematocrit : 45.5 %  Platelet Count - Automated : 245 K/uL  Mean Cell Volume : 75.8 fl  Mean Cell Hemoglobin : 25.7 pg  Mean Cell Hemoglobin Concentration : 33.8 gm/dL  Auto Neutrophil # : 5.73 K/uL  Auto Lymphocyte # : 0.93 K/uL  Auto Monocyte # : 1.12 K/uL  Auto Eosinophil # : 0.26 K/uL  Auto Basophil # : 0.01 K/uL  Auto Neutrophil % : 71.1 %  Auto Lymphocyte % : 11.5 %  Auto Monocyte % : 13.9 %  Auto Eosinophil % : 3.2 %  Auto Basophil % : 0.1 %    10-01    139  |  101  |  20  ----------------------------<  275<H>  3.9   |  26  |  1.25    Ca    9.0      01 Oct 2018 07:07            CAPILLARY BLOOD GLUCOSE      POCT Blood Glucose.: 398 mg/dL (01 Oct 2018 21:20)  POCT Blood Glucose.: 406 mg/dL (01 Oct 2018 16:38)  POCT Blood Glucose.: 457 mg/dL (01 Oct 2018 16:37)  POCT Blood Glucose.: 337 mg/dL (01 Oct 2018 12:22)  POCT Blood Glucose.: 301 mg/dL (01 Oct 2018 07:59)  POCT Blood Glucose.: 300 mg/dL (01 Oct 2018 02:09)      RADIOLOGY & ADDITIONAL TESTS:

## 2018-10-01 NOTE — PHYSICAL THERAPY INITIAL EVALUATION ADULT - DISCHARGE DISPOSITION, PT EVAL
home/home w/ assist/home w/ home PT/Home with Home PT for strengthening, bed mob, transfer, gait and balance training, home with assist for all functional mobility

## 2018-10-01 NOTE — PHYSICAL THERAPY INITIAL EVALUATION ADULT - PLANNED THERAPY INTERVENTIONS, PT EVAL
bed mobility training/stairs: GOAL: patient will be able to negotiated 2 stairs (I) with one HR in 2 weeks/gait training/transfer training

## 2018-10-02 DIAGNOSIS — E11.22 TYPE 2 DIABETES MELLITUS WITH DIABETIC CHRONIC KIDNEY DISEASE: ICD-10-CM

## 2018-10-02 DIAGNOSIS — E78.2 MIXED HYPERLIPIDEMIA: ICD-10-CM

## 2018-10-02 LAB
-  COAGULASE NEGATIVE STAPHYLOCOCCUS: SIGNIFICANT CHANGE UP
ANION GAP SERPL CALC-SCNC: 12 MMOL/L — SIGNIFICANT CHANGE UP (ref 5–17)
BUN SERPL-MCNC: 22 MG/DL — SIGNIFICANT CHANGE UP (ref 7–23)
CALCIUM SERPL-MCNC: 9.2 MG/DL — SIGNIFICANT CHANGE UP (ref 8.4–10.5)
CHLORIDE SERPL-SCNC: 99 MMOL/L — SIGNIFICANT CHANGE UP (ref 96–108)
CO2 SERPL-SCNC: 28 MMOL/L — SIGNIFICANT CHANGE UP (ref 22–31)
CREAT SERPL-MCNC: 1.22 MG/DL — SIGNIFICANT CHANGE UP (ref 0.5–1.3)
GLUCOSE BLDC GLUCOMTR-MCNC: 279 MG/DL — HIGH (ref 70–99)
GLUCOSE BLDC GLUCOMTR-MCNC: 306 MG/DL — HIGH (ref 70–99)
GLUCOSE BLDC GLUCOMTR-MCNC: 349 MG/DL — HIGH (ref 70–99)
GLUCOSE BLDC GLUCOMTR-MCNC: 376 MG/DL — HIGH (ref 70–99)
GLUCOSE SERPL-MCNC: 286 MG/DL — HIGH (ref 70–99)
GRAM STN FLD: SIGNIFICANT CHANGE UP
HCT VFR BLD CALC: 47.7 % — SIGNIFICANT CHANGE UP (ref 39–50)
HGB BLD-MCNC: 15.9 G/DL — SIGNIFICANT CHANGE UP (ref 13–17)
MCHC RBC-ENTMCNC: 25.8 PG — LOW (ref 27–34)
MCHC RBC-ENTMCNC: 33.3 GM/DL — SIGNIFICANT CHANGE UP (ref 32–36)
MCV RBC AUTO: 77.3 FL — LOW (ref 80–100)
METHOD TYPE: SIGNIFICANT CHANGE UP
PLATELET # BLD AUTO: 270 K/UL — SIGNIFICANT CHANGE UP (ref 150–400)
POTASSIUM SERPL-MCNC: 4.1 MMOL/L — SIGNIFICANT CHANGE UP (ref 3.5–5.3)
POTASSIUM SERPL-SCNC: 4.1 MMOL/L — SIGNIFICANT CHANGE UP (ref 3.5–5.3)
RBC # BLD: 6.17 M/UL — HIGH (ref 4.2–5.8)
RBC # FLD: 13.9 % — SIGNIFICANT CHANGE UP (ref 10.3–14.5)
SODIUM SERPL-SCNC: 139 MMOL/L — SIGNIFICANT CHANGE UP (ref 135–145)
WBC # BLD: 7.73 K/UL — SIGNIFICANT CHANGE UP (ref 3.8–10.5)
WBC # FLD AUTO: 7.73 K/UL — SIGNIFICANT CHANGE UP (ref 3.8–10.5)

## 2018-10-02 PROCEDURE — 99223 1ST HOSP IP/OBS HIGH 75: CPT

## 2018-10-02 PROCEDURE — 70250 X-RAY EXAM OF SKULL: CPT | Mod: 26

## 2018-10-02 PROCEDURE — 71045 X-RAY EXAM CHEST 1 VIEW: CPT | Mod: 26

## 2018-10-02 PROCEDURE — 70450 CT HEAD/BRAIN W/O DYE: CPT | Mod: 26

## 2018-10-02 PROCEDURE — 74018 RADEX ABDOMEN 1 VIEW: CPT | Mod: 26

## 2018-10-02 RX ORDER — INSULIN GLARGINE 100 [IU]/ML
34 INJECTION, SOLUTION SUBCUTANEOUS AT BEDTIME
Qty: 0 | Refills: 0 | Status: DISCONTINUED | OUTPATIENT
Start: 2018-10-02 | End: 2018-10-03

## 2018-10-02 RX ORDER — INSULIN LISPRO 100/ML
8 VIAL (ML) SUBCUTANEOUS
Qty: 0 | Refills: 0 | Status: DISCONTINUED | OUTPATIENT
Start: 2018-10-02 | End: 2018-10-03

## 2018-10-02 RX ORDER — INSULIN LISPRO 100/ML
6 VIAL (ML) SUBCUTANEOUS
Qty: 0 | Refills: 0 | Status: DISCONTINUED | OUTPATIENT
Start: 2018-10-02 | End: 2018-10-02

## 2018-10-02 RX ADMIN — Medication 50 MILLIGRAM(S): at 18:01

## 2018-10-02 RX ADMIN — Medication 8: at 12:47

## 2018-10-02 RX ADMIN — Medication 10: at 17:10

## 2018-10-02 RX ADMIN — Medication 500 MILLIGRAM(S): at 12:52

## 2018-10-02 RX ADMIN — Medication 6: at 08:22

## 2018-10-02 RX ADMIN — DONEPEZIL HYDROCHLORIDE 10 MILLIGRAM(S): 10 TABLET, FILM COATED ORAL at 22:13

## 2018-10-02 RX ADMIN — INSULIN GLARGINE 30 UNIT(S): 100 INJECTION, SOLUTION SUBCUTANEOUS at 22:13

## 2018-10-02 RX ADMIN — Medication 1 TABLET(S): at 12:48

## 2018-10-02 RX ADMIN — CEFTRIAXONE 100 GRAM(S): 500 INJECTION, POWDER, FOR SOLUTION INTRAMUSCULAR; INTRAVENOUS at 17:11

## 2018-10-02 RX ADMIN — PANTOPRAZOLE SODIUM 40 MILLIGRAM(S): 20 TABLET, DELAYED RELEASE ORAL at 05:50

## 2018-10-02 RX ADMIN — TAMSULOSIN HYDROCHLORIDE 0.4 MILLIGRAM(S): 0.4 CAPSULE ORAL at 22:12

## 2018-10-02 RX ADMIN — CARVEDILOL PHOSPHATE 6.25 MILLIGRAM(S): 80 CAPSULE, EXTENDED RELEASE ORAL at 18:01

## 2018-10-02 RX ADMIN — Medication 20 MILLIGRAM(S): at 05:48

## 2018-10-02 RX ADMIN — ATORVASTATIN CALCIUM 40 MILLIGRAM(S): 80 TABLET, FILM COATED ORAL at 22:12

## 2018-10-02 RX ADMIN — Medication 81 MILLIGRAM(S): at 12:52

## 2018-10-02 RX ADMIN — Medication 50 MILLIGRAM(S): at 05:49

## 2018-10-02 RX ADMIN — Medication 6 UNIT(S): at 17:10

## 2018-10-02 RX ADMIN — Medication 10 MILLIGRAM(S): at 18:02

## 2018-10-02 RX ADMIN — Medication 2: at 22:14

## 2018-10-02 RX ADMIN — Medication 10 MILLIGRAM(S): at 05:49

## 2018-10-02 RX ADMIN — CARVEDILOL PHOSPHATE 6.25 MILLIGRAM(S): 80 CAPSULE, EXTENDED RELEASE ORAL at 05:48

## 2018-10-02 NOTE — CONSULT NOTE ADULT - ATTENDING COMMENTS
83 year old male with AMS with baseline cognitive impairment in the setting of UTI.     AMS likely worsened in the setting of infection   Pt does have cognitive impairement at baseline   -pt AAOx3 this morning   -cont. to treat underlying metabolic/infectious etiology   -Cont. home Aricept 10mg daily and Razadyne ER 8mg daily   -cont. daily re-orientation     NPH with worsening gait   -hx of NPH with  shunt  -recommend shunt series to eval shunt function   -fall precaution   -PT 83 year old male with AMS with baseline cognitive impairment in the setting of UTI.     AMS likely worsened in the setting of infection   Pt does have cognitive impairement at baseline   -pt AAOx3 this morning   -cont. to treat underlying metabolic/infectious etiology   -Cont. home Aricept 10mg daily and Razadyne ER 8mg daily   -cont. daily re-orientation     NPH with worsening gait   -hx of NPH with ventriculostomy   -recommend HCT   -fall precaution   -PT

## 2018-10-02 NOTE — PROGRESS NOTE ADULT - SUBJECTIVE AND OBJECTIVE BOX
83M hx HTN, DM2 on oral meds, hx of UTIs, mild dementia and Normal pressure hydrocephalus  s/p ventriculostomy of the third ventricle 2012 presents with AMS since this AM. Wife had trouble waking pt up this morning so called EMS. Pt was unarousable with sternal rub, FS was 242 and T 100.1F. On transport, 150cc of IVF were given, frequent PVCs were seen on monitor and pt was satting at 90% on RA. Wife endorsed that Pt had a lot more frequent urination since yesterday. Pt denies abdominal pain, F/C, N/V, diarrhea, constipation, dysuria, flank pain, hematuria, CP, SOB, weakness, speech changes or HA       MEDICATIONS  (STANDING):  ascorbic acid 500 milliGRAM(s) Oral daily  aspirin enteric coated 81 milliGRAM(s) Oral daily  atorvastatin 40 milliGRAM(s) Oral at bedtime  carvedilol 6.25 milliGRAM(s) Oral every 12 hours  cefTRIAXone   IVPB 1 Gram(s) IV Intermittent every 24 hours  dextrose 5%. 1000 milliLiter(s) (50 mL/Hr) IV Continuous <Continuous>  dextrose 50% Injectable 12.5 Gram(s) IV Push once  dextrose 50% Injectable 25 Gram(s) IV Push once  dextrose 50% Injectable 25 Gram(s) IV Push once  donepezil 10 milliGRAM(s) Oral at bedtime  influenza   Vaccine 0.5 milliLiter(s) IntraMuscular once  insulin glargine Injectable (LANTUS) 30 Unit(s) SubCutaneous at bedtime  insulin lispro (HumaLOG) corrective regimen sliding scale   SubCutaneous three times a day before meals  insulin lispro (HumaLOG) corrective regimen sliding scale   SubCutaneous at bedtime  lactobacillus acidophilus 1 Tablet(s) Oral daily  multivitamin/minerals 1 Tablet(s) Oral daily  oxybutynin 10 milliGRAM(s) Oral two times a day  pantoprazole    Tablet 40 milliGRAM(s) Oral before breakfast  pregabalin 50 milliGRAM(s) Oral two times a day  tamsulosin 0.4 milliGRAM(s) Oral at bedtime  torsemide 20 milliGRAM(s) Oral daily    MEDICATIONS  (PRN):  acetaminophen   Tablet .. 650 milliGRAM(s) Oral every 6 hours PRN Temp greater or equal to 38C (100.4F)  dextrose 40% Gel 15 Gram(s) Oral once PRN Blood Glucose LESS THAN 70 milliGRAM(s)/deciliter  glucagon  Injectable 1 milliGRAM(s) IntraMuscular once PRN Glucose LESS THAN 70 milligrams/deciliter          VITALS:   T(C): 36.8 (10-01-18 @ 21:17), Max: 36.9 (10-01-18 @ 05:13)  HR: 70 (10-01-18 @ 21:17) (70 - 76)  BP: 154/75 (10-01-18 @ 21:17) (145/75 - 166/80)  RR: 18 (10-01-18 @ 21:17) (18 - 18)  SpO2: 95% (10-01-18 @ 21:17) (93% - 95%)  Wt(kg): --    PHYSICAL EXAM:    	GENERAL: NAD, well nourished and conversant  	HEAD:  Atraumatic  	EYES: EOM, PERRLA, conjunctiva pink and sclera white  	ENT: No tonsillar erythema, exudates, or enlargement, moist mucous membranes, good dentition, no lesions  	NECK: Supple, No JVD, normal thyroid, carotids with normal upstrokes and no bruits  	CHEST/LUNG: Clear to auscultation bilaterally, No rales, rhonchi, wheezing, or rubs  	HEART: Regular rate and rhythm, No murmurs, rubs, or gallops  	ABDOMEN: Soft, nondistended, no masses, guarding, tenderness or rebound, bowel sounds present  	EXTREMITIES:  2+ Peripheral Pulses, No clubbing, cyanosis, or edema. No arthritis of shoulders, elbows, hands, hips, knees, ankles, or feet. No DJD C spine, T spine, or L/S spine  	LYMPH: No lymphadenopathy noted  	SKIN: No rashes or lesions  NERVOUS SYSTEM:  minimally confused Motor Strength 5/5 right upper and right lower.  5/5 left upper and left lower extremities, DTRs 2+ intact and symmetric    LABS:        CBC Full  -  ( 01 Oct 2018 09:32 )  WBC Count : 8.07 K/uL  Hemoglobin : 15.4 g/dL  Hematocrit : 45.5 %  Platelet Count - Automated : 245 K/uL  Mean Cell Volume : 75.8 fl  Mean Cell Hemoglobin : 25.7 pg  Mean Cell Hemoglobin Concentration : 33.8 gm/dL  Auto Neutrophil # : 5.73 K/uL  Auto Lymphocyte # : 0.93 K/uL  Auto Monocyte # : 1.12 K/uL  Auto Eosinophil # : 0.26 K/uL  Auto Basophil # : 0.01 K/uL  Auto Neutrophil % : 71.1 %  Auto Lymphocyte % : 11.5 %  Auto Monocyte % : 13.9 %  Auto Eosinophil % : 3.2 %  Auto Basophil % : 0.1 %    10-01    139  |  101  |  20  ----------------------------<  275<H>  3.9   |  26  |  1.25    Ca    9.0      01 Oct 2018 07:07            CAPILLARY BLOOD GLUCOSE      POCT Blood Glucose.: 398 mg/dL (01 Oct 2018 21:20)  POCT Blood Glucose.: 406 mg/dL (01 Oct 2018 16:38)  POCT Blood Glucose.: 457 mg/dL (01 Oct 2018 16:37)  POCT Blood Glucose.: 337 mg/dL (01 Oct 2018 12:22)  POCT Blood Glucose.: 301 mg/dL (01 Oct 2018 07:59)  POCT Blood Glucose.: 300 mg/dL (01 Oct 2018 02:09)      RADIOLOGY & ADDITIONAL TESTS: 83M hx HTN, DM2 on oral meds, hx of UTIs, mild dementia and Normal pressure hydrocephalus  s/p ventriculostomy of the third ventricle 2012 presents with AMS since this AM. Wife had trouble waking pt up this morning so called EMS. Pt was unarousable with sternal rub, FS was 242 and T 100.1F. On transport, 150cc of IVF were given, frequent PVCs were seen on monitor and pt was satting at 90% on RA. Wife endorsed that Pt had a lot more frequent urination since yesterday. Pt denies abdominal pain, F/C, N/V, diarrhea, constipation, dysuria, flank pain, hematuria, CP, SOB, weakness, speech changes or HA     MEDICATIONS  (STANDING):  ascorbic acid 500 milliGRAM(s) Oral daily  aspirin enteric coated 81 milliGRAM(s) Oral daily  atorvastatin 40 milliGRAM(s) Oral at bedtime  carvedilol 6.25 milliGRAM(s) Oral every 12 hours  cefTRIAXone   IVPB 1 Gram(s) IV Intermittent every 24 hours  dextrose 5%. 1000 milliLiter(s) (50 mL/Hr) IV Continuous <Continuous>  dextrose 50% Injectable 12.5 Gram(s) IV Push once  dextrose 50% Injectable 25 Gram(s) IV Push once  dextrose 50% Injectable 25 Gram(s) IV Push once  donepezil 10 milliGRAM(s) Oral at bedtime  influenza   Vaccine 0.5 milliLiter(s) IntraMuscular once  insulin glargine Injectable (LANTUS) 30 Unit(s) SubCutaneous at bedtime  insulin lispro (HumaLOG) corrective regimen sliding scale   SubCutaneous three times a day before meals  insulin lispro (HumaLOG) corrective regimen sliding scale   SubCutaneous at bedtime  insulin lispro Injectable (HumaLOG) 6 Unit(s) SubCutaneous three times a day before meals  lactobacillus acidophilus 1 Tablet(s) Oral daily  multivitamin/minerals 1 Tablet(s) Oral daily  oxybutynin 10 milliGRAM(s) Oral two times a day  pantoprazole    Tablet 40 milliGRAM(s) Oral before breakfast  pregabalin 50 milliGRAM(s) Oral two times a day  tamsulosin 0.4 milliGRAM(s) Oral at bedtime  torsemide 20 milliGRAM(s) Oral daily    MEDICATIONS  (PRN):  acetaminophen   Tablet .. 650 milliGRAM(s) Oral every 6 hours PRN Temp greater or equal to 38C (100.4F)  dextrose 40% Gel 15 Gram(s) Oral once PRN Blood Glucose LESS THAN 70 milliGRAM(s)/deciliter  glucagon  Injectable 1 milliGRAM(s) IntraMuscular once PRN Glucose LESS THAN 70 milligrams/deciliter    Vital Signs Last 24 Hrs  T(C): 36.5 (02 Oct 2018 16:48), Max: 36.9 (02 Oct 2018 14:29)  T(F): 97.7 (02 Oct 2018 16:48), Max: 98.5 (02 Oct 2018 14:29)  HR: 69 (02 Oct 2018 16:48) (69 - 73)  BP: 138/79 (02 Oct 2018 16:48) (115/68 - 169/103)  BP(mean): --  RR: 18 (02 Oct 2018 16:48) (18 - 18)  SpO2: 91% (02 Oct 2018 16:48) (91% - 96%)  l  PHYSICAL EXAM:    	GENERAL: NAD, well nourished and conversant  	HEAD:  Atraumatic  	EYES: EOM, PERRLA, conjunctiva pink and sclera white  	ENT: No tonsillar erythema, exudates, or enlargement, moist mucous membranes, good dentition, no lesions  	NECK: Supple, No JVD, normal thyroid, carotids with normal upstrokes and no bruits  	CHEST/LUNG: Clear to auscultation bilaterally, No rales, rhonchi, wheezing, or rubs  	HEART: Regular rate and rhythm, No murmurs, rubs, or gallops  	ABDOMEN: Soft, nondistended, no masses, guarding, tenderness or rebound, bowel sounds present  	EXTREMITIES:  2+ Peripheral Pulses, No clubbing, cyanosis, or edema. No arthritis of shoulders, elbows, hands, hips, knees, ankles, or feet. No DJD C spine, T spine, or L/S spine  	LYMPH: No lymphadenopathy noted  	SKIN: No rashes or lesions  NERVOUS SYSTEM:  minimally confused Motor Strength 5/5 right upper and right lower.  5/5 left upper and left lower extremities, DTRs 2+ intact and symmetric    LABS:          CBC Full  -  ( 02 Oct 2018 07:49 )  WBC Count : 7.73 K/uL  Hemoglobin : 15.9 g/dL  Hematocrit : 47.7 %  Platelet Count - Automated : 270 K/uL  Mean Cell Volume : 77.3 fl  Mean Cell Hemoglobin : 25.8 pg  Mean Cell Hemoglobin Concentration : 33.3 gm/dL  Auto Neutrophil # : x  Auto Lymphocyte # : x  Auto Monocyte # : x  Auto Eosinophil # : x  Auto Basophil # : x  Auto Neutrophil % : x  Auto Lymphocyte % : x  Auto Monocyte % : x  Auto Eosinophil % : x  Auto Basophil % : x    10-02    139  |  99  |  22  ----------------------------<  286<H>  4.1   |  28  |  1.22    Ca    9.2      02 Oct 2018 06:27 83M hx HTN, DM2 on oral meds, hx of UTIs, mild dementia and Normal pressure hydrocephalus  s/p ventriculostomy of the third ventricle 2012 presents with AMS since this AM. Wife had trouble waking pt up this morning so called EMS. Pt was unarousable with sternal rub, FS was 242 and T 100.1F. On transport, 150cc of IVF were given, frequent PVCs were seen on monitor and pt was satting at 90% on RA. Wife endorsed that Pt had a lot more frequent urination since yesterday. Pt denies abdominal pain, F/C, N/V, diarrhea, constipation, dysuria, flank pain, hematuria, CP, SOB, weakness, speech changes or HA. The patient was seen by neurology today and advised CT brain to evaluate for SDH/Hydrocephalus  after  ventriculostomy.  The patient also has endocrinology consultation in progress  for blood sugars of 200-400 on Lantus insulin. I anticipate addition of humalog with meals to replace oral hypoglycemic agents. Mental status is markedly improved with the addition of IV Ceftriaxone  for urosepsis.    MEDICATIONS  (STANDING):  ascorbic acid 500 milliGRAM(s) Oral daily  aspirin enteric coated 81 milliGRAM(s) Oral daily  atorvastatin 40 milliGRAM(s) Oral at bedtime  carvedilol 6.25 milliGRAM(s) Oral every 12 hours  cefTRIAXone   IVPB 1 Gram(s) IV Intermittent every 24 hours  dextrose 5%. 1000 milliLiter(s) (50 mL/Hr) IV Continuous <Continuous>  dextrose 50% Injectable 12.5 Gram(s) IV Push once  dextrose 50% Injectable 25 Gram(s) IV Push once  dextrose 50% Injectable 25 Gram(s) IV Push once  donepezil 10 milliGRAM(s) Oral at bedtime  influenza   Vaccine 0.5 milliLiter(s) IntraMuscular once  insulin glargine Injectable (LANTUS) 30 Unit(s) SubCutaneous at bedtime  insulin lispro (HumaLOG) corrective regimen sliding scale   SubCutaneous three times a day before meals  insulin lispro (HumaLOG) corrective regimen sliding scale   SubCutaneous at bedtime  insulin lispro Injectable (HumaLOG) 6 Unit(s) SubCutaneous three times a day before meals  lactobacillus acidophilus 1 Tablet(s) Oral daily  multivitamin/minerals 1 Tablet(s) Oral daily  oxybutynin 10 milliGRAM(s) Oral two times a day  pantoprazole    Tablet 40 milliGRAM(s) Oral before breakfast  pregabalin 50 milliGRAM(s) Oral two times a day  tamsulosin 0.4 milliGRAM(s) Oral at bedtime  torsemide 20 milliGRAM(s) Oral daily    MEDICATIONS  (PRN):  acetaminophen   Tablet .. 650 milliGRAM(s) Oral every 6 hours PRN Temp greater or equal to 38C (100.4F)  dextrose 40% Gel 15 Gram(s) Oral once PRN Blood Glucose LESS THAN 70 milliGRAM(s)/deciliter  glucagon  Injectable 1 milliGRAM(s) IntraMuscular once PRN Glucose LESS THAN 70 milligrams/deciliter    Vital Signs Last 24 Hrs  T(C): 36.5 (02 Oct 2018 16:48), Max: 36.9 (02 Oct 2018 14:29)  T(F): 97.7 (02 Oct 2018 16:48), Max: 98.5 (02 Oct 2018 14:29)  HR: 69 (02 Oct 2018 16:48) (69 - 73)  BP: 138/79 (02 Oct 2018 16:48) (115/68 - 169/103)  BP(mean): --  RR: 18 (02 Oct 2018 16:48) (18 - 18)  SpO2: 91% (02 Oct 2018 16:48) (91% - 96%)  l  PHYSICAL EXAM:    	GENERAL: NAD, well nourished and conversant  	HEAD:  Atraumatic  	EYES: EOM, PERRLA, conjunctiva pink and sclera white  	ENT: No tonsillar erythema, exudates, or enlargement, moist mucous membranes, good dentition, no lesions  	NECK: Supple, No JVD, normal thyroid, carotids with normal upstrokes and no bruits  	CHEST/LUNG: Clear to auscultation bilaterally, No rales, rhonchi, wheezing, or rubs  	HEART: Regular rate and rhythm, No murmurs, rubs, or gallops  	ABDOMEN: Soft, nondistended, no masses, guarding, tenderness or rebound, bowel sounds present  	EXTREMITIES:  2+ Peripheral Pulses, No clubbing, cyanosis, or edema. No arthritis of shoulders, elbows, hands, hips, knees, ankles, or feet. No DJD C spine, T spine, or L/S spine  	LYMPH: No lymphadenopathy noted  	SKIN: No rashes or lesions  NERVOUS SYSTEM:  minimally confused Motor Strength 5/5 right upper and right lower.  5/5 left upper and left lower extremities, DTRs 2+ intact and symmetric    LABS:          CBC Full  -  ( 02 Oct 2018 07:49 )  WBC Count : 7.73 K/uL  Hemoglobin : 15.9 g/dL  Hematocrit : 47.7 %  Platelet Count - Automated : 270 K/uL  Mean Cell Volume : 77.3 fl  Mean Cell Hemoglobin : 25.8 pg  Mean Cell Hemoglobin Concentration : 33.3 gm/dL  Auto Neutrophil # : x  Auto Lymphocyte # : x  Auto Monocyte # : x  Auto Eosinophil # : x  Auto Basophil # : x  Auto Neutrophil % : x  Auto Lymphocyte % : x  Auto Monocyte % : x  Auto Eosinophil % : x  Auto Basophil % : x    10-02    139  |  99  |  22  ----------------------------<  286<H>  4.1   |  28  |  1.22    Ca    9.2      02 Oct 2018 06:27

## 2018-10-02 NOTE — PROGRESS NOTE ADULT - ATTENDING COMMENTS
DC planning to rehab on abx for suggested UTI Beginning to ambulate and doing well. No medical complications and to proceed with physical therapy, as tolerated. Continues pulmonary toilet to lessen atelectasis, leg exercises as taught to lessen the risk of DVT and supervised pain medications for post-op pain control. I anticipate transfer to rehabilitation to follow .

## 2018-10-02 NOTE — PROVIDER CONTACT NOTE (CRITICAL VALUE NOTIFICATION) - ASSESSMENT
Pt on ceftriaxone for UTI, VSS, Blood cultx growth in anaerobic bottle gram positive cocci in clusters.

## 2018-10-02 NOTE — CONSULT NOTE ADULT - REASON FOR ADMISSION
Fever  leucocytosis and increased confusion

## 2018-10-02 NOTE — CONSULT NOTE ADULT - SUBJECTIVE AND OBJECTIVE BOX
HPI:  83M hx HTN, DM2 on oral meds, hx of UTIs, mild dementia and Normal pressure hydrocephalus  s/p ventriculostomy of the third ventricle 2012 presents with AMS since this AM. Wife had trouble waking pt up this morning so called EMS. Pt was unarousable with sternal rub, FS was 242 and T 100.1F. On transport, 150cc of IVF were given, frequent PVCs were seen on monitor and pt was satting at 90% on RA. Wife endorsed that Pt had a lot more frequent urination since yesterday.      At bedside, patient reports being confused.  Denies any complaints.       Review of Systems:  Pt denies abdominal pain, F/C, N/V, diarrhea, constipation, dysuria, flank pain, hematuria, CP, SOB, weakness, speech changes or HA    	    PAST MEDICAL & SURGICAL HISTORY:  Lumbar spinal stenosis  Vitamin D deficiency  Mild dementia  OAB (overactive bladder)  Type 2 diabetes mellitus  BPH (benign prostatic hyperplasia)  Nephrotic syndrome: pt was instructed to avoid taking NSAIDS  Chronic lower back pain: unclear etiology per wife - no hx trauma, possibly arthritis  H/O recurrent urinary tract infection  Chronic Back Pain: L3-L4, L4-L5 compression  NPH (Normal Pressure Hydrocephalus): s/p ventriculostomy, last one in Jan 2012  Hypertension, Essential  Normal pressure hydrocephalus: s/p ventriculostomy of the third ventricle 2012      MEDICATIONS  (STANDING):  ascorbic acid 500 milliGRAM(s) Oral daily  aspirin enteric coated 81 milliGRAM(s) Oral daily  atorvastatin 40 milliGRAM(s) Oral at bedtime  carvedilol 6.25 milliGRAM(s) Oral every 12 hours  cefTRIAXone   IVPB 1 Gram(s) IV Intermittent every 24 hours  dextrose 5%. 1000 milliLiter(s) (50 mL/Hr) IV Continuous <Continuous>  dextrose 50% Injectable 12.5 Gram(s) IV Push once  dextrose 50% Injectable 25 Gram(s) IV Push once  dextrose 50% Injectable 25 Gram(s) IV Push once  donepezil 10 milliGRAM(s) Oral at bedtime  influenza   Vaccine 0.5 milliLiter(s) IntraMuscular once  insulin glargine Injectable (LANTUS) 30 Unit(s) SubCutaneous at bedtime  insulin lispro (HumaLOG) corrective regimen sliding scale   SubCutaneous three times a day before meals  insulin lispro (HumaLOG) corrective regimen sliding scale   SubCutaneous at bedtime  lactobacillus acidophilus 1 Tablet(s) Oral daily  multivitamin/minerals 1 Tablet(s) Oral daily  oxybutynin 10 milliGRAM(s) Oral two times a day  pantoprazole    Tablet 40 milliGRAM(s) Oral before breakfast  pregabalin 50 milliGRAM(s) Oral two times a day  tamsulosin 0.4 milliGRAM(s) Oral at bedtime  torsemide 20 milliGRAM(s) Oral daily    MEDICATIONS  (PRN):  acetaminophen   Tablet .. 650 milliGRAM(s) Oral every 6 hours PRN Temp greater or equal to 38C (100.4F)  dextrose 40% Gel 15 Gram(s) Oral once PRN Blood Glucose LESS THAN 70 milliGRAM(s)/deciliter  glucagon  Injectable 1 milliGRAM(s) IntraMuscular once PRN Glucose LESS THAN 70 milligrams/deciliter    Allergies    No Known Allergies    Intolerances          FAMILY HISTORY:  No pertinent neurological family history in first degree relatives      Social History  Lives at home with wife     Vital Signs Last 24 Hrs  T(C): 36.7 (02 Oct 2018 05:40), Max: 36.8 (01 Oct 2018 21:17)  T(F): 98.1 (02 Oct 2018 05:40), Max: 98.2 (01 Oct 2018 21:17)  HR: 73 (02 Oct 2018 07:05) (70 - 76)  BP: 154/80 (02 Oct 2018 07:05) (145/75 - 169/103)  BP(mean): --  RR: 18 (02 Oct 2018 07:05) (18 - 18)  SpO2: 92% (02 Oct 2018 07:05) (92% - 96%)  Daily     Daily       GENERAL PHYSICAL EXAM  All physical exam findings normal, except for those marked:  General:	well nourished, not acutely or chronically ill-appearing  HEENT:	normocephalic, atraumatic, clear conjunctiva  Neck:          supple, full range of motion, no nuchal rigidity  Cardiovascular:	regular rate and variability   Abdominal	:                    soft, ND, NT    Extremities:	no edema in b/l LE     NEUROLOGIC EXAM  Mental Status:     Oriented to time/place/person; Good eye contact ; follow simple commands ;  Age appropriate language  and fund of  knowledge.  Cranial Nerves:   PERRL, EOMI, no facial asymmetry , V1-V3 intact , symmetric palate, tongue midline.   Eyes:			Normal: optic discs   Visual Fields:		Full visual field  Muscle Strength:	 Full strength 5/5, proximal and distal,  upper and lower extremities  Muscle Tone:	Normal tone  Deep Tendon Reflexes:         1+/4  : Biceps, Brachioradialis, Triceps Bilateral;  1+/4 : Pattelar, Ankle bilateral. No clonus.  Plantar Response:	Plantar reflexes flexion bilaterally  Sensation:		Intact to pain, light touch, temperature   Coordination/	No dysmetria in finger to nose test bilaterally  Cerebellum	  Tandem Gait/Romberg deferred     Lab Results:                        15.9   7.73  )-----------( 270      ( 02 Oct 2018 07:49 )             47.7     10-02    139  |  99  |  22  ----------------------------<  286<H>  4.1   |  28  |  1.22    Ca    9.2      02 Oct 2018 06:27          Imaging Studies:  No new images
HPI: 84 yo male with T2DM x 4-5 years uncontrolled with CKD 3 here with change in mental status found to have urosepsis, endocrine consulted for elevated bs. Patient has memory issues so he asked me to call his wife to confirm his meds. Per Mrs. Parmar, he takes Lantus 30 units sq qhs, acarbose 100mg po bid, januvia 50mg po qdaily, and glipizide 10mg po bid. He only test sin the am. BS are 130s-140s. No hypoglycemia. He f/u with Dr. Apple.      PAST MEDICAL & SURGICAL HISTORY:  Lumbar spinal stenosis  Vitamin D deficiency  Mild dementia  OAB (overactive bladder)  Type 2 diabetes mellitus  BPH (benign prostatic hyperplasia)  Nephrotic syndrome: pt was instructed to avoid taking NSAIDS  Chronic lower back pain: unclear etiology per wife - no hx trauma, possibly arthritis  H/O recurrent urinary tract infection  Chronic Back Pain: L3-L4, L4-L5 compression  NPH (Normal Pressure Hydrocephalus): s/p ventriculostomy, last one in Jan 2012  Hypertension, Essential  Normal pressure hydrocephalus: s/p ventriculostomy of the third ventricle 2012      FAMILY HISTORY:  Denies DM or HTN  Rectal Ca: mother    Social History:  Tobacco: quit 35 years ago  ETOH: none      Outpatient Medications:    MEDICATIONS  (STANDING):  ascorbic acid 500 milliGRAM(s) Oral daily  aspirin enteric coated 81 milliGRAM(s) Oral daily  atorvastatin 40 milliGRAM(s) Oral at bedtime  carvedilol 6.25 milliGRAM(s) Oral every 12 hours  cefTRIAXone   IVPB 1 Gram(s) IV Intermittent every 24 hours  dextrose 5%. 1000 milliLiter(s) (50 mL/Hr) IV Continuous <Continuous>  dextrose 50% Injectable 12.5 Gram(s) IV Push once  dextrose 50% Injectable 25 Gram(s) IV Push once  dextrose 50% Injectable 25 Gram(s) IV Push once  donepezil 10 milliGRAM(s) Oral at bedtime  influenza   Vaccine 0.5 milliLiter(s) IntraMuscular once  insulin glargine Injectable (LANTUS) 34 Unit(s) SubCutaneous at bedtime  insulin lispro (HumaLOG) corrective regimen sliding scale   SubCutaneous three times a day before meals  insulin lispro (HumaLOG) corrective regimen sliding scale   SubCutaneous at bedtime  insulin lispro Injectable (HumaLOG) 8 Unit(s) SubCutaneous three times a day before meals  lactobacillus acidophilus 1 Tablet(s) Oral daily  multivitamin/minerals 1 Tablet(s) Oral daily  oxybutynin 10 milliGRAM(s) Oral two times a day  pantoprazole    Tablet 40 milliGRAM(s) Oral before breakfast  pregabalin 50 milliGRAM(s) Oral two times a day  tamsulosin 0.4 milliGRAM(s) Oral at bedtime  torsemide 20 milliGRAM(s) Oral daily    MEDICATIONS  (PRN):  acetaminophen   Tablet .. 650 milliGRAM(s) Oral every 6 hours PRN Temp greater or equal to 38C (100.4F)  dextrose 40% Gel 15 Gram(s) Oral once PRN Blood Glucose LESS THAN 70 milliGRAM(s)/deciliter  glucagon  Injectable 1 milliGRAM(s) IntraMuscular once PRN Glucose LESS THAN 70 milligrams/deciliter      Allergies  No Known Allergies    Review of Systems:  Constitutional: No fever/chills  Eyes: No blurry vision/diplopia  Neuro: +change in mental status, no headache  Cardiovascular: No chest pain, no palpitations  Respiratory: No SOB, no cough  : No dysuria, hematuria  Endocrine: no polyuria, polydipsia  ALL OTHER SYSTEMS REVIEWED AND NEGATIVE      PHYSICAL EXAM:  VITALS: T(C): 36.9 (10-02-18 @ 21:00)  T(F): 98.4 (10-02-18 @ 21:00), Max: 98.5 (10-02-18 @ 14:29)  HR: 74 (10-02-18 @ 21:00) (69 - 74)  BP: 131/79 (10-02-18 @ 21:00) (115/68 - 169/103)  RR:  (18 - 18)  SpO2:  (91% - 96%)  Wt(kg): --  GENERAL: NAD, well-groomed, well-developed  EYES: No proptosis, no lid lag, anicteric  HEENT:  Atraumatic, Normocephalic, moist mucous membranes  THYROID: Normal size, no palpable nodules  RESPIRATORY: Clear to auscultation bilaterally; No rales, rhonchi, wheezing, or rubs  CARDIOVASCULAR: Regular rate and rhythm; No murmurs; no peripheral edema  GI: Soft, nontender, non distended, normal bowel sounds  SKIN: Dry, intact, No rashes or lesions  NEURO: sensation intact, extraocular movements intact, no tremor, normal reflexes  PSYCH: reactive affect, euthymic mood  CUSHING'S SIGNS: no striae    POCT Blood Glucose.: 349 mg/dL (10-02-18 @ 21:45)  POCT Blood Glucose.: 376 mg/dL (10-02-18 @ 16:47)  POCT Blood Glucose.: 306 mg/dL (10-02-18 @ 12:12)  POCT Blood Glucose.: 279 mg/dL (10-02-18 @ 08:03)  POCT Blood Glucose.: 398 mg/dL (10-01-18 @ 21:20)  POCT Blood Glucose.: 406 mg/dL (10-01-18 @ 16:38)  POCT Blood Glucose.: 457 mg/dL (10-01-18 @ 16:37)  POCT Blood Glucose.: 337 mg/dL (10-01-18 @ 12:22)  POCT Blood Glucose.: 301 mg/dL (10-01-18 @ 07:59)  POCT Blood Glucose.: 300 mg/dL (10-01-18 @ 02:09)  POCT Blood Glucose.: 326 mg/dL (09-30-18 @ 17:02)  POCT Blood Glucose.: 360 mg/dL (09-30-18 @ 12:26)  POCT Blood Glucose.: 216 mg/dL (09-30-18 @ 07:57)                            15.9   7.73  )-----------( 270      ( 02 Oct 2018 07:49 )             47.7       10-02    139  |  99  |  22  ----------------------------<  286<H>  4.1   |  28  |  1.22    EGFR if : 64  EGFR if non : 55<L>    Ca    9.2      10-02          Hemoglobin A1C, Whole Blood: 9.2 % <H> [4.0 - 5.6] (09-29-18 @ 08:03)
Patient is a 83y old  Male who presents with a chief complaint of Fever  leucocytosis and increased confusion (01 Oct 2018 07:17)      HPI:  83M hx HTN, DM2 on oral meds, hx of UTIs, mild dementia and Normal pressure hydrocephalus  s/p ventriculostomy of the third ventricle 2012 presents with AMS since this AM. Wife had trouble waking pt up this morning so called EMS. Pt was unarousable with sternal rub, FS was 242 and T 100.1F. On transport, 150cc of IVF were given, frequent PVCs were seen on monitor and pt was satting at 90% on RA. Wife endorsed that Pt had a lot more frequent urination since yesterday. Pt denies abdominal pain, F/C, N/V, diarrhea, constipation, dysuria, flank pain, hematuria, CP, SOB, weakness, speech changes or HA (28 Sep 2018 14:47)  WBC has normalized on IV abx. Ucx growing E.Coli, sens pending.  Renal/bladder sono neg x renal cysts. TRUS no prostatic abscess, 69cc.      PAST MEDICAL & SURGICAL HISTORY:  Lumbar spinal stenosis  Vitamin D deficiency  Mild dementia  OAB (overactive bladder)  Type 2 diabetes mellitus  BPH (benign prostatic hyperplasia)  Nephrotic syndrome: pt was instructed to avoid taking NSAIDS  Chronic lower back pain: unclear etiology per wife - no hx trauma, possibly arthritis  H/O recurrent urinary tract infection  Chronic Back Pain: L3-L4, L4-L5 compression  NPH (Normal Pressure Hydrocephalus): s/p ventriculostomy, last one in Jan 2012  Hypertension, Essential  Normal pressure hydrocephalus: s/p ventriculostomy of the third ventricle 2012      REVIEW OF SYSTEMS:    CONSTITUTIONAL:  fevers or chills  HEENT: No visual changes  ENDO: No sweating  NECK: No pain or stiffness  MUSCULOSKELETAL: No back pain, no joint pain  RESPIRATORY: No shortness of breath  CARDIOVASCULAR: No chest pain  GASTROINTESTINAL: No abdominal or epigastric pain. No nausea, vomiting,  No diarrhea or constipation.   NEUROLOGICAL: No mental status changes  PSYCH: No depression, no mood changes  SKIN: No itching      MEDICATIONS  (STANDING):  ascorbic acid 500 milliGRAM(s) Oral daily  aspirin enteric coated 81 milliGRAM(s) Oral daily  atorvastatin 40 milliGRAM(s) Oral at bedtime  carvedilol 6.25 milliGRAM(s) Oral every 12 hours  cefTRIAXone   IVPB 1 Gram(s) IV Intermittent every 24 hours  dextrose 5%. 1000 milliLiter(s) (50 mL/Hr) IV Continuous <Continuous>  dextrose 50% Injectable 12.5 Gram(s) IV Push once  dextrose 50% Injectable 25 Gram(s) IV Push once  dextrose 50% Injectable 25 Gram(s) IV Push once  donepezil 10 milliGRAM(s) Oral at bedtime  influenza   Vaccine 0.5 milliLiter(s) IntraMuscular once  insulin glargine Injectable (LANTUS) 30 Unit(s) SubCutaneous at bedtime  insulin lispro (HumaLOG) corrective regimen sliding scale   SubCutaneous three times a day before meals  insulin lispro (HumaLOG) corrective regimen sliding scale   SubCutaneous at bedtime  lactobacillus acidophilus 1 Tablet(s) Oral daily  multivitamin/minerals 1 Tablet(s) Oral daily  oxybutynin 10 milliGRAM(s) Oral two times a day  pantoprazole    Tablet 40 milliGRAM(s) Oral before breakfast  pregabalin 50 milliGRAM(s) Oral two times a day  tamsulosin 0.4 milliGRAM(s) Oral at bedtime  torsemide 20 milliGRAM(s) Oral daily    MEDICATIONS  (PRN):  acetaminophen   Tablet .. 650 milliGRAM(s) Oral every 6 hours PRN Temp greater or equal to 38C (100.4F)  dextrose 40% Gel 15 Gram(s) Oral once PRN Blood Glucose LESS THAN 70 milliGRAM(s)/deciliter  glucagon  Injectable 1 milliGRAM(s) IntraMuscular once PRN Glucose LESS THAN 70 milligrams/deciliter      Allergies    No Known Allergies    Intolerances        SOCIAL HISTORY: No illicit drug use    FAMILY HISTORY:  No pertinent family history in first degree relatives      Vital Signs Last 24 Hrs  T(C): 36.7 (01 Oct 2018 13:54), Max: 36.9 (30 Sep 2018 22:08)  T(F): 98 (01 Oct 2018 13:54), Max: 98.5 (30 Sep 2018 22:08)  HR: 70 (01 Oct 2018 13:54) (70 - 76)  BP: 150/77 (01 Oct 2018 13:54) (145/75 - 166/80)  BP(mean): --  RR: 18 (01 Oct 2018 13:54) (18 - 18)  SpO2: 93% (01 Oct 2018 13:54) (93% - 95%)    PHYSICAL EXAM:    Constitutional: NAD, well-developed  HEENT: EOMI  Neck: no pain  Back: No CVA tenderness  Respiratory: No accessory respiratory muscle use  Abd: Soft, NT/ND  no organomegally  no hernia  : no tenderness, mass  WALE: deferred  Extremities: no edema  Neurological: A/O x 3  Psychiatric: Normal mood, normal affect  Skin: No rashes    I&O's Summary    30 Sep 2018 07:01  -  01 Oct 2018 07:00  --------------------------------------------------------  IN: 250 mL / OUT: 1200 mL / NET: -950 mL    01 Oct 2018 07:01  -  01 Oct 2018 20:55  --------------------------------------------------------  IN: 180 mL / OUT: 0 mL / NET: 180 mL        LABS:                        15.4   8.07  )-----------( 245      ( 01 Oct 2018 09:32 )             45.5     10-01    139  |  101  |  20  ----------------------------<  275<H>  3.9   |  26  |  1.25    Ca    9.0      01 Oct 2018 07:07          Urine Culture:   Prostate Ca Screen, PSA Total: 11.63 ng/mL (09-29 @ 08:06)      RADIOLOGY & ADDITIONAL STUDIES:
Patient is a 83y old  Male who presents with a chief complaint of Fever  leucocytosis and increased confusion      HPI:  83M, poor historian, well known to our practice, hx HTN, DM2 , recurrent UTIs, mild dementia,  Normal pressure hydrocephalus  s/p ventriculostomy of the third ventricle 2012, nephrotic syndrome, presents with AMS since this AM. Wife had trouble waking pt up this morning so called EMS. Pt was unarousable with sternal rub, FS was 242 and T 100.1F. On transport, 150cc of IVF were given, frequent PVCs were seen on monitor and pt was saturating at 90% on RA. Wife endorsed that Pt had a lot more frequent urination since yesterday. Pt denies abdominal pain, F/C, N/V, diarrhea, constipation, dysuria, flank pain, hematuria, CP, SOB, weakness, speech changes or HA . hospital course with  documented e. coli in urine and clinically improved on rocephin. cough noted ? worsened with po intake. hx of pna in past.      PAST MEDICAL HISTORY:  Lumbar spinal stenosis  ashd  Vitamin D deficiency  Mild dementia  OAB (overactive bladder)  Type 2 diabetes mellitus  BPH (benign prostatic hyperplasia)  Nephrotic syndrome: pt was instructed to avoid taking NSAIDS  htn  H/O recurrent urinary tract infection    PAST SURGICAL HX:    NPH (Normal Pressure Hydrocephalus): s/p ventriculostomy, last one in Jan 2012  s/p brain bx  s/p spine repair x 2      Allergies  No Known Allergies      MEDICATIONS  (STANDING):  ascorbic acid 500 milliGRAM(s) Oral daily  aspirin enteric coated 81 milliGRAM(s) Oral daily  atorvastatin 40 milliGRAM(s) Oral at bedtime  carvedilol 6.25 milliGRAM(s) Oral every 12 hours  cefTRIAXone   IVPB 1 Gram(s) IV Intermittent every 24 hours  dextrose 5%. 1000 milliLiter(s) (50 mL/Hr) IV Continuous <Continuous>  dextrose 50% Injectable 12.5 Gram(s) IV Push once  dextrose 50% Injectable 25 Gram(s) IV Push once  dextrose 50% Injectable 25 Gram(s) IV Push once  donepezil 10 milliGRAM(s) Oral at bedtime  influenza   Vaccine 0.5 milliLiter(s) IntraMuscular once  insulin glargine Injectable (LANTUS) 30 Unit(s) SubCutaneous at bedtime  insulin lispro (HumaLOG) corrective regimen sliding scale   SubCutaneous three times a day before meals  insulin lispro (HumaLOG) corrective regimen sliding scale   SubCutaneous at bedtime  lactobacillus acidophilus 1 Tablet(s) Oral daily  multivitamin/minerals 1 Tablet(s) Oral daily  oxybutynin 10 milliGRAM(s) Oral two times a day  pantoprazole    Tablet 40 milliGRAM(s) Oral before breakfast  pregabalin 50 milliGRAM(s) Oral two times a day  tamsulosin 0.4 milliGRAM(s) Oral at bedtime  torsemide 20 milliGRAM(s) Oral daily    MEDICATIONS  (PRN):  acetaminophen   Tablet .. 650 milliGRAM(s) Oral every 6 hours PRN Temp greater or equal to 38C (100.4F)  dextrose 40% Gel 15 Gram(s) Oral once PRN Blood Glucose LESS THAN 70 milliGRAM(s)/deciliter  glucagon  Injectable 1 milliGRAM(s) IntraMuscular once PRN Glucose LESS THAN 70 milligrams/deciliter      social history    FAMILY HISTORY:  No pertinent family history in first degree relatives              Vital Signs Last 24 Hrs  T(C): 36.9 (01 Oct 2018 05:13), Max: 36.9 (30 Sep 2018 22:08)  T(F): 98.5 (01 Oct 2018 05:13), Max: 98.5 (30 Sep 2018 22:08)  HR: 76 (01 Oct 2018 05:13) (65 - 78)  BP: 166/80 (01 Oct 2018 05:13) (145/81 - 166/80)  BP(mean): --  RR: 18 (01 Oct 2018 05:13) (18 - 20)  SpO2: 94% (01 Oct 2018 05:13) (93% - 94%)        LABS:                        15.3   12.52 )-----------( 205      ( 30 Sep 2018 08:18 )             44.5     09-30    139  |  103  |  18  ----------------------------<  180<H>  4.0   |  24  |  1.29    Ca    8.8      30 Sep 2018 06:54          I&O's Summary    30 Sep 2018 07:01  -  01 Oct 2018 07:00  --------------------------------------------------------  IN: 250 mL / OUT: 1200 mL / NET: -950 mL      RADIOLOGY & ADDITIONAL STUDIES:        [place]

## 2018-10-02 NOTE — PROGRESS NOTE ADULT - SUBJECTIVE AND OBJECTIVE BOX
INTERVAL HPI/OVERNIGHT EVENTS:  no new complaints  less confused  cough intermittent and ? relationship to meals  no nausea, vomiting, abdominal pain or diarrhea      Vital Signs Last 24 Hrs  T(C): 36.7 (02 Oct 2018 05:40), Max: 36.8 (01 Oct 2018 21:17)  T(F): 98.1 (02 Oct 2018 05:40), Max: 98.2 (01 Oct 2018 21:17)  HR: 73 (02 Oct 2018 07:05) (70 - 76)  BP: 154/80 (02 Oct 2018 07:05) (145/75 - 169/103)  BP(mean): --  RR: 18 (02 Oct 2018 07:05) (18 - 18)  SpO2: 92% (02 Oct 2018 07:05) (92% - 96%)        LABS:                        15.4   8.07  )-----------( 245      ( 01 Oct 2018 09:32 )             45.5     10-02    139  |  99  |  22  ----------------------------<  286<H>  4.1   |  28  |  1.22    Ca    9.2      02 Oct 2018 06:27          I&O's Summary    01 Oct 2018 07:01  -  02 Oct 2018 07:00  --------------------------------------------------------  IN: 180 mL / OUT: 1050 mL / NET: -870 mL        MEDICATIONS  (STANDING):  ascorbic acid 500 milliGRAM(s) Oral daily  aspirin enteric coated 81 milliGRAM(s) Oral daily  atorvastatin 40 milliGRAM(s) Oral at bedtime  carvedilol 6.25 milliGRAM(s) Oral every 12 hours  cefTRIAXone   IVPB 1 Gram(s) IV Intermittent every 24 hours  dextrose 5%. 1000 milliLiter(s) (50 mL/Hr) IV Continuous <Continuous>  dextrose 50% Injectable 12.5 Gram(s) IV Push once  dextrose 50% Injectable 25 Gram(s) IV Push once  dextrose 50% Injectable 25 Gram(s) IV Push once  donepezil 10 milliGRAM(s) Oral at bedtime  influenza   Vaccine 0.5 milliLiter(s) IntraMuscular once  insulin glargine Injectable (LANTUS) 30 Unit(s) SubCutaneous at bedtime  insulin lispro (HumaLOG) corrective regimen sliding scale   SubCutaneous three times a day before meals  insulin lispro (HumaLOG) corrective regimen sliding scale   SubCutaneous at bedtime  lactobacillus acidophilus 1 Tablet(s) Oral daily  multivitamin/minerals 1 Tablet(s) Oral daily  oxybutynin 10 milliGRAM(s) Oral two times a day  pantoprazole    Tablet 40 milliGRAM(s) Oral before breakfast  pregabalin 50 milliGRAM(s) Oral two times a day  tamsulosin 0.4 milliGRAM(s) Oral at bedtime  torsemide 20 milliGRAM(s) Oral daily    MEDICATIONS  (PRN):  acetaminophen   Tablet .. 650 milliGRAM(s) Oral every 6 hours PRN Temp greater or equal to 38C (100.4F)  dextrose 40% Gel 15 Gram(s) Oral once PRN Blood Glucose LESS THAN 70 milliGRAM(s)/deciliter  glucagon  Injectable 1 milliGRAM(s) IntraMuscular once PRN Glucose LESS THAN 70 milligrams/deciliter        RADIOLOGY & ADDITIONAL TESTS:                [facility]

## 2018-10-02 NOTE — CONSULT NOTE ADULT - ASSESSMENT
84 yo male with T2DM x 4-5 years uncontrolled with CKD 3 here with change in mental status found to have urosepsis, endocrine consulted for elevated bs.
UTI  can change ceftriaxone to cefdinir 300mg po bid, pending sensisitivties  BPH  cont flomax  Elevated PSA   taking at time of UTI and will need to be repeated  outpt f/u with Dr. La  d/c plans per primary team  please reconsult  prn
abx  as per med  consider speech and swallow assessment  continue pantoprazole

## 2018-10-02 NOTE — CONSULT NOTE ADULT - PROBLEM SELECTOR RECOMMENDATION 9
-adjust insulin as thus: lantus 34 units sq qhs and humalog 8 units-tid  -moderate scale tid-ac/qhs  Dispo: rehab. F/u with endo Dr. Apple, [place]

## 2018-10-03 LAB
ANION GAP SERPL CALC-SCNC: 13 MMOL/L — SIGNIFICANT CHANGE UP (ref 5–17)
BUN SERPL-MCNC: 21 MG/DL — SIGNIFICANT CHANGE UP (ref 7–23)
CALCIUM SERPL-MCNC: 9.2 MG/DL — SIGNIFICANT CHANGE UP (ref 8.4–10.5)
CHLORIDE SERPL-SCNC: 100 MMOL/L — SIGNIFICANT CHANGE UP (ref 96–108)
CO2 SERPL-SCNC: 26 MMOL/L — SIGNIFICANT CHANGE UP (ref 22–31)
CREAT SERPL-MCNC: 1.21 MG/DL — SIGNIFICANT CHANGE UP (ref 0.5–1.3)
CULTURE RESULTS: SIGNIFICANT CHANGE UP
CULTURE RESULTS: SIGNIFICANT CHANGE UP
GLUCOSE BLDC GLUCOMTR-MCNC: 254 MG/DL — HIGH (ref 70–99)
GLUCOSE BLDC GLUCOMTR-MCNC: 282 MG/DL — HIGH (ref 70–99)
GLUCOSE BLDC GLUCOMTR-MCNC: 293 MG/DL — HIGH (ref 70–99)
GLUCOSE BLDC GLUCOMTR-MCNC: 322 MG/DL — HIGH (ref 70–99)
GLUCOSE BLDC GLUCOMTR-MCNC: 373 MG/DL — HIGH (ref 70–99)
GLUCOSE SERPL-MCNC: 282 MG/DL — HIGH (ref 70–99)
HCT VFR BLD CALC: 47.9 % — SIGNIFICANT CHANGE UP (ref 39–50)
HGB BLD-MCNC: 16 G/DL — SIGNIFICANT CHANGE UP (ref 13–17)
MCHC RBC-ENTMCNC: 25.8 PG — LOW (ref 27–34)
MCHC RBC-ENTMCNC: 33.4 GM/DL — SIGNIFICANT CHANGE UP (ref 32–36)
MCV RBC AUTO: 77.1 FL — LOW (ref 80–100)
ORGANISM # SPEC MICROSCOPIC CNT: SIGNIFICANT CHANGE UP
ORGANISM # SPEC MICROSCOPIC CNT: SIGNIFICANT CHANGE UP
PLATELET # BLD AUTO: 260 K/UL — SIGNIFICANT CHANGE UP (ref 150–400)
POTASSIUM SERPL-MCNC: 4 MMOL/L — SIGNIFICANT CHANGE UP (ref 3.5–5.3)
POTASSIUM SERPL-SCNC: 4 MMOL/L — SIGNIFICANT CHANGE UP (ref 3.5–5.3)
RBC # BLD: 6.21 M/UL — HIGH (ref 4.2–5.8)
RBC # FLD: 13.7 % — SIGNIFICANT CHANGE UP (ref 10.3–14.5)
SODIUM SERPL-SCNC: 139 MMOL/L — SIGNIFICANT CHANGE UP (ref 135–145)
SPECIMEN SOURCE: SIGNIFICANT CHANGE UP
SPECIMEN SOURCE: SIGNIFICANT CHANGE UP
WBC # BLD: 7.64 K/UL — SIGNIFICANT CHANGE UP (ref 3.8–10.5)
WBC # FLD AUTO: 7.64 K/UL — SIGNIFICANT CHANGE UP (ref 3.8–10.5)

## 2018-10-03 PROCEDURE — 99232 SBSQ HOSP IP/OBS MODERATE 35: CPT

## 2018-10-03 RX ORDER — INSULIN GLARGINE 100 [IU]/ML
40 INJECTION, SOLUTION SUBCUTANEOUS AT BEDTIME
Qty: 0 | Refills: 0 | Status: DISCONTINUED | OUTPATIENT
Start: 2018-10-03 | End: 2018-10-04

## 2018-10-03 RX ORDER — INSULIN GLARGINE 100 [IU]/ML
36 INJECTION, SOLUTION SUBCUTANEOUS AT BEDTIME
Qty: 0 | Refills: 0 | Status: DISCONTINUED | OUTPATIENT
Start: 2018-10-03 | End: 2018-10-03

## 2018-10-03 RX ORDER — SODIUM CHLORIDE 0.65 %
1 AEROSOL, SPRAY (ML) NASAL
Qty: 0 | Refills: 0 | Status: DISCONTINUED | OUTPATIENT
Start: 2018-10-03 | End: 2018-10-05

## 2018-10-03 RX ORDER — INSULIN LISPRO 100/ML
VIAL (ML) SUBCUTANEOUS AT BEDTIME
Qty: 0 | Refills: 0 | Status: DISCONTINUED | OUTPATIENT
Start: 2018-10-03 | End: 2018-10-05

## 2018-10-03 RX ORDER — INSULIN LISPRO 100/ML
12 VIAL (ML) SUBCUTANEOUS
Qty: 0 | Refills: 0 | Status: DISCONTINUED | OUTPATIENT
Start: 2018-10-03 | End: 2018-10-04

## 2018-10-03 RX ADMIN — Medication 6: at 08:44

## 2018-10-03 RX ADMIN — Medication 20 MILLIGRAM(S): at 06:13

## 2018-10-03 RX ADMIN — Medication 12 UNIT(S): at 17:35

## 2018-10-03 RX ADMIN — Medication 10: at 13:33

## 2018-10-03 RX ADMIN — CARVEDILOL PHOSPHATE 6.25 MILLIGRAM(S): 80 CAPSULE, EXTENDED RELEASE ORAL at 17:50

## 2018-10-03 RX ADMIN — Medication 8 UNIT(S): at 08:43

## 2018-10-03 RX ADMIN — Medication 6: at 17:35

## 2018-10-03 RX ADMIN — PANTOPRAZOLE SODIUM 40 MILLIGRAM(S): 20 TABLET, DELAYED RELEASE ORAL at 06:13

## 2018-10-03 RX ADMIN — Medication 2: at 21:39

## 2018-10-03 RX ADMIN — DONEPEZIL HYDROCHLORIDE 10 MILLIGRAM(S): 10 TABLET, FILM COATED ORAL at 21:38

## 2018-10-03 RX ADMIN — Medication 81 MILLIGRAM(S): at 13:32

## 2018-10-03 RX ADMIN — Medication 500 MILLIGRAM(S): at 13:32

## 2018-10-03 RX ADMIN — Medication 10 MILLIGRAM(S): at 06:12

## 2018-10-03 RX ADMIN — CEFTRIAXONE 100 GRAM(S): 500 INJECTION, POWDER, FOR SOLUTION INTRAMUSCULAR; INTRAVENOUS at 21:39

## 2018-10-03 RX ADMIN — INSULIN GLARGINE 40 UNIT(S): 100 INJECTION, SOLUTION SUBCUTANEOUS at 21:38

## 2018-10-03 RX ADMIN — Medication 1 SPRAY(S): at 19:16

## 2018-10-03 RX ADMIN — Medication 50 MILLIGRAM(S): at 06:16

## 2018-10-03 RX ADMIN — Medication 1 TABLET(S): at 13:32

## 2018-10-03 RX ADMIN — Medication 50 MILLIGRAM(S): at 17:43

## 2018-10-03 RX ADMIN — ATORVASTATIN CALCIUM 40 MILLIGRAM(S): 80 TABLET, FILM COATED ORAL at 21:38

## 2018-10-03 RX ADMIN — Medication 10 MILLIGRAM(S): at 17:43

## 2018-10-03 RX ADMIN — Medication 12 UNIT(S): at 13:32

## 2018-10-03 RX ADMIN — TAMSULOSIN HYDROCHLORIDE 0.4 MILLIGRAM(S): 0.4 CAPSULE ORAL at 21:38

## 2018-10-03 RX ADMIN — CARVEDILOL PHOSPHATE 6.25 MILLIGRAM(S): 80 CAPSULE, EXTENDED RELEASE ORAL at 06:12

## 2018-10-03 NOTE — PROGRESS NOTE ADULT - SUBJECTIVE AND OBJECTIVE BOX
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      NEUROLOGIC EXAM  Mental Status:     Oriented to time/place/person; Good eye contact ; follow simple commands ;  Age appropriate   speech fluent  Cranial Nerves:   PERRL, EOMI, no facial asymmetry , V1-V3 intact , symmetric palate, tongue midline.   Muscle Strength:	5/5 in all extremities  Muscle Tone:	Normal tone  Deep Tendon Reflexes:         1+/4  : Biceps, Brachioradialis, Triceps Bilateral;  2+/4 : Pattelar, Ankle bilateral. No clonus.  Plantar Response:	Plantar reflexes flexion bilaterally  Sensation:		Intact to pain, light touch, temperature and vibration throughout.  Coordination/	No dysmetria in finger to nose test bilaterally  Cerebellum	  Tandem Gait/Romberg	Not tested.      Vital Signs Last 24 Hrs  T(C): 36.9 (03 Oct 2018 05:52), Max: 36.9 (02 Oct 2018 14:29)  T(F): 98.4 (03 Oct 2018 05:52), Max: 98.5 (02 Oct 2018 14:29)  HR: 73 (03 Oct 2018 07:56) (69 - 74)  BP: 120/76 (03 Oct 2018 07:56) (115/68 - 168/80)  BP(mean): --  RR: 20 (03 Oct 2018 05:52) (18 - 20)  SpO2: 96% (03 Oct 2018 05:52) (91% - 96%)  MEDICATIONS  (STANDING):  ascorbic acid 500 milliGRAM(s) Oral daily  aspirin enteric coated 81 milliGRAM(s) Oral daily  atorvastatin 40 milliGRAM(s) Oral at bedtime  carvedilol 6.25 milliGRAM(s) Oral every 12 hours  cefTRIAXone   IVPB 1 Gram(s) IV Intermittent every 24 hours  dextrose 5%. 1000 milliLiter(s) (50 mL/Hr) IV Continuous <Continuous>  dextrose 50% Injectable 12.5 Gram(s) IV Push once  dextrose 50% Injectable 25 Gram(s) IV Push once  dextrose 50% Injectable 25 Gram(s) IV Push once  donepezil 10 milliGRAM(s) Oral at bedtime  influenza   Vaccine 0.5 milliLiter(s) IntraMuscular once  insulin glargine Injectable (LANTUS) 34 Unit(s) SubCutaneous at bedtime  insulin lispro (HumaLOG) corrective regimen sliding scale   SubCutaneous three times a day before meals  insulin lispro (HumaLOG) corrective regimen sliding scale   SubCutaneous at bedtime  insulin lispro Injectable (HumaLOG) 8 Unit(s) SubCutaneous three times a day before meals  lactobacillus acidophilus 1 Tablet(s) Oral daily  multivitamin/minerals 1 Tablet(s) Oral daily  oxybutynin 10 milliGRAM(s) Oral two times a day  pantoprazole    Tablet 40 milliGRAM(s) Oral before breakfast  pregabalin 50 milliGRAM(s) Oral two times a day  tamsulosin 0.4 milliGRAM(s) Oral at bedtime  torsemide 20 milliGRAM(s) Oral daily    MEDICATIONS  (PRN):  acetaminophen   Tablet .. 650 milliGRAM(s) Oral every 6 hours PRN Temp greater or equal to 38C (100.4F)  dextrose 40% Gel 15 Gram(s) Oral once PRN Blood Glucose LESS THAN 70 milliGRAM(s)/deciliter  glucagon  Injectable 1 milliGRAM(s) IntraMuscular once PRN Glucose LESS THAN 70 milligrams/deciliter    < from: CT Head No Cont (10.02.18 @ 18:55) >  IMPRESSION:  Ventricles are prominent out of proportion to the sulci   consistent with normal pressure hydrocephalus. No change in the vent size   compared with 3/25/2018. Right frontal dayana hole.        < end of copied text >

## 2018-10-03 NOTE — PROGRESS NOTE ADULT - SUBJECTIVE AND OBJECTIVE BOX
Diabetes Follow up note:  Interval Hx:  83 year old male w/uncontrolled T2DM w/CKD 3, mild dementia here w/mental status change and urosepsis on IV abx. Pt w/persistently very elevated glucose levels while inpatient. Pt seen at bedside. Sleepy but awakens easily. Pt reports fair appetite, is eating most of his meals.     Review of Systems:  General: + fatigue.   GI: Tolerating POs without any N/V/D/ABD PAIN.  CV: No CP/SOB  ENDO: No S&Sx of hypoglycemia  MEDS:  atorvastatin 40 milliGRAM(s) Oral at bedtime    insulin glargine Injectable (LANTUS) 34 Unit(s) SubCutaneous at bedtime  insulin lispro (HumaLOG) corrective regimen sliding scale   SubCutaneous three times a day before meals  insulin lispro (HumaLOG) corrective regimen sliding scale   SubCutaneous at bedtime  insulin lispro Injectable (HumaLOG) 8 Unit(s) SubCutaneous three times a day before meals    cefTRIAXone   IVPB 1 Gram(s) IV Intermittent every 24 hours    Allergies    No Known Allergies        PE:  General: Male sitting in chair. NAD.   Vital Signs Last 24 Hrs  T(C): 36.9 (03 Oct 2018 05:52), Max: 36.9 (02 Oct 2018 14:29)  T(F): 98.4 (03 Oct 2018 05:52), Max: 98.5 (02 Oct 2018 14:29)  HR: 73 (03 Oct 2018 07:56) (69 - 74)  BP: 120/76 (03 Oct 2018 07:56) (115/68 - 168/80)  BP(mean): --  RR: 20 (03 Oct 2018 05:52) (18 - 20)  SpO2: 96% (03 Oct 2018 05:52) (91% - 96%)  Abd: Soft, NT,ND, Central adiposity.   Extremities: Warm. no edema noted.   Neuro: Alert and Oriented x 2. Poor historian.     LABS:    POCT Blood Glucose.: 293 mg/dL (10-03-18 @ 08:37)  POCT Blood Glucose.: 322 mg/dL (10-03-18 @ 08:31)  POCT Blood Glucose.: 349 mg/dL (10-02-18 @ 21:45)  POCT Blood Glucose.: 376 mg/dL (10-02-18 @ 16:47)  POCT Blood Glucose.: 306 mg/dL (10-02-18 @ 12:12)  POCT Blood Glucose.: 279 mg/dL (10-02-18 @ 08:03)  POCT Blood Glucose.: 398 mg/dL (10-01-18 @ 21:20)  POCT Blood Glucose.: 406 mg/dL (10-01-18 @ 16:38)  POCT Blood Glucose.: 457 mg/dL (10-01-18 @ 16:37)  POCT Blood Glucose.: 337 mg/dL (10-01-18 @ 12:22)  POCT Blood Glucose.: 301 mg/dL (10-01-18 @ 07:59)  POCT Blood Glucose.: 300 mg/dL (10-01-18 @ 02:09)  POCT Blood Glucose.: 326 mg/dL (09-30-18 @ 17:02)  POCT Blood Glucose.: 360 mg/dL (09-30-18 @ 12:26)                            16.0   7.64  )-----------( 260      ( 03 Oct 2018 09:00 )             47.9       10-03    139  |  100  |  21  ----------------------------<  282<H>  4.0   |  26  |  1.21    Ca    9.2      03 Oct 2018 07:35        Hemoglobin A1C, Whole Blood: 9.2 % <H> [4.0 - 5.6] (09-29-18 @ 08:03)            Contact number: kelly [phone] or [phone]

## 2018-10-03 NOTE — PROGRESS NOTE ADULT - SUBJECTIVE AND OBJECTIVE BOX
83M hx HTN, DM2 on oral meds, hx of UTIs, mild dementia and Normal pressure hydrocephalus  s/p ventriculostomy of the third ventricle 2012 presents with AMS since this AM. Wife had trouble waking pt up this morning so called EMS. Pt was unarousable with sternal rub, FS was 242 and T 100.1F. On transport, 150cc of IVF were given, frequent PVCs were seen on monitor and pt was satting at 90% on RA. Wife endorsed that Pt had a lot more frequent urination since yesterday. Pt denies abdominal pain, F/C, N/V, diarrhea, constipation, dysuria, flank pain, hematuria, CP, SOB, weakness, speech changes or HA. The patient was seen by neurology today and advised CT brain to evaluate for SDH/Hydrocephalus  after  ventriculostomy.  endo crine to fgollow with elevated glucose and will finish abx  MEDICATIONS  (STANDING):  ascorbic acid 500 milliGRAM(s) Oral daily  aspirin enteric coated 81 milliGRAM(s) Oral daily  atorvastatin 40 milliGRAM(s) Oral at bedtime  carvedilol 6.25 milliGRAM(s) Oral every 12 hours  cefTRIAXone   IVPB 1 Gram(s) IV Intermittent every 24 hours  dextrose 5%. 1000 milliLiter(s) (50 mL/Hr) IV Continuous <Continuous>  dextrose 50% Injectable 12.5 Gram(s) IV Push once  dextrose 50% Injectable 25 Gram(s) IV Push once  dextrose 50% Injectable 25 Gram(s) IV Push once  donepezil 10 milliGRAM(s) Oral at bedtime  influenza   Vaccine 0.5 milliLiter(s) IntraMuscular once  insulin glargine Injectable (LANTUS) 36 Unit(s) SubCutaneous at bedtime  insulin lispro (HumaLOG) corrective regimen sliding scale   SubCutaneous at bedtime  insulin lispro (HumaLOG) corrective regimen sliding scale   SubCutaneous three times a day before meals  insulin lispro Injectable (HumaLOG) 12 Unit(s) SubCutaneous three times a day before meals  lactobacillus acidophilus 1 Tablet(s) Oral daily  multivitamin/minerals 1 Tablet(s) Oral daily  oxybutynin 10 milliGRAM(s) Oral two times a day  pantoprazole    Tablet 40 milliGRAM(s) Oral before breakfast  pregabalin 50 milliGRAM(s) Oral two times a day  sodium chloride 0.65% Nasal 1 Spray(s) Both Nostrils four times a day  tamsulosin 0.4 milliGRAM(s) Oral at bedtime  torsemide 20 milliGRAM(s) Oral daily    MEDICATIONS  (PRN):  acetaminophen   Tablet .. 650 milliGRAM(s) Oral every 6 hours PRN Temp greater or equal to 38C (100.4F)  dextrose 40% Gel 15 Gram(s) Oral once PRN Blood Glucose LESS THAN 70 milliGRAM(s)/deciliter  glucagon  Injectable 1 milliGRAM(s) IntraMuscular once PRN Glucose LESS THAN 70 milligrams/deciliter          VITALS:   T(C): 37 (10-03-18 @ 14:33), Max: 37 (10-03-18 @ 14:33)  HR: 74 (10-03-18 @ 14:33) (69 - 74)  BP: 139/74 (10-03-18 @ 14:33) (120/76 - 168/80)  RR: 20 (10-03-18 @ 14:33) (18 - 20)  SpO2: 94% (10-03-18 @ 14:33) (91% - 96%)  Wt(kg): --      	GENERAL: NAD, well nourished and conversant  	HEAD:  Atraumatic  	EYES: EOM, PERRLA, conjunctiva pink and sclera white  	ENT: No tonsillar erythema, exudates, or enlargement, moist mucous membranes, good dentition, no lesions  	NECK: Supple, No JVD, normal thyroid, carotids with normal upstrokes and no bruits  	CHEST/LUNG: Clear to auscultation bilaterally, No rales, rhonchi, wheezing, or rubs  	HEART: Regular rate and rhythm, No murmurs, rubs, or gallops  	ABDOMEN: Soft, nondistended, no masses, guarding, tenderness or rebound, bowel sounds present  	EXTREMITIES:  2+ Peripheral Pulses, No clubbing, cyanosis, or edema. No arthritis of shoulders, elbows, hands, hips, knees, ankles, or feet. No DJD C spine, T spine, or L/S spine  	LYMPH: No lymphadenopathy noted  	SKIN: No rashes or lesions  NERVOUS SYSTEM:  minimally confused Motor Strength 5/5 right upper and right lower.  5/5 left upper and left lower extremities, DTRs 2+ intact and symmetric    LABS:        CBC Full  -  ( 03 Oct 2018 09:00 )  WBC Count : 7.64 K/uL  Hemoglobin : 16.0 g/dL  Hematocrit : 47.9 %  Platelet Count - Automated : 260 K/uL  Mean Cell Volume : 77.1 fl  Mean Cell Hemoglobin : 25.8 pg  Mean Cell Hemoglobin Concentration : 33.4 gm/dL  Auto Neutrophil # : x  Auto Lymphocyte # : x  Auto Monocyte # : x  Auto Eosinophil # : x  Auto Basophil # : x  Auto Neutrophil % : x  Auto Lymphocyte % : x  Auto Monocyte % : x  Auto Eosinophil % : x  Auto Basophil % : x    10-03    139  |  100  |  21  ----------------------------<  282<H>  4.0   |  26  |  1.21    Ca    9.2      03 Oct 2018 07:35            CAPILLARY BLOOD GLUCOSE      POCT Blood Glucose.: 293 mg/dL (03 Oct 2018 08:37)  POCT Blood Glucose.: 322 mg/dL (03 Oct 2018 08:31)  POCT Blood Glucose.: 349 mg/dL (02 Oct 2018 21:45)  POCT Blood Glucose.: 376 mg/dL (02 Oct 2018 16:47)      RADIOLOGY & ADDITIONAL TESTS:

## 2018-10-03 NOTE — PROGRESS NOTE ADULT - SUBJECTIVE AND OBJECTIVE BOX
INTERVAL HPI/OVERNIGHT EVENTS:  no nausea, vomiting or abdominal pain  remains with cough with ? relationship to eating  denies diarrhea  tolerating po  continues on rocephin      Vital Signs Last 24 Hrs  T(C): 36.9 (03 Oct 2018 05:52), Max: 36.9 (02 Oct 2018 14:29)  T(F): 98.4 (03 Oct 2018 05:52), Max: 98.5 (02 Oct 2018 14:29)  HR: 70 (03 Oct 2018 05:52) (69 - 74)  BP: 168/80 (03 Oct 2018 05:52) (115/68 - 168/80)  BP(mean): --  RR: 20 (03 Oct 2018 05:52) (18 - 20)  SpO2: 96% (03 Oct 2018 05:52) (91% - 96%)        LABS:                        15.9   7.73  )-----------( 270      ( 02 Oct 2018 07:49 )             47.7     10-02    139  |  99  |  22  ----------------------------<  286<H>  4.1   |  28  |  1.22    Ca    9.2      02 Oct 2018 06:27          I&O's Summary    02 Oct 2018 07:01  -  03 Oct 2018 07:00  --------------------------------------------------------  IN: 1470 mL / OUT: 1350 mL / NET: 120 mL        MEDICATIONS  (STANDING):  ascorbic acid 500 milliGRAM(s) Oral daily  aspirin enteric coated 81 milliGRAM(s) Oral daily  atorvastatin 40 milliGRAM(s) Oral at bedtime  carvedilol 6.25 milliGRAM(s) Oral every 12 hours  cefTRIAXone   IVPB 1 Gram(s) IV Intermittent every 24 hours  dextrose 5%. 1000 milliLiter(s) (50 mL/Hr) IV Continuous <Continuous>  dextrose 50% Injectable 12.5 Gram(s) IV Push once  dextrose 50% Injectable 25 Gram(s) IV Push once  dextrose 50% Injectable 25 Gram(s) IV Push once  donepezil 10 milliGRAM(s) Oral at bedtime  influenza   Vaccine 0.5 milliLiter(s) IntraMuscular once  insulin glargine Injectable (LANTUS) 34 Unit(s) SubCutaneous at bedtime  insulin lispro (HumaLOG) corrective regimen sliding scale   SubCutaneous three times a day before meals  insulin lispro (HumaLOG) corrective regimen sliding scale   SubCutaneous at bedtime  insulin lispro Injectable (HumaLOG) 8 Unit(s) SubCutaneous three times a day before meals  lactobacillus acidophilus 1 Tablet(s) Oral daily  multivitamin/minerals 1 Tablet(s) Oral daily  oxybutynin 10 milliGRAM(s) Oral two times a day  pantoprazole    Tablet 40 milliGRAM(s) Oral before breakfast  pregabalin 50 milliGRAM(s) Oral two times a day  tamsulosin 0.4 milliGRAM(s) Oral at bedtime  torsemide 20 milliGRAM(s) Oral daily    MEDICATIONS  (PRN):  acetaminophen   Tablet .. 650 milliGRAM(s) Oral every 6 hours PRN Temp greater or equal to 38C (100.4F)  dextrose 40% Gel 15 Gram(s) Oral once PRN Blood Glucose LESS THAN 70 milliGRAM(s)/deciliter  glucagon  Injectable 1 milliGRAM(s) IntraMuscular once PRN Glucose LESS THAN 70 milligrams/deciliter        RADIOLOGY & ADDITIONAL TESTS:                [facility]

## 2018-10-03 NOTE — PROGRESS NOTE ADULT - ATTENDING COMMENTS
Beginning to ambulate and doing well. No medical complications and to proceed with physical therapy, as tolerated. Continues pulmonary toilet to lessen atelectasis, leg exercises as taught to lessen the risk of DVT and supervised pain medications for post-op pain control. I anticipate transfer to rehabilitation to follow .

## 2018-10-04 LAB
GLUCOSE BLDC GLUCOMTR-MCNC: 230 MG/DL — HIGH (ref 70–99)
GLUCOSE BLDC GLUCOMTR-MCNC: 234 MG/DL — HIGH (ref 70–99)
GLUCOSE BLDC GLUCOMTR-MCNC: 245 MG/DL — HIGH (ref 70–99)
GLUCOSE BLDC GLUCOMTR-MCNC: 294 MG/DL — HIGH (ref 70–99)

## 2018-10-04 PROCEDURE — 99232 SBSQ HOSP IP/OBS MODERATE 35: CPT

## 2018-10-04 RX ORDER — INSULIN LISPRO 100/ML
16 VIAL (ML) SUBCUTANEOUS
Qty: 0 | Refills: 0 | Status: DISCONTINUED | OUTPATIENT
Start: 2018-10-04 | End: 2018-10-05

## 2018-10-04 RX ORDER — INSULIN GLARGINE 100 [IU]/ML
45 INJECTION, SOLUTION SUBCUTANEOUS AT BEDTIME
Qty: 0 | Refills: 0 | Status: DISCONTINUED | OUTPATIENT
Start: 2018-10-04 | End: 2018-10-05

## 2018-10-04 RX ADMIN — Medication 16 UNIT(S): at 12:46

## 2018-10-04 RX ADMIN — CARVEDILOL PHOSPHATE 6.25 MILLIGRAM(S): 80 CAPSULE, EXTENDED RELEASE ORAL at 17:24

## 2018-10-04 RX ADMIN — INSULIN GLARGINE 45 UNIT(S): 100 INJECTION, SOLUTION SUBCUTANEOUS at 22:02

## 2018-10-04 RX ADMIN — TAMSULOSIN HYDROCHLORIDE 0.4 MILLIGRAM(S): 0.4 CAPSULE ORAL at 21:51

## 2018-10-04 RX ADMIN — Medication 12 UNIT(S): at 08:14

## 2018-10-04 RX ADMIN — CARVEDILOL PHOSPHATE 6.25 MILLIGRAM(S): 80 CAPSULE, EXTENDED RELEASE ORAL at 06:21

## 2018-10-04 RX ADMIN — Medication 20 MILLIGRAM(S): at 06:21

## 2018-10-04 RX ADMIN — Medication 4: at 12:46

## 2018-10-04 RX ADMIN — Medication 2: at 22:01

## 2018-10-04 RX ADMIN — Medication 1 TABLET(S): at 12:45

## 2018-10-04 RX ADMIN — Medication 16 UNIT(S): at 17:24

## 2018-10-04 RX ADMIN — Medication 1 SPRAY(S): at 12:45

## 2018-10-04 RX ADMIN — Medication 81 MILLIGRAM(S): at 12:45

## 2018-10-04 RX ADMIN — PANTOPRAZOLE SODIUM 40 MILLIGRAM(S): 20 TABLET, DELAYED RELEASE ORAL at 06:21

## 2018-10-04 RX ADMIN — Medication 4: at 17:24

## 2018-10-04 RX ADMIN — Medication 50 MILLIGRAM(S): at 06:21

## 2018-10-04 RX ADMIN — Medication 4: at 08:14

## 2018-10-04 RX ADMIN — DONEPEZIL HYDROCHLORIDE 10 MILLIGRAM(S): 10 TABLET, FILM COATED ORAL at 21:52

## 2018-10-04 RX ADMIN — Medication 50 MILLIGRAM(S): at 17:29

## 2018-10-04 RX ADMIN — Medication 10 MILLIGRAM(S): at 06:21

## 2018-10-04 RX ADMIN — Medication 10 MILLIGRAM(S): at 17:25

## 2018-10-04 RX ADMIN — Medication 1 SPRAY(S): at 17:25

## 2018-10-04 RX ADMIN — CEFTRIAXONE 100 GRAM(S): 500 INJECTION, POWDER, FOR SOLUTION INTRAMUSCULAR; INTRAVENOUS at 17:25

## 2018-10-04 RX ADMIN — Medication 500 MILLIGRAM(S): at 12:45

## 2018-10-04 RX ADMIN — ATORVASTATIN CALCIUM 40 MILLIGRAM(S): 80 TABLET, FILM COATED ORAL at 21:52

## 2018-10-04 NOTE — PROGRESS NOTE ADULT - ATTENDING COMMENTS
Beginning to ambulate and doing well. No medical complications and to proceed with physical therapy, as tolerated. Continues pulmonary toilet to lessen atelectasis, leg exercises as taught to lessen the risk of DVT and supervised pain medications for post-op pain control. awaiting placement

## 2018-10-04 NOTE — PROGRESS NOTE ADULT - GASTROINTESTINAL DETAILS
soft/no guarding/no organomegaly/no masses palpable/bowel sounds normal/prominent/nontender/no rebound tenderness/no rigidity
nontender/prominent/bowel sounds normal/no masses palpable/soft/no rebound tenderness/no rigidity/no guarding/no organomegaly
prominent/no guarding/no organomegaly/nontender/no rebound tenderness/no rigidity/soft/no masses palpable/bowel sounds normal

## 2018-10-04 NOTE — PROGRESS NOTE ADULT - SUBJECTIVE AND OBJECTIVE BOX
INTERVAL HPI/OVERNIGHT EVENTS:  no new overnight events  ct  head c/w nph  no nausea, vomiting or abdominal pain   denies diarrhea   remains with cough, unclear if exacerbated with eating      Vital Signs Last 24 Hrs  T(C): 37 (04 Oct 2018 05:11), Max: 37 (03 Oct 2018 14:33)  T(F): 98.6 (04 Oct 2018 05:11), Max: 98.6 (03 Oct 2018 14:33)  HR: 78 (04 Oct 2018 05:11) (73 - 78)  BP: 117/72 (04 Oct 2018 05:11) (117/72 - 143/75)  BP(mean): --  RR: 20 (04 Oct 2018 05:11) (20 - 20)  SpO2: 95% (04 Oct 2018 05:11) (94% - 95%)        LABS:                        16.0   7.64  )-----------( 260      ( 03 Oct 2018 09:00 )             47.9     10-03    139  |  100  |  21  ----------------------------<  282<H>  4.0   |  26  |  1.21    Ca    9.2      03 Oct 2018 07:35          I&O's Summary    03 Oct 2018 07:01  -  04 Oct 2018 07:00  --------------------------------------------------------  IN: 480 mL / OUT: 1075 mL / NET: -595 mL        MEDICATIONS  (STANDING):  ascorbic acid 500 milliGRAM(s) Oral daily  aspirin enteric coated 81 milliGRAM(s) Oral daily  atorvastatin 40 milliGRAM(s) Oral at bedtime  carvedilol 6.25 milliGRAM(s) Oral every 12 hours  cefTRIAXone   IVPB 1 Gram(s) IV Intermittent every 24 hours  dextrose 5%. 1000 milliLiter(s) (50 mL/Hr) IV Continuous <Continuous>  dextrose 50% Injectable 12.5 Gram(s) IV Push once  dextrose 50% Injectable 25 Gram(s) IV Push once  dextrose 50% Injectable 25 Gram(s) IV Push once  donepezil 10 milliGRAM(s) Oral at bedtime  influenza   Vaccine 0.5 milliLiter(s) IntraMuscular once  insulin glargine Injectable (LANTUS) 40 Unit(s) SubCutaneous at bedtime  insulin lispro (HumaLOG) corrective regimen sliding scale   SubCutaneous at bedtime  insulin lispro (HumaLOG) corrective regimen sliding scale   SubCutaneous three times a day before meals  insulin lispro Injectable (HumaLOG) 12 Unit(s) SubCutaneous three times a day before meals  lactobacillus acidophilus 1 Tablet(s) Oral daily  multivitamin/minerals 1 Tablet(s) Oral daily  oxybutynin 10 milliGRAM(s) Oral two times a day  pantoprazole    Tablet 40 milliGRAM(s) Oral before breakfast  pregabalin 50 milliGRAM(s) Oral two times a day  sodium chloride 0.65% Nasal 1 Spray(s) Both Nostrils four times a day  tamsulosin 0.4 milliGRAM(s) Oral at bedtime  torsemide 20 milliGRAM(s) Oral daily    MEDICATIONS  (PRN):  acetaminophen   Tablet .. 650 milliGRAM(s) Oral every 6 hours PRN Temp greater or equal to 38C (100.4F)  dextrose 40% Gel 15 Gram(s) Oral once PRN Blood Glucose LESS THAN 70 milliGRAM(s)/deciliter  glucagon  Injectable 1 milliGRAM(s) IntraMuscular once PRN Glucose LESS THAN 70 milligrams/deciliter        RADIOLOGY & ADDITIONAL TESTS:                [facility]

## 2018-10-04 NOTE — PROGRESS NOTE ADULT - MS EXT PE MLT D E PC
no cyanosis/no clubbing/no pedal edema
no clubbing/no cyanosis/no pedal edema
no cyanosis/no pedal edema/no clubbing

## 2018-10-04 NOTE — PROGRESS NOTE ADULT - SUBJECTIVE AND OBJECTIVE BOX
83M hx HTN, DM2 on oral meds, hx of UTIs, mild dementia and Normal pressure hydrocephalus  s/p ventriculostomy of the third ventricle 2012 presents with AMS since this AM. Wife had trouble waking pt up this morning so called EMS. Pt was unarousable with sternal rub, FS was 242 and T 100.1F. On transport, 150cc of IVF were given, frequent PVCs were seen on monitor and pt was satting at 90% on RA. Wife endorsed that Pt had a lot more frequent urination since yesterday. Pt denies abdominal pain, F/C, N/V, diarrhea, constipation, dysuria, flank pain, hematuria, CP, SOB, weakness, speech changes or HA. The patient was seen by neurology today and advised CT brain to evaluate for SDH/Hydrocephalus  after  ventriculostomy.  endo crine to fgollow with elevated glucose and will finish abx      MEDICATIONS  (STANDING):  ascorbic acid 500 milliGRAM(s) Oral daily  aspirin enteric coated 81 milliGRAM(s) Oral daily  atorvastatin 40 milliGRAM(s) Oral at bedtime  carvedilol 6.25 milliGRAM(s) Oral every 12 hours  cefTRIAXone   IVPB 1 Gram(s) IV Intermittent every 24 hours  dextrose 5%. 1000 milliLiter(s) (50 mL/Hr) IV Continuous <Continuous>  dextrose 50% Injectable 12.5 Gram(s) IV Push once  dextrose 50% Injectable 25 Gram(s) IV Push once  dextrose 50% Injectable 25 Gram(s) IV Push once  donepezil 10 milliGRAM(s) Oral at bedtime  influenza   Vaccine 0.5 milliLiter(s) IntraMuscular once  insulin glargine Injectable (LANTUS) 45 Unit(s) SubCutaneous at bedtime  insulin lispro (HumaLOG) corrective regimen sliding scale   SubCutaneous at bedtime  insulin lispro (HumaLOG) corrective regimen sliding scale   SubCutaneous three times a day before meals  insulin lispro Injectable (HumaLOG) 16 Unit(s) SubCutaneous three times a day before meals  lactobacillus acidophilus 1 Tablet(s) Oral daily  multivitamin/minerals 1 Tablet(s) Oral daily  oxybutynin 10 milliGRAM(s) Oral two times a day  pantoprazole    Tablet 40 milliGRAM(s) Oral before breakfast  pregabalin 50 milliGRAM(s) Oral two times a day  sodium chloride 0.65% Nasal 1 Spray(s) Both Nostrils four times a day  tamsulosin 0.4 milliGRAM(s) Oral at bedtime  torsemide 20 milliGRAM(s) Oral daily    MEDICATIONS  (PRN):  acetaminophen   Tablet .. 650 milliGRAM(s) Oral every 6 hours PRN Temp greater or equal to 38C (100.4F)  dextrose 40% Gel 15 Gram(s) Oral once PRN Blood Glucose LESS THAN 70 milliGRAM(s)/deciliter  glucagon  Injectable 1 milliGRAM(s) IntraMuscular once PRN Glucose LESS THAN 70 milligrams/deciliter          VITALS:   T(C): 37 (10-04-18 @ 14:35), Max: 37 (10-04-18 @ 05:11)  HR: 76 (10-04-18 @ 14:35) (76 - 78)  BP: 104/67 (10-04-18 @ 14:35) (104/67 - 143/75)  RR: 20 (10-04-18 @ 14:35) (20 - 20)  SpO2: 95% (10-04-18 @ 14:35) (94% - 95%)  Wt(kg): --    	GENERAL: NAD, well nourished and conversant  	HEAD:  Atraumatic  	EYES: EOM, PERRLA, conjunctiva pink and sclera white  	ENT: No tonsillar erythema, exudates, or enlargement, moist mucous membranes, good dentition, no lesions  	NECK: Supple, No JVD, normal thyroid, carotids with normal upstrokes and no bruits  	CHEST/LUNG: Clear to auscultation bilaterally, No rales, rhonchi, wheezing, or rubs  	HEART: Regular rate and rhythm, No murmurs, rubs, or gallops  	ABDOMEN: Soft, nondistended, no masses, guarding, tenderness or rebound, bowel sounds present  	EXTREMITIES:  2+ Peripheral Pulses, No clubbing, cyanosis, or edema. No arthritis of shoulders, elbows, hands, hips, knees, ankles, or feet. No DJD C spine, T spine, or L/S spine  	LYMPH: No lymphadenopathy noted  	SKIN: No rashes or lesions  NERVOUS SYSTEM:  minimally confused Motor Strength 5/5 right upper and right lower.  5/5 left upper and left lower extremities, DTRs 2+ intact and symmetric    LABS:        CBC Full  -  ( 03 Oct 2018 09:00 )  WBC Count : 7.64 K/uL  Hemoglobin : 16.0 g/dL  Hematocrit : 47.9 %  Platelet Count - Automated : 260 K/uL  Mean Cell Volume : 77.1 fl  Mean Cell Hemoglobin : 25.8 pg  Mean Cell Hemoglobin Concentration : 33.4 gm/dL  Auto Neutrophil # : x  Auto Lymphocyte # : x  Auto Monocyte # : x  Auto Eosinophil # : x  Auto Basophil # : x  Auto Neutrophil % : x  Auto Lymphocyte % : x  Auto Monocyte % : x  Auto Eosinophil % : x  Auto Basophil % : x    10-03    139  |  100  |  21  ----------------------------<  282<H>  4.0   |  26  |  1.21    Ca    9.2      03 Oct 2018 07:35            CAPILLARY BLOOD GLUCOSE      POCT Blood Glucose.: 234 mg/dL (04 Oct 2018 16:40)  POCT Blood Glucose.: 245 mg/dL (04 Oct 2018 12:13)  POCT Blood Glucose.: 230 mg/dL (04 Oct 2018 07:54)  POCT Blood Glucose.: 254 mg/dL (03 Oct 2018 21:17)      RADIOLOGY & ADDITIONAL TESTS:

## 2018-10-04 NOTE — PROGRESS NOTE ADULT - SUBJECTIVE AND OBJECTIVE BOX
Diabetes Follow up note:  Interval Hx:  83 year old male w/uncontrolled T2DM w/CKD 3, mild dementia here w/mental status change and urosepsis on IV abx. Pt w/persistently elevated glucose values despite increasing basal/bolus doses. Slightly improved today as fasting sugar 230mg/dl. Per team, going to likely go up to [facility] today. Pt reports eating good appetite.       Review of Systems:  General: c/o fatigue. "I feel wiped out"  GI: Tolerating POs without any N/V/D/ABD PAIN. + BM today  CV: No CP/SOB  ENDO: No S&Sx of hypoglycemia  MEDS:  atorvastatin 40 milliGRAM(s) Oral at bedtime    insulin glargine Injectable (LANTUS) 40 Unit(s) SubCutaneous at bedtime  insulin lispro (HumaLOG) corrective regimen sliding scale   SubCutaneous at bedtime  insulin lispro (HumaLOG) corrective regimen sliding scale   SubCutaneous three times a day before meals  insulin lispro Injectable (HumaLOG) 12 Unit(s) SubCutaneous three times a day before meals    cefTRIAXone   IVPB 1 Gram(s) IV Intermittent every 24 hours    Allergies    No Known Allergies        PE:  General: Male sitting in chair. NAD.   Vital Signs Last 24 Hrs  T(C): 37 (04 Oct 2018 05:11), Max: 37 (03 Oct 2018 14:33)  T(F): 98.6 (04 Oct 2018 05:11), Max: 98.6 (03 Oct 2018 14:33)  HR: 78 (04 Oct 2018 05:11) (74 - 78)  BP: 117/72 (04 Oct 2018 05:11) (117/72 - 143/75)  BP(mean): --  RR: 20 (04 Oct 2018 05:11) (20 - 20)  SpO2: 95% (04 Oct 2018 05:11) (94% - 95%)  Abd: Soft, NT,ND, Central adiposity.   Extremities: Warm. no edema noted.   Neuro: A&O X2    LABS:    POCT Blood Glucose.: 230 mg/dL (10-04-18 @ 07:54)  POCT Blood Glucose.: 254 mg/dL (10-03-18 @ 21:17)  POCT Blood Glucose.: 282 mg/dL (10-03-18 @ 16:37)  POCT Blood Glucose.: 373 mg/dL (10-03-18 @ 11:57)  POCT Blood Glucose.: 293 mg/dL (10-03-18 @ 08:37)  POCT Blood Glucose.: 322 mg/dL (10-03-18 @ 08:31)  POCT Blood Glucose.: 349 mg/dL (10-02-18 @ 21:45)  POCT Blood Glucose.: 376 mg/dL (10-02-18 @ 16:47)  POCT Blood Glucose.: 306 mg/dL (10-02-18 @ 12:12)  POCT Blood Glucose.: 279 mg/dL (10-02-18 @ 08:03)  POCT Blood Glucose.: 398 mg/dL (10-01-18 @ 21:20)  POCT Blood Glucose.: 406 mg/dL (10-01-18 @ 16:38)  POCT Blood Glucose.: 457 mg/dL (10-01-18 @ 16:37)  POCT Blood Glucose.: 337 mg/dL (10-01-18 @ 12:22)                            16.0   7.64  )-----------( 260      ( 03 Oct 2018 09:00 )             47.9       10-03    139  |  100  |  21  ----------------------------<  282<H>  4.0   |  26  |  1.21    Ca    9.2      03 Oct 2018 07:35            Hemoglobin A1C, Whole Blood: 9.2 % <H> [4.0 - 5.6] (09-29-18 @ 08:03)            Contact number: kelly [phone] or [phone]

## 2018-10-05 ENCOUNTER — TRANSCRIPTION ENCOUNTER (OUTPATIENT)
Age: 83
End: 2018-10-05

## 2018-10-05 VITALS
OXYGEN SATURATION: 95 % | HEART RATE: 84 BPM | WEIGHT: 214.95 LBS | RESPIRATION RATE: 17 BRPM | TEMPERATURE: 98 F | SYSTOLIC BLOOD PRESSURE: 123 MMHG | DIASTOLIC BLOOD PRESSURE: 71 MMHG

## 2018-10-05 DIAGNOSIS — E11.65 TYPE 2 DIABETES MELLITUS WITH HYPERGLYCEMIA: ICD-10-CM

## 2018-10-05 LAB
ANION GAP SERPL CALC-SCNC: 10 MMOL/L — SIGNIFICANT CHANGE UP (ref 5–17)
BUN SERPL-MCNC: 23 MG/DL — SIGNIFICANT CHANGE UP (ref 7–23)
CALCIUM SERPL-MCNC: 8.7 MG/DL — SIGNIFICANT CHANGE UP (ref 8.4–10.5)
CHLORIDE SERPL-SCNC: 101 MMOL/L — SIGNIFICANT CHANGE UP (ref 96–108)
CO2 SERPL-SCNC: 27 MMOL/L — SIGNIFICANT CHANGE UP (ref 22–31)
CREAT SERPL-MCNC: 1.32 MG/DL — HIGH (ref 0.5–1.3)
GLUCOSE BLDC GLUCOMTR-MCNC: 228 MG/DL — HIGH (ref 70–99)
GLUCOSE BLDC GLUCOMTR-MCNC: 261 MG/DL — HIGH (ref 70–99)
GLUCOSE SERPL-MCNC: 315 MG/DL — HIGH (ref 70–99)
POTASSIUM SERPL-MCNC: 4 MMOL/L — SIGNIFICANT CHANGE UP (ref 3.5–5.3)
POTASSIUM SERPL-SCNC: 4 MMOL/L — SIGNIFICANT CHANGE UP (ref 3.5–5.3)
SODIUM SERPL-SCNC: 138 MMOL/L — SIGNIFICANT CHANGE UP (ref 135–145)

## 2018-10-05 PROCEDURE — 99232 SBSQ HOSP IP/OBS MODERATE 35: CPT

## 2018-10-05 RX ORDER — SITAGLIPTIN 50 MG/1
1 TABLET, FILM COATED ORAL
Qty: 0 | Refills: 0 | COMMUNITY

## 2018-10-05 RX ORDER — INSULIN GLARGINE 100 [IU]/ML
0 INJECTION, SOLUTION SUBCUTANEOUS
Qty: 0 | Refills: 0 | DISCHARGE
Start: 2018-10-05

## 2018-10-05 RX ORDER — SODIUM CHLORIDE 0.65 %
0 AEROSOL, SPRAY (ML) NASAL
Qty: 0 | Refills: 0 | DISCHARGE
Start: 2018-10-05

## 2018-10-05 RX ORDER — INSULIN LISPRO 100/ML
18 VIAL (ML) SUBCUTANEOUS
Qty: 0 | Refills: 0 | Status: DISCONTINUED | OUTPATIENT
Start: 2018-10-05 | End: 2018-10-05

## 2018-10-05 RX ORDER — INSULIN GLARGINE 100 [IU]/ML
52 INJECTION, SOLUTION SUBCUTANEOUS AT BEDTIME
Qty: 0 | Refills: 0 | Status: DISCONTINUED | OUTPATIENT
Start: 2018-10-05 | End: 2018-10-05

## 2018-10-05 RX ORDER — GALANTAMINE HYDROBROMIDE 4 MG/1
1 TABLET, FILM COATED ORAL
Qty: 0 | Refills: 0 | COMMUNITY

## 2018-10-05 RX ADMIN — Medication 1 SPRAY(S): at 05:00

## 2018-10-05 RX ADMIN — Medication 6: at 12:32

## 2018-10-05 RX ADMIN — Medication 50 MILLIGRAM(S): at 05:00

## 2018-10-05 RX ADMIN — Medication 1 TABLET(S): at 12:33

## 2018-10-05 RX ADMIN — Medication 16 UNIT(S): at 08:18

## 2018-10-05 RX ADMIN — Medication 4: at 08:18

## 2018-10-05 RX ADMIN — Medication 10 MILLIGRAM(S): at 05:01

## 2018-10-05 RX ADMIN — Medication 18 UNIT(S): at 12:32

## 2018-10-05 RX ADMIN — Medication 500 MILLIGRAM(S): at 11:55

## 2018-10-05 RX ADMIN — Medication 81 MILLIGRAM(S): at 11:55

## 2018-10-05 RX ADMIN — Medication 1 TABLET(S): at 11:55

## 2018-10-05 RX ADMIN — Medication 1 SPRAY(S): at 11:55

## 2018-10-05 RX ADMIN — PANTOPRAZOLE SODIUM 40 MILLIGRAM(S): 20 TABLET, DELAYED RELEASE ORAL at 05:00

## 2018-10-05 RX ADMIN — CARVEDILOL PHOSPHATE 6.25 MILLIGRAM(S): 80 CAPSULE, EXTENDED RELEASE ORAL at 05:00

## 2018-10-05 RX ADMIN — Medication 20 MILLIGRAM(S): at 05:00

## 2018-10-05 NOTE — PROGRESS NOTE ADULT - PROBLEM SELECTOR PROBLEM 5
Type 2 diabetes mellitus

## 2018-10-05 NOTE — DISCHARGE NOTE ADULT - PATIENT PORTAL LINK FT
You can access the [facility] Patient Portal, offered by [facility], by registering with the following website: [email or web address]

## 2018-10-05 NOTE — PROGRESS NOTE ADULT - PROBLEM SELECTOR PLAN 2
Replace with goal of 40 to 50
history of NPH.  Head CT noted.  No change. PT eval.
Replace with goal of 40 to 50

## 2018-10-05 NOTE — PROGRESS NOTE ADULT - PROBLEM SELECTOR PROBLEM 1
Acute cystitis with hematuria
Type 2 diabetes mellitus with hyperglycemia, with long-term current use of insulin
Type 2 diabetes mellitus with stage 3 chronic kidney disease, with long-term current use of insulin
Type 2 diabetes mellitus with stage 3 chronic kidney disease, with long-term current use of insulin

## 2018-10-05 NOTE — DISCHARGE NOTE ADULT - MEDICATION SUMMARY - MEDICATIONS TO STOP TAKING
I will STOP taking the medications listed below when I get home from the hospital:    galantamine 8 mg oral capsule, extended release  -- 1 cap(s) by mouth once a day (in the morning)  -- verified with duane reade    Januvia 50 mg oral tablet  -- 1 tab(s) by mouth once a day    acetaminophen 500 mg oral tablet  -- 2 tab(s) by mouth once a day, As Needed    glipiZIDE 10 mg oral tablet, extended release  -- 1 tab(s) by mouth 2 times a day  -- verified with duane reade

## 2018-10-05 NOTE — PROGRESS NOTE ADULT - PROBLEM SELECTOR PLAN 3
Baseline.  Continue aricept.  f/u with neuro as outpatient.
follow renal function
follow renal function
follow renal function  has been stable

## 2018-10-05 NOTE — PROGRESS NOTE ADULT - PROBLEM SELECTOR PROBLEM 2
NPH (Normal Pressure Hydrocephalus)
Vitamin D deficiency

## 2018-10-05 NOTE — PROGRESS NOTE ADULT - ASSESSMENT
83M hx HTN, DM2 on oral meds, hx of UTIs, mild dementia and Normal pressure hydrocephalus  s/p ventriculostomy of the third ventricle 2012 presents with AMS since this AM. Wife had trouble waking pt up this morning so called EMS. Pt was unarousable with sternal rub, FS was 242 and T 100.1F. On transport, 150cc of IVF were given, frequent PVCs were seen on monitor and pt was satting at 90% on RA. Wife endorsed that Pt had a lot more frequent urination since yesterday. Pt denies abdominal pain, F/C, N/V, diarrhea, constipation, dysuria, flank pain, hematuria, CP, SOB, weakness, speech changes or HA. With a suggested Urinary tract infection, Patient was started on abx with improvement insymptoms
82 yo male with T2DM x 4-5 years uncontrolled with CKD 3 here with change in mental status found to have urosepsis, endocrine consulted for elevated glucose in setting of infection. Will increase basal/bolus to improve glycemic control while inpatient. May need further adjustment while in rehab. Suspect insulin resistance may improve once infection clears. BG goal (100-180mg/dl).
83M hx HTN, DM2 on oral meds, hx of UTIs, mild dementia and Normal pressure hydrocephalus  s/p ventriculostomy of the third ventricle 2012 presents with AMS since this AM. Wife had trouble waking pt up this morning so called EMS. Pt was unarousable with sternal rub, FS was 242 and T 100.1F. On transport, 150cc of IVF were given, frequent PVCs were seen on monitor and pt was satting at 90% on RA. Wife endorsed that Pt had a lot more frequent urination since yesterday. Pt denies abdominal pain, F/C, N/V, diarrhea, constipation, dysuria, flank pain, hematuria, CP, SOB, weakness, speech changes or HA
83M hx HTN, DM2 on oral meds, hx of UTIs, mild dementia and Normal pressure hydrocephalus  s/p ventriculostomy of the third ventricle 2012 presents with AMS since this AM. Wife had trouble waking pt up this morning so called EMS. Pt was unarousable with sternal rub, FS was 242 and T 100.1F. On transport, 150cc of IVF were given, frequent PVCs were seen on monitor and pt was satting at 90% on RA. Wife endorsed that Pt had a lot more frequent urination since yesterday. Pt denies abdominal pain, F/C, N/V, diarrhea, constipation, dysuria, flank pain, hematuria, CP, SOB, weakness, speech changes or HA. With a suggested Urinary tract infection, Patient was started on abx with improvement insymptoms
83M hx HTN, DM2 on oral meds, hx of UTIs, mild dementia and Normal pressure hydrocephalus  s/p ventriculostomy of the third ventricle 2012 presents with AMS since this AM. Wife had trouble waking pt up this morning so called EMS. Pt was unarousable with sternal rub, FS was 242 and T 100.1F. On transport, 150cc of IVF were given, frequent PVCs were seen on monitor and pt was satting at 90% on RA. Wife endorsed that Pt had a lot more frequent urination since yesterday. Pt denies abdominal pain, F/C, N/V, diarrhea, constipation, dysuria, flank pain, hematuria, CP, SOB, weakness, speech changes or HA. With a suggested Urinary tract infection, Patient was started on abx with improvement insymptoms. awaiting DC to rehab
83M hx HTN, DM2 on oral meds, hx of UTIs, mild dementia and Normal pressure hydrocephalus  s/p ventriculostomy of the third ventricle 2012 presents with AMS since this AM. Wife had trouble waking pt up this morning so called EMS. Pt was unarousable with sternal rub, FS was 242 and T 100.1F. On transport, 150cc of IVF were given, frequent PVCs were seen on monitor and pt was satting at 90% on RA. Wife endorsed that Pt had a lot more frequent urination since yesterday. Pt denies abdominal pain, F/C, N/V, diarrhea, constipation, dysuria, flank pain, hematuria, CP, SOB, weakness, speech changes or HA. With a suggested Urinary tract infection, Patient was started on abx with improvement insymptoms. awaiting DC to rehab
84 yo male with T2DM x 4-5 years uncontrolled with CKD 3 here with change in mental status found to have urosepsis, endocrine consulted for elevated glucose in setting of infection. Will increase basal/bolus to improve glycemic control while inpatient. Insulin requirements may be increased as patient is off his Oral DM meds as well. BG goal (100-180mg/dl). Pt does not self-administer meds and has family assistance at home.
84 yo male with T2DM x 4-5 years uncontrolled with CKD 3 here with change in mental status found to have urosepsis, endocrine consulted for elevated glucose in setting of infection. Will increase basal/bolus to improve glycemic control while inpatient. May need further adjustment while in rehab. Off oral DM meds which may be contributing to his high insulin requirements. BG goal (100-180mg/dl).
consider speech and swallow eval  abx as per med/ urology  continue protonix  pt
consider speech and swallow assessment  abx as per med/urology   continue ppi
consider speech and swallow eval given underlying neurologic disease  rocephin as per med/ urology  continue pantoprazole  subacute rehab pending

## 2018-10-05 NOTE — PROGRESS NOTE ADULT - PROBLEM SELECTOR PLAN 5
follow glucose and adjust insulin as needed
continue current regimen  continue to follow glucose and adjust as needed
continue current regimen  continue to follow glucose and adjust as needed
endocrine called to see Patient  DC planning when glucose is better controlled
follow glucose and adjust insulin as needed

## 2018-10-05 NOTE — PROGRESS NOTE ADULT - SUBJECTIVE AND OBJECTIVE BOX
83M hx HTN, DM2 on oral meds, hx of UTIs, mild dementia and Normal pressure hydrocephalus  s/p ventriculostomy of the third ventricle 2012 presents with AMS since this AM. Wife had trouble waking pt up this morning so called EMS. Pt was unarousable with sternal rub, FS was 242 and T 100.1F. On transport, 150cc of IVF were given, frequent PVCs were seen on monitor and pt was satting at 90% on RA. Wife endorsed that Pt had a lot more frequent urination since yesterday. Pt denies abdominal pain, F/C, N/V, diarrhea, constipation, dysuria, flank pain, hematuria, CP, SOB, weakness, speech changes or HA. The patient was seen by neurology today and advised CT brain to evaluate for SDH/Hydrocephalus  after  ventriculostomy.  endo crine to follow with elevated glucose   Finished treatment and needs to go to rehab today    MEDICATIONS  (STANDING):  ascorbic acid 500 milliGRAM(s) Oral daily  aspirin enteric coated 81 milliGRAM(s) Oral daily  atorvastatin 40 milliGRAM(s) Oral at bedtime  carvedilol 6.25 milliGRAM(s) Oral every 12 hours  cefTRIAXone   IVPB 1 Gram(s) IV Intermittent every 24 hours  dextrose 5%. 1000 milliLiter(s) (50 mL/Hr) IV Continuous <Continuous>  dextrose 50% Injectable 12.5 Gram(s) IV Push once  dextrose 50% Injectable 25 Gram(s) IV Push once  dextrose 50% Injectable 25 Gram(s) IV Push once  donepezil 10 milliGRAM(s) Oral at bedtime  influenza   Vaccine 0.5 milliLiter(s) IntraMuscular once  insulin glargine Injectable (LANTUS) 45 Unit(s) SubCutaneous at bedtime  insulin lispro (HumaLOG) corrective regimen sliding scale   SubCutaneous at bedtime  insulin lispro (HumaLOG) corrective regimen sliding scale   SubCutaneous three times a day before meals  insulin lispro Injectable (HumaLOG) 16 Unit(s) SubCutaneous three times a day before meals  lactobacillus acidophilus 1 Tablet(s) Oral daily  multivitamin/minerals 1 Tablet(s) Oral daily  oxybutynin 10 milliGRAM(s) Oral two times a day  pantoprazole    Tablet 40 milliGRAM(s) Oral before breakfast  pregabalin 50 milliGRAM(s) Oral two times a day  sodium chloride 0.65% Nasal 1 Spray(s) Both Nostrils four times a day  tamsulosin 0.4 milliGRAM(s) Oral at bedtime  torsemide 20 milliGRAM(s) Oral daily    MEDICATIONS  (PRN):  acetaminophen   Tablet .. 650 milliGRAM(s) Oral every 6 hours PRN Temp greater or equal to 38C (100.4F)  dextrose 40% Gel 15 Gram(s) Oral once PRN Blood Glucose LESS THAN 70 milliGRAM(s)/deciliter  glucagon  Injectable 1 milliGRAM(s) IntraMuscular once PRN Glucose LESS THAN 70 milligrams/deciliter          VITALS:     	GENERAL: NAD, well nourished and conversant  	HEAD:  Atraumatic  	EYES: EOM, PERRLA, conjunctiva pink and sclera white  	ENT: No tonsillar erythema, exudates, or enlargement, moist mucous membranes, good dentition, no lesions  	NECK: Supple, No JVD, normal thyroid, carotids with normal upstrokes and no bruits  	CHEST/LUNG: Clear to auscultation bilaterally, No rales, rhonchi, wheezing, or rubs  	HEART: Regular rate and rhythm, No murmurs, rubs, or gallops  	ABDOMEN: Soft, nondistended, no masses, guarding, tenderness or rebound, bowel sounds present  	EXTREMITIES:  2+ Peripheral Pulses, No clubbing, cyanosis, or edema. No arthritis of shoulders, elbows, hands, hips, knees, ankles, or feet. No DJD C spine, T spine, or L/S spine  	LYMPH: No lymphadenopathy noted  	SKIN: No rashes or lesions  NERVOUS SYSTEM:  minimally confused Motor Strength 5/5 right upper and right lower.  5/5 left upper and left lower extremities, DTRs 2+ intact and symmetric    LABS:        CBC Full  -  ( 05 Oct 2018 09:43 )  WBC Count : 11.4 K/uL  Hemoglobin : 16.8 g/dL  Hematocrit : 49.5 %  Platelet Count - Automated : 312 K/uL  Mean Cell Volume : 78.2 fl  Mean Cell Hemoglobin : 26.5 pg  Mean Cell Hemoglobin Concentration : 33.9 gm/dL  Auto Neutrophil # : x  Auto Lymphocyte # : x  Auto Monocyte # : x  Auto Eosinophil # : x  Auto Basophil # : x  Auto Neutrophil % : x  Auto Lymphocyte % : x  Auto Monocyte % : x  Auto Eosinophil % : x  Auto Basophil % : x    10-05    138  |  101  |  23  ----------------------------<  315<H>  4.0   |  27  |  1.32<H>    Ca    8.7      05 Oct 2018 09:40            CBC Full  -  ( 03 Oct 2018 09:00 )  WBC Count : 7.64 K/uL  Hemoglobin : 16.0 g/dL  Hematocrit : 47.9 %  Platelet Count - Automated : 260 K/uL  Mean Cell Volume : 77.1 fl  Mean Cell Hemoglobin : 25.8 pg  Mean Cell Hemoglobin Concentration : 33.4 gm/dL  Auto Neutrophil # : x  Auto Lymphocyte # : x  Auto Monocyte # : x  Auto Eosinophil # : x  Auto Basophil # : x  Auto Neutrophil % : x  Auto Lymphocyte % : x  Auto Monocyte % : x  Auto Eosinophil % : x  Auto Basophil % : x    10-03    139  |  100  |  21  ----------------------------<  282<H>  4.0   |  26  |  1.21    Ca    9.2      03 Oct 2018 07:35            CAPILLARY BLOOD GLUCOSE      POCT Blood Glucose.: 234 mg/dL (04 Oct 2018 16:40)  POCT Blood Glucose.: 245 mg/dL (04 Oct 2018 12:13)  POCT Blood Glucose.: 230 mg/dL (04 Oct 2018 07:54)  POCT Blood Glucose.: 254 mg/dL (03 Oct 2018 21:17)      RADIOLOGY & ADDITIONAL TESTS:

## 2018-10-05 NOTE — PROGRESS NOTE ADULT - PROBLEM SELECTOR PROBLEM 3
Mild dementia
Nephrotic syndrome

## 2018-10-05 NOTE — PROGRESS NOTE ADULT - PROBLEM SELECTOR PROBLEM 7
NPH (Normal Pressure Hydrocephalus)

## 2018-10-05 NOTE — PROGRESS NOTE ADULT - REASON FOR ADMISSION
Fever  leucocytosis and increased confusion
Fever  leucocytosis and increased confusion  cough
Fever  leucocytosis and increased confusion
Fever  leucocytosis and increased confusion  cough
Fever  leucocytosis and increased confusion

## 2018-10-05 NOTE — DISCHARGE NOTE ADULT - CARE PROVIDER_API CALL
Eleazar Duque), Gastroenterology; Internal Medicine  [address] [place] [address]  Phone: [phone]  Fax: [phone]

## 2018-10-05 NOTE — DISCHARGE NOTE ADULT - PLAN OF CARE
resolved s/p course of iv rocephin  f/u with PMD take prescribed medications  f/u with PCP take prescribed medications  f/u PCP f/u private neurology monitor BS; take prescribed medication; f/u with private endocrinologist

## 2018-10-05 NOTE — DISCHARGE NOTE ADULT - SECONDARY DIAGNOSIS.
Hypertension, Essential BPH (benign prostatic hyperplasia) Mild dementia Normal pressure hydrocephalus Type 2 diabetes mellitus

## 2018-10-05 NOTE — PROGRESS NOTE ADULT - PROBLEM SELECTOR PLAN 6
check sonogram and Residual  urine

## 2018-10-05 NOTE — DISCHARGE NOTE ADULT - HOSPITAL COURSE
83M hx HTN, DM2 on oral meds, hx of UTIs, mild dementia and Normal pressure hydrocephalus  s/p ventriculostomy of the third ventricle 2012 presents with AMS ; diagnosed with uti; treated with 7 days iv antibiotics; followed by neuro - CT head unchanged; stable for d/c to [facility]

## 2018-10-05 NOTE — PROGRESS NOTE ADULT - SUBJECTIVE AND OBJECTIVE BOX
Diabetes Follow up note:  Interval Hx:  83 year old male w/uncontrolled T2DM w/CKD 3, mild dementia here w/mental status change and urosepsis. BG values persistently elevated in 200's. Pt seen at bedside. OOB to chair. Reports eating all of his meals. Going to rehab today.    Review of Systems:  General: "I feel fine Doc" no complaints.  GI: Tolerating POs without any N/V/D/ABD PAIN. + BM today  CV: No CP/SOB  ENDO: No S&Sx of hypoglycemia  MEDS:  atorvastatin 40 milliGRAM(s) Oral at bedtime    insulin glargine Injectable (LANTUS) 45 Unit(s) SubCutaneous at bedtime  insulin lispro (HumaLOG) corrective regimen sliding scale   SubCutaneous at bedtime  insulin lispro (HumaLOG) corrective regimen sliding scale   SubCutaneous three times a day before meals  insulin lispro Injectable (HumaLOG) 16 Unit(s) SubCutaneous three times a day before meals      Allergies    No Known Allergies        PE:  General: Male sitting in chair. NAD.   Vital Signs Last 24 Hrs  T(C): 36.4 (05 Oct 2018 05:00), Max: 37 (04 Oct 2018 14:35)  T(F): 97.5 (05 Oct 2018 05:00), Max: 98.6 (04 Oct 2018 14:35)  HR: 79 (05 Oct 2018 05:00) (76 - 79)  BP: 117/68 (05 Oct 2018 05:00) (104/67 - 128/71)  BP(mean): --  RR: 18 (05 Oct 2018 05:00) (18 - 20)  SpO2: 94% (05 Oct 2018 05:00) (93% - 95%)  Resp: CTA b/l. no wheeze/accessory muscle use.   Abd: Soft, NT,ND, Central adiposity  Extremities: Warm. no edema  Neuro: A&O X 2    LABS:    POCT Blood Glucose.: 228 mg/dL (10-05-18 @ 08:01)  POCT Blood Glucose.: 294 mg/dL (10-04-18 @ 21:48)  POCT Blood Glucose.: 234 mg/dL (10-04-18 @ 16:40)  POCT Blood Glucose.: 245 mg/dL (10-04-18 @ 12:13)  POCT Blood Glucose.: 230 mg/dL (10-04-18 @ 07:54)  POCT Blood Glucose.: 254 mg/dL (10-03-18 @ 21:17)  POCT Blood Glucose.: 282 mg/dL (10-03-18 @ 16:37)  POCT Blood Glucose.: 373 mg/dL (10-03-18 @ 11:57)  POCT Blood Glucose.: 293 mg/dL (10-03-18 @ 08:37)  POCT Blood Glucose.: 322 mg/dL (10-03-18 @ 08:31)  POCT Blood Glucose.: 349 mg/dL (10-02-18 @ 21:45)  POCT Blood Glucose.: 376 mg/dL (10-02-18 @ 16:47)  POCT Blood Glucose.: 306 mg/dL (10-02-18 @ 12:12)                            16.8   11.4  )-----------( 312      ( 05 Oct 2018 09:43 )             49.5                   Hemoglobin A1C, Whole Blood: 9.2 % <H> [4.0 - 5.6] (09-29-18 @ 08:03)            Contact number: kelly [phone] or [phone]

## 2018-10-05 NOTE — DISCHARGE NOTE ADULT - MEDICATION SUMMARY - MEDICATIONS TO CHANGE
I will SWITCH the dose or number of times a day I take the medications listed below when I get home from the hospital:    insulin glargine  -- 24 unit(s) subcutaneous once a day (at bedtime)

## 2018-10-05 NOTE — PROGRESS NOTE ADULT - PROBLEM SELECTOR PLAN 1
AMS overall improved, underlying UTI, on ABX.  Medicine f/u.
cultures positive for E coli sensitive to most abx  continue ceftriaxone for now . cipro prior to DC  mental status improved
-test BG AC/HS  -Increase Lantus 36 units QHS  -Increase Humalog 12 units AC meals  -c/w Humalog moderate correction scale AC and Mod HS scale  -f/u outpt w/Dr Apple ([place])  discharge plan: TBD at time of discharge  pager: [phone]
-test BG AC/HS  -Increase Lantus 45 units QHS  -Increase Humalog 16 units AC meals  -c/w Humalog moderate correction scale AC and Mod HS scale  -Can be discharged to rehab on present insulin doses. May need further titration while in [facility].  -Can f/u outpt w/Dr Apple (private endocrinologist)  discussed w/pt and medicine NP Bebe  pager: [phone]
Patient completed 7 days of abx will dc abx   Plan placement to [facility]
Patient completed 7 days of abx will dc abx   awaiting placement
cultures positive for E coli sensitive to most abx  continue ceftriaxone for now . cipro prior to DC  mental status improved
follow cultures  continue ceftriaxone  mental status improved
test BG AC/HS  -Increase Lantus 52 units QHS  -Increase Humalog 18 units AC meals  -c/w Humalog moderate correction scale AC and Mod HS scale  -Can be discharged to rehab on present insulin doses. May need further titration while in [facility].  -Can f/u outpt w/Dr Apple (private endocrinologist)  discussed w/pt and medicine NP Bebe  pager: [phone].

## 2018-10-05 NOTE — DISCHARGE NOTE ADULT - MEDICATION SUMMARY - MEDICATIONS TO TAKE
I will START or STAY ON the medications listed below when I get home from the hospital:    Aspirin Enteric Coated 81 mg oral delayed release tablet  -- 1 tab(s) by mouth once a day  -- Indication: For Antiplatelet    tamsulosin 0.4 mg oral capsule  -- 1 cap(s) by mouth once a day (at bedtime)  -- verified with duane reade  -- Indication: For     Lyrica 50 mg oral capsule  -- 1 cap(s) by mouth 2 times a day  -- Indication: For cns    insulin glargine  -- 52 units sc q hs  -- Indication: For diabetes    HumaLOG 100 units/mL subcutaneous solution  -- 18 units sc 3x daily before meals  -- Indication: For diabetes    HumaLOG 100 units/mL subcutaneous solution  -- sliding sacle 3x daily beofre meals  2 units sc for -200  4 units sc for -250  6 units sc for -300  8 units sc for -350  10 units sc for -400   12 units  sc for FS > 400  -- Indication: For diabetes    HumaLOG 100 units/mL subcutaneous solution  -- sliding scale at hs  o units sc for FS 0-250  2 units sc for -300  4 units sc for -350  6 units sc for -400  8 units sc for FS>400  -- Indication: For diabetes    Crestor 10 mg oral tablet  -- 1 tab(s) by mouth once a day (at bedtime)  -- verified with duane reade  -- Indication: For cholesterol    carvedilol 6.25 mg oral tablet  -- 1 tab(s) by mouth every 12 hours  -- Indication: For Htn    donepezil 10 mg oral tablet  -- 1 tab(s) by mouth once a day (at bedtime)  -- Indication: For Memory    torsemide 20 mg oral tablet  -- 1 tab(s) by mouth once a day  -- Indication: For diuretic    sodium chloride 0.65% nasal spray  --  1 spray both nostrils 4x daily  -- Indication: For Nasal congestion    azelastine 0.05% ophthalmic solution  -- 1 drop(s) in each eye 2 times a day  -- Indication: For eye drops    lactobacillus acidophilus oral capsule  -- 1 cap(s) by mouth once a day   -- Indication: For Bowel kori    omeprazole 20 mg oral delayed release capsule  -- 1 cap(s) by mouth once a day  -- Indication: For GI    oxybutynin 5 mg oral tablet  -- 2 tab(s) by mouth 2 times a day  -- Indication: For     Centrum oral tablet  -- 1 tab(s) by mouth once a day  -- Indication: For supplement    ascorbic acid 500 mg oral tablet  -- 1 tab(s) by mouth once a day  -- Indication: For supplement

## 2018-10-05 NOTE — PROGRESS NOTE ADULT - PROBLEM SELECTOR PROBLEM 8
Hypertension, Essential

## 2018-10-05 NOTE — PROGRESS NOTE ADULT - PROVIDER SPECIALTY LIST ADULT
Endocrinology
Gastroenterology
Internal Medicine
Neurology
Internal Medicine

## 2018-11-11 PROCEDURE — 93005 ELECTROCARDIOGRAM TRACING: CPT

## 2018-11-11 PROCEDURE — 70450 CT HEAD/BRAIN W/O DYE: CPT

## 2018-11-11 PROCEDURE — 83036 HEMOGLOBIN GLYCOSYLATED A1C: CPT

## 2018-11-11 PROCEDURE — 70250 X-RAY EXAM OF SKULL: CPT

## 2018-11-11 PROCEDURE — 82553 CREATINE MB FRACTION: CPT

## 2018-11-11 PROCEDURE — 82947 ASSAY GLUCOSE BLOOD QUANT: CPT

## 2018-11-11 PROCEDURE — 76872 US TRANSRECTAL: CPT

## 2018-11-11 PROCEDURE — 96361 HYDRATE IV INFUSION ADD-ON: CPT

## 2018-11-11 PROCEDURE — 71045 X-RAY EXAM CHEST 1 VIEW: CPT

## 2018-11-11 PROCEDURE — 99285 EMERGENCY DEPT VISIT HI MDM: CPT | Mod: 25

## 2018-11-11 PROCEDURE — 81001 URINALYSIS AUTO W/SCOPE: CPT

## 2018-11-11 PROCEDURE — 84484 ASSAY OF TROPONIN QUANT: CPT

## 2018-11-11 PROCEDURE — 74018 RADEX ABDOMEN 1 VIEW: CPT

## 2018-11-11 PROCEDURE — 96374 THER/PROPH/DIAG INJ IV PUSH: CPT

## 2018-11-11 PROCEDURE — 82962 GLUCOSE BLOOD TEST: CPT

## 2018-11-11 PROCEDURE — 87150 DNA/RNA AMPLIFIED PROBE: CPT

## 2018-11-11 PROCEDURE — 82330 ASSAY OF CALCIUM: CPT

## 2018-11-11 PROCEDURE — 97162 PT EVAL MOD COMPLEX 30 MIN: CPT

## 2018-11-11 PROCEDURE — 84132 ASSAY OF SERUM POTASSIUM: CPT

## 2018-11-11 PROCEDURE — 94640 AIRWAY INHALATION TREATMENT: CPT

## 2018-11-11 PROCEDURE — 83735 ASSAY OF MAGNESIUM: CPT

## 2018-11-11 PROCEDURE — 80053 COMPREHEN METABOLIC PANEL: CPT

## 2018-11-11 PROCEDURE — 87086 URINE CULTURE/COLONY COUNT: CPT

## 2018-11-11 PROCEDURE — 82435 ASSAY OF BLOOD CHLORIDE: CPT

## 2018-11-11 PROCEDURE — 87186 SC STD MICRODIL/AGAR DIL: CPT

## 2018-11-11 PROCEDURE — G0103: CPT

## 2018-11-11 PROCEDURE — 80048 BASIC METABOLIC PNL TOTAL CA: CPT

## 2018-11-11 PROCEDURE — 85027 COMPLETE CBC AUTOMATED: CPT

## 2018-11-11 PROCEDURE — 83605 ASSAY OF LACTIC ACID: CPT

## 2018-11-11 PROCEDURE — 87040 BLOOD CULTURE FOR BACTERIA: CPT

## 2018-11-11 PROCEDURE — 76770 US EXAM ABDO BACK WALL COMP: CPT

## 2018-11-11 PROCEDURE — 85014 HEMATOCRIT: CPT

## 2018-11-11 PROCEDURE — 82803 BLOOD GASES ANY COMBINATION: CPT

## 2018-11-11 PROCEDURE — 84295 ASSAY OF SERUM SODIUM: CPT

## 2018-12-07 NOTE — PATIENT PROFILE ADULT. - PATIENT REPRESENTATIVE NAME
[Negative] : Heme/Lymph
patient able to communicate in english but prefers medical care to be explained in Slovenian
Korey Parmar, Wife

## 2019-04-02 NOTE — ED ADULT TRIAGE NOTE - BP NONINVASIVE DIASTOLIC (MM HG)
72 Electrodesiccation And Curettage Text: The wound bed was treated with electrodesiccation and curettage after the biopsy was performed.

## 2019-07-30 ENCOUNTER — INPATIENT (INPATIENT)
Facility: HOSPITAL | Age: 84
LOS: 6 days | Discharge: HOME | DRG: 178 | End: 2019-08-06
Attending: STUDENT IN AN ORGANIZED HEALTH CARE EDUCATION/TRAINING PROGRAM | Admitting: STUDENT IN AN ORGANIZED HEALTH CARE EDUCATION/TRAINING PROGRAM
Payer: MEDICARE

## 2019-07-30 VITALS
DIASTOLIC BLOOD PRESSURE: 64 MMHG | OXYGEN SATURATION: 96 % | TEMPERATURE: 98 F | HEIGHT: 69 IN | WEIGHT: 220.02 LBS | RESPIRATION RATE: 16 BRPM | HEART RATE: 68 BPM | SYSTOLIC BLOOD PRESSURE: 147 MMHG

## 2019-07-30 DIAGNOSIS — G91.2 (IDIOPATHIC) NORMAL PRESSURE HYDROCEPHALUS: Chronic | ICD-10-CM

## 2019-07-30 DIAGNOSIS — R09.02 HYPOXEMIA: ICD-10-CM

## 2019-07-30 LAB
ALBUMIN SERPL ELPH-MCNC: 3.8 G/DL — SIGNIFICANT CHANGE UP (ref 3.3–5)
ALP SERPL-CCNC: 80 U/L — SIGNIFICANT CHANGE UP (ref 40–120)
ALT FLD-CCNC: 18 U/L — SIGNIFICANT CHANGE UP (ref 10–45)
ANION GAP SERPL CALC-SCNC: 16 MMOL/L — SIGNIFICANT CHANGE UP (ref 5–17)
APPEARANCE UR: CLEAR — SIGNIFICANT CHANGE UP
AST SERPL-CCNC: 14 U/L — SIGNIFICANT CHANGE UP (ref 10–40)
BASOPHILS # BLD AUTO: 0 K/UL — SIGNIFICANT CHANGE UP (ref 0–0.2)
BASOPHILS NFR BLD AUTO: 0.2 % — SIGNIFICANT CHANGE UP (ref 0–2)
BILIRUB SERPL-MCNC: 0.8 MG/DL — SIGNIFICANT CHANGE UP (ref 0.2–1.2)
BILIRUB UR-MCNC: NEGATIVE — SIGNIFICANT CHANGE UP
BUN SERPL-MCNC: 16 MG/DL — SIGNIFICANT CHANGE UP (ref 7–23)
CALCIUM SERPL-MCNC: 9.5 MG/DL — SIGNIFICANT CHANGE UP (ref 8.4–10.5)
CHLORIDE SERPL-SCNC: 101 MMOL/L — SIGNIFICANT CHANGE UP (ref 96–108)
CO2 SERPL-SCNC: 24 MMOL/L — SIGNIFICANT CHANGE UP (ref 22–31)
COLOR SPEC: YELLOW — SIGNIFICANT CHANGE UP
CREAT SERPL-MCNC: 1.21 MG/DL — SIGNIFICANT CHANGE UP (ref 0.5–1.3)
DIFF PNL FLD: NEGATIVE — SIGNIFICANT CHANGE UP
EOSINOPHIL # BLD AUTO: 0.3 K/UL — SIGNIFICANT CHANGE UP (ref 0–0.5)
EOSINOPHIL NFR BLD AUTO: 1.6 % — SIGNIFICANT CHANGE UP (ref 0–6)
GLUCOSE SERPL-MCNC: 190 MG/DL — HIGH (ref 70–99)
GLUCOSE UR QL: NEGATIVE — SIGNIFICANT CHANGE UP
HCT VFR BLD CALC: 51.2 % — HIGH (ref 39–50)
HGB BLD-MCNC: 16.4 G/DL — SIGNIFICANT CHANGE UP (ref 13–17)
KETONES UR-MCNC: NEGATIVE — SIGNIFICANT CHANGE UP
LEUKOCYTE ESTERASE UR-ACNC: ABNORMAL
LIDOCAIN IGE QN: 25 U/L — SIGNIFICANT CHANGE UP (ref 7–60)
LYMPHOCYTES # BLD AUTO: 1.4 K/UL — SIGNIFICANT CHANGE UP (ref 1–3.3)
LYMPHOCYTES # BLD AUTO: 7.9 % — LOW (ref 13–44)
MCHC RBC-ENTMCNC: 26.3 PG — LOW (ref 27–34)
MCHC RBC-ENTMCNC: 32.1 GM/DL — SIGNIFICANT CHANGE UP (ref 32–36)
MCV RBC AUTO: 81.9 FL — SIGNIFICANT CHANGE UP (ref 80–100)
MONOCYTES # BLD AUTO: 1.6 K/UL — HIGH (ref 0–0.9)
MONOCYTES NFR BLD AUTO: 9.2 % — SIGNIFICANT CHANGE UP (ref 2–14)
NEUTROPHILS # BLD AUTO: 14 K/UL — HIGH (ref 1.8–7.4)
NEUTROPHILS NFR BLD AUTO: 81.1 % — HIGH (ref 43–77)
NITRITE UR-MCNC: NEGATIVE — SIGNIFICANT CHANGE UP
PH UR: 6 — SIGNIFICANT CHANGE UP (ref 5–8)
PLATELET # BLD AUTO: 183 K/UL — SIGNIFICANT CHANGE UP (ref 150–400)
POTASSIUM SERPL-MCNC: 4.2 MMOL/L — SIGNIFICANT CHANGE UP (ref 3.5–5.3)
POTASSIUM SERPL-SCNC: 4.2 MMOL/L — SIGNIFICANT CHANGE UP (ref 3.5–5.3)
PROT SERPL-MCNC: 6.7 G/DL — SIGNIFICANT CHANGE UP (ref 6–8.3)
PROT UR-MCNC: ABNORMAL
RBC # BLD: 6.26 M/UL — HIGH (ref 4.2–5.8)
RBC # FLD: 13 % — SIGNIFICANT CHANGE UP (ref 10.3–14.5)
SODIUM SERPL-SCNC: 141 MMOL/L — SIGNIFICANT CHANGE UP (ref 135–145)
SP GR SPEC: 1.01 — SIGNIFICANT CHANGE UP (ref 1.01–1.02)
UROBILINOGEN FLD QL: NEGATIVE — SIGNIFICANT CHANGE UP
WBC # BLD: 17.2 K/UL — HIGH (ref 3.8–10.5)
WBC # FLD AUTO: 17.2 K/UL — HIGH (ref 3.8–10.5)

## 2019-07-30 PROCEDURE — 71045 X-RAY EXAM CHEST 1 VIEW: CPT | Mod: 26

## 2019-07-30 PROCEDURE — 74177 CT ABD & PELVIS W/CONTRAST: CPT | Mod: 26

## 2019-07-30 PROCEDURE — 99285 EMERGENCY DEPT VISIT HI MDM: CPT | Mod: GC

## 2019-07-30 RX ORDER — AZITHROMYCIN 500 MG/1
500 TABLET, FILM COATED ORAL ONCE
Refills: 0 | Status: COMPLETED | OUTPATIENT
Start: 2019-07-30 | End: 2019-07-30

## 2019-07-30 RX ORDER — FUROSEMIDE 40 MG
20 TABLET ORAL ONCE
Refills: 0 | Status: DISCONTINUED | OUTPATIENT
Start: 2019-07-30 | End: 2019-07-30

## 2019-07-30 RX ORDER — CEFTRIAXONE 500 MG/1
1000 INJECTION, POWDER, FOR SOLUTION INTRAMUSCULAR; INTRAVENOUS ONCE
Refills: 0 | Status: COMPLETED | OUTPATIENT
Start: 2019-07-30 | End: 2019-07-30

## 2019-07-30 RX ORDER — VANCOMYCIN HCL 1 G
1000 VIAL (EA) INTRAVENOUS ONCE
Refills: 0 | Status: COMPLETED | OUTPATIENT
Start: 2019-07-30 | End: 2019-07-31

## 2019-07-30 RX ADMIN — AZITHROMYCIN 250 MILLIGRAM(S): 500 TABLET, FILM COATED ORAL at 22:52

## 2019-07-30 RX ADMIN — AZITHROMYCIN 500 MILLIGRAM(S): 500 TABLET, FILM COATED ORAL at 22:56

## 2019-07-30 RX ADMIN — CEFTRIAXONE 100 MILLIGRAM(S): 500 INJECTION, POWDER, FOR SOLUTION INTRAMUSCULAR; INTRAVENOUS at 22:21

## 2019-07-30 RX ADMIN — CEFTRIAXONE 1000 MILLIGRAM(S): 500 INJECTION, POWDER, FOR SOLUTION INTRAMUSCULAR; INTRAVENOUS at 22:26

## 2019-07-30 NOTE — ED ADULT NURSE REASSESSMENT NOTE - NS ED NURSE REASSESS COMMENT FT1
19:15. Report received from DIONE Beckett. Pt AAOx4, NAD, resp nonlabored, skin warm/dry, resting comfortably in bed with family at bedside. Pt denies headache, dizziness, chest pain, palpitations, SOB, abd pain, n/v/d, fevers, chills, weakness at this time. Blood work sent to lab. Clean catch urine sample obtained and sent to lab. Bed locked & in lowest position. Safety maintained. Will continue to reassess.

## 2019-07-30 NOTE — ED ADULT NURSE NOTE - OBJECTIVE STATEMENT
83 y/o male with pmhx of HTN, DM and high cholesterol biba sent by pmd for increased WBC's.  per ems, pt was being evaluated for c/o non productive cough associated with frequent urination x 1 day and the md found his [id number].  per spouse, no fever or chills noted at this time.  pt denies any pain during urination.  pt is awake and responsive to all stimuli with periods of confusion.  per spouse, pt at baseline mental status.  no sob or respiratory distress noted.  pt provided with chest PT when he was coughing.  pt feels warm to touch; T=99.9 rectal.  no sob or respiratory distress noted.  safety precautions in place.  spouse at bedside.  will continue to monitor.

## 2019-07-30 NOTE — ED ADULT NURSE NOTE - NSIMPLEMENTINTERV_GEN_ALL_ED
Implemented All Universal Safety Interventions:  [place] to call system. Call bell, personal items and telephone within reach. Instruct patient to call for assistance. Room bathroom lighting operational. Non-slip footwear when patient is off stretcher. Physically safe environment: no spills, clutter or unnecessary equipment. Stretcher in lowest position, wheels locked, appropriate side rails in place.

## 2019-07-30 NOTE — ED PROVIDER NOTE - PHYSICAL EXAMINATION
[Const]  [HEENT]  [Neck]  [CV]  [Lungs]  [Abd]  [MSK]  [Skin]  [Neuro]  [] [Const] resting comfortably, no acute distress  [HEENT] PERRL, EOM, MMM, OP clear, no erythema/tonsillar exudates  [Neck] Supple  [CV] +S1/S2  [Lungs] Basilar Expiratory rhonchi BL  [Abd] minimal epigastric tenderness  [MSK] 5/5 UE/LE str  [Skin] warm, dry, well-perfused  [Neuro] A&Ox3, no focal neuro deficits elicited

## 2019-07-30 NOTE — ED PROVIDER NOTE - CARE PLAN
Principal Discharge DX:	Hypoxia  Secondary Diagnosis:	Pneumonia of lower lobe due to infectious organism, unspecified laterality

## 2019-07-30 NOTE — ED PROVIDER NOTE - OBJECTIVE STATEMENT
83 y/o M w/ pmh recurrent UTIs, NPH w/ shunt, BPH, HTN p/w urinary frequency since last night. Referred by PMD for 20 WBC in UA. Endorses non-productive chronic cough for several weeks. Past week wife states patient has been acting "off." CXR done at [facility] site, negative per wife. Denies dysuria, hesitation, hematuria. Denies f/c, SOB, CP, abd, n/v/d, flank pain. 85 y/o M w/ pmh recurrent UTIs, NPH w/ shunt, BPH, HTN p/w urinary frequency since last night. Referred by PMD for 20 WBC. Endorses non-productive chronic cough for several weeks. Past week wife states patient has been acting "off." CXR done at [facility] site, negative per wife. Denies dysuria, hesitation, hematuria. Denies f/c, SOB, CP, abd, n/v/d, flank pain.

## 2019-07-31 DIAGNOSIS — E78.5 HYPERLIPIDEMIA, UNSPECIFIED: ICD-10-CM

## 2019-07-31 DIAGNOSIS — Z29.9 ENCOUNTER FOR PROPHYLACTIC MEASURES, UNSPECIFIED: ICD-10-CM

## 2019-07-31 DIAGNOSIS — N40.0 BENIGN PROSTATIC HYPERPLASIA WITHOUT LOWER URINARY TRACT SYMPTOMS: ICD-10-CM

## 2019-07-31 DIAGNOSIS — K21.9 GASTRO-ESOPHAGEAL REFLUX DISEASE WITHOUT ESOPHAGITIS: ICD-10-CM

## 2019-07-31 DIAGNOSIS — I10 ESSENTIAL (PRIMARY) HYPERTENSION: ICD-10-CM

## 2019-07-31 DIAGNOSIS — N39.0 URINARY TRACT INFECTION, SITE NOT SPECIFIED: ICD-10-CM

## 2019-07-31 DIAGNOSIS — E11.9 TYPE 2 DIABETES MELLITUS WITHOUT COMPLICATIONS: ICD-10-CM

## 2019-07-31 DIAGNOSIS — J15.9 UNSPECIFIED BACTERIAL PNEUMONIA: ICD-10-CM

## 2019-07-31 DIAGNOSIS — Z79.899 OTHER LONG TERM (CURRENT) DRUG THERAPY: ICD-10-CM

## 2019-07-31 LAB
LEGIONELLA AG UR QL: NEGATIVE — SIGNIFICANT CHANGE UP
RAPID RVP RESULT: SIGNIFICANT CHANGE UP

## 2019-07-31 PROCEDURE — 71250 CT THORAX DX C-: CPT | Mod: 26

## 2019-07-31 PROCEDURE — 12345: CPT | Mod: NC

## 2019-07-31 PROCEDURE — 99223 1ST HOSP IP/OBS HIGH 75: CPT | Mod: GC

## 2019-07-31 RX ORDER — DEXTROSE 50 % IN WATER 50 %
12.5 SYRINGE (ML) INTRAVENOUS ONCE
Refills: 0 | Status: DISCONTINUED | OUTPATIENT
Start: 2019-07-31 | End: 2019-08-06

## 2019-07-31 RX ORDER — ACARBOSE 25 MG/1
1 TABLET ORAL
Qty: 0 | Refills: 0 | DISCHARGE

## 2019-07-31 RX ORDER — TAMSULOSIN HYDROCHLORIDE 0.4 MG/1
0.4 CAPSULE ORAL AT BEDTIME
Refills: 0 | Status: DISCONTINUED | OUTPATIENT
Start: 2019-07-31 | End: 2019-08-06

## 2019-07-31 RX ORDER — DEXTROSE 50 % IN WATER 50 %
25 SYRINGE (ML) INTRAVENOUS ONCE
Refills: 0 | Status: DISCONTINUED | OUTPATIENT
Start: 2019-07-31 | End: 2019-08-06

## 2019-07-31 RX ORDER — GLUCAGON INJECTION, SOLUTION 0.5 MG/.1ML
1 INJECTION, SOLUTION SUBCUTANEOUS ONCE
Refills: 0 | Status: DISCONTINUED | OUTPATIENT
Start: 2019-07-31 | End: 2019-08-06

## 2019-07-31 RX ORDER — MIRABEGRON 50 MG/1
0 TABLET, EXTENDED RELEASE ORAL
Qty: 0 | Refills: 0 | DISCHARGE

## 2019-07-31 RX ORDER — INSULIN LISPRO 100/ML
0 VIAL (ML) SUBCUTANEOUS
Qty: 0 | Refills: 0 | DISCHARGE

## 2019-07-31 RX ORDER — MULTIVIT-MIN/FERROUS GLUCONATE 9 MG/15 ML
1 LIQUID (ML) ORAL
Qty: 0 | Refills: 0 | DISCHARGE

## 2019-07-31 RX ORDER — ASPIRIN/CALCIUM CARB/MAGNESIUM 324 MG
81 TABLET ORAL DAILY
Refills: 0 | Status: DISCONTINUED | OUTPATIENT
Start: 2019-07-31 | End: 2019-08-06

## 2019-07-31 RX ORDER — AZELASTINE HCL 0.05 %
1 DROPS OPHTHALMIC (EYE)
Qty: 0 | Refills: 0 | DISCHARGE

## 2019-07-31 RX ORDER — ATORVASTATIN CALCIUM 80 MG/1
40 TABLET, FILM COATED ORAL AT BEDTIME
Refills: 0 | Status: DISCONTINUED | OUTPATIENT
Start: 2019-07-31 | End: 2019-08-06

## 2019-07-31 RX ORDER — SODIUM CHLORIDE 9 MG/ML
1000 INJECTION, SOLUTION INTRAVENOUS
Refills: 0 | Status: DISCONTINUED | OUTPATIENT
Start: 2019-07-31 | End: 2019-08-06

## 2019-07-31 RX ORDER — AZITHROMYCIN 500 MG/1
500 TABLET, FILM COATED ORAL EVERY 24 HOURS
Refills: 0 | Status: COMPLETED | OUTPATIENT
Start: 2019-07-31 | End: 2019-08-03

## 2019-07-31 RX ORDER — INSULIN LISPRO 100/ML
VIAL (ML) SUBCUTANEOUS
Refills: 0 | Status: DISCONTINUED | OUTPATIENT
Start: 2019-07-31 | End: 2019-08-06

## 2019-07-31 RX ORDER — DONEPEZIL HYDROCHLORIDE 10 MG/1
10 TABLET, FILM COATED ORAL AT BEDTIME
Refills: 0 | Status: DISCONTINUED | OUTPATIENT
Start: 2019-07-31 | End: 2019-08-06

## 2019-07-31 RX ORDER — CEFTRIAXONE 500 MG/1
1000 INJECTION, POWDER, FOR SOLUTION INTRAMUSCULAR; INTRAVENOUS EVERY 24 HOURS
Refills: 0 | Status: COMPLETED | OUTPATIENT
Start: 2019-07-31 | End: 2019-08-04

## 2019-07-31 RX ORDER — CARVEDILOL PHOSPHATE 80 MG/1
6.25 CAPSULE, EXTENDED RELEASE ORAL EVERY 12 HOURS
Refills: 0 | Status: DISCONTINUED | OUTPATIENT
Start: 2019-07-31 | End: 2019-08-06

## 2019-07-31 RX ORDER — INSULIN LISPRO 100/ML
VIAL (ML) SUBCUTANEOUS AT BEDTIME
Refills: 0 | Status: DISCONTINUED | OUTPATIENT
Start: 2019-07-31 | End: 2019-08-06

## 2019-07-31 RX ORDER — METHENAMINE MANDELATE 1 G
0 TABLET ORAL
Qty: 0 | Refills: 0 | DISCHARGE

## 2019-07-31 RX ORDER — INSULIN GLARGINE 100 [IU]/ML
15 INJECTION, SOLUTION SUBCUTANEOUS AT BEDTIME
Refills: 0 | Status: DISCONTINUED | OUTPATIENT
Start: 2019-07-31 | End: 2019-08-01

## 2019-07-31 RX ORDER — ENOXAPARIN SODIUM 100 MG/ML
40 INJECTION SUBCUTANEOUS DAILY
Refills: 0 | Status: DISCONTINUED | OUTPATIENT
Start: 2019-07-31 | End: 2019-08-06

## 2019-07-31 RX ORDER — OMEPRAZOLE 10 MG/1
1 CAPSULE, DELAYED RELEASE ORAL
Qty: 0 | Refills: 0 | DISCHARGE

## 2019-07-31 RX ORDER — PANTOPRAZOLE SODIUM 20 MG/1
40 TABLET, DELAYED RELEASE ORAL
Refills: 0 | Status: DISCONTINUED | OUTPATIENT
Start: 2019-07-31 | End: 2019-08-06

## 2019-07-31 RX ORDER — DEXTROSE 50 % IN WATER 50 %
15 SYRINGE (ML) INTRAVENOUS ONCE
Refills: 0 | Status: DISCONTINUED | OUTPATIENT
Start: 2019-07-31 | End: 2019-08-06

## 2019-07-31 RX ADMIN — ENOXAPARIN SODIUM 40 MILLIGRAM(S): 100 INJECTION SUBCUTANEOUS at 12:30

## 2019-07-31 RX ADMIN — Medication 81 MILLIGRAM(S): at 12:30

## 2019-07-31 RX ADMIN — INSULIN GLARGINE 15 UNIT(S): 100 INJECTION, SOLUTION SUBCUTANEOUS at 21:56

## 2019-07-31 RX ADMIN — CARVEDILOL PHOSPHATE 6.25 MILLIGRAM(S): 80 CAPSULE, EXTENDED RELEASE ORAL at 18:18

## 2019-07-31 RX ADMIN — CARVEDILOL PHOSPHATE 6.25 MILLIGRAM(S): 80 CAPSULE, EXTENDED RELEASE ORAL at 05:43

## 2019-07-31 RX ADMIN — ATORVASTATIN CALCIUM 40 MILLIGRAM(S): 80 TABLET, FILM COATED ORAL at 21:57

## 2019-07-31 RX ADMIN — Medication 1: at 18:17

## 2019-07-31 RX ADMIN — Medication 1: at 09:47

## 2019-07-31 RX ADMIN — Medication 3: at 12:39

## 2019-07-31 RX ADMIN — DONEPEZIL HYDROCHLORIDE 10 MILLIGRAM(S): 10 TABLET, FILM COATED ORAL at 21:56

## 2019-07-31 RX ADMIN — Medication 250 MILLIGRAM(S): at 00:51

## 2019-07-31 RX ADMIN — CEFTRIAXONE 100 MILLIGRAM(S): 500 INJECTION, POWDER, FOR SOLUTION INTRAMUSCULAR; INTRAVENOUS at 21:56

## 2019-07-31 RX ADMIN — AZITHROMYCIN 250 MILLIGRAM(S): 500 TABLET, FILM COATED ORAL at 23:30

## 2019-07-31 RX ADMIN — PANTOPRAZOLE SODIUM 40 MILLIGRAM(S): 20 TABLET, DELAYED RELEASE ORAL at 06:40

## 2019-07-31 RX ADMIN — TAMSULOSIN HYDROCHLORIDE 0.4 MILLIGRAM(S): 0.4 CAPSULE ORAL at 21:56

## 2019-07-31 NOTE — H&P ADULT - PROBLEM SELECTOR PLAN 7
Patient on metformin and glipizide at home  - Will start patient on SSI  - Check a1c  - Adjust additional medications after reconcilliation Patient on metformin and glipizide at home; Pt endorses being on insulin in the past, but denies taking it currently.  - Will start patient on SSI  - Check a1c  - Adjust additional medications after reconcilliation

## 2019-07-31 NOTE — H&P ADULT - NSHPPHYSICALEXAM_GEN_ALL_CORE
Vital Signs Last 24 Hrs  T(C): 36.9 (31 Jul 2019 03:39), Max: 37.7 (30 Jul 2019 18:18)  T(F): 98.5 (31 Jul 2019 03:39), Max: 99.9 (30 Jul 2019 18:18)  HR: 75 (31 Jul 2019 03:39) (66 - 75)  BP: 142/78 (31 Jul 2019 03:39) (139/62 - 170/75)  BP(mean): --  RR: 18 (31 Jul 2019 03:39) (16 - 18)  SpO2: 96% (31 Jul 2019 03:39) (94% - 96%)    PHYSICAL EXAM:  GENERAL: NAD, well-developed, resting in bed  HEAD:  Atraumatic, Normocephalic  EYES: EOMI, PERRLA, conjunctiva and sclera clear  NECK: Supple, No JVD  CHEST/LUNG: Clear to auscultation bilaterally with rales in left lower lobe  HEART: Regular rate and rhythm; No murmurs, rubs, or gallops  ABDOMEN: Soft, Nontender, Nondistended; Bowel sounds present  EXTREMITIES:  2+ Peripheral Pulses, No clubbing, cyanosis, or edema  PSYCH: AAOx2 (Pt able to name his name, [facility], and Summer 2019)  NEUROLOGY: non-focal  SKIN: No rashes or lesions

## 2019-07-31 NOTE — PROGRESS NOTE ADULT - PROBLEM SELECTOR PLAN 8
Diet: DASH/TLD  DVT Prophylaxis: Lovenox  Dispo: Home, pending further workup    Margaret Silva  PGY-2 Internal Medicine  Pager: [phone] (NS)/[phone] (MARIA FERNANDA) Diet: DASH/TLD  DVT Prophylaxis: Lovenox  Dispo: Home, pending further workup

## 2019-07-31 NOTE — H&P ADULT - PROBLEM SELECTOR PLAN 2
pt c/o cough per wife, with rales in RRL, WRC of 17, and clear CXR, with negative RVP  - Continue with CTX and Azithromycin  - Urine legionella  - CT Scan to assess for PNA pt c/o cough per wife, with rales on exam, WBC of 17, and clear CXR, with negative RVP  - Continue with CTX and Azithromycin  - Urine legionella  - CT Scan to assess for PNA

## 2019-07-31 NOTE — PROGRESS NOTE ADULT - PROBLEM SELECTOR PLAN 1
Pt presenting with increased confusion & increased urinary frequency (per wife), found to have a WBC of 17, a LE on UA. S/p Vanc/CTX/Azithro in the ED  - Continue with CTX for UTI  - f/u Blood cultures  - f/u Urine culture

## 2019-07-31 NOTE — ED ADULT NURSE REASSESSMENT NOTE - NS ED NURSE REASSESS COMMENT FT1
00:00. pt sleeping comfortably in stretcher. Respirations spontaneous, non-labored. NAD. Pt pending medicine bed upstairs.

## 2019-07-31 NOTE — ED ADULT NURSE REASSESSMENT NOTE - NS ED NURSE REASSESS COMMENT FT1
03:40. VSS. Pt AAOx4, NAD, resp nonlabored, resting comfortably in bed. Pt denies headache, dizziness, chest pain, palpitations, SOB, abd pain, n/v/d, fevers, chills, weakness at this time. Bed locked & in lowest position. Safety maintained. Will continue to reassess.

## 2019-07-31 NOTE — PATIENT PROFILE ADULT - ABILITY TO HEAR (WITH HEARING AID OR HEARING APPLIANCE IF NORMALLY USED):
Mildly to Moderately Impaired: difficulty hearing in some environments or speaker may need to increase volume or speak distinctly/Bilateral hearing aids

## 2019-07-31 NOTE — CONSULT NOTE ADULT - GASTROINTESTINAL COMMENTS
colonoscopy 2014 with multiple colon polyps; egd 2014 with gastritis and esophagitis prominent but soft

## 2019-07-31 NOTE — PROGRESS NOTE ADULT - PROBLEM SELECTOR PLAN 7
Patient on metformin and glipizide at home; Pt endorses being on insulin in the past, but denies taking it currently.  - Will start patient on SSI  - Check a1c Patient on glipizide and Januvia at home, as well as acarbose and Lantus 30u  - c/w SSI. Start Lantus 15u QHS  - Check a1c

## 2019-07-31 NOTE — H&P ADULT - NSICDXPASTMEDICALHX_GEN_ALL_CORE_FT
PAST MEDICAL HISTORY:  BPH (benign prostatic hyperplasia)     Chronic Back Pain L3-L4, L4-L5 compression    Chronic lower back pain unclear etiology per wife - no hx trauma, possibly arthritis    H/O recurrent urinary tract infection     Hypertension, Essential     Lumbar spinal stenosis     Mild dementia     Nephrotic syndrome pt was instructed to avoid taking NSAIDS    NPH (Normal Pressure Hydrocephalus) s/p ventriculostomy, last one in Jan 2012    OAB (overactive bladder)     Type 2 diabetes mellitus     Vitamin D deficiency

## 2019-07-31 NOTE — PROGRESS NOTE ADULT - PROBLEM SELECTOR PLAN 2
pt c/o cough per wife, CT chest with RML groundglass, representing possible infection vs pneumonitis  - Continue with CTX and Azithromycin  - Urine legionella

## 2019-07-31 NOTE — CONSULT NOTE ADULT - GASTROINTESTINAL DETAILS
soft/no guarding/no organomegaly/no masses palpable/bowel sounds normal/nontender/no rebound tenderness/no rigidity

## 2019-07-31 NOTE — H&P ADULT - ATTENDING COMMENTS
83 yo man with recurrent UTIs (previous cultures at [facility] have shown pan-sensitive E coli), NPH s/p shunt, BPH, HTN, lumbar stenosis who presents to the ED with increased coughing and frequency urinating. Reviewed HPI and ROS  Vitals reviewed and physically examined patient at bedside. Agree with residents findings  Labs, imaging and EKG personally reviewed  Increased urinary frequency concern for UTI given patient's history. UA notable for trace LE. Empirically treat with Ceftriaxone pending Urine culture  Cough: concern for worsening cough. No clear consolidation on prelim CXR. check Procalcitonin. consider CT. Check urine legionella  Remainder of plan as above.  Patient previously unknown to me and I was assigned to precept this case with housestaff resident, Dr. Silva. My involvement in this case consisted only of the initial history, physical and management plan. Primary medicine day team to assume care in AM.

## 2019-07-31 NOTE — H&P ADULT - PROBLEM SELECTOR PLAN 9
Diet: DASH/TLD  DVT Prophylaxis: Lovenox  Dispo: Home, pending further workup    Margaret Silva  PGY-2 Internal Medicine  Pager: [phone] (NS)/[phone] (MARIA FERNANDA)

## 2019-07-31 NOTE — H&P ADULT - PROBLEM SELECTOR PLAN 1
Pt presenting with increased confusion & increased urinary frequency (per wife), found to have a WBC of 17, a LE on UA. S/p Vanc/CTX/Azithro in the ED  - Continue with CTX for UTI  - Blood cultures x2. Suspect that cultures may be negative as they were drawn after antibiotics were given.  - Urine culture

## 2019-07-31 NOTE — H&P ADULT - NSICDXPASTSURGICALHX_GEN_ALL_CORE_FT
PAST SURGICAL HISTORY:  Normal pressure hydrocephalus s/p ventriculostomy of the third ventricle 2012

## 2019-07-31 NOTE — H&P ADULT - ASSESSMENT
85 yo male with recurrent UTIs (previous cultures at [facility] have shown pan-sensitive E coli), NPH s/p shunt, BPH, HTN, lumbar stenosis who presents to the ED with increased coughing and frequency urinating, admitted to medicine for UTI.

## 2019-07-31 NOTE — H&P ADULT - PROBLEM SELECTOR PLAN 8
Medications reconciled with patient and list in chart. However, patient unclear of doses. Plan to call pharmacy in the am for verification of doses.

## 2019-07-31 NOTE — H&P ADULT - NSHPLABSRESULTS_GEN_ALL_CORE
The Labs were reviewed by me   The Radiology was reviewed by me    EKG tracing reviewed by me        141  |  101  |  16  ----------------------------<  190<H>  4.2   |  24  |  1.21    Ca    9.5      2019 19:32    TPro  6.7  /  Alb  3.8  /  TBili  0.8  /  DBili  x   /  AST  14  /  ALT  18  /  AlkPhos  80                          Urinalysis Basic - ( 2019 20:06 )    Color: Yellow / Appearance: Clear / S.015 / pH: x  Gluc: x / Ketone: Negative  / Bili: Negative / Urobili: Negative   Blood: x / Protein: Trace / Nitrite: Negative   Leuk Esterase: Small / RBC: 1 /hpf / WBC 4 /HPF   Sq Epi: x / Non Sq Epi: 3 /hpf / Bacteria: Negative                              16.4   17.2  )-----------( 183      ( 2019 19:32 )             51.2     CAPILLARY BLOOD GLUCOSE    CT with proctocolitis  CXR showing clear lungs The Labs were reviewed by me   The Radiology was reviewed by me          141  |  101  |  16  ----------------------------<  190<H>  4.2   |  24  |  1.21    Ca    9.5      2019 19:32    TPro  6.7  /  Alb  3.8  /  TBili  0.8  /  DBili  x   /  AST  14  /  ALT  18  /  AlkPhos  80                    Urinalysis Basic - ( 2019 20:06 )    Color: Yellow / Appearance: Clear / S.015 / pH: x  Gluc: x / Ketone: Negative  / Bili: Negative / Urobili: Negative   Blood: x / Protein: Trace / Nitrite: Negative   Leuk Esterase: Small / RBC: 1 /hpf / WBC 4 /HPF   Sq Epi: x / Non Sq Epi: 3 /hpf / Bacteria: Negative                          16.4   17.2  )-----------( 183      ( 2019 19:32 )             51.2     CAPILLARY BLOOD GLUCOSE    < from: Xray Chest 1 View- PORTABLE-Urgent (19 @ 22:31) >  ******PRELIMINARY REPORT******       INTERPRETATION:  Clear lungs  ******PRELIMINARY REPORT******    < end of copied text >    < from: CT Abdomen and Pelvis w/ IV Cont (19 @ 21:17) >    FINDINGS:    LOWER CHEST: Bibasilar discoid atelectasis and/or scarring.  LIVER: Within normal limits.  BILE DUCTS: Normal caliber.  GALLBLADDER: Within normal limits.  SPLEEN: Within normal limits.  PANCREAS: Fatty atrophy of the pancreatic head, neck, and adjacent body.  ADRENALS: Within normal limits.    KIDNEYS/URETERS: Multiple right renal cysts. Multiple subcentimeter   low-attenuation lesions in the left kidney, too small to characterize. No   hydroureteronephrosis or obstructing renal calculi.  BLADDER: Within normal limits.  REPRODUCTIVE ORGANS: Heterogeneous and enlarged prostate gland measures 5   cm in transverse dimension.    BOWEL: Rectosigmoid wall thickening. No significant pericolonic   inflammatory change. No bowel obstruction. Appendix is normal.  PERITONEUM: No free air.  VESSELS: Calcified atherosclerosis. Circumaortic left renal vein.  RETROPERITONEUM: No lymphadenopathy.      ABDOMINAL WALL: Small left greater than right fat-containing inguinal   hernias bilaterally.  BONES: Changes from posterior lumbar interbody fusion (PLIF) L3-L5   vertebral bodies.   No change in 2.5 cm L2 vertebral body sclerotic focus. Multilevel   thoracic and lumbar spondylosis.    IMPRESSION:     Rectosigmoid wall thickening may reflect proctocolitis in the appropriate   clinical setting. No bowel obstruction or freeair. Normal appendix.    Heterogeneous enlarged prostate gland. Correlate with PSA.    < end of copied text >    EKG ordered and pending review

## 2019-07-31 NOTE — H&P ADULT - NSHPREVIEWOFSYSTEMS_GEN_ALL_CORE
REVIEW OF SYSTEMS:    CONSTITUTIONAL: No weakness, fevers or chills   EYES: No visual changes;    ENT: No vertigo or throat pain   NECK: No pain or stiffness  RESPIRATORY: No cough (+cough per wife), wheezing, hemoptysis; No shortness of breath  CARDIOVASCULAR: No chest pain or palpitations  GASTROINTESTINAL: No abdominal or epigastric pain. No nausea, vomiting, or hematemesis; No diarrhea or constipation. No melena or hematochezia.  GENITOURINARY: No dysuria, frequency (+ increased urinary frequency) or hematuria  NEUROLOGICAL: No numbness or weakness  SKIN: No itching, rashes

## 2019-07-31 NOTE — H&P ADULT - HISTORY OF PRESENT ILLNESS
Patient has a hx of dementia at baseline and was unable to give full details of why patient was admitted to the hospital. Collateral information was taken from chart review as well as wife, Maria Eugenia.    Pt is a 83 yo male with recurrent UTIs (previous cultures at [facility] have shown pan-sensitive E coli), NPH s/p shunt, BPH, HTN, lumbar stenosis who presents to the ED with increased coughing and frequency urinating. He endorses subjective fevers. Pt originally presented to his PMD, was noted to have a white count of 20 and a normal CXR (per wife) and referred to the ED. Patient c/p of increased urinary frequency since yesterday evening. He also endorses a non-productive cough for several weeks. Per wife, patient has been acting “off.” Wife reports that pt had a CXR that was normal at [facility]. Of note, patient was hospitalized back in Oct 2018 for altered mental status 2/2 UTI and was treated with IV abx.     In the ED, pts VS were /64, HR 68, RR 16 T 36.6, SpO2 96% on RA. His labs were remarkable for w WC of 17.2 (no bandemia) and a UA that showed + LE. He had a CT A/P that showed an enlarged prostate, but no over signs of infection. He had a CXR thatwas clear. He was given Vanc, CTX, Azithromycin, and Lasix. He was admitted to medicine for further work up of altered mental status.

## 2019-07-31 NOTE — CONSULT NOTE ADULT - SUBJECTIVE AND OBJECTIVE BOX
Patient is a 84y old  Male who presents with a chief complaint of Cough       HPI:      Pt is a 85 yo male, well known to our practice, with recurrent UTIs (previous cultures at [facility] have shown pan-sensitive E coli), NPH s/p shunt, BPH, HTN, lumbar stenosis who was referred to the ED with increased coughing and frequency urinating. He endorses subjective fevers. Pt originally presented to our office, with cough x 1 day and increased fatigability, was noted to have a white count of 20 and a normal CXR) and referred to the ED. Patient c/p of increased urinary frequency since yesterday evening.  Wife reports that pt had a CXR that was normal at [facility]. Of note, patient was hospitalized back in Oct 2018 for altered mental status 2/2 UTI and was treated with IV abx and has hx of recurrent  admissions for pneumonia and urosepsis.     In the ED, pts VS were /64, HR 68, RR 16 T 36.6, SpO2 96% on RA. His labs were remarkable for w WC of 17.2 (no bandemia) and a UA that showed + LE. He had a CT A/P that showed an enlarged prostate, but no over signs of infection. ct  with rectosigmoid wall  thickening ? concerning for proctocolitis but no hx of diarrhea or constipation and states had normal bowel movement day prior to admission He had a CXR in er that was clear. He was given Vanc, CTX, Azithromycin, and Lasix. He was admitted to medicine for further work up of altered mental status.       PAST MEDICAL  HISTORY:  Lumbar spinal stenosis  Vitamin D deficiency  Mild dementia  Normal Pressure Hydorcephalus  OAB (overactive bladder)  Type 2 diabetes mellitus  BPH (benign prostatic hyperplasia)  Nephrotic syndrome: pt was instructed to avoid taking NSAIDS  hypertension  H/O recurrent urinary tract infection  Chronic Back Pain: L3-L4, L4-L5 compression    PAST SURGICAL HX:  NPH (Normal Pressure Hydrocephalus): s/p ventriculostomy, last one in 2012  s/p lumbar-spine repair x 2        Allergies  No Known Allergies      MEDICATIONS  (STANDING):  aspirin enteric coated 81 milliGRAM(s) Oral daily  atorvastatin 40 milliGRAM(s) Oral at bedtime  azithromycin  IVPB 500 milliGRAM(s) IV Intermittent every 24 hours  carvedilol 6.25 milliGRAM(s) Oral every 12 hours  cefTRIAXone   IVPB 1000 milliGRAM(s) IV Intermittent every 24 hours  dextrose 5%. 1000 milliLiter(s) (50 mL/Hr) IV Continuous <Continuous>  dextrose 50% Injectable 12.5 Gram(s) IV Push once  dextrose 50% Injectable 25 Gram(s) IV Push once  dextrose 50% Injectable 25 Gram(s) IV Push once  donepezil 10 milliGRAM(s) Oral at bedtime  insulin lispro (HumaLOG) corrective regimen sliding scale   SubCutaneous three times a day before meals  insulin lispro (HumaLOG) corrective regimen sliding scale   SubCutaneous at bedtime  pantoprazole    Tablet 40 milliGRAM(s) Oral before breakfast  tamsulosin 0.4 milliGRAM(s) Oral at bedtime    MEDICATIONS  (PRN):  dextrose 40% Gel 15 Gram(s) Oral once PRN Blood Glucose LESS THAN 70 milliGRAM(s)/deciliter  glucagon  Injectable 1 milliGRAM(s) IntraMuscular once PRN Glucose LESS THAN 70 milligrams/deciliter      social history  nonsmoker  no alcohol usage    FAMILY HISTORY:  No pertinent family history in first degree relatives              Vital Signs Last 24 Hrs  T(C): 36.8 (2019 05:33), Max: 37.7 (2019 18:18)  T(F): 98.2 (2019 05:33), Max: 99.9 (2019 18:18)  HR: 87 (2019 05:33) (66 - 87)  BP: 163/90 (2019 05:33) (139/62 - 170/75)  BP(mean): --  RR: 18 (2019 05:33) (16 - 18)  SpO2: 94% (2019 05:33) (94% - 96%)        LABS:                        16.4   17.2  )-----------( 183      ( 2019 19:32 )             51.2     07-30    141  |  101  |  16  ----------------------------<  190<H>  4.2   |  24  |  1.21    Ca    9.5      2019 19:32    TPro  6.7  /  Alb  3.8  /  TBili  0.8  /  DBili  x   /  AST  14  /  ALT  18  /  AlkPhos  80  0730      Urinalysis Basic - ( 2019 20:06 )    Color: Yellow / Appearance: Clear / S.015 / pH: x  Gluc: x / Ketone: Negative  / Bili: Negative / Urobili: Negative   Blood: x / Protein: Trace / Nitrite: Negative   Leuk Esterase: Small / RBC: 1 /hpf / WBC 4 /HPF   Sq Epi: x / Non Sq Epi: 3 /hpf / Bacteria: Negative      I&O's Summary    RADIOLOG[facility] & ADDITIONAL STUDIES:        [facility]

## 2019-08-01 ENCOUNTER — TRANSCRIPTION ENCOUNTER (OUTPATIENT)
Age: 84
End: 2019-08-01

## 2019-08-01 LAB
ANION GAP SERPL CALC-SCNC: 11 MMOL/L — SIGNIFICANT CHANGE UP (ref 5–17)
BASOPHILS # BLD AUTO: 0.02 K/UL — SIGNIFICANT CHANGE UP (ref 0–0.2)
BASOPHILS NFR BLD AUTO: 0.2 % — SIGNIFICANT CHANGE UP (ref 0–2)
BUN SERPL-MCNC: 15 MG/DL — SIGNIFICANT CHANGE UP (ref 7–23)
CALCIUM SERPL-MCNC: 8.9 MG/DL — SIGNIFICANT CHANGE UP (ref 8.4–10.5)
CHLORIDE SERPL-SCNC: 102 MMOL/L — SIGNIFICANT CHANGE UP (ref 96–108)
CO2 SERPL-SCNC: 25 MMOL/L — SIGNIFICANT CHANGE UP (ref 22–31)
CREAT SERPL-MCNC: 1.08 MG/DL — SIGNIFICANT CHANGE UP (ref 0.5–1.3)
CULTURE RESULTS: SIGNIFICANT CHANGE UP
EOSINOPHIL # BLD AUTO: 0.3 K/UL — SIGNIFICANT CHANGE UP (ref 0–0.5)
EOSINOPHIL NFR BLD AUTO: 2.6 % — SIGNIFICANT CHANGE UP (ref 0–6)
GLUCOSE SERPL-MCNC: 156 MG/DL — HIGH (ref 70–99)
HBA1C BLD-MCNC: 7.7 % — HIGH (ref 4–5.6)
HCT VFR BLD CALC: 47.5 % — SIGNIFICANT CHANGE UP (ref 39–50)
HGB BLD-MCNC: 15.9 G/DL — SIGNIFICANT CHANGE UP (ref 13–17)
IMM GRANULOCYTES NFR BLD AUTO: 0.5 % — SIGNIFICANT CHANGE UP (ref 0–1.5)
LYMPHOCYTES # BLD AUTO: 1.12 K/UL — SIGNIFICANT CHANGE UP (ref 1–3.3)
LYMPHOCYTES # BLD AUTO: 9.7 % — LOW (ref 13–44)
MCHC RBC-ENTMCNC: 27 PG — SIGNIFICANT CHANGE UP (ref 27–34)
MCHC RBC-ENTMCNC: 33.5 GM/DL — SIGNIFICANT CHANGE UP (ref 32–36)
MCV RBC AUTO: 80.8 FL — SIGNIFICANT CHANGE UP (ref 80–100)
MONOCYTES # BLD AUTO: 1.26 K/UL — HIGH (ref 0–0.9)
MONOCYTES NFR BLD AUTO: 10.9 % — SIGNIFICANT CHANGE UP (ref 2–14)
NEUTROPHILS # BLD AUTO: 8.82 K/UL — HIGH (ref 1.8–7.4)
NEUTROPHILS NFR BLD AUTO: 76.1 % — SIGNIFICANT CHANGE UP (ref 43–77)
PLATELET # BLD AUTO: 172 K/UL — SIGNIFICANT CHANGE UP (ref 150–400)
POTASSIUM SERPL-MCNC: 3.7 MMOL/L — SIGNIFICANT CHANGE UP (ref 3.5–5.3)
POTASSIUM SERPL-SCNC: 3.7 MMOL/L — SIGNIFICANT CHANGE UP (ref 3.5–5.3)
RBC # BLD: 5.88 M/UL — HIGH (ref 4.2–5.8)
RBC # FLD: 14.2 % — SIGNIFICANT CHANGE UP (ref 10.3–14.5)
SODIUM SERPL-SCNC: 138 MMOL/L — SIGNIFICANT CHANGE UP (ref 135–145)
SPECIMEN SOURCE: SIGNIFICANT CHANGE UP
WBC # BLD: 11.58 K/UL — HIGH (ref 3.8–10.5)
WBC # FLD AUTO: 11.58 K/UL — HIGH (ref 3.8–10.5)

## 2019-08-01 PROCEDURE — 99232 SBSQ HOSP IP/OBS MODERATE 35: CPT

## 2019-08-01 RX ORDER — INSULIN GLARGINE 100 [IU]/ML
25 INJECTION, SOLUTION SUBCUTANEOUS AT BEDTIME
Refills: 0 | Status: DISCONTINUED | OUTPATIENT
Start: 2019-08-01 | End: 2019-08-01

## 2019-08-01 RX ORDER — INSULIN GLARGINE 100 [IU]/ML
22 INJECTION, SOLUTION SUBCUTANEOUS AT BEDTIME
Refills: 0 | Status: DISCONTINUED | OUTPATIENT
Start: 2019-08-01 | End: 2019-08-02

## 2019-08-01 RX ORDER — INSULIN LISPRO 100/ML
4 VIAL (ML) SUBCUTANEOUS
Refills: 0 | Status: DISCONTINUED | OUTPATIENT
Start: 2019-08-01 | End: 2019-08-03

## 2019-08-01 RX ADMIN — PANTOPRAZOLE SODIUM 40 MILLIGRAM(S): 20 TABLET, DELAYED RELEASE ORAL at 06:15

## 2019-08-01 RX ADMIN — Medication 81 MILLIGRAM(S): at 12:26

## 2019-08-01 RX ADMIN — Medication 1: at 09:10

## 2019-08-01 RX ADMIN — TAMSULOSIN HYDROCHLORIDE 0.4 MILLIGRAM(S): 0.4 CAPSULE ORAL at 21:37

## 2019-08-01 RX ADMIN — DONEPEZIL HYDROCHLORIDE 10 MILLIGRAM(S): 10 TABLET, FILM COATED ORAL at 21:37

## 2019-08-01 RX ADMIN — Medication 4: at 13:08

## 2019-08-01 RX ADMIN — AZITHROMYCIN 250 MILLIGRAM(S): 500 TABLET, FILM COATED ORAL at 23:49

## 2019-08-01 RX ADMIN — CARVEDILOL PHOSPHATE 6.25 MILLIGRAM(S): 80 CAPSULE, EXTENDED RELEASE ORAL at 06:15

## 2019-08-01 RX ADMIN — ENOXAPARIN SODIUM 40 MILLIGRAM(S): 100 INJECTION SUBCUTANEOUS at 12:26

## 2019-08-01 RX ADMIN — Medication 4: at 17:47

## 2019-08-01 RX ADMIN — Medication 4 UNIT(S): at 17:47

## 2019-08-01 RX ADMIN — INSULIN GLARGINE 22 UNIT(S): 100 INJECTION, SOLUTION SUBCUTANEOUS at 21:36

## 2019-08-01 RX ADMIN — ATORVASTATIN CALCIUM 40 MILLIGRAM(S): 80 TABLET, FILM COATED ORAL at 21:37

## 2019-08-01 RX ADMIN — CEFTRIAXONE 100 MILLIGRAM(S): 500 INJECTION, POWDER, FOR SOLUTION INTRAMUSCULAR; INTRAVENOUS at 21:35

## 2019-08-01 RX ADMIN — CARVEDILOL PHOSPHATE 6.25 MILLIGRAM(S): 80 CAPSULE, EXTENDED RELEASE ORAL at 17:26

## 2019-08-01 NOTE — DISCHARGE NOTE NURSING/CASE MANAGEMENT/SOCIAL WORK - NSDCPEEMAIL_GEN_ALL_CORE
[facility] for Tobacco Control email [email or web address]

## 2019-08-01 NOTE — PHYSICAL THERAPY INITIAL EVALUATION ADULT - ADDITIONAL COMMENTS
Pt. reports resides in private home with wife, 1 step to enter with railing, has chairlift for stairs inside home.  Pt. reports uses a rolling walker for ambulation but occasionally walks without it.

## 2019-08-01 NOTE — PROGRESS NOTE ADULT - PROBLEM SELECTOR PLAN 2
Pt presenting with increased confusion & increased urinary frequency (per wife), UA with small LE, no pyuria. UTI unlikely given below  - On Ceftriaxone for PNA which will cover potential UTI  - Blood cultures NGTD  - Urine culture with urogenital kori.

## 2019-08-01 NOTE — PROGRESS NOTE ADULT - PROBLEM SELECTOR PLAN 7
Patient on glipizide and Januvia at home, as well as acarbose and Lantus 30u  - c/w SSI. Start Lantus 15u QHS  - Check a1c Patient on glipizide and Januvia at home, as well as acarbose and Lantus 30u  - c/w SSI. Increase Lantus to 25u QHS  - a1c 7.7 Patient on glipizide and Januvia at home, as well as acarbose and Lantus 30u  - c/w SSI. Increase Lantus to 22u QHS  - a1c 7.7

## 2019-08-01 NOTE — DISCHARGE NOTE NURSING/CASE MANAGEMENT/SOCIAL WORK - NSDCPEWEB_GEN_ALL_CORE
[facility] for Tobacco Control website --- [email or web address] --- [email or web address]

## 2019-08-01 NOTE — DISCHARGE NOTE PROVIDER - NSDCPNSUBOBJ_GEN_ALL_CORE
SEen and examined by me today. PT refusing thickened diet and also refused ENT scope.  We have documented he and his wife understand risks of repeat aspiration and possibly death if he does not comply and he would like to continue his thin records and understands the risk.  He will DC to [facility] today after completing course of antibiotics for his pneumonia.      Discharge time 34 minutes SEen and examined by me today. PT refusing thickened diet and also refused ENT scope.  We have documented he and his wife understand risks of repeat aspiration and possibly death if he does not comply and he would like to continue his thin records and understands the risk.  He will DC to [facility] today after completing course of antibiotics for his pneumonia.  UPdate emailed to Dr. Duque    Discharge time 34 minutes

## 2019-08-01 NOTE — DISCHARGE NOTE PROVIDER - HOSPITAL COURSE
85 yo male with recurrent UTIs (previous cultures at [facility] have shown pan-sensitive E coli), NPH s/p shunt, BPH, HTN, lumbar stenosis who presents to the ED with increased coughing and frequency urinating, admitted to medicine for UTI and PNA. 85 yo male with recurrent UTIs (previous cultures at [facility] have shown pan-sensitive E coli), NPH s/p shunt, BPH, HTN, lumbar stenosis who presents to the ED with increased coughing and frequency urinating, admitted to medicine for UTI and PNA.  For pneumonia, bacterial pt with nonproductive cough and CT chest with RML groundglass, representing possible infection vs pneumonitis. Due to cough, leukocytosis and CT findings pt. was treated for CAP and leukocytosis subsequently resolving. Pt continued with CTX and Azithromycin for total 5 day course with transition to PO on d/c. Urine legionella was negative and speech and swallow rec Dysphagia III diet, Diet was changed and explained to patient who is refusing. Pt. had repeat blood cx NGTD. Pt. has recurrent UTIs as above, BPH with suprapubic tenderness and urinary frequency. The post void residual was checked, pt had u/s kidney and bladder which showed no hydro or obstruction and continued with Tamsulosin 0.4 mg q am and urologist Dr. Goldberg office called by Dr. Duque. For hyperlipidemia  in-pt continued with atorvastatin 40 mg (rosuvastatin is not on formulary). For hypertension continued with Coreg 6.25 mg BID and BP trended. Pt. was continued with pantoprazole for GERD. For Diabetes (on glipizide, acarbose and Januvia at home). Throughout admission FS above goal and c/w SSI, increasing Lantus to 30u QHS & c/w premeal (a1c 7.7). Pt tolerated diet - DASH/TLD and discharged home on 8/5 with HHA; discussed with Dr. Oh. 83 yo male with recurrent UTIs (previous cultures at [facility] have shown pan-sensitive E coli), NPH s/p shunt, BPH, HTN, lumbar stenosis who presents to the ED with increased coughing and frequency urinating, admitted to medicine for UTI and PNA.  For pneumonia, bacterial pt with nonproductive cough and CT chest with RML groundglass, representing possible infection vs pneumonitis. Due to cough, leukocytosis and CT findings pt. was treated for CAP and leukocytosis subsequently resolving. Pt continued with CTX and Azithromycin for total 5 day course with transition to PO on d/c. Urine legionella was negative and speech and swallow rec Dysphagia III diet, Diet was changed and explained to patient who is refusing. Pt. had repeat blood cx NGTD. Pt. has recurrent UTIs as above, BPH with suprapubic tenderness and urinary frequency. The post void residual was checked, pt had u/s kidney and bladder which showed no hydro or obstruction and continued with Tamsulosin 0.4 mg q am and urologist Dr. Goldberg office called by Dr. Duque. For hyperlipidemia  in-pt continued with atorvastatin 40 mg (rosuvastatin is not on formulary). For hypertension continued with Coreg 6.25 mg BID and BP trended. Pt. was continued with pantoprazole for GERD. For Diabetes (on glipizide, acarbose and Januvia at home). Throughout admission FS above goal and c/w SSI, increasing Lantus to 30u QHS & c/w premeal (a1c 7.7). Pt tolerated diet - DASH/TLD and discharged home on 8/6 with HHA; discussed with Dr. Oh.

## 2019-08-01 NOTE — DISCHARGE NOTE NURSING/CASE MANAGEMENT/SOCIAL WORK - NSDCDPATPORTLINK_GEN_ALL_CORE
You can access the [facility] Patient Portal, offered by [facility], by registering with the following website: [email or web address]

## 2019-08-01 NOTE — PROGRESS NOTE ADULT - PROBLEM SELECTOR PLAN 1
Pt with nonproductive cough, CT chest with RML groundglass, representing possible infection vs pneumonitis. Given cough, leukocytosis and CT findings treating for CAP   - Continue with CTX and Azithromycin  - Urine legionella negative  - Speech and swallow eval to eval for potential aspiration

## 2019-08-01 NOTE — DISCHARGE NOTE PROVIDER - CARE PROVIDER_API CALL
Eleazar Duque)  Gastroenterology; Internal Medicine  [address] [place], [place] [address]  [place] [address]  Phone: [phone]  Fax: [phone]  Follow Up Time:

## 2019-08-01 NOTE — PHARMACOTHERAPY INTERVENTION NOTE - COMMENTS
83 yo M with DM A1C 7.7 on acarbose, glipizide, januvia, and lantus 30 at home. Currently on lantus 22 units increased from 15, and humalog low dose correctional scale. BG in past 24 hrs were 306, 196, 188, 176. Recommendation made to add humalog 4 units subcutaneously three times daily.    Ahsan Jean, PharmD  [phone]

## 2019-08-01 NOTE — DISCHARGE NOTE PROVIDER - NSDCCPCAREPLAN_GEN_ALL_CORE_FT
PRINCIPAL DISCHARGE DIAGNOSIS  Diagnosis: Pneumonia of lower lobe due to infectious organism, unspecified laterality  Assessment and Plan of Treatment: Pneumonia is a lung infection that can cause a fever, cough, and trouble breathing.  Continue all antibiotics as ordered until complete.  Nutrition is important, eat small frequent meals.  Get lots of rest and drink fluids.  Call your health care provider upon arrival home from hospital and make a follow up appointment for one week.  If your cough worsens, you develop fever greater than 101', you have shaking chills, a fast heartbeat, trouble breathing and/or feel your are breathing much faster than usual, call your healthcare provider.  Make sure you wash your hands frequently.        SECONDARY DISCHARGE DIAGNOSES  Diagnosis: UTI (urinary tract infection)  Assessment and Plan of Treatment: HOME CARE INSTRUCTIONS  If you were prescribed antibiotics, take them exactly as your caregiver instructs you. Finish the medication even if you feel better after you have only taken some of the medication.  Drink enough water and fluids to keep your urine clear or pale yellow.  Avoid caffeine, tea, and carbonated beverages. They tend to irritate your bladder.  Empty your bladder often. Avoid holding urine for long periods of time.  After a bowel movement, women should cleanse from front to back. Use each tissue only once.  SEEK MEDICAL CARE IF:  You have back pain.  You develop a fever.  Your symptoms do not begin to resolve within 3 days.  SEEK IMMEDIATE MEDICAL CARE IF:  You have severe back pain or lower abdominal pain.  You develop chills.  You have nausea or vomiting.  You have continued burning or discomfort with urination.      Diagnosis: Hypertension  Assessment and Plan of Treatment: Hypertension  Low salt diet  Activity as tolerated.  Take all medication as prescribed.  Follow up with your medical doctor for routine blood pressure monitoring at your next visit.  Notify your doctor if you have any of the following symptoms:   Dizziness, Lightheadedness, Blurry vision, Headache, Chest pain, Shortness of breath      Diagnosis: GERD (gastroesophageal reflux disease)  Assessment and Plan of Treatment: GERD (gastroesophageal reflux disease)  Change the factors that you can control. Ask your caregiver for guidance concerning weight loss, quitting smoking, and alcohol consumption. Avoid foods and drinks that make your symptoms worse, such as: Caffeine or alcoholic drinks. Chocolate. spicy foods. Citrus fruits tomato-based foods, Fried and fatty foods.  Avoid lying down for the 3 hours after eating .Eat small, frequent meals  Do not wear anything tight around your waist that causes pressure on your stomach. Raise the head of your bed 6 to 8 inches with wood blocks to help you sleep. Do not take aspirin, ibuprofen, or other nonsteroidal anti-inflammatory drugs (NSAIDs).You have pain in your arms, neck, jaw, teeth, or back. Your pain increases or changes in intensity or duration. You develop nausea, vomiting, or sweating (diaphoresis).You develop shortness of breath, or you faint.Your vomit is green, yellow, black, or looks like coffee grounds or blood.Your stool is red, bloody, or black.These symptoms could be signs of other problems, such as heart disease, gastric bleeding, or esophageal bleeding.      Diagnosis: BPH (benign prostatic hyperplasia)  Assessment and Plan of Treatment: BPH (benign prostatic hyperplasia)  You have an enlarged prostate gland which gets bigger as men get older - it is a very common problem and has nothing to do with prostate cancer  Call your doctor if you are urinating more frequently, have trouble starting to urinate, have weak stream, urine leaking or dribbling, and feeling as though bladder is not empty after urination  Your doctor will monitor your prostate with a rectal exam as well as urine or blood testing  You can help yourself by reducing the amount of fluid you drink before going to bed, limiting the amount of alcohol & caffeine you drink   Avoid cold & allergy medication that contain decongestants or antihistamines which make BPH symptoms worse  You can also "double void" by waiting a moment after urinating & trying again  Take your medication as prescribed - one medication helps to relax the muscle aroung the urethra and the other medication you may take prevents the prostate from growing more or even shrinking the prostate      Diagnosis: Hyperlipidemia  Assessment and Plan of Treatment: Hyperlipidemia  Continue with your cholesterol medications. Eat a heart healthy diet that is low in saturated fats and salt, and includes whole grains, fruits, vegetables and lean protein; exercise regularly (consult with your physician or cardiologist first); maintain a heart healthy weight; if you smoke - quit (A resource to help you stop smoking is the [facility] – phone number [phone].). Continue to follow with your primary physician or cardiologist.  Seek medical help for dizziness, Lightheadedness, Blurry vision, Headache, Chest pain, Shortness of breath      Diagnosis: Pneumonia of lower lobe due to infectious organism, unspecified laterality  Assessment and Plan of Treatment: Pneumonia is a lung infection that can cause a fever, cough, and trouble breathing.  Continue all antibiotics as ordered until complete.  Nutrition is important, eat small frequent meals.  Get lots of rest and drink fluids.  Call your health care provider upon arrival home from hospital and make a follow up appointment for one week.  If your cough worsens, you develop fever greater than 101', you have shaking chills, a fast heartbeat, trouble breathing and/or feel your are breathing much faster than usual, call your healthcare provider.  Make sure you wash your hands frequently.

## 2019-08-02 LAB
ANION GAP SERPL CALC-SCNC: 11 MMOL/L — SIGNIFICANT CHANGE UP (ref 5–17)
BUN SERPL-MCNC: 14 MG/DL — SIGNIFICANT CHANGE UP (ref 7–23)
CALCIUM SERPL-MCNC: 8.9 MG/DL — SIGNIFICANT CHANGE UP (ref 8.4–10.5)
CHLORIDE SERPL-SCNC: 104 MMOL/L — SIGNIFICANT CHANGE UP (ref 96–108)
CO2 SERPL-SCNC: 24 MMOL/L — SIGNIFICANT CHANGE UP (ref 22–31)
CREAT SERPL-MCNC: 1.04 MG/DL — SIGNIFICANT CHANGE UP (ref 0.5–1.3)
GLUCOSE SERPL-MCNC: 212 MG/DL — HIGH (ref 70–99)
HCT VFR BLD CALC: 48.2 % — SIGNIFICANT CHANGE UP (ref 39–50)
HGB BLD-MCNC: 15.6 G/DL — SIGNIFICANT CHANGE UP (ref 13–17)
MCHC RBC-ENTMCNC: 26.3 PG — LOW (ref 27–34)
MCHC RBC-ENTMCNC: 32.4 GM/DL — SIGNIFICANT CHANGE UP (ref 32–36)
MCV RBC AUTO: 81.1 FL — SIGNIFICANT CHANGE UP (ref 80–100)
PLATELET # BLD AUTO: 202 K/UL — SIGNIFICANT CHANGE UP (ref 150–400)
POTASSIUM SERPL-MCNC: 3.8 MMOL/L — SIGNIFICANT CHANGE UP (ref 3.5–5.3)
POTASSIUM SERPL-SCNC: 3.8 MMOL/L — SIGNIFICANT CHANGE UP (ref 3.5–5.3)
RBC # BLD: 5.94 M/UL — HIGH (ref 4.2–5.8)
RBC # FLD: 13.6 % — SIGNIFICANT CHANGE UP (ref 10.3–14.5)
SODIUM SERPL-SCNC: 139 MMOL/L — SIGNIFICANT CHANGE UP (ref 135–145)
WBC # BLD: 9.36 K/UL — SIGNIFICANT CHANGE UP (ref 3.8–10.5)
WBC # FLD AUTO: 9.36 K/UL — SIGNIFICANT CHANGE UP (ref 3.8–10.5)

## 2019-08-02 PROCEDURE — 74230 X-RAY XM SWLNG FUNCJ C+: CPT | Mod: 26

## 2019-08-02 PROCEDURE — 76770 US EXAM ABDO BACK WALL COMP: CPT | Mod: 26

## 2019-08-02 PROCEDURE — 99232 SBSQ HOSP IP/OBS MODERATE 35: CPT

## 2019-08-02 RX ORDER — GALANTAMINE HYDROBROMIDE 4 MG/1
1 TABLET, FILM COATED ORAL
Qty: 0 | Refills: 0 | DISCHARGE

## 2019-08-02 RX ORDER — INSULIN GLARGINE 100 [IU]/ML
30 INJECTION, SOLUTION SUBCUTANEOUS AT BEDTIME
Refills: 0 | Status: DISCONTINUED | OUTPATIENT
Start: 2019-08-02 | End: 2019-08-03

## 2019-08-02 RX ORDER — ACARBOSE 25 MG/1
1 TABLET ORAL
Qty: 0 | Refills: 0 | DISCHARGE

## 2019-08-02 RX ADMIN — Medication 4 UNIT(S): at 11:27

## 2019-08-02 RX ADMIN — Medication 2: at 21:38

## 2019-08-02 RX ADMIN — AZITHROMYCIN 250 MILLIGRAM(S): 500 TABLET, FILM COATED ORAL at 23:15

## 2019-08-02 RX ADMIN — ENOXAPARIN SODIUM 40 MILLIGRAM(S): 100 INJECTION SUBCUTANEOUS at 11:27

## 2019-08-02 RX ADMIN — Medication 5: at 17:58

## 2019-08-02 RX ADMIN — ATORVASTATIN CALCIUM 40 MILLIGRAM(S): 80 TABLET, FILM COATED ORAL at 21:36

## 2019-08-02 RX ADMIN — Medication 4 UNIT(S): at 09:05

## 2019-08-02 RX ADMIN — Medication 4 UNIT(S): at 17:58

## 2019-08-02 RX ADMIN — DONEPEZIL HYDROCHLORIDE 10 MILLIGRAM(S): 10 TABLET, FILM COATED ORAL at 21:36

## 2019-08-02 RX ADMIN — Medication 2: at 09:06

## 2019-08-02 RX ADMIN — Medication 81 MILLIGRAM(S): at 11:27

## 2019-08-02 RX ADMIN — CARVEDILOL PHOSPHATE 6.25 MILLIGRAM(S): 80 CAPSULE, EXTENDED RELEASE ORAL at 05:24

## 2019-08-02 RX ADMIN — Medication 3: at 11:27

## 2019-08-02 RX ADMIN — INSULIN GLARGINE 30 UNIT(S): 100 INJECTION, SOLUTION SUBCUTANEOUS at 21:36

## 2019-08-02 RX ADMIN — PANTOPRAZOLE SODIUM 40 MILLIGRAM(S): 20 TABLET, DELAYED RELEASE ORAL at 06:33

## 2019-08-02 RX ADMIN — CEFTRIAXONE 100 MILLIGRAM(S): 500 INJECTION, POWDER, FOR SOLUTION INTRAMUSCULAR; INTRAVENOUS at 21:36

## 2019-08-02 RX ADMIN — CARVEDILOL PHOSPHATE 6.25 MILLIGRAM(S): 80 CAPSULE, EXTENDED RELEASE ORAL at 17:58

## 2019-08-02 RX ADMIN — TAMSULOSIN HYDROCHLORIDE 0.4 MILLIGRAM(S): 0.4 CAPSULE ORAL at 21:37

## 2019-08-02 NOTE — SWALLOW VFSS/MBS ASSESSMENT ADULT - ADDITIONAL INFORMATION
Calcification of thyroid and arytenoid cartilages, + small anterior cervical osteophytes noted Calcification of thyroid and arytenoid cartilages, + small anterior cervical osteophytes noted.    Disorders: impaired buccal tension,  reduced lingual strength/ROM/Rate of motion, reduced BOT to posterior pharyngeal wall contact with a narrow column of contrast between structures, delay in trigger of the swallow reflex with low trigger points, incomplete laryngeal vestibule closure, reduced supraglottic sensation, reduced subglottic sensation,  Etiology of esophageal dysphagia is unclear - suggest GI consult for assessment.

## 2019-08-02 NOTE — PROGRESS NOTE ADULT - PROBLEM SELECTOR PLAN 8
Diet: DASH/TLD  DVT Prophylaxis: Lovenox  Dispo: Home, pending further workup  PT recommending outpatient PT

## 2019-08-02 NOTE — SWALLOW VFSS/MBS ASSESSMENT ADULT - RECOMMENDED CONSISTENCY
Given new PNA, with concern for aspiration would recommend a diet change: Dysphagia III with nectar thick liquids. MD/team Please enter the following as provider to RN orders and put on d/c summary : 1) Assist with meals, 2) Meds with thick fluids/purees , 3) Pt to avoid fresh or canned fruits, ices, ice cream, jello, and mixed textures (ie soup with pieces and cereal with milk), 4) small single bites and sips at slow rate 5 ) Successive/dry swallows for oral and pharyngeal clearance 6) ) Aspiration/reflux precautions ( ie) upright positioning during meals and for at least 120 min after eating/drinking). 7) Monitor for s/s aspiration/laryngeal penetration. If noted:  D/C p.o. intake, provide non-oral nutrition/hydration/meds

## 2019-08-02 NOTE — PROGRESS NOTE ADULT - PROBLEM SELECTOR PLAN 2
- With suprapubic tenderness and urinary frequency.   - Check post void residual  - Check US kidney and bladder  - Tamsulosin 0.4 mg q am.  - Urologist Dr. Goldberg office called by Dr. Duque

## 2019-08-02 NOTE — SWALLOW VFSS/MBS ASSESSMENT ADULT - LARYNGEAL PENETRATION DURING THE SWALLOW - SILENT
appeared to be via the interarytenoid space/Trace Trace/appeared to be over the laryngeal surface of the arytenoids and via the interarytenoid space. There was trace residue in the vestibule/ventricle (at the level of the vocal folds)

## 2019-08-02 NOTE — PROGRESS NOTE ADULT - PROBLEM SELECTOR PLAN 1
Pt with nonproductive cough, CT chest with RML groundglass, representing possible infection vs pneumonitis. Given cough, leukocytosis and CT findings treating for CAP. Leukocytosis now resolved  - Continue with CTX and Azithromycin. Will treat with abx for total 5 day course. Transition to PO on d/c  - Urine legionella negative  - Speech and swallow eval to eval for potential aspiration  - Bcx NGTD

## 2019-08-02 NOTE — SWALLOW VFSS/MBS ASSESSMENT ADULT - ORAL PHASE
within functional limits Delayed oral transit time/Residue in oral cavity/Reduced anterior - posterior transport Uncontrolled bolus / spillover in leslye-pharynx/Delayed oral transit time/Residue in oral cavity/Incomplete tongue to palate contact/Uncontrolled bolus / spillover in hypopharynx/Reduced anterior - posterior transport

## 2019-08-02 NOTE — PROGRESS NOTE ADULT - PROBLEM SELECTOR PLAN 3
Pt presenting with increased confusion & increased urinary frequency (per wife), UA with small LE, no pyuria. UTI unlikely given below. Now with some suprapubic tenderness.   - On Ceftriaxone for PNA which would also cover UTI although unlikely. Ucx with urogenital kori.    - Check post void residual and US kidney and bladder given suprapubic tenderness to r/o obstruction

## 2019-08-02 NOTE — SWALLOW BEDSIDE ASSESSMENT ADULT - SWALLOW EVAL: DIAGNOSIS
Pt known to this service. Has a hx of leslye-pharyngeal dysphagia with silent aspiration of thin liquids. Adm with CAP, per IM and GI, r/o aspiration as etiology.

## 2019-08-02 NOTE — SWALLOW VFSS/MBS ASSESSMENT ADULT - ORAL PHASE COMMENTS
trace lingual stasis Trace lingual residue Trace lingual/palatal residue Partial AP transit on initial swallow. A majority of the bolus remained in the oral cavity. A self -generated successive swallow resulted in almost complete clearance of cavity. Total oral transit was 20.8 sec. Partial AP transit on initial swallow. Approximately 1/2 of the bolus remained in the oral cavity. A self -generated successive swallow resulted in almost complete clearance of cavity. Minimal lingual and palatal residue remained. Total oral transit was 21 sec. Oral transit time noted to be 12  seconds. Minimal lingual and sulcus residue (palatal stasis from prior trials still evident). There was inconsistent spillage of oral residue to the valleculae and along the posterior surface of the epiglottis.

## 2019-08-02 NOTE — SWALLOW BEDSIDE ASSESSMENT ADULT - SWALLOW EVAL: RECOMMENDED DIET
Given hx of dysphagia./silent aspiration , will require an objective swallow study needed for comprehensive assessment

## 2019-08-02 NOTE — SWALLOW VFSS/MBS ASSESSMENT ADULT - ORAL PREP COMMENTS
Impaired bolus formation with spillage into the lateral sulcus. Repetitive/disorganized bolus transport/lingual motion

## 2019-08-02 NOTE — SWALLOW VFSS/MBS ASSESSMENT ADULT - THE ABOVE FINDINGS WERE DISCUSSED WITH
d/w Pt - written instructions provided d/w Pt - written instructions provided, d/w NAVID Lassiter [id number] d/w Pt - written instructions provided, d/w NAVID Lassiter [id number], left message for cc @ [phone]

## 2019-08-02 NOTE — SWALLOW BEDSIDE ASSESSMENT ADULT - SLP GENERAL OBSERVATIONS
Pt awake, alert, able to follow commands and answer questions. Has no recall of having had a swallow study in the past and has been consuming a regular diet. Denied dysphagia, denied GERD and having had an EGD in the past (even though results are in Dr. Duque's note). When questioned if he takes a PPI on a regular basis, Pt stated " I don't know, you will have to ask my wife"

## 2019-08-02 NOTE — SWALLOW VFSS/MBS ASSESSMENT ADULT - DIAGNOSTIC IMPRESSIONS
Patient presents with leslye-pharyngo-cervical esophageal dysphagia with post swallow oral and pharyngeal residue, inconsistent retrograde flow from the cervical esophagus to the pharynx, and inconsistent trace silent aspiration of thin liquids.

## 2019-08-02 NOTE — PROGRESS NOTE ADULT - PROBLEM SELECTOR PLAN 7
Patient on glipizide and Januvia at home, as well as acarbose and Lantus 30u  - FS above goal  - c/w SSI. Increase Lantus to 30u QHS, c/w premeal  - a1c 7.7

## 2019-08-02 NOTE — SWALLOW VFSS/MBS ASSESSMENT ADULT - ESOPHAGEAL STAGE
Trace residue was noted in the cervical esophagus Trace residue in the proximal esophagus. Due to the physical constraints of testing, unable to fully visualize or determine the etiology. Suggest GI consult to assess. Evidence of minimal retrograde flow from the cervical esophagus into the hypopharynx. The etiology could not be determined due to the physical limitations of testing.

## 2019-08-02 NOTE — PROGRESS NOTE ADULT - GASTROINTESTINAL DETAILS
no guarding/no masses palpable/soft/bowel sounds normal/no rigidity/no organomegaly/no rebound tenderness/mildly prominent
soft/bowel sounds normal/no organomegaly/no rebound tenderness/no rigidity/prominent/no guarding/nontender/no distention/no masses palpable

## 2019-08-03 LAB
ANION GAP SERPL CALC-SCNC: 14 MMOL/L — SIGNIFICANT CHANGE UP (ref 5–17)
BASOPHILS # BLD AUTO: 0.03 K/UL — SIGNIFICANT CHANGE UP (ref 0–0.2)
BASOPHILS NFR BLD AUTO: 0.3 % — SIGNIFICANT CHANGE UP (ref 0–2)
BUN SERPL-MCNC: 15 MG/DL — SIGNIFICANT CHANGE UP (ref 7–23)
CALCIUM SERPL-MCNC: 9.3 MG/DL — SIGNIFICANT CHANGE UP (ref 8.4–10.5)
CHLORIDE SERPL-SCNC: 103 MMOL/L — SIGNIFICANT CHANGE UP (ref 96–108)
CO2 SERPL-SCNC: 24 MMOL/L — SIGNIFICANT CHANGE UP (ref 22–31)
CREAT SERPL-MCNC: 1.05 MG/DL — SIGNIFICANT CHANGE UP (ref 0.5–1.3)
EOSINOPHIL # BLD AUTO: 0.39 K/UL — SIGNIFICANT CHANGE UP (ref 0–0.5)
EOSINOPHIL NFR BLD AUTO: 4.5 % — SIGNIFICANT CHANGE UP (ref 0–6)
GLUCOSE SERPL-MCNC: 239 MG/DL — HIGH (ref 70–99)
HCT VFR BLD CALC: 49.5 % — SIGNIFICANT CHANGE UP (ref 39–50)
HGB BLD-MCNC: 15.8 G/DL — SIGNIFICANT CHANGE UP (ref 13–17)
IMM GRANULOCYTES NFR BLD AUTO: 0.6 % — SIGNIFICANT CHANGE UP (ref 0–1.5)
LYMPHOCYTES # BLD AUTO: 1.21 K/UL — SIGNIFICANT CHANGE UP (ref 1–3.3)
LYMPHOCYTES # BLD AUTO: 13.8 % — SIGNIFICANT CHANGE UP (ref 13–44)
MCHC RBC-ENTMCNC: 25.6 PG — LOW (ref 27–34)
MCHC RBC-ENTMCNC: 31.9 GM/DL — LOW (ref 32–36)
MCV RBC AUTO: 80.4 FL — SIGNIFICANT CHANGE UP (ref 80–100)
MONOCYTES # BLD AUTO: 0.88 K/UL — SIGNIFICANT CHANGE UP (ref 0–0.9)
MONOCYTES NFR BLD AUTO: 10 % — SIGNIFICANT CHANGE UP (ref 2–14)
NEUTROPHILS # BLD AUTO: 6.2 K/UL — SIGNIFICANT CHANGE UP (ref 1.8–7.4)
NEUTROPHILS NFR BLD AUTO: 70.8 % — SIGNIFICANT CHANGE UP (ref 43–77)
PLATELET # BLD AUTO: 219 K/UL — SIGNIFICANT CHANGE UP (ref 150–400)
POTASSIUM SERPL-MCNC: 4 MMOL/L — SIGNIFICANT CHANGE UP (ref 3.5–5.3)
POTASSIUM SERPL-SCNC: 4 MMOL/L — SIGNIFICANT CHANGE UP (ref 3.5–5.3)
RBC # BLD: 6.16 M/UL — HIGH (ref 4.2–5.8)
RBC # FLD: 13.6 % — SIGNIFICANT CHANGE UP (ref 10.3–14.5)
SODIUM SERPL-SCNC: 141 MMOL/L — SIGNIFICANT CHANGE UP (ref 135–145)
WBC # BLD: 8.76 K/UL — SIGNIFICANT CHANGE UP (ref 3.8–10.5)
WBC # FLD AUTO: 8.76 K/UL — SIGNIFICANT CHANGE UP (ref 3.8–10.5)

## 2019-08-03 PROCEDURE — 99233 SBSQ HOSP IP/OBS HIGH 50: CPT

## 2019-08-03 RX ORDER — INSULIN GLARGINE 100 [IU]/ML
36 INJECTION, SOLUTION SUBCUTANEOUS AT BEDTIME
Refills: 0 | Status: DISCONTINUED | OUTPATIENT
Start: 2019-08-03 | End: 2019-08-06

## 2019-08-03 RX ORDER — INSULIN LISPRO 100/ML
6 VIAL (ML) SUBCUTANEOUS
Refills: 0 | Status: DISCONTINUED | OUTPATIENT
Start: 2019-08-03 | End: 2019-08-06

## 2019-08-03 RX ADMIN — Medication 6 UNIT(S): at 18:03

## 2019-08-03 RX ADMIN — CEFTRIAXONE 100 MILLIGRAM(S): 500 INJECTION, POWDER, FOR SOLUTION INTRAMUSCULAR; INTRAVENOUS at 21:27

## 2019-08-03 RX ADMIN — Medication 4 UNIT(S): at 08:37

## 2019-08-03 RX ADMIN — ATORVASTATIN CALCIUM 40 MILLIGRAM(S): 80 TABLET, FILM COATED ORAL at 21:35

## 2019-08-03 RX ADMIN — INSULIN GLARGINE 36 UNIT(S): 100 INJECTION, SOLUTION SUBCUTANEOUS at 22:46

## 2019-08-03 RX ADMIN — PANTOPRAZOLE SODIUM 40 MILLIGRAM(S): 20 TABLET, DELAYED RELEASE ORAL at 05:10

## 2019-08-03 RX ADMIN — ENOXAPARIN SODIUM 40 MILLIGRAM(S): 100 INJECTION SUBCUTANEOUS at 12:07

## 2019-08-03 RX ADMIN — Medication 4: at 12:52

## 2019-08-03 RX ADMIN — CARVEDILOL PHOSPHATE 6.25 MILLIGRAM(S): 80 CAPSULE, EXTENDED RELEASE ORAL at 18:06

## 2019-08-03 RX ADMIN — AZITHROMYCIN 250 MILLIGRAM(S): 500 TABLET, FILM COATED ORAL at 22:47

## 2019-08-03 RX ADMIN — Medication 2: at 08:38

## 2019-08-03 RX ADMIN — Medication 1: at 22:46

## 2019-08-03 RX ADMIN — DONEPEZIL HYDROCHLORIDE 10 MILLIGRAM(S): 10 TABLET, FILM COATED ORAL at 21:35

## 2019-08-03 RX ADMIN — TAMSULOSIN HYDROCHLORIDE 0.4 MILLIGRAM(S): 0.4 CAPSULE ORAL at 21:35

## 2019-08-03 RX ADMIN — Medication 81 MILLIGRAM(S): at 12:07

## 2019-08-03 RX ADMIN — Medication 6 UNIT(S): at 12:51

## 2019-08-03 RX ADMIN — CARVEDILOL PHOSPHATE 6.25 MILLIGRAM(S): 80 CAPSULE, EXTENDED RELEASE ORAL at 05:10

## 2019-08-03 RX ADMIN — Medication 3: at 18:04

## 2019-08-03 NOTE — PROVIDER CONTACT NOTE (OTHER) - RECOMMENDATIONS
education given, spouse was given explanations risks can involve serious injury including death pt continues to drink water and diet soda

## 2019-08-03 NOTE — PROGRESS NOTE ADULT - PROBLEM SELECTOR PLAN 1
Pt with nonproductive cough, CT chest with RML groundglass, representing possible infection vs pneumonitis. Given cough, leukocytosis and CT findings treating for CAP. Leukocytosis now resolved  - Continue with CTX and Azithromycin. Will treat with abx for total 5 day course. Transition to PO on d/c  - Urine legionella negative  - Speech and swallow rec Dysphagia III diet, agree with this and explained to patient.   - Bcx NGTD

## 2019-08-03 NOTE — PROGRESS NOTE ADULT - PROBLEM SELECTOR PLAN 2
- With suprapubic tenderness and urinary frequency.   - Check post void residual  - US kidney and bladder show no hydro or obstruction  - Tamsulosin 0.4 mg q am.  - Urologist Dr. Goldberg office called by Dr. Duque

## 2019-08-03 NOTE — PROVIDER CONTACT NOTE (OTHER) - ASSESSMENT
pt non compliant w/ dysphagia diet, pt and family (spouse) educated on the risks of drinking non thickened liquid pt and wife understood however stated "this does not taste good, I will not drink this" pt family then brought their own soda and water for patient to drink

## 2019-08-04 LAB
ANION GAP SERPL CALC-SCNC: 14 MMOL/L — SIGNIFICANT CHANGE UP (ref 5–17)
BASOPHILS # BLD AUTO: 0.05 K/UL — SIGNIFICANT CHANGE UP (ref 0–0.2)
BASOPHILS NFR BLD AUTO: 0.5 % — SIGNIFICANT CHANGE UP (ref 0–2)
BUN SERPL-MCNC: 14 MG/DL — SIGNIFICANT CHANGE UP (ref 7–23)
CALCIUM SERPL-MCNC: 9.4 MG/DL — SIGNIFICANT CHANGE UP (ref 8.4–10.5)
CHLORIDE SERPL-SCNC: 101 MMOL/L — SIGNIFICANT CHANGE UP (ref 96–108)
CO2 SERPL-SCNC: 24 MMOL/L — SIGNIFICANT CHANGE UP (ref 22–31)
CREAT SERPL-MCNC: 1.1 MG/DL — SIGNIFICANT CHANGE UP (ref 0.5–1.3)
EOSINOPHIL # BLD AUTO: 0.36 K/UL — SIGNIFICANT CHANGE UP (ref 0–0.5)
EOSINOPHIL NFR BLD AUTO: 3.9 % — SIGNIFICANT CHANGE UP (ref 0–6)
GLUCOSE SERPL-MCNC: 228 MG/DL — HIGH (ref 70–99)
HCT VFR BLD CALC: 47.1 % — SIGNIFICANT CHANGE UP (ref 39–50)
HGB BLD-MCNC: 15.8 G/DL — SIGNIFICANT CHANGE UP (ref 13–17)
IMM GRANULOCYTES NFR BLD AUTO: 0.7 % — SIGNIFICANT CHANGE UP (ref 0–1.5)
LYMPHOCYTES # BLD AUTO: 1.31 K/UL — SIGNIFICANT CHANGE UP (ref 1–3.3)
LYMPHOCYTES # BLD AUTO: 14.3 % — SIGNIFICANT CHANGE UP (ref 13–44)
MCHC RBC-ENTMCNC: 26.4 PG — LOW (ref 27–34)
MCHC RBC-ENTMCNC: 33.5 GM/DL — SIGNIFICANT CHANGE UP (ref 32–36)
MCV RBC AUTO: 78.8 FL — LOW (ref 80–100)
MONOCYTES # BLD AUTO: 0.81 K/UL — SIGNIFICANT CHANGE UP (ref 0–0.9)
MONOCYTES NFR BLD AUTO: 8.8 % — SIGNIFICANT CHANGE UP (ref 2–14)
NEUTROPHILS # BLD AUTO: 6.59 K/UL — SIGNIFICANT CHANGE UP (ref 1.8–7.4)
NEUTROPHILS NFR BLD AUTO: 71.8 % — SIGNIFICANT CHANGE UP (ref 43–77)
PLATELET # BLD AUTO: 228 K/UL — SIGNIFICANT CHANGE UP (ref 150–400)
POTASSIUM SERPL-MCNC: 3.8 MMOL/L — SIGNIFICANT CHANGE UP (ref 3.5–5.3)
POTASSIUM SERPL-SCNC: 3.8 MMOL/L — SIGNIFICANT CHANGE UP (ref 3.5–5.3)
RBC # BLD: 5.98 M/UL — HIGH (ref 4.2–5.8)
RBC # FLD: 13.5 % — SIGNIFICANT CHANGE UP (ref 10.3–14.5)
SODIUM SERPL-SCNC: 139 MMOL/L — SIGNIFICANT CHANGE UP (ref 135–145)
WBC # BLD: 9.18 K/UL — SIGNIFICANT CHANGE UP (ref 3.8–10.5)
WBC # FLD AUTO: 9.18 K/UL — SIGNIFICANT CHANGE UP (ref 3.8–10.5)

## 2019-08-04 PROCEDURE — 99233 SBSQ HOSP IP/OBS HIGH 50: CPT

## 2019-08-04 RX ADMIN — Medication 81 MILLIGRAM(S): at 12:16

## 2019-08-04 RX ADMIN — CARVEDILOL PHOSPHATE 6.25 MILLIGRAM(S): 80 CAPSULE, EXTENDED RELEASE ORAL at 17:15

## 2019-08-04 RX ADMIN — Medication 6 UNIT(S): at 12:19

## 2019-08-04 RX ADMIN — CEFTRIAXONE 100 MILLIGRAM(S): 500 INJECTION, POWDER, FOR SOLUTION INTRAMUSCULAR; INTRAVENOUS at 21:37

## 2019-08-04 RX ADMIN — Medication 6 UNIT(S): at 18:20

## 2019-08-04 RX ADMIN — Medication 1: at 21:44

## 2019-08-04 RX ADMIN — Medication 6 UNIT(S): at 08:36

## 2019-08-04 RX ADMIN — Medication 1: at 08:37

## 2019-08-04 RX ADMIN — Medication 4: at 18:21

## 2019-08-04 RX ADMIN — Medication 1: at 12:19

## 2019-08-04 RX ADMIN — DONEPEZIL HYDROCHLORIDE 10 MILLIGRAM(S): 10 TABLET, FILM COATED ORAL at 21:24

## 2019-08-04 RX ADMIN — PANTOPRAZOLE SODIUM 40 MILLIGRAM(S): 20 TABLET, DELAYED RELEASE ORAL at 06:30

## 2019-08-04 RX ADMIN — ENOXAPARIN SODIUM 40 MILLIGRAM(S): 100 INJECTION SUBCUTANEOUS at 12:16

## 2019-08-04 RX ADMIN — CARVEDILOL PHOSPHATE 6.25 MILLIGRAM(S): 80 CAPSULE, EXTENDED RELEASE ORAL at 06:37

## 2019-08-04 RX ADMIN — ATORVASTATIN CALCIUM 40 MILLIGRAM(S): 80 TABLET, FILM COATED ORAL at 21:24

## 2019-08-04 RX ADMIN — INSULIN GLARGINE 36 UNIT(S): 100 INJECTION, SOLUTION SUBCUTANEOUS at 21:45

## 2019-08-04 RX ADMIN — TAMSULOSIN HYDROCHLORIDE 0.4 MILLIGRAM(S): 0.4 CAPSULE ORAL at 21:24

## 2019-08-04 NOTE — PROGRESS NOTE ADULT - PROBLEM SELECTOR PLAN 1
Pt with nonproductive cough, CT chest with RML groundglass, representing possible infection vs pneumonitis. Given cough, leukocytosis and CT findings treating for CAP. Leukocytosis now resolved  - Continue with CTX and Azithromycin. Will treat with abx for total 5 day course. Transition to PO on d/c  - Urine legionella negative  - Speech and swallow rec Dysphagia III diet, agree with this and explained to patient who is refusing. See documentation by NP.   - Bcx NGTD

## 2019-08-05 DIAGNOSIS — J69.0 PNEUMONITIS DUE TO INHALATION OF FOOD AND VOMIT: ICD-10-CM

## 2019-08-05 DIAGNOSIS — R13.10 DYSPHAGIA, UNSPECIFIED: ICD-10-CM

## 2019-08-05 LAB
ANION GAP SERPL CALC-SCNC: 12 MMOL/L — SIGNIFICANT CHANGE UP (ref 5–17)
BUN SERPL-MCNC: 14 MG/DL — SIGNIFICANT CHANGE UP (ref 7–23)
CALCIUM SERPL-MCNC: 9.4 MG/DL — SIGNIFICANT CHANGE UP (ref 8.4–10.5)
CHLORIDE SERPL-SCNC: 106 MMOL/L — SIGNIFICANT CHANGE UP (ref 96–108)
CO2 SERPL-SCNC: 24 MMOL/L — SIGNIFICANT CHANGE UP (ref 22–31)
CREAT SERPL-MCNC: 0.99 MG/DL — SIGNIFICANT CHANGE UP (ref 0.5–1.3)
CULTURE RESULTS: SIGNIFICANT CHANGE UP
GLUCOSE SERPL-MCNC: 212 MG/DL — HIGH (ref 70–99)
HCT VFR BLD CALC: 47.2 % — SIGNIFICANT CHANGE UP (ref 39–50)
HGB BLD-MCNC: 15.8 G/DL — SIGNIFICANT CHANGE UP (ref 13–17)
MCHC RBC-ENTMCNC: 26.1 PG — LOW (ref 27–34)
MCHC RBC-ENTMCNC: 33.5 GM/DL — SIGNIFICANT CHANGE UP (ref 32–36)
MCV RBC AUTO: 78 FL — LOW (ref 80–100)
PLATELET # BLD AUTO: 248 K/UL — SIGNIFICANT CHANGE UP (ref 150–400)
POTASSIUM SERPL-MCNC: 3.7 MMOL/L — SIGNIFICANT CHANGE UP (ref 3.5–5.3)
POTASSIUM SERPL-SCNC: 3.7 MMOL/L — SIGNIFICANT CHANGE UP (ref 3.5–5.3)
RBC # BLD: 6.05 M/UL — HIGH (ref 4.2–5.8)
RBC # FLD: 13.6 % — SIGNIFICANT CHANGE UP (ref 10.3–14.5)
SODIUM SERPL-SCNC: 142 MMOL/L — SIGNIFICANT CHANGE UP (ref 135–145)
SPECIMEN SOURCE: SIGNIFICANT CHANGE UP
WBC # BLD: 10.61 K/UL — HIGH (ref 3.8–10.5)
WBC # FLD AUTO: 10.61 K/UL — HIGH (ref 3.8–10.5)

## 2019-08-05 PROCEDURE — 99222 1ST HOSP IP/OBS MODERATE 55: CPT

## 2019-08-05 PROCEDURE — 99232 SBSQ HOSP IP/OBS MODERATE 35: CPT

## 2019-08-05 RX ADMIN — ENOXAPARIN SODIUM 40 MILLIGRAM(S): 100 INJECTION SUBCUTANEOUS at 11:57

## 2019-08-05 RX ADMIN — ATORVASTATIN CALCIUM 40 MILLIGRAM(S): 80 TABLET, FILM COATED ORAL at 22:12

## 2019-08-05 RX ADMIN — CARVEDILOL PHOSPHATE 6.25 MILLIGRAM(S): 80 CAPSULE, EXTENDED RELEASE ORAL at 18:05

## 2019-08-05 RX ADMIN — Medication 6 UNIT(S): at 08:12

## 2019-08-05 RX ADMIN — DONEPEZIL HYDROCHLORIDE 10 MILLIGRAM(S): 10 TABLET, FILM COATED ORAL at 22:12

## 2019-08-05 RX ADMIN — CARVEDILOL PHOSPHATE 6.25 MILLIGRAM(S): 80 CAPSULE, EXTENDED RELEASE ORAL at 06:01

## 2019-08-05 RX ADMIN — Medication 81 MILLIGRAM(S): at 11:57

## 2019-08-05 RX ADMIN — INSULIN GLARGINE 36 UNIT(S): 100 INJECTION, SOLUTION SUBCUTANEOUS at 22:12

## 2019-08-05 RX ADMIN — TAMSULOSIN HYDROCHLORIDE 0.4 MILLIGRAM(S): 0.4 CAPSULE ORAL at 22:12

## 2019-08-05 RX ADMIN — Medication 3: at 18:05

## 2019-08-05 RX ADMIN — Medication 2: at 08:12

## 2019-08-05 RX ADMIN — PANTOPRAZOLE SODIUM 40 MILLIGRAM(S): 20 TABLET, DELAYED RELEASE ORAL at 06:01

## 2019-08-05 RX ADMIN — Medication 3: at 13:09

## 2019-08-05 RX ADMIN — Medication 6 UNIT(S): at 18:05

## 2019-08-05 RX ADMIN — Medication 6 UNIT(S): at 13:09

## 2019-08-05 NOTE — PROGRESS NOTE ADULT - PROBLEM SELECTOR PLAN 1
S/S rec Dysphagia 3 diet with thickend liquids which pt is refusing.  ENT consulted today per S/S recs for scope of upper airway eval.  Completed course of abx but high risk for repeat aspiration given no compliance with diet. See NP note documenting pt's understanding and refusal.

## 2019-08-05 NOTE — DIETITIAN INITIAL EVALUATION ADULT. - OTHER INFO
Source: Patient, electronic medical record    INTAKE PTA: Patient reports good po intake PTA; states he "always" has a good appetite.  Notes that he checks FS x1/day - "usually low 100s."  Metformin and glipizide per H&P.  HgbA1c 7.7%.  Pt states he does not drink juice, only drinks diet soda.  Notes multivitamin and vitamin D supplementation PTA; confirms NKFA.    WEIGHT HISTORY: Pt reports usual body weight of 210 lbs - per [facility] 215.1 lbs (3/26/18), 214.9 lbs (10/5/18). Dosing weight this admission 219.6 lbs.  Appears weight trending up.  Pt states he used to weigh 150 lbs, but has been less physically active.    THIS ADMISSION: Patient s/p SLP eval/MBS with recommendation for dysphagia 3 diet with nectar thick liquids due to laryngeal penetration.  Noted per chart, Pt refusing modified texture.  Reports to RD that he dislikes thickened liquids.  Diet coke noted at bedside, but Pt states he drank it several days ago.  Denies n/v, diarrhea/constipation (last BM 8/4/19 per flowsheets).  Elevated POCT blood glucose, recommend consistent carbohydrate diet with texture per SLP recommendation.    EDUCATION: RD reviewed dysphagia 3 diet and thickened liquids with Pt.  Pt receptive to written materials.  Also reviewed consistent carbohydrate diet, portion control, pairing carbohydrate with protein.  Made aware RD remains available PRN. Source: Patient, electronic medical record    INTAKE PTA: Patient reports good po intake PTA; states he "always" has a good appetite.  Notes that he checks FS x1/day - "usually low 100s."  Metformin and glipizide per H&P.  HgbA1c 7.7%.  Pt states he does not drink juice, only drinks diet soda.  Reads labels for sugar content. Notes multivitamin and vitamin D supplementation PTA; confirms NKFA.    WEIGHT HISTORY: Pt reports usual body weight of 210 lbs - per [facility] 215.1 lbs (3/26/18), 214.9 lbs (10/5/18). Dosing weight this admission 219.6 lbs.  Appears weight trending up.  Pt states he used to weigh 150 lbs, but has been less physically active.    THIS ADMISSION: Patient s/p SLP eval/MBS with recommendation for dysphagia 3 diet with nectar thick liquids due to laryngeal penetration.  Noted per chart, Pt refusing modified texture.  Reports to RD that he dislikes thickened liquids.  Diet coke noted at bedside, but Pt states he drank it several days ago.  Denies n/v, diarrhea/constipation (last BM 8/4/19 per flowsheets).  Elevated POCT blood glucose, recommend consistent carbohydrate diet with texture per SLP recommendation.    EDUCATION: RD reviewed dysphagia 3 diet and thickened liquids with Pt.  Pt receptive to written materials.  Also reviewed consistent carbohydrate diet, portion control, pairing carbohydrate with protein, label reading.  Made aware RD remains available PRN. Source: Patient, electronic medical record    INTAKE PTA: Patient reports good po intake PTA; states he "always" has a good appetite.  Notes that he checks FS x1/day - "usually low 100s."  Metformin and glipizide per H&P.  HgbA1c 7.7%.  Pt states he does not drink juice, only drinks diet soda.  Reads labels for sugar content. Notes multivitamin and vitamin D supplementation PTA; confirms NKFA.    WEIGHT HISTORY: Pt reports usual body weight of 210 lbs - per [facility] 215.1 lbs (3/26/18), 214.9 lbs (10/5/18). Dosing weight this admission 219.6 lbs.  Appears weight trending up.  Pt states he used to weigh 150 lbs, but has been less physically active.    THIS ADMISSION: Patient s/p SLP eval/MBS with recommendation for dysphagia 3 diet with nectar thick liquids due to laryngeal penetration.  Noted per chart, Pt refusing modified texture.  Reports to RD that he dislikes thickened liquids and does "not have a swallowing problem."  Diet coke noted at bedside, but Pt states he drank it several days ago.  Denies n/v, diarrhea/constipation (last BM 8/4/19 per flowsheets).  Elevated POCT blood glucose, recommend consistent carbohydrate diet with texture per SLP recommendation.    EDUCATION: RD reviewed dysphagia 3 diet and thickened liquids with Pt.  Pt receptive to written materials.  Also reviewed consistent carbohydrate diet, portion control, pairing carbohydrate with protein, label reading.  Made aware RD remains available PRN.

## 2019-08-05 NOTE — CONSULT NOTE ADULT - PROBLEM SELECTOR RECOMMENDATION 9
- Pt refused Laryngoscopy. Call ENT if pt agrees for FOE.   - Diet per Speech and Swallow.   - Aspiration Precautions.   - Pt s/p course of Abx for PNA.   - Call ENT with questions.

## 2019-08-05 NOTE — PROGRESS NOTE ADULT - PROVIDER SPECIALTY LIST ADULT
Gastroenterology
Hospitalist
Internal Medicine
Gastroenterology

## 2019-08-05 NOTE — DIETITIAN INITIAL EVALUATION ADULT. - PROBLEM SELECTOR PLAN 9
Diet: DASH/TLD  DVT Prophylaxis: Lovenox  Dispo: Home, pending further workup    Margaret Silva  PGY-2 Internal Medicine  Pager: [phone] (NS)/[phone] (MARIA FERNANDA)

## 2019-08-05 NOTE — DIETITIAN INITIAL EVALUATION ADULT. - PHYSICAL APPEARANCE
other (specify)/obese Skin per nursing documentation: no pressure injuries noted  Edema: none documented  ht: 69 inches, weight: 219.6 lbs, BMI: 32.5 kg/m2, IBW: 160 lbs (+/- 10%), %IBW: 137%

## 2019-08-05 NOTE — PROGRESS NOTE ADULT - ATTENDING COMMENTS
Amarilis Oh DO  [facility]  [phone]
Amarilis Oh [facility]  [phone]
Amarilis Oh DO  [facility]  [phone]

## 2019-08-05 NOTE — PROGRESS NOTE ADULT - PROBLEM SELECTOR PLAN 8
Diet: DASH/TLD  DVT Prophylaxis: Lovenox  Dispo: [facility]Ed[facility] ready but wife refusing [facility] wants pt to go to [facility] so will refer out and if not accepted will need to DC with [facility].

## 2019-08-05 NOTE — DIETITIAN INITIAL EVALUATION ADULT. - PROBLEM SELECTOR PLAN 2
pt c/o cough per wife, with rales on exam, WBC of 17, and clear CXR, with negative RVP  - Continue with CTX and Azithromycin  - Urine legionella  - CT Scan to assess for PNA

## 2019-08-05 NOTE — DIETITIAN INITIAL EVALUATION ADULT. - REASON INDICATOR FOR ASSESSMENT
LOS  Per chart: 83 yo male with recurrent UTIs, NPH s/p shunt, BPH, HTN, lumbar stenosis who presents to the ED with increased coughing and frequency urinating, admitted to medicine for PNA. PMH GERD, DM2.  S/p MBS.  ENT following / laryngeal evaluation.

## 2019-08-05 NOTE — DIETITIAN INITIAL EVALUATION ADULT. - PROBLEM SELECTOR PLAN 7
Patient on metformin and glipizide at home; Pt endorses being on insulin in the past, but denies taking it currently.  - Will start patient on SSI  - Check a1c  - Adjust additional medications after reconcilliation

## 2019-08-05 NOTE — CONSULT NOTE ADULT - ASSESSMENT
83 yo male with recurrent UTIs (previous cultures at [facility] have shown pan-sensitive E coli), NPH s/p shunt, BPH, HTN, lumbar stenosis admitted with increased coughing and frequency urinating. Pt w hypoxemia and WBC w concern for pneumonitis possibly 2/2 aspiration. 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 ENT called for upper airway evaluation via laryngoscopy. Pt presently on dysphagia III diet. Pt refused FOE.
consider ct chest to assess for pneumonia  if pna documented, speech and swallow eval to r/o aspiration  abx as per med  continue protonix

## 2019-08-05 NOTE — PROGRESS NOTE ADULT - SUBJECTIVE AND OBJECTIVE BOX
INTERVAL HPI/OVERNIGHT EVENTS:  no nausea or vomiting  tolerating po  notes suprapubic discomfort since this am  bowel movement yesterday   denies change in baseline urinary sx      Vital Signs Last 24 Hrs  T(C): 36.6 (02 Aug 2019 04:27), Max: 36.8 (01 Aug 2019 11:56)  T(F): 97.8 (02 Aug 2019 04:27), Max: 98.3 (01 Aug 2019 11:56)  HR: 71 (02 Aug 2019 04:27) (70 - 86)  BP: 174/84 (02 Aug 2019 04:27) (133/87 - 174/84)  BP(mean): --  RR: 18 (02 Aug 2019 04:27) (18 - 18)  SpO2: 96% (02 Aug 2019 04:27) (94% - 96%)        LABS:                        15.6   9.36  )-----------( 202      ( 02 Aug 2019 08:50 )             48.2     08-02    139  |  104  |  14  ----------------------------<  212<H>  3.8   |  24  |  1.04    Ca    8.9      02 Aug 2019 05:47          I&O's Summary    01 Aug 2019 07:01  -  02 Aug 2019 07:00  --------------------------------------------------------  IN: 760 mL / OUT: 1100 mL / NET: -340 mL        MEDICATIONS  (STANDING):  aspirin enteric coated 81 milliGRAM(s) Oral daily  atorvastatin 40 milliGRAM(s) Oral at bedtime  azithromycin  IVPB 500 milliGRAM(s) IV Intermittent every 24 hours  carvedilol 6.25 milliGRAM(s) Oral every 12 hours  cefTRIAXone   IVPB 1000 milliGRAM(s) IV Intermittent every 24 hours  dextrose 5%. 1000 milliLiter(s) (50 mL/Hr) IV Continuous <Continuous>  dextrose 50% Injectable 12.5 Gram(s) IV Push once  dextrose 50% Injectable 25 Gram(s) IV Push once  dextrose 50% Injectable 25 Gram(s) IV Push once  donepezil 10 milliGRAM(s) Oral at bedtime  enoxaparin Injectable 40 milliGRAM(s) SubCutaneous daily  insulin glargine Injectable (LANTUS) 22 Unit(s) SubCutaneous at bedtime  insulin lispro (HumaLOG) corrective regimen sliding scale   SubCutaneous three times a day before meals  insulin lispro (HumaLOG) corrective regimen sliding scale   SubCutaneous at bedtime  insulin lispro Injectable (HumaLOG) 4 Unit(s) SubCutaneous three times a day before meals  pantoprazole    Tablet 40 milliGRAM(s) Oral before breakfast  tamsulosin 0.4 milliGRAM(s) Oral at bedtime    MEDICATIONS  (PRN):  dextrose 40% Gel 15 Gram(s) Oral once PRN Blood Glucose LESS THAN 70 milliGRAM(s)/deciliter  glucagon  Injectable 1 milliGRAM(s) IntraMuscular once PRN Glucose LESS THAN 70 milligrams/deciliter        RADIOLOGY & ADDITIONAL TESTS:                marshall 
Glynn Gaspar MD  Division of Hospital Medicine  Pager [phone]  If no response or off hours page: [phone]  ---------------------------------------------------------    TAZ MARSHALL  84y  Male      Patient is a 84y old  Male who presents with a chief complaint of Cough (01 Aug 2019 07:32)      INTERVAL HPI/OVERNIGHT EVENTS:  Sitting in chair. Still with cough. States that he slept well. No fever/chills.       REVIEW OF SYSTEMS: 14 point ROS negative unless listed above    T(C): 36.8 (19 @ 11:56), Max: 36.8 (19 @ 16:09)  HR: 70 (19 @ 11:56) (65 - 91)  BP: 133/87 (19 @ 11:56) (133/87 - 149/85)  RR: 18 (19 @ 11:56) (17 - 18)  SpO2: 94% (19 @ 11:56) (94% - 96%)  Wt(kg): --Vital Signs Last 24 Hrs  T(C): 36.8 (01 Aug 2019 11:56), Max: 36.8 (2019 16:09)  T(F): 98.3 (01 Aug 2019 11:56), Max: 98.3 (01 Aug 2019 11:56)  HR: 70 (01 Aug 2019 11:56) (65 - 91)  BP: 133/87 (01 Aug 2019 11:56) (133/87 - 149/85)  BP(mean): --  RR: 18 (01 Aug 2019 11:56) (17 - 18)  SpO2: 94% (01 Aug 2019 11:56) (94% - 96%)    PHYSICAL EXAM:  GENERAL: NAD, well-developed, resting in bed  HEAD:  Atraumatic, Normocephalic  NECK: Supple, No JVD  CHEST/LUNG: rales in left lower lobe  HEART: Regular rate and rhythm; No murmurs, rubs, or gallops  ABDOMEN: Soft, Nontender, Nondistended; Bowel sounds present  EXTREMITIES:  2+ Peripheral Pulses, No clubbing, cyanosis, or edema  PSYCH: AAOx2   NEUROLOGY: non-focal  SKIN: No rashes or lesions    Consultant(s) Notes Reviewed:  [x ] YES  [ ] NO  Care Discussed with Consultants/Other Providers [ x] YES  [ ] NO    LABS:                        15.9   11.58 )-----------( 172      ( 01 Aug 2019 08:37 )             47.5     08-    138  |  102  |  15  ----------------------------<  156<H>  3.7   |  25  |  1.08    Ca    8.9      01 Aug 2019 06:36    TPro  6.7  /  Alb  3.8  /  TBili  0.8  /  DBili  x   /  AST  14  /  ALT  18  /  AlkPhos  80  07-30      Urinalysis Basic - ( 2019 20:06 )    Color: Yellow / Appearance: Clear / S.015 / pH: x  Gluc: x / Ketone: Negative  / Bili: Negative / Urobili: Negative   Blood: x / Protein: Trace / Nitrite: Negative   Leuk Esterase: Small / RBC: 1 /hpf / WBC 4 /HPF   Sq Epi: x / Non Sq Epi: 3 /hpf / Bacteria: Negative      CAPILLARY BLOOD GLUCOSE      POCT Blood Glucose.: 196 mg/dL (01 Aug 2019 08:30)  POCT Blood Glucose.: 188 mg/dL (2019 21:43)  POCT Blood Glucose.: 176 mg/dL (2019 18:13)  POCT Blood Glucose.: 267 mg/dL (2019 12:37)        Urinalysis Basic - ( 2019 20:06 )    Color: Yellow / Appearance: Clear / S.015 / pH: x  Gluc: x / Ketone: Negative  / Bili: Negative / Urobili: Negative   Blood: x / Protein: Trace / Nitrite: Negative   Leuk Esterase: Small / RBC: 1 /hpf / WBC 4 /HPF   Sq Epi: x / Non Sq Epi: 3 /hpf / Bacteria: Negative        RADIOLOGY & ADDITIONAL TESTS:    Imaging Personally Reviewed:  [x ] YES  [ ] NO
Glynn Gaspar MD  Division of Hospital Medicine  Pager [phone]  If no response or off hours page: [phone]  ---------------------------------------------------------    TAZ MARSHALL  84y  Male      Patient is a 84y old  Male who presents with a chief complaint of Cough (2019 06:56)      INTERVAL HPI/OVERNIGHT EVENTS:  Seen at bedside, with wife. Cough improved.       REVIEW OF SYSTEMS: 14 point ROS negative unless listed above    T(C): 37.6 (19 @ 08:17), Max: 37.7 (19 @ 18:18)  HR: 82 (19 @ 08:17) (66 - 87)  BP: 144/72 (19 @ 08:17) (139/62 - 170/75)  RR: 18 (19 @ 08:17) (16 - 18)  SpO2: 94% (19 @ 08:17) (94% - 96%)  Wt(kg): --Vital Signs Last 24 Hrs  T(C): 37.6 (2019 08:17), Max: 37.7 (2019 18:18)  T(F): 99.6 (2019 08:17), Max: 99.9 (2019 18:18)  HR: 82 (2019 08:17) (66 - 87)  BP: 144/72 (2019 08:17) (139/62 - 170/75)  BP(mean): --  RR: 18 (2019 08:17) (16 - 18)  SpO2: 94% (2019 08:17) (94% - 96%)    PHYSICAL EXAM:  GENERAL: NAD, well-developed, resting in bed  HEAD:  Atraumatic, Normocephalic  NECK: Supple, No JVD  CHEST/LUNG: rales in left lower lobe  HEART: Regular rate and rhythm; No murmurs, rubs, or gallops  ABDOMEN: Soft, Nontender, Nondistended; Bowel sounds present  EXTREMITIES:  2+ Peripheral Pulses, No clubbing, cyanosis, or edema  PSYCH: AAOx2   NEUROLOGY: non-focal  SKIN: No rashes or lesions    Consultant(s) Notes Reviewed:  [x ] YES  [ ] NO  Care Discussed with Consultants/Other Providers [ x] YES  [ ] NO    LABS:                        16.4   17.2  )-----------( 183      ( 2019 19:32 )             51.2     07-30    141  |  101  |  16  ----------------------------<  190<H>  4.2   |  24  |  1.21    Ca    9.5      2019 19:32    TPro  6.7  /  Alb  3.8  /  TBili  0.8  /  DBili  x   /  AST  14  /  ALT  18  /  AlkPhos  80        Urinalysis Basic - ( 2019 20:06 )    Color: Yellow / Appearance: Clear / S.015 / pH: x  Gluc: x / Ketone: Negative  / Bili: Negative / Urobili: Negative   Blood: x / Protein: Trace / Nitrite: Negative   Leuk Esterase: Small / RBC: 1 /hpf / WBC 4 /HPF   Sq Epi: x / Non Sq Epi: 3 /hpf / Bacteria: Negative      CAPILLARY BLOOD GLUCOSE      POCT Blood Glucose.: 267 mg/dL (2019 12:37)  POCT Blood Glucose.: 175 mg/dL (2019 09:03)        Urinalysis Basic - ( 2019 20:06 )    Color: Yellow / Appearance: Clear / S.015 / pH: x  Gluc: x / Ketone: Negative  / Bili: Negative / Urobili: Negative   Blood: x / Protein: Trace / Nitrite: Negative   Leuk Esterase: Small / RBC: 1 /hpf / WBC 4 /HPF   Sq Epi: x / Non Sq Epi: 3 /hpf / Bacteria: Negative        RADIOLOGY & ADDITIONAL TESTS:    Imaging Personally Reviewed:  [x ] YES  [ ] NO
Glynn Gaspar MD  Division of Hospital Medicine  Pager [phone]  If no response or off hours page: [phone]  ---------------------------------------------------------    TAZ MARSHALL  84y  Male      Patient is a 84y old  Male who presents with a chief complaint of Cough (02 Aug 2019 09:12)      INTERVAL HPI/OVERNIGHT EVENTS:  Seen sitting in chair. Still with cough, remains nonproductive. Has urinary frequency overnight and some suprapubic pain.       REVIEW OF SYSTEMS: 14 point ROS negative unless listed above    T(C): 36.6 (08-02-19 @ 04:27), Max: 36.8 (08-01-19 @ 11:56)  HR: 71 (08-02-19 @ 04:27) (70 - 77)  BP: 174/84 (08-02-19 @ 04:27) (133/87 - 174/84)  RR: 18 (08-02-19 @ 04:27) (18 - 18)  SpO2: 96% (08-02-19 @ 04:27) (94% - 96%)  Wt(kg): --Vital Signs Last 24 Hrs  T(C): 36.6 (02 Aug 2019 04:27), Max: 36.8 (01 Aug 2019 11:56)  T(F): 97.8 (02 Aug 2019 04:27), Max: 98.3 (01 Aug 2019 11:56)  HR: 71 (02 Aug 2019 04:27) (70 - 77)  BP: 174/84 (02 Aug 2019 04:27) (133/87 - 174/84)  BP(mean): --  RR: 18 (02 Aug 2019 04:27) (18 - 18)  SpO2: 96% (02 Aug 2019 04:27) (94% - 96%)    PHYSICAL EXAM:  GENERAL: NAD, well-developed, resting in bed  HEAD:  Atraumatic, Normocephalic  NECK: Supple, No JVD  CHEST/LUNG: rales in left lower lobe  HEART: Regular rate and rhythm; No murmurs, rubs, or gallops  ABDOMEN: Soft, Suprapubic tenderness to light palpation; Bowel sounds present  EXTREMITIES:  2+ Peripheral Pulses, No clubbing, cyanosis, or edema  PSYCH: AAOx2   NEUROLOGY: non-focal  SKIN: No rashes or lesions      Consultant(s) Notes Reviewed:  [x ] YES  [ ] NO  Care Discussed with Consultants/Other Providers [ x] YES  [ ] NO    LABS:                        15.6   9.36  )-----------( 202      ( 02 Aug 2019 08:50 )             48.2     08-02    139  |  104  |  14  ----------------------------<  212<H>  3.8   |  24  |  1.04    Ca    8.9      02 Aug 2019 05:47          CAPILLARY BLOOD GLUCOSE      POCT Blood Glucose.: 202 mg/dL (02 Aug 2019 08:57)  POCT Blood Glucose.: 225 mg/dL (01 Aug 2019 21:33)  POCT Blood Glucose.: 345 mg/dL (01 Aug 2019 17:28)  POCT Blood Glucose.: 306 mg/dL (01 Aug 2019 12:32)            RADIOLOGY & ADDITIONAL TESTS:    Imaging Personally Reviewed:  [x ] YES  [ ] NO
INTERVAL HPI/OVERNIGHT EVENTS:  remains with cough  denies correlation with po intake  ct chest with GGO RML and RLL  no nausea, vomiting or abdominal pain      Vital Signs Last 24 Hrs  T(C): 36.7 (01 Aug 2019 06:34), Max: 37.6 (2019 08:17)  T(F): 98.1 (01 Aug 2019 06:34), Max: 99.6 (2019 08:17)  HR: 91 (01 Aug 2019 06:34) (65 - 91)  BP: 144/79 (01 Aug 2019 06:34) (134/80 - 149/85)  BP(mean): --  RR: 18 (01 Aug 2019 06:34) (17 - 18)  SpO2: 96% (01 Aug 2019 06:34) (94% - 96%)        LABS:                        16.4   17.2  )-----------( 183      ( 2019 19:32 )             51.2     08-    138  |  102  |  15  ----------------------------<  156<H>  3.7   |  25  |  1.08    Ca    8.9      01 Aug 2019 06:36    TPro  6.7  /  Alb  3.8  /  TBili  0.8  /  DBili  x   /  AST  14  /  ALT  18  /  AlkPhos  80  07-30      Urinalysis Basic - ( 2019 20:06 )    Color: Yellow / Appearance: Clear / S.015 / pH: x  Gluc: x / Ketone: Negative  / Bili: Negative / Urobili: Negative   Blood: x / Protein: Trace / Nitrite: Negative   Leuk Esterase: Small / RBC: 1 /hpf / WBC 4 /HPF   Sq Epi: x / Non Sq Epi: 3 /hpf / Bacteria: Negative      I&O's Summary    2019 07:01  -  01 Aug 2019 07:00  --------------------------------------------------------  IN: 250 mL / OUT: 600 mL / NET: -350 mL        MEDICATIONS  (STANDING):  aspirin enteric coated 81 milliGRAM(s) Oral daily  atorvastatin 40 milliGRAM(s) Oral at bedtime  azithromycin  IVPB 500 milliGRAM(s) IV Intermittent every 24 hours  carvedilol 6.25 milliGRAM(s) Oral every 12 hours  cefTRIAXone   IVPB 1000 milliGRAM(s) IV Intermittent every 24 hours  dextrose 5%. 1000 milliLiter(s) (50 mL/Hr) IV Continuous <Continuous>  dextrose 50% Injectable 12.5 Gram(s) IV Push once  dextrose 50% Injectable 25 Gram(s) IV Push once  dextrose 50% Injectable 25 Gram(s) IV Push once  donepezil 10 milliGRAM(s) Oral at bedtime  enoxaparin Injectable 40 milliGRAM(s) SubCutaneous daily  insulin glargine Injectable (LANTUS) 15 Unit(s) SubCutaneous at bedtime  insulin lispro (HumaLOG) corrective regimen sliding scale   SubCutaneous three times a day before meals  insulin lispro (HumaLOG) corrective regimen sliding scale   SubCutaneous at bedtime  pantoprazole    Tablet 40 milliGRAM(s) Oral before breakfast  tamsulosin 0.4 milliGRAM(s) Oral at bedtime    MEDICATIONS  (PRN):  dextrose 40% Gel 15 Gram(s) Oral once PRN Blood Glucose LESS THAN 70 milliGRAM(s)/deciliter  glucagon  Injectable 1 milliGRAM(s) IntraMuscular once PRN Glucose LESS THAN 70 milligrams/deciliter        RADIOLOGY & ADDITIONAL TESTS:                marshall 
Patient is a 84y old  Male who presents with a chief complaint of Cough (02 Aug 2019 11:19)      INTERVAL HPI/OVERNIGHT EVENTS: Refusing thickened liquids but explained to him it puts him at risk for aspiration and he seems to understand.  He would like to leave today but his wife would like his PT needs reevaled.       Hypoxemia  H/o or current diagnosis of HF- Contraindication to ACEI/ARBs  H/o or current diagnosis of HF- ACEI/ARB contraindication unknown  H/o or current diagnosis of HF- Contraindication to ACEI/ARBs  H/o or current diagnosis of HF- Contraindication to ACEI/ARBs  No pertinent family history in first degree relatives  Family history of stroke  Family history of colon cancer  No significant family history  Handoff  MEWS Score  Lumbar spinal stenosis  Vitamin D deficiency  Mild dementia  OAB (overactive bladder)  Type 2 diabetes mellitus  BPH (benign prostatic hyperplasia)  Nephrotic syndrome  Chronic lower back pain  H/O recurrent urinary tract infection  Chronic Back Pain  NPH (Normal Pressure Hydrocephalus)  Hypertension, Essential  Diabetes Mellitus  Pneumonia of lower lobe due to infectious organism, unspecified laterality  Hypoxia  Pneumonia, bacterial  Need for prophylactic measure  Medication management  Diabetes  BPH (benign prostatic hyperplasia)  GERD (gastroesophageal reflux disease)  Hypertension  Hyperlipidemia  UTI (urinary tract infection)  H/O back injury  Normal pressure hydrocephalus  No significant past surgical history  GENERAL MEDICAL EXAM NOS  UTI (urinary tract infection)  Pneumonia, bacterial  RAD CDS  90+  UTI (urinary tract infection)  Hyperlipidemia  Hypertension  GERD (gastroesophageal reflux disease)  BPH (benign prostatic hyperplasia)  Pneumonia of lower lobe due to infectious organism, unspecified laterality      Review of Systems: 12 point review of systems otherwise negative  ( - )fevers/chills  ( - ) dyspnea  ( - ) cough  ( - ) chest pain  ( - ) palpatations  ( - ) dizziness/lightheadedness  ( - ) nausea/vomiting  ( - ) abd pain  ( - ) diarrhea  ( - ) melena  ( - ) hematochezia  ( - ) dysuria  ( - ) hematuria  ( - ) leg swelling  ( -) calf tenderness  ( - ) motor weakness  ( - ) extremity numbness  ( - ) back pain  ( + ) tolerating POs  ( + ) BM    MEDICATIONS  (STANDING):  aspirin enteric coated 81 milliGRAM(s) Oral daily  atorvastatin 40 milliGRAM(s) Oral at bedtime  azithromycin  IVPB 500 milliGRAM(s) IV Intermittent every 24 hours  carvedilol 6.25 milliGRAM(s) Oral every 12 hours  cefTRIAXone   IVPB 1000 milliGRAM(s) IV Intermittent every 24 hours  dextrose 5%. 1000 milliLiter(s) (50 mL/Hr) IV Continuous <Continuous>  dextrose 50% Injectable 12.5 Gram(s) IV Push once  dextrose 50% Injectable 25 Gram(s) IV Push once  dextrose 50% Injectable 25 Gram(s) IV Push once  donepezil 10 milliGRAM(s) Oral at bedtime  enoxaparin Injectable 40 milliGRAM(s) SubCutaneous daily  insulin glargine Injectable (LANTUS) 36 Unit(s) SubCutaneous at bedtime  insulin lispro (HumaLOG) corrective regimen sliding scale   SubCutaneous three times a day before meals  insulin lispro (HumaLOG) corrective regimen sliding scale   SubCutaneous at bedtime  insulin lispro Injectable (HumaLOG) 6 Unit(s) SubCutaneous three times a day before meals  pantoprazole    Tablet 40 milliGRAM(s) Oral before breakfast  tamsulosin 0.4 milliGRAM(s) Oral at bedtime    MEDICATIONS  (PRN):  dextrose 40% Gel 15 Gram(s) Oral once PRN Blood Glucose LESS THAN 70 milliGRAM(s)/deciliter  glucagon  Injectable 1 milliGRAM(s) IntraMuscular once PRN Glucose LESS THAN 70 milligrams/deciliter      Allergies    No Known Allergies    Intolerances          Vital Signs Last 24 Hrs  T(C): 36.6 (03 Aug 2019 04:12), Max: 36.8 (02 Aug 2019 20:10)  T(F): 97.9 (03 Aug 2019 04:12), Max: 98.2 (02 Aug 2019 20:10)  HR: 64 (03 Aug 2019 04:12) (64 - 84)  BP: 134/74 (03 Aug 2019 04:12) (117/87 - 157/79)  BP(mean): --  RR: 18 (03 Aug 2019 04:12) (18 - 18)  SpO2: 96% (03 Aug 2019 04:12) (93% - 96%)  CAPILLARY BLOOD GLUCOSE      POCT Blood Glucose.: 305 mg/dL (03 Aug 2019 12:47)  POCT Blood Glucose.: 249 mg/dL (03 Aug 2019 08:34)  POCT Blood Glucose.: 342 mg/dL (02 Aug 2019 21:27)  POCT Blood Glucose.: 351 mg/dL (02 Aug 2019 17:23)      08-02 @ 07:01  -  08-03 @ 07:00  --------------------------------------------------------  IN: 960 mL / OUT: 900 mL / NET: 60 mL        Physical Exam:    Daily     Daily   General:  NAD, non toxica  HEENT:  Nonicteric, PERRLA  CV:  RRR, no murmur  Lungs:  rales rhonchi at right base  Abdomen:  Soft, non-tender, no distended, positive BS  Extremities:  no edema  Skin:  Warm and dry, no rashes  :  No day  Neuro:  AAOx3, non-focal  No Restraints    LABS:                        15.8   8.76  )-----------( 219      ( 03 Aug 2019 10:57 )             49.5     08-03    141  |  103  |  15  ----------------------------<  239<H>  4.0   |  24  |  1.05    Ca    9.3      03 Aug 2019 05:45              RADIOLOGY & ADDITIONAL TESTS:    ---------------------------------------------------------------------------  I personally reviewed: [  ]EKG   [  ]CXR    [  ] CT    [  ]Other  ---------------------------------------------------------------------------  PLEASE CHECK WHEN PRESENT:     [  ]Heart Failure     [  ] Acute     [  ] Acute on Chronic     [  ] Chronic  -------------------------------------------------------------------     [  ]Diastolic [HFpEF]     [  ]Systolic [HFrEF]     [  ]Combined [HFpEF & HFrEF]     [  ]Other:  -------------------------------------------------------------------  [  ]DESIRE     [  ]ATN     [  ]Reneal Medullary Necrosis     [  ]Renal Cortical Necrosis     [  ]Other Pathological Lesions:    [  ]CKD 1  [  ]CKD 2  [  ]CKD 3  [  ]CKD 4  [  ]CKD 5  [  ]Other  -------------------------------------------------------------------  [  ]Other/Unspecified:    --------------------------------------------------------------------    Abdominal Nutritional Status  [  ]Malnutrition: See Nutrition Note  [  ]Cachexia  [  ]Other:   [  ]Supplement Ordered:  [  ]Morbid Obesity (BMI >=40]
Patient is a 84y old  Male who presents with a chief complaint of Cough (03 Aug 2019 13:19)      INTERVAL HPI/OVERNIGHT EVENTS: TRISH      Hypoxemia  H/o or current diagnosis of HF- Contraindication to ACEI/ARBs  H/o or current diagnosis of HF- ACEI/ARB contraindication unknown  H/o or current diagnosis of HF- Contraindication to ACEI/ARBs  H/o or current diagnosis of HF- Contraindication to ACEI/ARBs  No pertinent family history in first degree relatives  Family history of stroke  Family history of colon cancer  No significant family history  Handoff  MEWS Score  Lumbar spinal stenosis  Vitamin D deficiency  Mild dementia  OAB (overactive bladder)  Type 2 diabetes mellitus  BPH (benign prostatic hyperplasia)  Nephrotic syndrome  Chronic lower back pain  H/O recurrent urinary tract infection  Chronic Back Pain  NPH (Normal Pressure Hydrocephalus)  Hypertension, Essential  Diabetes Mellitus  Pneumonia of lower lobe due to infectious organism, unspecified laterality  Hypoxia  Pneumonia, bacterial  Need for prophylactic measure  Medication management  Diabetes  BPH (benign prostatic hyperplasia)  GERD (gastroesophageal reflux disease)  Hypertension  Hyperlipidemia  UTI (urinary tract infection)  H/O back injury  Normal pressure hydrocephalus  No significant past surgical history  GENERAL MEDICAL EXAM NOS  UTI (urinary tract infection)  Pneumonia, bacterial  RAD CDS  90+  UTI (urinary tract infection)  Hyperlipidemia  Hypertension  GERD (gastroesophageal reflux disease)  BPH (benign prostatic hyperplasia)  Pneumonia of lower lobe due to infectious organism, unspecified laterality      Review of Systems: 12 point review of systems otherwise negative  ( - )fevers/chills  ( - ) dyspnea  ( - ) cough  ( - ) chest pain  ( - ) palpatations  ( - ) dizziness/lightheadedness  ( - ) nausea/vomiting  ( - ) abd pain  ( - ) diarrhea  ( - ) melena  ( - ) hematochezia  ( - ) dysuria  ( - ) hematuria  ( - ) leg swelling  ( -) calf tenderness  ( - ) motor weakness  ( - ) extremity numbness  ( - ) back pain  ( + ) tolerating POs  ( + ) BM    MEDICATIONS  (STANDING):  aspirin enteric coated 81 milliGRAM(s) Oral daily  atorvastatin 40 milliGRAM(s) Oral at bedtime  carvedilol 6.25 milliGRAM(s) Oral every 12 hours  cefTRIAXone   IVPB 1000 milliGRAM(s) IV Intermittent every 24 hours  dextrose 5%. 1000 milliLiter(s) (50 mL/Hr) IV Continuous <Continuous>  dextrose 50% Injectable 12.5 Gram(s) IV Push once  dextrose 50% Injectable 25 Gram(s) IV Push once  dextrose 50% Injectable 25 Gram(s) IV Push once  donepezil 10 milliGRAM(s) Oral at bedtime  enoxaparin Injectable 40 milliGRAM(s) SubCutaneous daily  insulin glargine Injectable (LANTUS) 36 Unit(s) SubCutaneous at bedtime  insulin lispro (HumaLOG) corrective regimen sliding scale   SubCutaneous three times a day before meals  insulin lispro (HumaLOG) corrective regimen sliding scale   SubCutaneous at bedtime  insulin lispro Injectable (HumaLOG) 6 Unit(s) SubCutaneous three times a day before meals  pantoprazole    Tablet 40 milliGRAM(s) Oral before breakfast  tamsulosin 0.4 milliGRAM(s) Oral at bedtime    MEDICATIONS  (PRN):  dextrose 40% Gel 15 Gram(s) Oral once PRN Blood Glucose LESS THAN 70 milliGRAM(s)/deciliter  glucagon  Injectable 1 milliGRAM(s) IntraMuscular once PRN Glucose LESS THAN 70 milligrams/deciliter      Allergies    No Known Allergies    Intolerances          Vital Signs Last 24 Hrs  T(C): 36.7 (04 Aug 2019 04:00), Max: 36.7 (03 Aug 2019 20:50)  T(F): 98 (04 Aug 2019 04:00), Max: 98.1 (03 Aug 2019 20:50)  HR: 69 (04 Aug 2019 04:00) (69 - 78)  BP: 141/68 (04 Aug 2019 04:00) (137/66 - 170/77)  BP(mean): --  RR: 18 (04 Aug 2019 04:00) (18 - 18)  SpO2: 95% (04 Aug 2019 04:00) (92% - 95%)  CAPILLARY BLOOD GLUCOSE      POCT Blood Glucose.: 197 mg/dL (04 Aug 2019 12:14)  POCT Blood Glucose.: 191 mg/dL (04 Aug 2019 08:29)  POCT Blood Glucose.: 287 mg/dL (03 Aug 2019 22:34)  POCT Blood Glucose.: 289 mg/dL (03 Aug 2019 17:43)      08-03 @ 07:01  -  08-04 @ 07:00  --------------------------------------------------------  IN: 960 mL / OUT: 1000 mL / NET: -40 mL    08-04 @ 07:01  -  08-04 @ 13:16  --------------------------------------------------------  IN: 0 mL / OUT: 200 mL / NET: -200 mL        Physical Exam:    Daily     Daily   General:  NAD  HEENT:  Nonicteric, PERRLA  CV:  RRR, no murmur  Lungs:  CTA B/L, no wheezes, rales, rhonchi  Abdomen:  Soft, non-tender, no distended, positive BS  Extremities:  no edema  Skin:  Warm and dry, no rashes  :  No day  Neuro:  AAOx3, non-focal  No Restraints    LABS:                        15.8   9.18  )-----------( 228      ( 04 Aug 2019 08:13 )             47.1     08-04    139  |  101  |  14  ----------------------------<  228<H>  3.8   |  24  |  1.10    Ca    9.4      04 Aug 2019 06:30              RADIOLOGY & ADDITIONAL TESTS:    ---------------------------------------------------------------------------  I personally reviewed: [  ]EKG   [  ]CXR    [  ] CT    [  ]Other  ---------------------------------------------------------------------------  PLEASE CHECK WHEN PRESENT:     [  ]Heart Failure     [  ] Acute     [  ] Acute on Chronic     [  ] Chronic  -------------------------------------------------------------------     [  ]Diastolic [HFpEF]     [  ]Systolic [HFrEF]     [  ]Combined [HFpEF & HFrEF]     [  ]Other:  -------------------------------------------------------------------  [  ]DESIRE     [  ]ATN     [  ]Reneal Medullary Necrosis     [  ]Renal Cortical Necrosis     [  ]Other Pathological Lesions:    [  ]CKD 1  [  ]CKD 2  [  ]CKD 3  [  ]CKD 4  [  ]CKD 5  [  ]Other  -------------------------------------------------------------------  [  ]Other/Unspecified:    --------------------------------------------------------------------    Abdominal Nutritional Status  [  ]Malnutrition: See Nutrition Note  [  ]Cachexia  [  ]Other:   [  ]Supplement Ordered:  [  ]Morbid Obesity (BMI >=40]
Patient is a 84y old  Male who presents with a chief complaint of Cough (05 Aug 2019 09:46)      INTERVAL HPI/OVERNIGHT EVENTS: Feeling well today no cough.       Hypoxemia  H/o or current diagnosis of HF- Contraindication to ACEI/ARBs  H/o or current diagnosis of HF- ACEI/ARB contraindication unknown  H/o or current diagnosis of HF- Contraindication to ACEI/ARBs  H/o or current diagnosis of HF- Contraindication to ACEI/ARBs  No pertinent family history in first degree relatives  Family history of stroke  Family history of colon cancer  No significant family history  Handoff  MEWS Score  Lumbar spinal stenosis  Vitamin D deficiency  Mild dementia  OAB (overactive bladder)  Type 2 diabetes mellitus  BPH (benign prostatic hyperplasia)  Nephrotic syndrome  Chronic lower back pain  H/O recurrent urinary tract infection  Chronic Back Pain  NPH (Normal Pressure Hydrocephalus)  Hypertension, Essential  Diabetes Mellitus  Pneumonia of lower lobe due to infectious organism, unspecified laterality  Hypoxia  Pneumonia, bacterial  Need for prophylactic measure  Medication management  Diabetes  BPH (benign prostatic hyperplasia)  GERD (gastroesophageal reflux disease)  Hypertension  Hyperlipidemia  UTI (urinary tract infection)  H/O back injury  Normal pressure hydrocephalus  No significant past surgical history  GENERAL MEDICAL EXAM NOS  UTI (urinary tract infection)  Pneumonia, bacterial  RAD CDS  90+  UTI (urinary tract infection)  Hyperlipidemia  Hypertension  GERD (gastroesophageal reflux disease)  BPH (benign prostatic hyperplasia)  Pneumonia of lower lobe due to infectious organism, unspecified laterality      Review of Systems: 12 point review of systems otherwise negative  ( - )fevers/chills  ( - ) dyspnea  ( - ) cough  ( - ) chest pain  ( - ) palpatations  ( - ) dizziness/lightheadedness  ( - ) nausea/vomiting  ( - ) abd pain  ( - ) diarrhea  ( - ) melena  ( - ) hematochezia  ( - ) dysuria  ( - ) hematuria  ( - ) leg swelling  ( -) calf tenderness  ( - ) motor weakness  ( - ) extremity numbness  ( - ) back pain  ( + ) tolerating POs  ( + ) BM    MEDICATIONS  (STANDING):  aspirin enteric coated 81 milliGRAM(s) Oral daily  atorvastatin 40 milliGRAM(s) Oral at bedtime  carvedilol 6.25 milliGRAM(s) Oral every 12 hours  dextrose 5%. 1000 milliLiter(s) (50 mL/Hr) IV Continuous <Continuous>  dextrose 50% Injectable 12.5 Gram(s) IV Push once  dextrose 50% Injectable 25 Gram(s) IV Push once  dextrose 50% Injectable 25 Gram(s) IV Push once  donepezil 10 milliGRAM(s) Oral at bedtime  enoxaparin Injectable 40 milliGRAM(s) SubCutaneous daily  insulin glargine Injectable (LANTUS) 36 Unit(s) SubCutaneous at bedtime  insulin lispro (HumaLOG) corrective regimen sliding scale   SubCutaneous three times a day before meals  insulin lispro (HumaLOG) corrective regimen sliding scale   SubCutaneous at bedtime  insulin lispro Injectable (HumaLOG) 6 Unit(s) SubCutaneous three times a day before meals  pantoprazole    Tablet 40 milliGRAM(s) Oral before breakfast  tamsulosin 0.4 milliGRAM(s) Oral at bedtime    MEDICATIONS  (PRN):  dextrose 40% Gel 15 Gram(s) Oral once PRN Blood Glucose LESS THAN 70 milliGRAM(s)/deciliter  glucagon  Injectable 1 milliGRAM(s) IntraMuscular once PRN Glucose LESS THAN 70 milligrams/deciliter      Allergies    No Known Allergies    Intolerances          Vital Signs Last 24 Hrs  T(C): 36.9 (05 Aug 2019 04:52), Max: 36.9 (05 Aug 2019 04:52)  T(F): 98.4 (05 Aug 2019 04:52), Max: 98.4 (05 Aug 2019 04:52)  HR: 72 (05 Aug 2019 04:52) (66 - 72)  BP: 162/77 (05 Aug 2019 04:52) (140/79 - 162/77)  BP(mean): --  RR: 18 (05 Aug 2019 04:52) (18 - 18)  SpO2: 92% (05 Aug 2019 04:52) (92% - 94%)  CAPILLARY BLOOD GLUCOSE      POCT Blood Glucose.: 241 mg/dL (05 Aug 2019 08:05)  POCT Blood Glucose.: 284 mg/dL (04 Aug 2019 21:35)  POCT Blood Glucose.: 305 mg/dL (04 Aug 2019 17:33)  POCT Blood Glucose.: 197 mg/dL (04 Aug 2019 12:14)      08-04 @ 07:01  -  08-05 @ 07:00  --------------------------------------------------------  IN: 530 mL / OUT: 830 mL / NET: -300 mL        Physical Exam:    Daily     Daily   General:  NAD, non toxic appearing  HEENT:  Nonicteric, PERRLA  CV:  RRR, no murmur  Lungs:  CTA B/L, no wheezes, rales, rhonchi  Abdomen:  Soft, non-tender, no distended, positive BS  Extremities:  no edema  Skin:  Warm and dry, no rashes  :  No day  Neuro:  AAOx3, non-focal  No Restraints    LABS:                        15.8   10.61 )-----------( 248      ( 05 Aug 2019 09:48 )             47.2     08-05    142  |  106  |  14  ----------------------------<  212<H>  3.7   |  24  |  0.99    Ca    9.4      05 Aug 2019 07:52              RADIOLOGY & ADDITIONAL TESTS:    ---------------------------------------------------------------------------  I personally reviewed: [  ]EKG   [  ]CXR    [  ] CT    [  ]Other  ---------------------------------------------------------------------------  PLEASE CHECK WHEN PRESENT:     [  ]Heart Failure     [  ] Acute     [  ] Acute on Chronic     [  ] Chronic  -------------------------------------------------------------------     [  ]Diastolic [HFpEF]     [  ]Systolic [HFrEF]     [  ]Combined [HFpEF & HFrEF]     [  ]Other:  -------------------------------------------------------------------  [  ]DESIRE     [  ]ATN     [  ]Reneal Medullary Necrosis     [  ]Renal Cortical Necrosis     [  ]Other Pathological Lesions:    [  ]CKD 1  [  ]CKD 2  [  ]CKD 3  [  ]CKD 4  [  ]CKD 5  [  ]Other  -------------------------------------------------------------------  [  ]Other/Unspecified:    --------------------------------------------------------------------    Abdominal Nutritional Status  [  ]Malnutrition: See Nutrition Note  [  ]Cachexia  [  ]Other:   [  ]Supplement Ordered:  [  ]Morbid Obesity (BMI >=40]

## 2019-08-05 NOTE — PROGRESS NOTE ADULT - PROBLEM SELECTOR PLAN 2
Pt with nonproductive cough, CT chest with RML groundglass, representing possible infection vs pneumonitis. Given cough, leukocytosis and CT findings treating for CAP. Leukocytosis now resolved  - Treated with abx for total 5 day course.   - Urine legionella negative  - Speech and swallow rec Dysphagia III diet, agree with this and explained to patient who is refusing. See documentation by NP.   - Bcx NGTD

## 2019-08-05 NOTE — PROGRESS NOTE ADULT - ASSESSMENT
83 yo male with recurrent UTIs (previous cultures at [facility] have shown pan-sensitive E coli), NPH s/p shunt, BPH, HTN, lumbar stenosis who presents to the ED with increased coughing and frequency urinating, admitted to medicine for PNA.
83 yo male with recurrent UTIs (previous cultures at [facility] have shown pan-sensitive E coli), NPH s/p shunt, BPH, HTN, lumbar stenosis who presents to the ED with increased coughing and frequency urinating, admitted to medicine for PNA.
85 yo male with recurrent UTIs (previous cultures at [facility] have shown pan-sensitive E coli), NPH s/p shunt, BPH, HTN, lumbar stenosis who presents to the ED with increased coughing and frequency urinating, admitted to medicine for PNA.
85 yo male with recurrent UTIs (previous cultures at [facility] have shown pan-sensitive E coli), NPH s/p shunt, BPH, HTN, lumbar stenosis who presents to the ED with increased coughing and frequency urinating, admitted to medicine for PNA.
85 yo male with recurrent UTIs (previous cultures at [facility] have shown pan-sensitive E coli), NPH s/p shunt, BPH, HTN, lumbar stenosis who presents to the ED with increased coughing and frequency urinating, admitted to medicine for UTI and PNA.
85 yo male with recurrent UTIs (previous cultures at [facility] have shown pan-sensitive E coli), NPH s/p shunt, BPH, HTN, lumbar stenosis who presents to the ED with increased coughing and frequency urinating, admitted to medicine for UTI and PNA.
consider urology f/up and ? sono  repeat cbc pending  speech and swallow eval re cough and ? pna on ct  continue pantoprazole
consider speech and swallow assessment for MBS  abx as per med   continue pantoprazole

## 2019-08-06 VITALS
SYSTOLIC BLOOD PRESSURE: 159 MMHG | RESPIRATION RATE: 18 BRPM | HEART RATE: 67 BPM | TEMPERATURE: 98 F | OXYGEN SATURATION: 96 % | DIASTOLIC BLOOD PRESSURE: 71 MMHG

## 2019-08-06 LAB
HCT VFR BLD CALC: 52.4 % — HIGH (ref 39–50)
HGB BLD-MCNC: 16.1 G/DL — SIGNIFICANT CHANGE UP (ref 13–17)
MCHC RBC-ENTMCNC: 24.9 PG — LOW (ref 27–34)
MCHC RBC-ENTMCNC: 30.7 GM/DL — LOW (ref 32–36)
MCV RBC AUTO: 81.1 FL — SIGNIFICANT CHANGE UP (ref 80–100)
PLATELET # BLD AUTO: 265 K/UL — SIGNIFICANT CHANGE UP (ref 150–400)
RBC # BLD: 6.46 M/UL — HIGH (ref 4.2–5.8)
RBC # FLD: 12.6 % — SIGNIFICANT CHANGE UP (ref 10.3–14.5)
WBC # BLD: 10.8 K/UL — HIGH (ref 3.8–10.5)
WBC # FLD AUTO: 10.8 K/UL — HIGH (ref 3.8–10.5)

## 2019-08-06 PROCEDURE — 92611 MOTION FLUOROSCOPY/SWALLOW: CPT

## 2019-08-06 PROCEDURE — 87633 RESP VIRUS 12-25 TARGETS: CPT

## 2019-08-06 PROCEDURE — 81001 URINALYSIS AUTO W/SCOPE: CPT

## 2019-08-06 PROCEDURE — 74177 CT ABD & PELVIS W/CONTRAST: CPT

## 2019-08-06 PROCEDURE — 71045 X-RAY EXAM CHEST 1 VIEW: CPT

## 2019-08-06 PROCEDURE — 83036 HEMOGLOBIN GLYCOSYLATED A1C: CPT

## 2019-08-06 PROCEDURE — 96375 TX/PRO/DX INJ NEW DRUG ADDON: CPT

## 2019-08-06 PROCEDURE — 76770 US EXAM ABDO BACK WALL COMP: CPT

## 2019-08-06 PROCEDURE — 80048 BASIC METABOLIC PNL TOTAL CA: CPT

## 2019-08-06 PROCEDURE — 71250 CT THORAX DX C-: CPT

## 2019-08-06 PROCEDURE — 83690 ASSAY OF LIPASE: CPT

## 2019-08-06 PROCEDURE — 96374 THER/PROPH/DIAG INJ IV PUSH: CPT | Mod: XU

## 2019-08-06 PROCEDURE — 87798 DETECT AGENT NOS DNA AMP: CPT

## 2019-08-06 PROCEDURE — 87449 NOS EACH ORGANISM AG IA: CPT

## 2019-08-06 PROCEDURE — 97116 GAIT TRAINING THERAPY: CPT

## 2019-08-06 PROCEDURE — 87581 M.PNEUMON DNA AMP PROBE: CPT

## 2019-08-06 PROCEDURE — 82962 GLUCOSE BLOOD TEST: CPT

## 2019-08-06 PROCEDURE — 99285 EMERGENCY DEPT VISIT HI MDM: CPT | Mod: 25

## 2019-08-06 PROCEDURE — 87486 CHLMYD PNEUM DNA AMP PROBE: CPT

## 2019-08-06 PROCEDURE — 92610 EVALUATE SWALLOWING FUNCTION: CPT

## 2019-08-06 PROCEDURE — 85027 COMPLETE CBC AUTOMATED: CPT

## 2019-08-06 PROCEDURE — 87086 URINE CULTURE/COLONY COUNT: CPT

## 2019-08-06 PROCEDURE — 97161 PT EVAL LOW COMPLEX 20 MIN: CPT

## 2019-08-06 PROCEDURE — 80053 COMPREHEN METABOLIC PANEL: CPT

## 2019-08-06 PROCEDURE — 74230 X-RAY XM SWLNG FUNCJ C+: CPT

## 2019-08-06 PROCEDURE — 87040 BLOOD CULTURE FOR BACTERIA: CPT

## 2019-08-06 PROCEDURE — 99239 HOSP IP/OBS DSCHRG MGMT >30: CPT

## 2019-08-06 RX ORDER — MIRABEGRON 50 MG/1
1 TABLET, EXTENDED RELEASE ORAL
Qty: 0 | Refills: 0 | DISCHARGE

## 2019-08-06 RX ORDER — ACETAMINOPHEN 500 MG
2 TABLET ORAL
Qty: 0 | Refills: 0 | DISCHARGE

## 2019-08-06 RX ORDER — METHENAMINE MANDELATE 1 G
1 TABLET ORAL
Qty: 0 | Refills: 0 | DISCHARGE

## 2019-08-06 RX ORDER — MULTIVIT-MIN/FERROUS GLUCONATE 9 MG/15 ML
1 LIQUID (ML) ORAL
Qty: 0 | Refills: 0 | DISCHARGE

## 2019-08-06 RX ORDER — PANTOPRAZOLE SODIUM 20 MG/1
1 TABLET, DELAYED RELEASE ORAL
Qty: 0 | Refills: 0 | DISCHARGE
Start: 2019-08-06

## 2019-08-06 RX ORDER — DONEPEZIL HYDROCHLORIDE 10 MG/1
1 TABLET, FILM COATED ORAL
Qty: 0 | Refills: 0 | DISCHARGE

## 2019-08-06 RX ORDER — INSULIN GLARGINE 100 [IU]/ML
36 INJECTION, SOLUTION SUBCUTANEOUS
Qty: 0 | Refills: 0 | DISCHARGE
Start: 2019-08-06

## 2019-08-06 RX ORDER — CARVEDILOL PHOSPHATE 80 MG/1
1 CAPSULE, EXTENDED RELEASE ORAL
Qty: 0 | Refills: 0 | DISCHARGE
Start: 2019-08-06

## 2019-08-06 RX ORDER — INSULIN LISPRO 100/ML
0 VIAL (ML) SUBCUTANEOUS
Qty: 0 | Refills: 0 | DISCHARGE

## 2019-08-06 RX ORDER — ERGOCALCIFEROL 1.25 MG/1
1 CAPSULE ORAL
Qty: 0 | Refills: 0 | DISCHARGE

## 2019-08-06 RX ORDER — SITAGLIPTIN 50 MG/1
1 TABLET, FILM COATED ORAL
Qty: 0 | Refills: 0 | DISCHARGE

## 2019-08-06 RX ORDER — INSULIN LISPRO 100/ML
6 VIAL (ML) SUBCUTANEOUS
Qty: 0 | Refills: 0 | DISCHARGE

## 2019-08-06 RX ORDER — SOLIFENACIN SUCCINATE 10 MG/1
1 TABLET ORAL
Qty: 0 | Refills: 0 | DISCHARGE

## 2019-08-06 RX ORDER — FLUTICASONE PROPIONATE 50 MCG
1 SPRAY, SUSPENSION NASAL
Qty: 0 | Refills: 0 | DISCHARGE

## 2019-08-06 RX ORDER — ASCORBIC ACID 60 MG
1 TABLET,CHEWABLE ORAL
Qty: 0 | Refills: 0 | DISCHARGE

## 2019-08-06 RX ORDER — TAMSULOSIN HYDROCHLORIDE 0.4 MG/1
1 CAPSULE ORAL
Qty: 0 | Refills: 0 | DISCHARGE
Start: 2019-08-06

## 2019-08-06 RX ORDER — ASPIRIN/CALCIUM CARB/MAGNESIUM 324 MG
1 TABLET ORAL
Qty: 30 | Refills: 0

## 2019-08-06 RX ORDER — SODIUM CHLORIDE 0.65 %
1 AEROSOL, SPRAY (ML) NASAL
Qty: 0 | Refills: 0 | DISCHARGE

## 2019-08-06 RX ORDER — DONEPEZIL HYDROCHLORIDE 10 MG/1
1 TABLET, FILM COATED ORAL
Qty: 0 | Refills: 0 | DISCHARGE
Start: 2019-08-06

## 2019-08-06 RX ORDER — HYDROMORPHONE HYDROCHLORIDE 2 MG/ML
0 INJECTION INTRAMUSCULAR; INTRAVENOUS; SUBCUTANEOUS
Qty: 0 | Refills: 0 | DISCHARGE

## 2019-08-06 RX ORDER — ENOXAPARIN SODIUM 100 MG/ML
30 INJECTION SUBCUTANEOUS
Qty: 0 | Refills: 0 | DISCHARGE

## 2019-08-06 RX ORDER — CARVEDILOL PHOSPHATE 80 MG/1
1 CAPSULE, EXTENDED RELEASE ORAL
Qty: 0 | Refills: 0 | DISCHARGE

## 2019-08-06 RX ORDER — OMEPRAZOLE 10 MG/1
1 CAPSULE, DELAYED RELEASE ORAL
Qty: 0 | Refills: 0 | DISCHARGE

## 2019-08-06 RX ORDER — ATORVASTATIN CALCIUM 80 MG/1
1 TABLET, FILM COATED ORAL
Qty: 0 | Refills: 0 | DISCHARGE
Start: 2019-08-06

## 2019-08-06 RX ADMIN — CARVEDILOL PHOSPHATE 6.25 MILLIGRAM(S): 80 CAPSULE, EXTENDED RELEASE ORAL at 06:56

## 2019-08-06 RX ADMIN — PANTOPRAZOLE SODIUM 40 MILLIGRAM(S): 20 TABLET, DELAYED RELEASE ORAL at 06:56

## 2019-08-06 RX ADMIN — Medication 6 UNIT(S): at 08:55

## 2019-08-06 RX ADMIN — ENOXAPARIN SODIUM 40 MILLIGRAM(S): 100 INJECTION SUBCUTANEOUS at 11:32

## 2019-08-06 RX ADMIN — Medication 6 UNIT(S): at 11:46

## 2019-08-06 RX ADMIN — Medication 4: at 11:45

## 2019-08-06 RX ADMIN — Medication 81 MILLIGRAM(S): at 11:31

## 2019-08-06 RX ADMIN — Medication 1: at 08:54

## 2019-10-18 NOTE — PROGRESS NOTE ADULT - PROBLEM/PLAN-4
Detail Level: Detailed
DISPLAY PLAN FREE TEXT

## 2020-10-07 NOTE — PROGRESS NOTE ADULT - SUBJECTIVE AND OBJECTIVE BOX
INTERVAL HPI/OVERNIGHT EVENTS:  Pt seen and examined at bedside.     Allergies/Intolerance: No Known Allergies      MEDICATIONS  (STANDING):  aspirin enteric coated 81 milliGRAM(s) Oral daily  atorvastatin 10 milliGRAM(s) Oral at bedtime  azithromycin   Tablet 250 milliGRAM(s) Oral daily  carvedilol 6.25 milliGRAM(s) Oral every 12 hours  cefuroxime   Tablet 500 milliGRAM(s) Oral every 12 hours  dextrose 5%. 1000 milliLiter(s) (50 mL/Hr) IV Continuous <Continuous>  dextrose 50% Injectable 12.5 Gram(s) IV Push once  dextrose 50% Injectable 25 Gram(s) IV Push once  dextrose 50% Injectable 25 Gram(s) IV Push once  donepezil 10 milliGRAM(s) Oral at bedtime  enalapril 20 milliGRAM(s) Oral daily  heparin  Injectable 5000 Unit(s) SubCutaneous every 8 hours  insulin glargine Injectable (LANTUS) 18 Unit(s) SubCutaneous at bedtime  insulin lispro (HumaLOG) corrective regimen sliding scale   SubCutaneous three times a day before meals  insulin lispro (HumaLOG) corrective regimen sliding scale   SubCutaneous at bedtime  lidocaine   Patch 1 Patch Transdermal every 24 hours  pantoprazole    Tablet 40 milliGRAM(s) Oral before breakfast  tamsulosin 0.4 milliGRAM(s) Oral at bedtime    MEDICATIONS  (PRN):  acetaminophen   Tablet 650 milliGRAM(s) Oral every 6 hours PRN For Temp greater than 38 C (100.4 F)  dextrose Gel 1 Dose(s) Oral once PRN Blood Glucose LESS THAN 70 milliGRAM(s)/deciliter  glucagon  Injectable 1 milliGRAM(s) IntraMuscular once PRN Glucose LESS THAN 70 milligrams/deciliter        ROS: all systems reviewed and wnl      PHYSICAL EXAMINATION:  Vital Signs Last 24 Hrs  T(C): 37.1 (12 Feb 2018 08:50), Max: 37.1 (11 Feb 2018 21:45)  T(F): 98.7 (12 Feb 2018 08:50), Max: 98.8 (11 Feb 2018 21:45)  HR: 73 (12 Feb 2018 08:50) (58 - 75)  BP: 149/80 (12 Feb 2018 08:50) (145/84 - 157/76)  BP(mean): --  RR: 18 (12 Feb 2018 08:50) (18 - 20)  SpO2: 95% (12 Feb 2018 08:50) (94% - 95%)  CAPILLARY BLOOD GLUCOSE      POCT Blood Glucose.: 233 mg/dL (12 Feb 2018 08:57)  POCT Blood Glucose.: 284 mg/dL (11 Feb 2018 21:27)  POCT Blood Glucose.: 402 mg/dL (11 Feb 2018 17:41)  POCT Blood Glucose.: 429 mg/dL (11 Feb 2018 17:39)  POCT Blood Glucose.: 317 mg/dL (11 Feb 2018 12:33)      02-11 @ 07:01  -  02-12 @ 07:00  --------------------------------------------------------  IN: 640 mL / OUT: 300 mL / NET: 340 mL        GENERAL:   NECK: supple, No JVD  CHEST/LUNG: clear to auscultation bilaterally; no rales, rhonchi, or wheezing b/l  HEART: normal S1, S2  ABDOMEN: BS+, soft, ND, NT   EXTREMITIES:  pulses palpable; no clubbing, cyanosis, or edema b/l LEs  SKIN: no rashes or lesions      LABS: INTERVAL HPI/OVERNIGHT EVENTS:  Pt seen and examined at bedside.     Allergies/Intolerance: No Known Allergies      MEDICATIONS  (STANDING):  aspirin enteric coated 81 milliGRAM(s) Oral daily  atorvastatin 10 milliGRAM(s) Oral at bedtime  azithromycin   Tablet 250 milliGRAM(s) Oral daily  carvedilol 6.25 milliGRAM(s) Oral every 12 hours  cefuroxime   Tablet 500 milliGRAM(s) Oral every 12 hours  dextrose 5%. 1000 milliLiter(s) (50 mL/Hr) IV Continuous <Continuous>  dextrose 50% Injectable 12.5 Gram(s) IV Push once  dextrose 50% Injectable 25 Gram(s) IV Push once  dextrose 50% Injectable 25 Gram(s) IV Push once  donepezil 10 milliGRAM(s) Oral at bedtime  enalapril 20 milliGRAM(s) Oral daily  heparin  Injectable 5000 Unit(s) SubCutaneous every 8 hours  insulin glargine Injectable (LANTUS) 18 Unit(s) SubCutaneous at bedtime  insulin lispro (HumaLOG) corrective regimen sliding scale   SubCutaneous three times a day before meals  insulin lispro (HumaLOG) corrective regimen sliding scale   SubCutaneous at bedtime  lidocaine   Patch 1 Patch Transdermal every 24 hours  pantoprazole    Tablet 40 milliGRAM(s) Oral before breakfast  tamsulosin 0.4 milliGRAM(s) Oral at bedtime    MEDICATIONS  (PRN):  acetaminophen   Tablet 650 milliGRAM(s) Oral every 6 hours PRN For Temp greater than 38 C (100.4 F)  dextrose Gel 1 Dose(s) Oral once PRN Blood Glucose LESS THAN 70 milliGRAM(s)/deciliter  glucagon  Injectable 1 milliGRAM(s) IntraMuscular once PRN Glucose LESS THAN 70 milligrams/deciliter        ROS: all systems reviewed and wnl      PHYSICAL EXAMINATION:  Vital Signs Last 24 Hrs  T(C): 37.1 (12 Feb 2018 08:50), Max: 37.1 (11 Feb 2018 21:45)  T(F): 98.7 (12 Feb 2018 08:50), Max: 98.8 (11 Feb 2018 21:45)  HR: 73 (12 Feb 2018 08:50) (58 - 75)  BP: 149/80 (12 Feb 2018 08:50) (145/84 - 157/76)  BP(mean): --  RR: 18 (12 Feb 2018 08:50) (18 - 20)  SpO2: 95% (12 Feb 2018 08:50) (94% - 95%)  CAPILLARY BLOOD GLUCOSE      POCT Blood Glucose.: 233 mg/dL (12 Feb 2018 08:57)  POCT Blood Glucose.: 284 mg/dL (11 Feb 2018 21:27)  POCT Blood Glucose.: 402 mg/dL (11 Feb 2018 17:41)  POCT Blood Glucose.: 429 mg/dL (11 Feb 2018 17:39)  POCT Blood Glucose.: 317 mg/dL (11 Feb 2018 12:33)      02-11 @ 07:01  -  02-12 @ 07:00  --------------------------------------------------------  IN: 640 mL / OUT: 300 mL / NET: 340 mL        GENERAL: stable, asleep, no fevers, cough or CP   NECK: supple, No JVD  CHEST/LUNG: clear to auscultation bilaterally; no rales, rhonchi, or wheezing b/l  HEART: normal S1, S2  ABDOMEN: BS+, soft, ND, NT   EXTREMITIES:  pulses palpable; no clubbing, cyanosis, or edema b/l LEs  SKIN: no rashes or lesions      LABS: Product 15 Application Directions: Apply a pea size amount to face at bedtime\\nLot #[id number] Product 15 Application Directions: Apply a pea size amount to face at bedtime\\nLot #[id number]

## 2021-01-28 NOTE — DISCHARGE NOTE ADULT - PHYSICIAN SECTION COMPLETE
Nutritional and metabolic disorders in diseases classified elsewhere
Yes

## 2021-06-04 NOTE — ED PROVIDER NOTE - MUSCULOSKELETAL, MLM
High suicide risk, impulsive cannot not engage In safety planning.
Spine appears normal, range of motion is not limited, no muscle or joint tenderness
High suicide risk, impulsive cannot not engage In safety planning.

## 2021-06-09 ENCOUNTER — NON-APPOINTMENT (OUTPATIENT)
Age: 86
End: 2021-06-09

## 2021-06-10 NOTE — DISCHARGE NOTE ADULT - FUNCTIONAL SCREEN CURRENT LEVEL: BATHING, MLM
Behavioral Health  [facility]  [phone]    [facility]  [address]   [phone]    [facility]  [address]   [phone]    *other locations available in [place] or [facility], accepts medicaid.  [address], [place] [address]  [phone]    [facility] [phone]    Mrs. Melanie Hughes   [facility]   [address]  [place] [address] [phone]    Dr. Jaron Baugh/Dr. Radhames Baugh at   [address]   [phone]  *Multiple branches available     Dr. Dean Ham T on [address]  [phone]    [facility], [facility] and [facility]  [phone]     *Make sure to ask if they take your insurance. Usually takes 2-6 weeks to get an appointment as a new patient.     [facility], add to behavioral health list    1. [facility], [address], [phone]   2. [facility], [address], [phone]   3. [facility], [address], [phone]   4. [facility] Address: [address], [place] [address] Phone: [phone]   5. [facility] Address: [address], [place] [address] Phone: [phone]     
(2) assistive person

## 2021-06-10 NOTE — PATIENT PROFILE ADULT. - AS SC BRADEN NUTRITION
Upon return call please let patient know that I schedule him a nurse visit for vitals check same time as the lab.   
(3) adequate

## 2021-07-01 ENCOUNTER — APPOINTMENT (OUTPATIENT)
Dept: VASCULAR SURGERY | Facility: CLINIC | Age: 86
End: 2021-07-01
Payer: MEDICARE

## 2021-07-01 PROCEDURE — 93923 UPR/LXTR ART STDY 3+ LVLS: CPT

## 2021-07-01 PROCEDURE — 99203 OFFICE O/P NEW LOW 30 MIN: CPT

## 2021-07-01 NOTE — HISTORY OF PRESENT ILLNESS
[FreeTextEntry1] : 86-year-old gentleman with a history of dementia presents to the office with a complaint of leg pain and difficulty ambulating.  Patient currently ambulates with a walker.  Patient states that he has difficulty ambulating up hills and staircases.  He does not have a history of rest pain or ulceration.  He is here for evaluation.

## 2021-07-01 NOTE — CONSULT LETTER
[Dear  ___] : Dear  [unfilled], [Consult Letter:] : I had the pleasure of evaluating your patient, [unfilled]. [Please see my note below.] : Please see my note below. [Consult Closing:] : Thank you very much for allowing me to participate in the care of this patient.  If you have any questions, please do not hesitate to contact me. [Sincerely,] : Sincerely, [FreeTextEntry3] : Jesus Villalba M.D., JAVIER., R.P.V.I.\par \par  [facility]\par  of  Surgery\par Assistant Professor of Radiology\par Vascular Surgery at [place]

## 2021-07-01 NOTE — ASSESSMENT
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  At this time no intervention is necessary.  Patient with stable claudication which is not limb threatening.  Would recommend ongoing ambulation.

## 2021-07-01 NOTE — PHYSICAL EXAM
[JVD] : no jugular venous distention  [Normal Breath Sounds] : Normal breath sounds [Normal Rate and Rhythm] : normal rate and rhythm [2+] : right 2+ [1+] : left 1+ [0] : left 0 [Ankle Swelling (On Exam)] : not present [Varicose Veins Of Lower Extremities] : not present [] : not present [Abdomen Tenderness] : ~T ~M No abdominal tenderness [No Rash or Lesion] : No rash or lesion [Alert] : alert [Calm] : calm [de-identified] : Appears well

## 2021-09-09 NOTE — DISCHARGE NOTE ADULT - CARE PLAN
Principal Discharge DX:	Acute cystitis with hematuria  Goal:	resolved  Assessment and plan of treatment:	s/p course of iv rocephin  f/u with PMD  Secondary Diagnosis:	Hypertension, Essential  Assessment and plan of treatment:	take prescribed medications  f/u with PCP  Secondary Diagnosis:	BPH (benign prostatic hyperplasia)  Secondary Diagnosis:	Mild dementia  Secondary Diagnosis:	Normal pressure hydrocephalus  Secondary Diagnosis:	Type 2 diabetes mellitus Principal Discharge DX:	Acute cystitis with hematuria  Goal:	resolved  Assessment and plan of treatment:	s/p course of iv rocephin  f/u with PMD  Secondary Diagnosis:	Hypertension, Essential  Assessment and plan of treatment:	take prescribed medications  f/u with PCP  Secondary Diagnosis:	BPH (benign prostatic hyperplasia)  Assessment and plan of treatment:	take prescribed medications  f/u with PCP  Secondary Diagnosis:	Mild dementia  Assessment and plan of treatment:	take prescribed medications  f/u PCP  Secondary Diagnosis:	Normal pressure hydrocephalus  Assessment and plan of treatment:	f/u private neurology  Secondary Diagnosis:	Type 2 diabetes mellitus  Assessment and plan of treatment:	monitor BS; take prescribed medication; f/u with private endocrinologist 09-Sep-2021 11:17

## 2021-10-13 NOTE — DIETITIAN INITIAL EVALUATION ADULT. - 25 CAL
----- Message from Marilyn K Vermeesch, MD sent at 10/13/2021 11:55 AM EDT -----  Please send me orders and I will sign for medications.  Thank you   [id number]

## 2022-03-22 NOTE — DISCHARGE NOTE ADULT - ADDITIONAL INSTRUCTIONS
1.52 Follow up with pmd  in 7 days                        cardiologist  7 days                         Endocrine Dr. Apple 7 days

## 2023-02-17 NOTE — ED ADULT NURSE NOTE - PRIMARY CARE PROVIDER
"Occupational Therapy     02/17/23 1500   Group Therapy Session   Group Attendance attended group session   Time Session Began 1315   Time Session Ended 1415   Total Time (minutes) 60   Total # Attendees 6-7   Group Type recreation   Group Topic Covered cognitive activities;leisure exploration/use of leisure time;structured socialization   Group Session Detail OT: Education on 8 Dimensions of Wellness and interactive social activity (Scattegories) to increase concentration, focus, attention to task/detail, task follow through, memory recall, healthy leisure engagement, coping with stress, heathy distraction engagement, cognitive wellness, social wellness, and social engagement   Patient Response/Contribution cooperative with task;listened actively;organized;other (see comments)  (pleasant; cooperative; engaged)   Patient Participation Detail Pt reported during check-in \"doing my nails\" is one way she exercises her brain. Pt able to follow verbal directions for novel activity. Pt sat among peers to complete presented activity and engaged in brief social interactions with peers. Pt able to identify unique responses for most categories. Pt reported she enjoyed the activity.          " unknown

## 2023-10-02 NOTE — DIETITIAN INITIAL EVALUATION ADULT. - ADD RECOMMEND
Any Headache: No Show Text Field For Brand Names Of Contraception?: Yes Months Of Therapy Completed: 1 Dosing Month 8 (Required For Cumulative Dosing): Discontinue Hypercholesterolemia Monitoring: I explained this is common when taking isotretinoin. We will monitor closely. Myalgia Treatment: I explained this is common when taking isotretinoin. If this worsens they will contact us. They may try OTC ibuprofen. Headache Monitoring: I recommended monitoring the headaches for now. There is no evidence of increased intracranial pressure. They were instructed to call if the headaches are worsening. Dosing Month 1 (Required For Cumulative Dosing): 30mg BID Xerosis Normal Treatment: I recommended application of Cetaphil or CeraVe numerous times a day going to bed to all dry areas. Female Completion Statement: After discussing her treatment course we decided to discontinue isotretinoin therapy at this time. I explained that she would need to continue her birth control methods for at least one month after the last dosage. She should also get a pregnancy test one month after the last dose. She shouldn't donate blood for one month after the last dose. She should call with any new symptoms of depression. Weight Units: pounds Use Therapeutic Ranged Or Therapeutic Target: Range Dosing Month 6 (Required For Cumulative Dosing): 40mg BID Xerosis Aggressive Treatment: I recommended application of Cetaphil or CeraVe numerous times a day going to bed to all dry areas. I also prescribed a topical steroid for twice daily use. Kilograms Preamble Statement (Weight Entered In Details Tab): Reported Weight in kilograms: Myalgia Monitoring: I explained this is common when taking isotretinoin. Recommend otc icy hot, heating pads, and stretching as initial management. If needed, recommend otc ibuprofen (Advil) or naproxen (Aleve). Discussed that acetaminophen use should be avoided due to risk of liver toxicity. If this worsens they will contact us. Hypertriglyceridemia Monitoring: I explained this is common when taking isotretinoin. If this worsens they will contact us. Retinoid Dermatitis Aggressive Treatment: I recommended more frequent application of Cetaphil or CeraVe to the areas of dermatitis. I also prescribed a topical steroid for twice daily use until the dermatitis resolves. Is Cheilitis Present?: Yes - Normal Treatment Pounds Preamble Statement (Weight Entered In Details Tab): Reported Weight in pounds: Retinoid Dermatitis Normal Treatment: I recommended more frequent application of Cetaphil or CeraVe to the areas of dermatitis. Patient Weight (Optional But Required For Cumulative Dose-Numbers And Decimals Only): 160 What Is The Patient's Gender: Male Female Pregnancy Counseling Text: Female patients should also be on two forms of birth control while taking this medication and for one month after their last dose. Upper Range (In Mg/Kg): 220 Detail Level: Zone Counseling Text: I reviewed the side effect in detail. Patient should get monthly blood tests, not donate blood, not drive at night if vision affected, and not share medication. Cheilitis Aggressive Treatment: I recommended application of Vaseline or Aquaphor numerous times a day (as often as every hour) and before going to bed. I also prescribed a topical steroid for twice daily use. Cheilitis Normal Treatment: I recommended application of Vaseline or Aquaphor numerous times a day (as often as every hour) and before going to bed. Also recommended Dr. Dan's katalina. Nosebleeds Normal Treatment: I explained this is common when taking isotretinoin. I recommended saline mist in each nostril multiple times a day. If this worsens they will contact us. Xerosis Aggressive Treatment: I recommended application of Cetaphil or CeraVe numerous times a day and before going to bed to all dry areas. I also prescribed a topical steroid for twice daily use. Lower Range (In Mg/Kg): 120 Xerosis Normal Treatment: I recommended application of Cetaphil or CeraVe cream numerous times a day and before going to bed to all dry areas. Target Cumulative Dosage (In Mg/Kg): 135 Male Completion Statement: After discussing his treatment course we decided to discontinue isotretinoin therapy at this time. He shouldn't donate blood for one month after the last dose. He should call with any new symptoms of depression. Nosebleeds Normal Treatment: I explained this is common when taking isotretinoin. I recommended saline mist in each nostril multiple times a day, using a cool mist humidifier while sleeping, and aquaphor or vaseline applied into nares. If this worsens they will contact us. Cheilitis Normal Treatment: I recommended application of Vaseline or Aquaphor numerous times a day (as often as every hour) and before going to bed. 1) Add consistent carbohydrate.  Continue dysphagia 3 + nectar liquids per SLP.  2) Diet education provided - T2DM/consistent carbohydrate, dysphagia 3, thickened liquids.  Questions answered.  Made aware RD remains available PRN.

## 2023-10-10 NOTE — SWALLOW VFSS/MBS ASSESSMENT ADULT - SLP PERTINENT HISTORY OF CURRENT PROBLEM
Spoke w/Julianna at [facility] to set up botox injection delivery for 9/11/23 appt.  Injection is to be delivered [facility] 9/7/23 between 9am and 11am.
REFER TO INITIAL CONSULT FOR HX
Detail Level: Zone
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

## 2023-11-22 NOTE — ED ADULT NURSE NOTE - CAS EDN INTEG ASSESS
Addended by: LEONARDO ALDRICH on: 11/22/2023 09:47 AM     Modules accepted: Orders     Addended by: TERRENCE AVINA on: 11/22/2023 07:48 AM     Modules accepted: Orders     Anxiety    Hypertension  x 2 years, controlled  Hypothyroid    Spondylolisthesis of lumbar region    Transient cerebral ischemia, unspecified type  10/17/2015 WDL

## 2024-01-23 ENCOUNTER — APPOINTMENT (OUTPATIENT)
Dept: ENDOCRINOLOGY | Facility: CLINIC | Age: 89
End: 2024-01-23
Payer: MEDICARE

## 2024-01-23 VITALS
TEMPERATURE: 97.9 F | DIASTOLIC BLOOD PRESSURE: 69 MMHG | HEART RATE: 61 BPM | SYSTOLIC BLOOD PRESSURE: 139 MMHG | OXYGEN SATURATION: 94 % | RESPIRATION RATE: 16 BRPM

## 2024-01-23 DIAGNOSIS — E78.5 HYPERLIPIDEMIA, UNSPECIFIED: ICD-10-CM

## 2024-01-23 DIAGNOSIS — E11.9 TYPE 2 DIABETES MELLITUS W/OUT COMPLICATIONS: ICD-10-CM

## 2024-01-23 DIAGNOSIS — R80.9 TYPE 2 DIABETES MELLITUS WITH OTHER DIABETIC KIDNEY COMPLICATION: ICD-10-CM

## 2024-01-23 DIAGNOSIS — I10 ESSENTIAL (PRIMARY) HYPERTENSION: ICD-10-CM

## 2024-01-23 DIAGNOSIS — E11.29 TYPE 2 DIABETES MELLITUS WITH OTHER DIABETIC KIDNEY COMPLICATION: ICD-10-CM

## 2024-01-23 PROCEDURE — 99204 OFFICE O/P NEW MOD 45 MIN: CPT

## 2024-01-23 RX ORDER — REPAGLINIDE 2 MG/1
2 TABLET ORAL TWICE DAILY
Qty: 180 | Refills: 0 | Status: ACTIVE | COMMUNITY
Start: 2024-01-23

## 2024-01-23 RX ORDER — GLIPIZIDE 10 MG/1
10 TABLET, FILM COATED, EXTENDED RELEASE ORAL TWICE DAILY
Refills: 0 | Status: DISCONTINUED | COMMUNITY
Start: 2018-05-18 | End: 2024-01-23

## 2024-01-23 NOTE — ASSESSMENT
[FreeTextEntry1] : Targets in patients 65 years and older: HbA1c 7 to 8%, BP < 140/90   HbA1c is at goal but patient has fasting hypoglycemia and post prandial hyperglycemia so I increased the dose of repaglinide and I decreased the dose of insulin lantus.  BP is at goal.    Patient is on Aspirin, statin and ARB.    Last lipid panel -May 2023 - Trig 114, LDL 46 Last HbA1c - May 2023 - 7.6% Last Vitamin B12 - N/A Last urine albumin panel - May 2023 - positive Last BMP/CMP - August 2023 - Cr N, K N May 2023 - AST N, ALT N  Plan: 1. Decrease insulin lantus to 38 units sc qhs 2. Change repaglinide to 2 mg po bid with breakfast and dinner 3. Fingersticks to be done twice daily 4. Labs to be done in 4 weeks - see below 5. Follow up in 4 weeks to review results and meter

## 2024-01-23 NOTE — HISTORY OF PRESENT ILLNESS
[FreeTextEntry1] : Problems: 1. DM type 2 2. Hypertension 3. Hyperlipidemia 4. DM nephropathy - normal creatinine, positive urine microalbumin panel in May 2023  Note - patient lives in a nursing home and has dementia   DM type 2 1. Diagnosed in 2013 2. Meds: Acarbose 100 mg po bid with breakfast and dinner - patient does not usually eat lunch Januvia 100 mg po daily Repaglinide 1 mg po tid with meals Insulin lantus 42 units sc qhs Patient has diastolic HF but he also has overactive bladder and BPH with lower urinary tract symptoms and a history of UTIs  so I will avoid SGLT-2 inhibitors for now.  3. Fingersticks done twice per day - 60s to 200s, no hypoglycemic unawareness 4. On Aspirin 81 mg po daily, on crestor 10 mg po daily, on losartan/HCTZ 50/12.5 mg po daily 5. Complications: Has DM nephropathy (normal creatinine, positive urine microalbumin panel in May 2023) No DM retinopathy (last eye exam was in August 2023, patient advised on the need for annual DM eye exam) Has CAD, Has peripheral vascular disease, No other ASCVD No foot ulcers/amputation 6. Patient quit smoking cigarettes in 1980s

## 2024-01-23 NOTE — PHYSICAL EXAM
[de-identified] : General: No distress, well nourished Eyes: Normal Sclera, EOMI, PERRL ENT: Normal appearance of the nose, normal oropharynx Neck/Thyroid: No cervical lymphadenopathy, thyroid gland 20 g in size, no thyroid nodules, non-tender Respiratory: No use of accessory muscles of respiration, vesicular breath sounds heard bilaterally, no crepitations or ronchi Cardiovascular: S1 and S2 heard and normal, no S3 or S4, no murmurs, radial pulse normal bilaterally Abdomen: soft, non-tender, no masses, normal bowel sounds Musculoskeletal: No swelling or deformities of joints of hands, no pedal edema Neurological: Normal range of motion in the hands, Normal brachioradialis reflexes bilaterally Psychiatry: Patient converses normally, good judgement and insight Skin: No rashes in hands, no nodules palpated in hands  [de-identified] : I defer DM foot exam to podiatry

## 2024-02-15 NOTE — CONSULT NOTE ADULT - CONSULT REQUESTED BY NAME
Medicine [Never] : Never [Yes] : Yes [2 - 3 times a week (3 pts)] : 2 - 3  times a week (3 points) [1 or 2 (0 pts)] : 1 or 2 (0 points) [Never (0 pts)] : Never (0 points) [No] : In the past 12 months have you used drugs other than those required for medical reasons? No [0] : 2) Feeling down, depressed, or hopeless: Not at all (0) [PHQ-2 Negative - No further assessment needed] : PHQ-2 Negative - No further assessment needed [Fully functional (bathing, dressing, toileting, transferring, walking, feeding)] : Fully functional (bathing, dressing, toileting, transferring, walking, feeding) [Fully functional (using the telephone, shopping, preparing meals, housekeeping, doing laundry, using] : Fully functional and needs no help or supervision to perform IADLs (using the telephone, shopping, preparing meals, housekeeping, doing laundry, using transportation, managing medications and managing finances) [Audit-CScore] : 3 [de-identified] : walking  [de-identified] : healthy diet  [[id number]] : 0 [Reports changes in hearing] : Reports no changes in hearing [Reports changes in vision] : Reports no changes in vision [Reports changes in dental health] : Reports no changes in dental health

## 2024-03-07 NOTE — H&P ADULT - PROBLEM SELECTOR PROBLEM 4
SUBJECTIVE:  Italia Charles is a 11 year old female who presents with left ear pain for 2 month(s). Has been treated for ear infections twice in the last couple months.  Severity: moderate   Additional symptoms include none.      History of recurrent otitis: yes    Past Medical History:   Diagnosis Date    Infant of a diabetic mother (IDM) 2014     No current outpatient medications on file.     Social History     Tobacco Use    Smoking status: Never     Passive exposure: Never    Smokeless tobacco: Never   Substance Use Topics    Alcohol use: No       OBJECTIVE:  /78 (BP Location: Left arm, Patient Position: Sitting, Cuff Size: Adult Regular)   Pulse 102   Temp 98.7  F (37.1  C) (Tympanic)   Wt 71.8 kg (158 lb 6.4 oz)   SpO2 99%    EXAM:  The right TM is air/fluid interface     The right auditory canal is normal and without drainage, edema or erythema  The left TM is normal: no effusions, no erythema, and normal landmarks  The left auditory canal is normal and without drainage, edema or erythema  Oropharynx exam is normal: no lesions, erythema, adenopathy or exudate.  GENERAL: no acute distress  EYES: EOMI,  PERRL, conjunctiva clear  NECK: supple, non-tender to palpation, no adenopathy noted  RESP: lungs clear to auscultation - no rales, rhonchi or wheezes  CV: regular rates and rhythm, normal S1 S2, no murmur noted  SKIN: no suspicious lesions or rashes     ASSESSMENT:  1. Otalgia, left  - ciprofloxacin-dexAMETHasone (CIPRODEX) 0.3-0.1 % otic suspension; Place 4 drops Into the left ear 2 times daily for 5 days  Dispense: 7.5 mL; Refill: 0  - Pediatric ENT  Referral; Future      PLAN:  Try zyrtec daily for fluid in the ears.   Ear drops 4 drops twice a day for 5 days.  Monitor for worsening. Be seen again for worsening pain, or fevers.     ENT referral placed today in the event this doesn't improve as expected.    
NPH (Normal Pressure Hydrocephalus)

## 2024-07-01 NOTE — PATIENT PROFILE ADULT. - PRO ANTICIPATED DISCH DISP
#home

## 2024-08-02 ENCOUNTER — APPOINTMENT (OUTPATIENT)
Dept: ENDOCRINOLOGY | Facility: CLINIC | Age: 89
End: 2024-08-02

## 2024-09-10 ENCOUNTER — APPOINTMENT (OUTPATIENT)
Dept: ENDOCRINOLOGY | Facility: CLINIC | Age: 89
End: 2024-09-10
Payer: MEDICARE

## 2024-09-10 VITALS
BODY MASS INDEX: 30.07 KG/M2 | OXYGEN SATURATION: 97 % | TEMPERATURE: 97.6 F | SYSTOLIC BLOOD PRESSURE: 126 MMHG | HEART RATE: 54 BPM | DIASTOLIC BLOOD PRESSURE: 62 MMHG | HEIGHT: 69 IN | RESPIRATION RATE: 17 BRPM | WEIGHT: 203 LBS

## 2024-09-10 DIAGNOSIS — E78.5 HYPERLIPIDEMIA, UNSPECIFIED: ICD-10-CM

## 2024-09-10 DIAGNOSIS — E11.29 TYPE 2 DIABETES MELLITUS WITH OTHER DIABETIC KIDNEY COMPLICATION: ICD-10-CM

## 2024-09-10 DIAGNOSIS — I10 ESSENTIAL (PRIMARY) HYPERTENSION: ICD-10-CM

## 2024-09-10 DIAGNOSIS — R80.9 TYPE 2 DIABETES MELLITUS WITH OTHER DIABETIC KIDNEY COMPLICATION: ICD-10-CM

## 2024-09-10 DIAGNOSIS — E11.9 TYPE 2 DIABETES MELLITUS W/OUT COMPLICATIONS: ICD-10-CM

## 2024-09-10 PROCEDURE — 99214 OFFICE O/P EST MOD 30 MIN: CPT

## 2024-09-10 PROCEDURE — G2211 COMPLEX E/M VISIT ADD ON: CPT

## 2024-09-10 NOTE — HISTORY OF PRESENT ILLNESS
[FreeTextEntry1] : Problems: 1. DM type 2 2. Hypertension 3. Hyperlipidemia 4. DM nephropathy - normal creatinine, positive urine microalbumin panel in May 2023  Note - patient lives in a nursing home and has dementia   DM type 2 1. Diagnosed in 2013 2. Meds: Acarbose 100 mg po bid with breakfast and dinner - patient does not usually eat lunch Januvia 100 mg po daily (no pancreatitis) Repaglinide  2 mg po bid with breakfast and dinner - patient does not usually eat lunch Insulin lantus 40 units sc qhs Patient has diastolic HF but he also has overactive bladder and BPH with lower urinary tract symptoms and a history of UTIs  so I will avoid SGLT-2 inhibitors for now.  3. Fingersticks - BG LOG NOT SENT WITH PATIENT AND HOME DID NOT ANSWER MY CALL, no hypoglycemic unawareness 4. On Aspirin 81 mg po daily, on crestor 10 mg po daily, on losartan/HCTZ 50/12.5 mg po daily 5. Complications: Has DM nephropathy (normal creatinine, positive urine microalbumin panel in May 2023) No DM retinopathy (last eye exam was in April 2024, patient advised on the need for annual DM eye exam) Has CAD, Has peripheral vascular disease, No other ASCVD No foot ulcers/amputation 6. Patient quit smoking cigarettes in 1980s

## 2024-09-10 NOTE — ASSESSMENT
[FreeTextEntry1] : Targets in patients 65 years and older: HbA1c 7 to 8%, BP < 140/90   HbA1c is above goal but I will accept this level BP is at goal.    Patient is on Aspirin, statin and ARB.    Last lipid panel -May 2023 - Trig 114, LDL 46 Last HbA1c - 08/21/2024 - 8.3% Last Vitamin B12 - N/A Last urine albumin panel - May 2023 - positive Last BMP/CMP - June 2024 - Cr, K, AST, ALT N  Plan: 1. Continue insulin lantus 38 units sc qhs 2. Fingersticks to be done twice daily 3. Labs to be done in 3 months - see below 4. Follow up in 3 months to review results and meter - Nursing home to send 4 week blood glucose log with patient.

## 2024-09-10 NOTE — PHYSICAL EXAM
[de-identified] : General: No distress, well nourished Eyes: Normal Sclera, EOMI, PERRL ENT: Normal appearance of the nose, normal oropharynx Neck/Thyroid: No cervical lymphadenopathy, thyroid gland 20 g in size, no thyroid nodules, non-tender Respiratory: No use of accessory muscles of respiration, vesicular breath sounds heard bilaterally, no crepitations or ronchi Cardiovascular: S1 and S2 heard and normal, no S3 or S4, no murmurs, radial pulse normal bilaterally Abdomen: soft, non-tender, no masses, normal bowel sounds Musculoskeletal: No swelling or deformities of joints of hands, no pedal edema Neurological: Normal range of motion in the hands, Normal brachioradialis reflexes bilaterally Psychiatry: Patient converses normally, good judgement and insight Skin: No rashes in hands, no nodules palpated in hands  [de-identified] : I defer DM foot exam to podiatry

## 2024-12-10 ENCOUNTER — APPOINTMENT (OUTPATIENT)
Dept: ENDOCRINOLOGY | Facility: CLINIC | Age: 89
End: 2024-12-10
Payer: MEDICARE

## 2024-12-10 VITALS
WEIGHT: 200 LBS | OXYGEN SATURATION: 95 % | DIASTOLIC BLOOD PRESSURE: 62 MMHG | RESPIRATION RATE: 17 BRPM | HEIGHT: 69 IN | SYSTOLIC BLOOD PRESSURE: 124 MMHG | BODY MASS INDEX: 29.62 KG/M2 | HEART RATE: 62 BPM | TEMPERATURE: 97.5 F

## 2024-12-10 DIAGNOSIS — E11.9 TYPE 2 DIABETES MELLITUS W/OUT COMPLICATIONS: ICD-10-CM

## 2024-12-10 DIAGNOSIS — E11.29 TYPE 2 DIABETES MELLITUS WITH OTHER DIABETIC KIDNEY COMPLICATION: ICD-10-CM

## 2024-12-10 DIAGNOSIS — R80.9 TYPE 2 DIABETES MELLITUS WITH OTHER DIABETIC KIDNEY COMPLICATION: ICD-10-CM

## 2024-12-10 DIAGNOSIS — E78.5 HYPERLIPIDEMIA, UNSPECIFIED: ICD-10-CM

## 2024-12-10 DIAGNOSIS — I10 ESSENTIAL (PRIMARY) HYPERTENSION: ICD-10-CM

## 2024-12-10 PROCEDURE — G2211 COMPLEX E/M VISIT ADD ON: CPT

## 2024-12-10 PROCEDURE — 99214 OFFICE O/P EST MOD 30 MIN: CPT

## 2024-12-10 PROCEDURE — 99204 OFFICE O/P NEW MOD 45 MIN: CPT

## 2025-03-10 ENCOUNTER — APPOINTMENT (OUTPATIENT)
Dept: ENDOCRINOLOGY | Facility: CLINIC | Age: 89
End: 2025-03-10
Payer: MEDICARE

## 2025-03-10 VITALS
BODY MASS INDEX: 29.62 KG/M2 | WEIGHT: 200 LBS | RESPIRATION RATE: 17 BRPM | OXYGEN SATURATION: 97 % | TEMPERATURE: 97.4 F | DIASTOLIC BLOOD PRESSURE: 76 MMHG | HEIGHT: 69 IN | SYSTOLIC BLOOD PRESSURE: 128 MMHG | HEART RATE: 72 BPM

## 2025-03-10 DIAGNOSIS — E11.9 TYPE 2 DIABETES MELLITUS W/OUT COMPLICATIONS: ICD-10-CM

## 2025-03-10 DIAGNOSIS — E78.5 HYPERLIPIDEMIA, UNSPECIFIED: ICD-10-CM

## 2025-03-10 DIAGNOSIS — R80.9 TYPE 2 DIABETES MELLITUS WITH OTHER DIABETIC KIDNEY COMPLICATION: ICD-10-CM

## 2025-03-10 DIAGNOSIS — I10 ESSENTIAL (PRIMARY) HYPERTENSION: ICD-10-CM

## 2025-03-10 DIAGNOSIS — E11.29 TYPE 2 DIABETES MELLITUS WITH OTHER DIABETIC KIDNEY COMPLICATION: ICD-10-CM

## 2025-03-10 LAB — HBA1C MFR BLD HPLC: 6.9

## 2025-03-10 PROCEDURE — 83036 HEMOGLOBIN GLYCOSYLATED A1C: CPT | Mod: QW

## 2025-03-10 PROCEDURE — G2211 COMPLEX E/M VISIT ADD ON: CPT | Mod: NC

## 2025-03-10 PROCEDURE — 99214 OFFICE O/P EST MOD 30 MIN: CPT

## 2025-03-12 NOTE — ED PROVIDER NOTE - CADM POA PRESS ULCER
Chief Complaint   Patient presents with    Leg Injury       Patient reports he fell 2 days ago and since has been having left leg pain. Patient is taking blood thinners currently. Patient also has a swollen bruise on his right forearm from a fall Saturday. Patient is unsure if he hit his head.     Past Medical History:   Diagnosis Date    Bell's palsy     Chronic kidney disease     Diabetes mellitus  (CMD)     Diabetic neuropathy  (CMD)     Diverticulitis     Gastroesophageal reflux disease     Vertigo      Past Surgical History:   Procedure Laterality Date    Cholecystectomy      Eye surgery      Hernia repair      Ir renal biopsy left Left 09/14/2022    MEDICAL    Peritoneal catheter insertion  07/31/2024    1. Exploratory laparoscopy with extensive lysis of adhesions 2. Placement of peritoneal dialysis catheter    Right and left heart cath/possible ptca  10/10/2024       
No

## 2025-05-12 NOTE — ED PROVIDER NOTE - CLINICAL SUMMARY MEDICAL DECISION MAKING FREE TEXT BOX
LM  requesting a return call to reschedule pt for missed appt last week.    84 M w/ PMH Of hydrocephalus s/o venticulostomy, recurrent UTIs, HTN, p/w increasing confusion at home w/ cough that is productive. pt has been having subjective fevers, no vomiting. reports mild epigastric and upper abdominal pain. Pt was brought by wife to PCP today who checked a urine did blood work and cxr. No obvious pneumonia. elevated WBC, w unknown eitiology possible UTI.   Pt is tired appearing, has crackle and rhonchi, L > R  Pt has no abdominal tenderness and denies testicular tenderness or discharge  Pt had elevated WBC w/ L shift. Concern for hospital acquired pneumonia as pt was recently in a respite care for 25 days and has only been home for 2 weeks. Pt will be admitted w/ vnacomycin jobro and rocephin. family agreeable to admisison CURB 65 >2    Additionally pt is requiring 2 L of NC to maintain oxygent saturation above 92 percent while at rest.

## 2025-06-16 ENCOUNTER — APPOINTMENT (OUTPATIENT)
Dept: ENDOCRINOLOGY | Facility: CLINIC | Age: 89
End: 2025-06-16
Payer: MEDICARE

## 2025-06-16 VITALS
SYSTOLIC BLOOD PRESSURE: 138 MMHG | RESPIRATION RATE: 19 BRPM | HEIGHT: 69 IN | WEIGHT: 200 LBS | DIASTOLIC BLOOD PRESSURE: 72 MMHG | BODY MASS INDEX: 29.62 KG/M2 | TEMPERATURE: 97.3 F | OXYGEN SATURATION: 99 % | HEART RATE: 55 BPM

## 2025-06-16 PROCEDURE — 99214 OFFICE O/P EST MOD 30 MIN: CPT

## 2025-06-16 PROCEDURE — G2211 COMPLEX E/M VISIT ADD ON: CPT
